# Patient Record
Sex: FEMALE | Race: WHITE | NOT HISPANIC OR LATINO | Employment: OTHER | URBAN - METROPOLITAN AREA
[De-identification: names, ages, dates, MRNs, and addresses within clinical notes are randomized per-mention and may not be internally consistent; named-entity substitution may affect disease eponyms.]

---

## 2018-03-22 DIAGNOSIS — R92.8 ABNORMAL MAMMOGRAM: Primary | ICD-10-CM

## 2018-04-20 DIAGNOSIS — M25.559 HIP PAIN: Primary | ICD-10-CM

## 2018-04-23 ENCOUNTER — HOSPITAL ENCOUNTER (OUTPATIENT)
Dept: RADIOLOGY | Facility: HOSPITAL | Age: 63
Discharge: HOME | End: 2018-04-23
Attending: NURSE PRACTITIONER
Payer: MEDICARE

## 2018-04-23 DIAGNOSIS — M25.559 HIP PAIN: ICD-10-CM

## 2018-04-23 PROCEDURE — 73721 MRI JNT OF LWR EXTRE W/O DYE: CPT | Mod: TC,RT

## 2018-04-23 PROCEDURE — 73721 MRI JNT OF LWR EXTRE W/O DYE: CPT | Mod: 26,RT,, | Performed by: RADIOLOGY

## 2018-07-16 ENCOUNTER — HOSPITAL ENCOUNTER (OUTPATIENT)
Dept: RADIOLOGY | Facility: HOSPITAL | Age: 63
Discharge: HOME | End: 2018-07-16
Attending: OBSTETRICS & GYNECOLOGY
Payer: MEDICARE

## 2018-07-16 VITALS — BODY MASS INDEX: 28.88 KG/M2 | WEIGHT: 169.13 LBS | HEIGHT: 64 IN

## 2018-07-16 DIAGNOSIS — R92.8 ABNORMAL MAMMOGRAM: ICD-10-CM

## 2018-07-16 PROCEDURE — 77066 DX MAMMO INCL CAD BI: CPT | Mod: TC

## 2018-07-16 PROCEDURE — 77066 DX MAMMO INCL CAD BI: CPT | Mod: 26,,, | Performed by: RADIOLOGY

## 2021-01-04 RX ORDER — ASPIRIN/ACETAMINOPHEN/CAFFEINE 250-250-65
1 TABLET ORAL
COMMUNITY

## 2021-01-04 RX ORDER — ASPIRIN 81 MG/1
1 TABLET ORAL DAILY
COMMUNITY

## 2021-01-04 RX ORDER — ALBUTEROL SULFATE 90 UG/1
2 AEROSOL, METERED RESPIRATORY (INHALATION) 4 TIMES DAILY PRN
COMMUNITY
End: 2021-01-05

## 2021-01-04 RX ORDER — NEBIVOLOL 10 MG/1
1 TABLET ORAL DAILY
COMMUNITY
Start: 2017-11-22 | End: 2021-06-11 | Stop reason: SDUPTHER

## 2021-01-04 RX ORDER — CYCLOBENZAPRINE HCL 10 MG
1 TABLET ORAL NIGHTLY
COMMUNITY
End: 2021-06-11 | Stop reason: SDUPTHER

## 2021-01-04 RX ORDER — CELECOXIB 200 MG/1
1 CAPSULE ORAL DAILY
COMMUNITY
End: 2021-06-21 | Stop reason: SDUPTHER

## 2021-01-04 RX ORDER — DOXEPIN HYDROCHLORIDE 25 MG/1
2 CAPSULE ORAL NIGHTLY
COMMUNITY
End: 2021-01-05 | Stop reason: SDUPTHER

## 2021-01-04 RX ORDER — FLUTICASONE PROPIONATE AND SALMETEROL 250; 50 UG/1; UG/1
1 POWDER RESPIRATORY (INHALATION) 2 TIMES DAILY
COMMUNITY
Start: 2017-12-12 | End: 2021-01-05

## 2021-01-04 RX ORDER — MONTELUKAST SODIUM 10 MG/1
1 TABLET ORAL DAILY
COMMUNITY
End: 2021-01-05 | Stop reason: SDUPTHER

## 2021-01-04 RX ORDER — BUPROPION HYDROCHLORIDE 100 MG/1
1 TABLET ORAL 2 TIMES DAILY
COMMUNITY
End: 2021-12-07

## 2021-01-04 RX ORDER — HYDROCODONE BITARTRATE AND ACETAMINOPHEN 10; 325 MG/1; MG/1
1 TABLET ORAL 3 TIMES DAILY PRN
COMMUNITY
End: 2021-01-05 | Stop reason: SDUPTHER

## 2021-01-04 RX ORDER — PANTOPRAZOLE SODIUM 40 MG/1
1 TABLET, DELAYED RELEASE ORAL DAILY
COMMUNITY
End: 2021-06-21 | Stop reason: SDUPTHER

## 2021-01-04 RX ORDER — FLUTICASONE PROPIONATE 50 MCG
1 SPRAY, SUSPENSION (ML) NASAL DAILY
COMMUNITY
End: 2022-07-06 | Stop reason: SDUPTHER

## 2021-01-04 RX ORDER — CETIRIZINE HYDROCHLORIDE 10 MG/1
1 TABLET ORAL DAILY
COMMUNITY
End: 2021-01-05 | Stop reason: SDUPTHER

## 2021-01-05 ENCOUNTER — OFFICE VISIT (OUTPATIENT)
Dept: FAMILY MEDICINE | Facility: CLINIC | Age: 66
End: 2021-01-05
Payer: MEDICARE

## 2021-01-05 VITALS
HEART RATE: 98 BPM | TEMPERATURE: 97 F | SYSTOLIC BLOOD PRESSURE: 140 MMHG | WEIGHT: 178 LBS | OXYGEN SATURATION: 98 % | DIASTOLIC BLOOD PRESSURE: 80 MMHG | HEIGHT: 64 IN | BODY MASS INDEX: 30.39 KG/M2

## 2021-01-05 DIAGNOSIS — J44.9 CHRONIC OBSTRUCTIVE PULMONARY DISEASE, UNSPECIFIED COPD TYPE: Primary | ICD-10-CM

## 2021-01-05 DIAGNOSIS — E78.5 HYPERLIPIDEMIA, UNSPECIFIED HYPERLIPIDEMIA TYPE: ICD-10-CM

## 2021-01-05 DIAGNOSIS — N95.2 VAGINAL ATROPHY: ICD-10-CM

## 2021-01-05 DIAGNOSIS — F33.42 RECURRENT MAJOR DEPRESSIVE DISORDER, IN FULL REMISSION: ICD-10-CM

## 2021-01-05 DIAGNOSIS — I10 HYPERTENSION, UNSPECIFIED TYPE: ICD-10-CM

## 2021-01-05 PROBLEM — F32.A DEPRESSIVE DISORDER: Status: ACTIVE | Noted: 2021-01-05

## 2021-01-05 PROBLEM — K21.9 GASTROESOPHAGEAL REFLUX DISEASE: Status: ACTIVE | Noted: 2017-05-02

## 2021-01-05 PROBLEM — M51.36 DEGENERATION OF LUMBAR INTERVERTEBRAL DISC: Status: ACTIVE | Noted: 2018-10-01

## 2021-01-05 PROBLEM — M51.369 DEGENERATION OF LUMBAR INTERVERTEBRAL DISC: Status: ACTIVE | Noted: 2018-10-01

## 2021-01-05 PROBLEM — J30.9 ALLERGIC RHINITIS: Status: ACTIVE | Noted: 2021-01-05

## 2021-01-05 PROBLEM — Z87.891 HISTORY OF SMOKING: Status: ACTIVE | Noted: 2017-06-02

## 2021-01-05 PROCEDURE — 99214 OFFICE O/P EST MOD 30 MIN: CPT | Mod: S$GLB,,, | Performed by: FAMILY MEDICINE

## 2021-01-05 PROCEDURE — 99214 PR OFFICE/OUTPT VISIT, EST, LEVL IV, 30-39 MIN: ICD-10-PCS | Mod: S$GLB,,, | Performed by: FAMILY MEDICINE

## 2021-01-05 RX ORDER — HYDROCHLOROTHIAZIDE 25 MG/1
25 TABLET ORAL DAILY
Qty: 90 TABLET | Refills: 3 | Status: SHIPPED | OUTPATIENT
Start: 2021-01-05 | End: 2022-03-10 | Stop reason: SDUPTHER

## 2021-01-05 RX ORDER — TRAVOPROST OPHTHALMIC SOLUTION 0.04 MG/ML
SOLUTION OPHTHALMIC
COMMUNITY
Start: 2020-12-02 | End: 2023-02-07

## 2021-01-05 RX ORDER — ALBUTEROL SULFATE 90 UG/1
2 AEROSOL, METERED RESPIRATORY (INHALATION) EVERY 6 HOURS PRN
Qty: 25.5 G | Refills: 3 | Status: SHIPPED | OUTPATIENT
Start: 2021-01-05 | End: 2021-04-07 | Stop reason: SDUPTHER

## 2021-01-05 RX ORDER — HYDROCODONE BITARTRATE AND ACETAMINOPHEN 10; 325 MG/1; MG/1
1 TABLET ORAL EVERY 8 HOURS PRN
Qty: 90 TABLET | Refills: 0 | Status: SHIPPED | OUTPATIENT
Start: 2021-01-05 | End: 2021-03-03 | Stop reason: SDUPTHER

## 2021-01-05 RX ORDER — HYDROCODONE BITARTRATE AND ACETAMINOPHEN 10; 325 MG/1; MG/1
1 TABLET ORAL EVERY 8 HOURS PRN
Qty: 90 TABLET | Refills: 0 | Status: SHIPPED | OUTPATIENT
Start: 2021-02-03 | End: 2021-03-03 | Stop reason: SDUPTHER

## 2021-01-05 RX ORDER — DOXYCYCLINE 100 MG/1
CAPSULE ORAL
COMMUNITY
Start: 2020-11-13 | End: 2021-04-07

## 2021-01-05 RX ORDER — BUDESONIDE 1 MG/2ML
2 INHALANT ORAL
COMMUNITY
End: 2021-06-09 | Stop reason: SDUPTHER

## 2021-01-05 RX ORDER — HYDROCHLOROTHIAZIDE 25 MG/1
TABLET ORAL
COMMUNITY
Start: 2020-12-18 | End: 2021-01-05 | Stop reason: SDUPTHER

## 2021-01-05 RX ORDER — CETIRIZINE HYDROCHLORIDE 10 MG/1
10 TABLET ORAL DAILY
Qty: 90 TABLET | Refills: 5 | Status: SHIPPED | OUTPATIENT
Start: 2021-01-05 | End: 2022-03-10 | Stop reason: SDUPTHER

## 2021-01-05 RX ORDER — FLUCONAZOLE 150 MG/1
150 TABLET ORAL
Qty: 4 TABLET | Refills: 1 | Status: SHIPPED | OUTPATIENT
Start: 2021-01-05 | End: 2021-04-07

## 2021-01-05 RX ORDER — DOXEPIN HYDROCHLORIDE 25 MG/1
50 CAPSULE ORAL NIGHTLY
Qty: 180 CAPSULE | Refills: 3 | Status: SHIPPED | OUTPATIENT
Start: 2021-01-05 | End: 2021-09-08 | Stop reason: SDUPTHER

## 2021-01-05 RX ORDER — FLUCONAZOLE 150 MG/1
TABLET ORAL
COMMUNITY
Start: 2020-11-13 | End: 2021-01-05 | Stop reason: SDUPTHER

## 2021-01-05 RX ORDER — IPRATROPIUM BROMIDE AND ALBUTEROL SULFATE 2.5; .5 MG/3ML; MG/3ML
3 SOLUTION RESPIRATORY (INHALATION)
Qty: 1 BOX | Refills: 5 | Status: SHIPPED | OUTPATIENT
Start: 2021-01-05 | End: 2021-06-09 | Stop reason: SDUPTHER

## 2021-01-05 RX ORDER — MONTELUKAST SODIUM 10 MG/1
10 TABLET ORAL DAILY
Qty: 90 TABLET | Refills: 3 | Status: SHIPPED | OUTPATIENT
Start: 2021-01-05 | End: 2022-03-10 | Stop reason: SDUPTHER

## 2021-01-05 RX ORDER — PREDNISONE 20 MG/1
TABLET ORAL
COMMUNITY
Start: 2020-11-13 | End: 2021-03-03

## 2021-01-05 RX ORDER — IPRATROPIUM BROMIDE AND ALBUTEROL SULFATE 2.5; .5 MG/3ML; MG/3ML
SOLUTION RESPIRATORY (INHALATION)
COMMUNITY
Start: 2020-12-10 | End: 2021-01-05 | Stop reason: SDUPTHER

## 2021-02-12 ENCOUNTER — TELEPHONE (OUTPATIENT)
Dept: FAMILY MEDICINE | Facility: CLINIC | Age: 66
End: 2021-02-12

## 2021-03-02 DIAGNOSIS — J44.9 CHRONIC OBSTRUCTIVE PULMONARY DISEASE, UNSPECIFIED COPD TYPE: ICD-10-CM

## 2021-03-02 RX ORDER — TRAMADOL HYDROCHLORIDE 50 MG/1
50 TABLET ORAL EVERY 6 HOURS PRN
COMMUNITY
Start: 2021-02-16 | End: 2021-04-07

## 2021-03-02 RX ORDER — GABAPENTIN 300 MG/1
CAPSULE ORAL
COMMUNITY
Start: 2021-02-16 | End: 2022-08-23

## 2021-03-03 ENCOUNTER — OFFICE VISIT (OUTPATIENT)
Dept: FAMILY MEDICINE | Facility: CLINIC | Age: 66
End: 2021-03-03
Payer: MEDICARE

## 2021-03-03 VITALS
SYSTOLIC BLOOD PRESSURE: 128 MMHG | WEIGHT: 181.63 LBS | TEMPERATURE: 98 F | HEIGHT: 64 IN | BODY MASS INDEX: 31.01 KG/M2 | HEART RATE: 75 BPM | DIASTOLIC BLOOD PRESSURE: 80 MMHG

## 2021-03-03 DIAGNOSIS — R07.81 PLEURITIC PAIN: ICD-10-CM

## 2021-03-03 DIAGNOSIS — J44.9 CHRONIC OBSTRUCTIVE PULMONARY DISEASE, UNSPECIFIED COPD TYPE: ICD-10-CM

## 2021-03-03 DIAGNOSIS — M51.36 DEGENERATION OF LUMBAR INTERVERTEBRAL DISC: Primary | ICD-10-CM

## 2021-03-03 DIAGNOSIS — Z96.652 STATUS POST TOTAL LEFT KNEE REPLACEMENT: ICD-10-CM

## 2021-03-03 DIAGNOSIS — G89.4 CHRONIC PAIN SYNDROME: ICD-10-CM

## 2021-03-03 PROCEDURE — 99213 PR OFFICE/OUTPT VISIT, EST, LEVL III, 20-29 MIN: ICD-10-PCS | Mod: S$GLB,,, | Performed by: NURSE PRACTITIONER

## 2021-03-03 PROCEDURE — 99213 OFFICE O/P EST LOW 20 MIN: CPT | Mod: S$GLB,,, | Performed by: NURSE PRACTITIONER

## 2021-03-03 RX ORDER — HYDROCODONE BITARTRATE AND ACETAMINOPHEN 10; 325 MG/1; MG/1
1 TABLET ORAL EVERY 8 HOURS PRN
Qty: 90 TABLET | Refills: 0 | Status: SHIPPED | OUTPATIENT
Start: 2021-03-03 | End: 2021-03-03

## 2021-03-03 RX ORDER — HYDROCODONE BITARTRATE AND ACETAMINOPHEN 10; 325 MG/1; MG/1
1 TABLET ORAL EVERY 8 HOURS PRN
Qty: 90 TABLET | Refills: 0 | Status: SHIPPED | OUTPATIENT
Start: 2021-03-03 | End: 2023-03-20

## 2021-03-03 RX ORDER — PREDNISONE 20 MG/1
20 TABLET ORAL DAILY
Qty: 5 TABLET | Refills: 0 | Status: SHIPPED | OUTPATIENT
Start: 2021-03-03 | End: 2021-04-07

## 2021-03-03 RX ORDER — HYDROCODONE BITARTRATE AND ACETAMINOPHEN 10; 325 MG/1; MG/1
1 TABLET ORAL EVERY 8 HOURS PRN
Qty: 90 TABLET | Refills: 0 | Status: SHIPPED | OUTPATIENT
Start: 2021-04-03 | End: 2021-03-03

## 2021-03-03 RX ORDER — HYDROCODONE BITARTRATE AND ACETAMINOPHEN 10; 325 MG/1; MG/1
1 TABLET ORAL EVERY 8 HOURS PRN
Qty: 90 TABLET | Refills: 0 | Status: SHIPPED | OUTPATIENT
Start: 2021-04-03 | End: 2021-06-09 | Stop reason: SDUPTHER

## 2021-03-05 DIAGNOSIS — Z12.11 COLON CANCER SCREENING: ICD-10-CM

## 2021-03-08 RX ORDER — PREDNISONE 20 MG/1
TABLET ORAL
OUTPATIENT
Start: 2021-03-08

## 2021-03-08 RX ORDER — HYDROCODONE BITARTRATE AND ACETAMINOPHEN 10; 325 MG/1; MG/1
1 TABLET ORAL EVERY 8 HOURS PRN
Qty: 90 TABLET | Refills: 0 | OUTPATIENT
Start: 2021-03-08

## 2021-03-16 ENCOUNTER — TELEPHONE (OUTPATIENT)
Dept: FAMILY MEDICINE | Facility: CLINIC | Age: 66
End: 2021-03-16

## 2021-03-30 ENCOUNTER — TELEPHONE (OUTPATIENT)
Dept: FAMILY MEDICINE | Facility: CLINIC | Age: 66
End: 2021-03-30

## 2021-04-07 ENCOUNTER — OFFICE VISIT (OUTPATIENT)
Dept: FAMILY MEDICINE | Facility: CLINIC | Age: 66
End: 2021-04-07
Payer: MEDICAID

## 2021-04-07 VITALS
WEIGHT: 178.63 LBS | DIASTOLIC BLOOD PRESSURE: 80 MMHG | SYSTOLIC BLOOD PRESSURE: 130 MMHG | HEIGHT: 64 IN | OXYGEN SATURATION: 97 % | TEMPERATURE: 99 F | HEART RATE: 79 BPM | BODY MASS INDEX: 30.5 KG/M2

## 2021-04-07 DIAGNOSIS — J44.9 CHRONIC OBSTRUCTIVE PULMONARY DISEASE, UNSPECIFIED COPD TYPE: Primary | ICD-10-CM

## 2021-04-07 DIAGNOSIS — F17.200 NICOTINE DEPENDENCE WITH CURRENT USE: ICD-10-CM

## 2021-04-07 PROCEDURE — 99213 OFFICE O/P EST LOW 20 MIN: CPT | Mod: S$GLB,,, | Performed by: NURSE PRACTITIONER

## 2021-04-07 PROCEDURE — 99213 PR OFFICE/OUTPT VISIT, EST, LEVL III, 20-29 MIN: ICD-10-PCS | Mod: S$GLB,,, | Performed by: NURSE PRACTITIONER

## 2021-04-07 RX ORDER — ALBUTEROL SULFATE 90 UG/1
2 AEROSOL, METERED RESPIRATORY (INHALATION) EVERY 6 HOURS PRN
Qty: 25.5 G | Refills: 3 | Status: SHIPPED | OUTPATIENT
Start: 2021-04-07 | End: 2022-03-10 | Stop reason: SDUPTHER

## 2021-04-07 RX ORDER — IBUPROFEN 200 MG
1 TABLET ORAL DAILY
Qty: 28 PATCH | Refills: 0 | Status: SHIPPED | OUTPATIENT
Start: 2021-04-07 | End: 2021-06-09

## 2021-04-07 RX ORDER — BUDESONIDE AND FORMOTEROL FUMARATE DIHYDRATE 160; 4.5 UG/1; UG/1
2 AEROSOL RESPIRATORY (INHALATION) EVERY 12 HOURS
Qty: 10.2 G | Refills: 5 | Status: SHIPPED | OUTPATIENT
Start: 2021-04-07 | End: 2021-04-09 | Stop reason: ALTCHOICE

## 2021-04-07 RX ORDER — PREDNISONE 20 MG/1
20 TABLET ORAL DAILY
Qty: 5 TABLET | Refills: 0 | Status: SHIPPED | OUTPATIENT
Start: 2021-04-07 | End: 2021-06-09

## 2021-04-08 ENCOUNTER — TELEPHONE (OUTPATIENT)
Dept: FAMILY MEDICINE | Facility: CLINIC | Age: 66
End: 2021-04-08

## 2021-04-08 DIAGNOSIS — J44.9 CHRONIC OBSTRUCTIVE PULMONARY DISEASE, UNSPECIFIED COPD TYPE: Primary | ICD-10-CM

## 2021-04-09 ENCOUNTER — TELEPHONE (OUTPATIENT)
Dept: FAMILY MEDICINE | Facility: CLINIC | Age: 66
End: 2021-04-09

## 2021-04-09 DIAGNOSIS — J44.9 CHRONIC OBSTRUCTIVE PULMONARY DISEASE, UNSPECIFIED COPD TYPE: ICD-10-CM

## 2021-04-12 ENCOUNTER — TELEPHONE (OUTPATIENT)
Dept: FAMILY MEDICINE | Facility: CLINIC | Age: 66
End: 2021-04-12

## 2021-06-09 ENCOUNTER — OFFICE VISIT (OUTPATIENT)
Dept: FAMILY MEDICINE | Facility: CLINIC | Age: 66
End: 2021-06-09
Payer: MEDICAID

## 2021-06-09 ENCOUNTER — TELEPHONE (OUTPATIENT)
Dept: FAMILY MEDICINE | Facility: CLINIC | Age: 66
End: 2021-06-09

## 2021-06-09 VITALS
BODY MASS INDEX: 30.3 KG/M2 | WEIGHT: 177.5 LBS | TEMPERATURE: 98 F | OXYGEN SATURATION: 96 % | DIASTOLIC BLOOD PRESSURE: 80 MMHG | HEART RATE: 88 BPM | HEIGHT: 64 IN | SYSTOLIC BLOOD PRESSURE: 124 MMHG

## 2021-06-09 DIAGNOSIS — F17.210 NICOTINE DEPENDENCE, CIGARETTES, UNCOMPLICATED: ICD-10-CM

## 2021-06-09 DIAGNOSIS — J44.9 CHRONIC OBSTRUCTIVE PULMONARY DISEASE, UNSPECIFIED COPD TYPE: ICD-10-CM

## 2021-06-09 PROCEDURE — 99214 PR OFFICE/OUTPT VISIT, EST, LEVL IV, 30-39 MIN: ICD-10-PCS | Mod: S$GLB,,, | Performed by: NURSE PRACTITIONER

## 2021-06-09 PROCEDURE — 99214 OFFICE O/P EST MOD 30 MIN: CPT | Mod: S$GLB,,, | Performed by: NURSE PRACTITIONER

## 2021-06-09 RX ORDER — BUDESONIDE AND FORMOTEROL FUMARATE DIHYDRATE 160; 4.5 UG/1; UG/1
2 AEROSOL RESPIRATORY (INHALATION) EVERY 12 HOURS
Qty: 10.2 G | Refills: 5 | Status: SHIPPED | OUTPATIENT
Start: 2021-06-09 | End: 2022-03-10 | Stop reason: SDUPTHER

## 2021-06-09 RX ORDER — IPRATROPIUM BROMIDE AND ALBUTEROL SULFATE 2.5; .5 MG/3ML; MG/3ML
3 SOLUTION RESPIRATORY (INHALATION)
Qty: 1 BOX | Refills: 5 | Status: SHIPPED | OUTPATIENT
Start: 2021-06-09 | End: 2022-02-11 | Stop reason: SDUPTHER

## 2021-06-11 ENCOUNTER — TELEPHONE (OUTPATIENT)
Dept: FAMILY MEDICINE | Facility: CLINIC | Age: 66
End: 2021-06-11

## 2021-06-11 DIAGNOSIS — I10 HYPERTENSION, UNSPECIFIED TYPE: ICD-10-CM

## 2021-06-11 DIAGNOSIS — M51.36 DEGENERATION OF LUMBAR INTERVERTEBRAL DISC: Primary | ICD-10-CM

## 2021-06-11 RX ORDER — CYCLOBENZAPRINE HCL 10 MG
10 TABLET ORAL NIGHTLY
Qty: 90 TABLET | Refills: 3 | Status: SHIPPED | OUTPATIENT
Start: 2021-06-11

## 2021-06-11 RX ORDER — NEBIVOLOL 10 MG/1
10 TABLET ORAL DAILY
Qty: 90 TABLET | Refills: 3 | Status: SHIPPED | OUTPATIENT
Start: 2021-06-11 | End: 2022-03-17

## 2021-06-21 DIAGNOSIS — J44.9 CHRONIC OBSTRUCTIVE PULMONARY DISEASE, UNSPECIFIED COPD TYPE: ICD-10-CM

## 2021-06-21 RX ORDER — PANTOPRAZOLE SODIUM 40 MG/1
40 TABLET, DELAYED RELEASE ORAL DAILY
Qty: 90 TABLET | Refills: 3 | Status: SHIPPED | OUTPATIENT
Start: 2021-06-21 | End: 2022-03-10 | Stop reason: SDUPTHER

## 2021-06-21 RX ORDER — CELECOXIB 200 MG/1
200 CAPSULE ORAL DAILY
Qty: 90 CAPSULE | Refills: 1 | Status: SHIPPED | OUTPATIENT
Start: 2021-06-21 | End: 2021-09-08 | Stop reason: SDUPTHER

## 2021-06-22 ENCOUNTER — HOSPITAL ENCOUNTER (OUTPATIENT)
Dept: RADIOLOGY | Facility: HOSPITAL | Age: 66
Discharge: HOME | End: 2021-06-22
Attending: NURSE PRACTITIONER
Payer: MEDICARE

## 2021-06-22 DIAGNOSIS — J44.9 CHRONIC OBSTRUCTIVE PULMONARY DISEASE, UNSPECIFIED COPD TYPE: ICD-10-CM

## 2021-06-22 DIAGNOSIS — F17.210 NICOTINE DEPENDENCE, CIGARETTES, UNCOMPLICATED: ICD-10-CM

## 2021-06-22 PROCEDURE — 71271 CT THORAX LUNG CANCER SCR C-: CPT | Mod: TC,PO

## 2021-07-21 ENCOUNTER — OFFICE VISIT (OUTPATIENT)
Dept: FAMILY MEDICINE | Facility: CLINIC | Age: 66
End: 2021-07-21
Payer: MEDICARE

## 2021-07-21 VITALS
RESPIRATION RATE: 20 BRPM | TEMPERATURE: 99 F | BODY MASS INDEX: 30.09 KG/M2 | SYSTOLIC BLOOD PRESSURE: 142 MMHG | OXYGEN SATURATION: 88 % | DIASTOLIC BLOOD PRESSURE: 79 MMHG | WEIGHT: 176.25 LBS | HEART RATE: 96 BPM | HEIGHT: 64 IN

## 2021-07-21 DIAGNOSIS — J44.9 CHRONIC OBSTRUCTIVE PULMONARY DISEASE, UNSPECIFIED COPD TYPE: ICD-10-CM

## 2021-07-21 DIAGNOSIS — I10 ESSENTIAL HYPERTENSION: ICD-10-CM

## 2021-07-21 DIAGNOSIS — T36.95XA ANTIBIOTIC-INDUCED YEAST INFECTION: ICD-10-CM

## 2021-07-21 DIAGNOSIS — L03.114 CELLULITIS OF LEFT ARM: Primary | ICD-10-CM

## 2021-07-21 DIAGNOSIS — B37.9 ANTIBIOTIC-INDUCED YEAST INFECTION: ICD-10-CM

## 2021-07-21 PROCEDURE — 99213 OFFICE O/P EST LOW 20 MIN: CPT | Mod: S$GLB,,, | Performed by: NURSE PRACTITIONER

## 2021-07-21 PROCEDURE — 99213 PR OFFICE/OUTPT VISIT, EST, LEVL III, 20-29 MIN: ICD-10-PCS | Mod: S$GLB,,, | Performed by: NURSE PRACTITIONER

## 2021-07-21 RX ORDER — FLUCONAZOLE 150 MG/1
150 TABLET ORAL DAILY
Qty: 1 TABLET | Refills: 0 | Status: SHIPPED | OUTPATIENT
Start: 2021-07-21 | End: 2021-07-22

## 2021-07-21 RX ORDER — CEPHALEXIN 500 MG/1
500 CAPSULE ORAL EVERY 12 HOURS
Qty: 20 CAPSULE | Refills: 0 | Status: SHIPPED | OUTPATIENT
Start: 2021-07-21 | End: 2021-09-08

## 2021-09-08 ENCOUNTER — OFFICE VISIT (OUTPATIENT)
Dept: FAMILY MEDICINE | Facility: CLINIC | Age: 66
End: 2021-09-08
Payer: MEDICARE

## 2021-09-08 VITALS
TEMPERATURE: 98 F | BODY MASS INDEX: 30.45 KG/M2 | HEIGHT: 64 IN | OXYGEN SATURATION: 96 % | HEART RATE: 78 BPM | DIASTOLIC BLOOD PRESSURE: 80 MMHG | WEIGHT: 178.38 LBS | SYSTOLIC BLOOD PRESSURE: 124 MMHG

## 2021-09-08 DIAGNOSIS — F33.42 RECURRENT MAJOR DEPRESSIVE DISORDER, IN FULL REMISSION: ICD-10-CM

## 2021-09-08 DIAGNOSIS — F32.A DEPRESSIVE DISORDER: ICD-10-CM

## 2021-09-08 DIAGNOSIS — I10 ESSENTIAL HYPERTENSION: ICD-10-CM

## 2021-09-08 DIAGNOSIS — M51.36 DEGENERATION OF LUMBAR INTERVERTEBRAL DISC: ICD-10-CM

## 2021-09-08 DIAGNOSIS — J44.9 CHRONIC OBSTRUCTIVE PULMONARY DISEASE, UNSPECIFIED COPD TYPE: ICD-10-CM

## 2021-09-08 DIAGNOSIS — F17.200 NICOTINE DEPENDENCE WITH CURRENT USE: Primary | ICD-10-CM

## 2021-09-08 DIAGNOSIS — N95.2 ATROPHIC VAGINITIS: ICD-10-CM

## 2021-09-08 DIAGNOSIS — B37.31 VAGINAL CANDIDIASIS: ICD-10-CM

## 2021-09-08 PROCEDURE — 99214 PR OFFICE/OUTPT VISIT, EST, LEVL IV, 30-39 MIN: ICD-10-PCS | Mod: S$GLB,,, | Performed by: NURSE PRACTITIONER

## 2021-09-08 PROCEDURE — 99214 OFFICE O/P EST MOD 30 MIN: CPT | Mod: S$GLB,,, | Performed by: NURSE PRACTITIONER

## 2021-09-08 RX ORDER — CELECOXIB 200 MG/1
200 CAPSULE ORAL DAILY
Qty: 90 CAPSULE | Refills: 1 | Status: SHIPPED | OUTPATIENT
Start: 2021-09-08 | End: 2022-03-10 | Stop reason: SDUPTHER

## 2021-09-08 RX ORDER — FLUCONAZOLE 150 MG/1
150 TABLET ORAL DAILY
Qty: 2 TABLET | Refills: 0 | Status: SHIPPED | OUTPATIENT
Start: 2021-09-08 | End: 2021-09-09

## 2021-09-08 RX ORDER — DOXEPIN HYDROCHLORIDE 25 MG/1
50 CAPSULE ORAL NIGHTLY
Qty: 180 CAPSULE | Refills: 3 | Status: SHIPPED | OUTPATIENT
Start: 2021-09-08 | End: 2022-09-08 | Stop reason: SDUPTHER

## 2021-10-11 ENCOUNTER — TELEPHONE (OUTPATIENT)
Dept: PEDIATRICS | Facility: CLINIC | Age: 66
End: 2021-10-11

## 2021-10-11 ENCOUNTER — OFFICE VISIT (OUTPATIENT)
Dept: FAMILY MEDICINE | Facility: CLINIC | Age: 66
End: 2021-10-11
Payer: MEDICARE

## 2021-10-11 VITALS
TEMPERATURE: 98 F | BODY MASS INDEX: 30.39 KG/M2 | OXYGEN SATURATION: 95 % | SYSTOLIC BLOOD PRESSURE: 144 MMHG | RESPIRATION RATE: 18 BRPM | HEIGHT: 64 IN | WEIGHT: 178 LBS | HEART RATE: 72 BPM | DIASTOLIC BLOOD PRESSURE: 76 MMHG

## 2021-10-11 DIAGNOSIS — N30.01 ACUTE CYSTITIS WITH HEMATURIA: ICD-10-CM

## 2021-10-11 DIAGNOSIS — G89.4 CHRONIC PAIN SYNDROME: ICD-10-CM

## 2021-10-11 DIAGNOSIS — E66.09 CLASS 1 OBESITY DUE TO EXCESS CALORIES WITHOUT SERIOUS COMORBIDITY WITH BODY MASS INDEX (BMI) OF 30.0 TO 30.9 IN ADULT: ICD-10-CM

## 2021-10-11 DIAGNOSIS — J44.1 COPD WITH ACUTE EXACERBATION: Primary | ICD-10-CM

## 2021-10-11 DIAGNOSIS — I10 PRIMARY HYPERTENSION: ICD-10-CM

## 2021-10-11 DIAGNOSIS — R30.0 BURNING WITH URINATION: ICD-10-CM

## 2021-10-11 DIAGNOSIS — J44.9 CHRONIC OBSTRUCTIVE PULMONARY DISEASE, UNSPECIFIED COPD TYPE: ICD-10-CM

## 2021-10-11 LAB
BILIRUB SERPL-MCNC: NORMAL MG/DL
BLOOD URINE, POC: ABNORMAL
CLARITY, POC UA: ABNORMAL
COLOR, POC UA: ABNORMAL
GLUCOSE UR QL STRIP: NORMAL
KETONES UR QL STRIP: NEGATIVE
LEUKOCYTE ESTERASE URINE, POC: ABNORMAL
NITRITE, POC UA: POSITIVE
PH, POC UA: 5
PROTEIN, POC: ABNORMAL
SPECIFIC GRAVITY, POC UA: 1.01
UROBILINOGEN, POC UA: NORMAL

## 2021-10-11 PROCEDURE — 87086 URINE CULTURE/COLONY COUNT: CPT | Performed by: FAMILY MEDICINE

## 2021-10-11 PROCEDURE — 87077 CULTURE AEROBIC IDENTIFY: CPT | Performed by: FAMILY MEDICINE

## 2021-10-11 PROCEDURE — 87186 SC STD MICRODIL/AGAR DIL: CPT | Performed by: FAMILY MEDICINE

## 2021-10-11 PROCEDURE — 99214 OFFICE O/P EST MOD 30 MIN: CPT | Mod: S$GLB,,, | Performed by: FAMILY MEDICINE

## 2021-10-11 PROCEDURE — 81002 POCT URINE DIPSTICK WITHOUT MICROSCOPE: ICD-10-PCS | Mod: S$GLB,,, | Performed by: FAMILY MEDICINE

## 2021-10-11 PROCEDURE — 81002 URINALYSIS NONAUTO W/O SCOPE: CPT | Mod: S$GLB,,, | Performed by: FAMILY MEDICINE

## 2021-10-11 PROCEDURE — 99214 PR OFFICE/OUTPT VISIT, EST, LEVL IV, 30-39 MIN: ICD-10-PCS | Mod: S$GLB,,, | Performed by: FAMILY MEDICINE

## 2021-10-11 PROCEDURE — 87088 URINE BACTERIA CULTURE: CPT | Performed by: FAMILY MEDICINE

## 2021-10-11 RX ORDER — DOXYCYCLINE HYCLATE 100 MG
100 TABLET ORAL 2 TIMES DAILY
Qty: 14 TABLET | Refills: 0 | Status: SHIPPED | OUTPATIENT
Start: 2021-10-11 | End: 2021-10-18

## 2021-10-11 RX ORDER — PHENAZOPYRIDINE HYDROCHLORIDE 200 MG/1
200 TABLET, FILM COATED ORAL 3 TIMES DAILY PRN
Qty: 20 TABLET | Refills: 0 | Status: SHIPPED | OUTPATIENT
Start: 2021-10-11 | End: 2021-10-13

## 2021-10-11 RX ORDER — METHYLPREDNISOLONE 4 MG/1
TABLET ORAL
Qty: 21 TABLET | Refills: 0 | Status: SHIPPED | OUTPATIENT
Start: 2021-10-11 | End: 2022-03-10 | Stop reason: ALTCHOICE

## 2021-10-14 LAB — BACTERIA UR CULT: ABNORMAL

## 2021-10-19 ENCOUNTER — TELEPHONE (OUTPATIENT)
Dept: PEDIATRICS | Facility: CLINIC | Age: 66
End: 2021-10-19

## 2021-10-20 ENCOUNTER — TELEPHONE (OUTPATIENT)
Dept: FAMILY MEDICINE | Facility: CLINIC | Age: 66
End: 2021-10-20

## 2021-10-27 DIAGNOSIS — Z12.31 OTHER SCREENING MAMMOGRAM: ICD-10-CM

## 2021-11-13 ENCOUNTER — OFFICE VISIT (OUTPATIENT)
Dept: URGENT CARE | Facility: CLINIC | Age: 66
End: 2021-11-13
Payer: MEDICARE

## 2021-11-13 VITALS
DIASTOLIC BLOOD PRESSURE: 73 MMHG | BODY MASS INDEX: 30.39 KG/M2 | RESPIRATION RATE: 18 BRPM | WEIGHT: 178 LBS | OXYGEN SATURATION: 92 % | TEMPERATURE: 99 F | HEIGHT: 64 IN | HEART RATE: 81 BPM | SYSTOLIC BLOOD PRESSURE: 147 MMHG

## 2021-11-13 DIAGNOSIS — M79.602 PAIN IN LEFT ARM: ICD-10-CM

## 2021-11-13 DIAGNOSIS — L02.414 ABSCESS OF LEFT FOREARM: Primary | ICD-10-CM

## 2021-11-13 PROCEDURE — 99213 PR OFFICE/OUTPT VISIT, EST, LEVL III, 20-29 MIN: ICD-10-PCS | Mod: S$GLB,,, | Performed by: NURSE PRACTITIONER

## 2021-11-13 PROCEDURE — 99213 OFFICE O/P EST LOW 20 MIN: CPT | Mod: S$GLB,,, | Performed by: NURSE PRACTITIONER

## 2021-11-13 RX ORDER — SULFAMETHOXAZOLE AND TRIMETHOPRIM 800; 160 MG/1; MG/1
1 TABLET ORAL 2 TIMES DAILY
Qty: 20 TABLET | Refills: 0 | Status: SHIPPED | OUTPATIENT
Start: 2021-11-13 | End: 2022-03-10

## 2021-11-13 RX ORDER — SULFAMETHOXAZOLE AND TRIMETHOPRIM 800; 160 MG/1; MG/1
1 TABLET ORAL 2 TIMES DAILY
Qty: 20 TABLET | Refills: 0 | Status: SHIPPED | OUTPATIENT
Start: 2021-11-13 | End: 2021-11-23

## 2021-11-21 LAB
BACTERIA SPEC CULT: ABNORMAL
MICROORGANISM/AGENT SPEC: ABNORMAL
OTHER ANTIBIOTIC SUSC ISLT: ABNORMAL

## 2021-12-06 DIAGNOSIS — F33.42 RECURRENT MAJOR DEPRESSIVE DISORDER, IN FULL REMISSION: ICD-10-CM

## 2021-12-07 RX ORDER — BUPROPION HYDROCHLORIDE 100 MG/1
100 TABLET ORAL 3 TIMES DAILY
Qty: 270 TABLET | Refills: 1 | Status: SHIPPED | OUTPATIENT
Start: 2021-12-07 | End: 2022-05-05

## 2022-01-05 ENCOUNTER — TELEPHONE (OUTPATIENT)
Dept: FAMILY MEDICINE | Facility: CLINIC | Age: 67
End: 2022-01-05
Payer: MEDICARE

## 2022-01-05 NOTE — TELEPHONE ENCOUNTER
----- Message from Vinita Chavez sent at 1/5/2022 10:31 AM CST -----  Contact: pt  Type:  Sooner Apoointment Request    Caller is requesting a sooner appointment.  Caller declined first available appointment listed below.  Caller will not accept being placed on the waitlist and is requesting a message be sent to doctor.    Name of Caller:  pt  When is the first available appointment?  1/25  Symptoms:  chest congestion  Best Call Back Number:  [phone]    Additional Information:

## 2022-01-05 NOTE — TELEPHONE ENCOUNTER
LM for pt to call back to notify to [facility] for treatment due to not having any available appointments

## 2022-01-10 ENCOUNTER — TELEPHONE (OUTPATIENT)
Dept: FAMILY MEDICINE | Facility: CLINIC | Age: 67
End: 2022-01-10
Payer: MEDICARE

## 2022-01-10 DIAGNOSIS — U07.1 COVID-19: Primary | ICD-10-CM

## 2022-01-10 DIAGNOSIS — J43.9 PULMONARY EMPHYSEMA, UNSPECIFIED EMPHYSEMA TYPE: ICD-10-CM

## 2022-01-10 NOTE — TELEPHONE ENCOUNTER
Called and spoke with patient - patient advises that she is very concerned with her history of respiratory issues that she will not fare well without the infusion being ordered. Patient advises that she was seen and tested positive for COVID Saturday at one of the local [facility] (located by [employer]). Explained to patient that I will consult with provider and will relay further information to her regarding whether or not provider can order without being seen.

## 2022-01-10 NOTE — TELEPHONE ENCOUNTER
Monoclonal antibody infusion order placed.  Scheduling should make contact with her to set this up

## 2022-01-10 NOTE — TELEPHONE ENCOUNTER
----- Message from Ted Brennan sent at 1/10/2022  9:36 AM CST -----  Contact: pt  Type: Needs Medical Advice  Who Called:  pt  Symptoms (please be specific):  tested positive for Covid  How long has patient had these symptoms:  Since Saturday    Best Call Back Number: [phone]    Additional Information: pt called and wants the Covid Antibody infusion.  Can you pleaser contact her and disucss and get the order from the NP?

## 2022-01-11 ENCOUNTER — TELEPHONE (OUTPATIENT)
Dept: FAMILY MEDICINE | Facility: CLINIC | Age: 67
End: 2022-01-11
Payer: MEDICARE

## 2022-01-11 NOTE — TELEPHONE ENCOUNTER
----- Message from Trang Joy sent at 1/11/2022 11:20 AM CST -----  Contact: pt  Type: Needs Medical Advice    Who Called: pt    Best Call Back Number: [phone]    Inquiry/Question: pt is calling in reference to her infusion. States the order was put in yesterday and hasn't heard anything yet.      Thank you~

## 2022-01-11 NOTE — TELEPHONE ENCOUNTER
Sent in basket message to [facility] clinical support staff requesting for them to reach out directly to the patient.

## 2022-01-13 ENCOUNTER — INFUSION (OUTPATIENT)
Dept: INFECTIOUS DISEASES | Facility: HOSPITAL | Age: 67
End: 2022-01-13
Attending: NURSE PRACTITIONER
Payer: MEDICARE

## 2022-01-13 VITALS
SYSTOLIC BLOOD PRESSURE: 140 MMHG | RESPIRATION RATE: 18 BRPM | TEMPERATURE: 98 F | HEART RATE: 75 BPM | OXYGEN SATURATION: 75 % | DIASTOLIC BLOOD PRESSURE: 62 MMHG

## 2022-01-13 DIAGNOSIS — U07.1 COVID-19: Primary | ICD-10-CM

## 2022-01-13 DIAGNOSIS — J43.9 PULMONARY EMPHYSEMA, UNSPECIFIED EMPHYSEMA TYPE: ICD-10-CM

## 2022-01-13 PROCEDURE — 63600175 PHARM REV CODE 636 W HCPCS: Performed by: NURSE PRACTITIONER

## 2022-01-13 PROCEDURE — M0243 CASIRIVI AND IMDEVI INFUSION: HCPCS | Performed by: NURSE PRACTITIONER

## 2022-01-13 PROCEDURE — 25000003 PHARM REV CODE 250: Performed by: NURSE PRACTITIONER

## 2022-01-13 RX ORDER — SODIUM CHLORIDE 0.9 % (FLUSH) 0.9 %
10 SYRINGE (ML) INJECTION
Status: DISCONTINUED | OUTPATIENT
Start: 2022-01-13 | End: 2022-03-10

## 2022-01-13 RX ORDER — ALBUTEROL SULFATE 90 UG/1
2 AEROSOL, METERED RESPIRATORY (INHALATION)
Status: DISCONTINUED | OUTPATIENT
Start: 2022-01-13 | End: 2022-03-10

## 2022-01-13 RX ORDER — ONDANSETRON 4 MG/1
4 TABLET, ORALLY DISINTEGRATING ORAL ONCE AS NEEDED
Status: DISCONTINUED | OUTPATIENT
Start: 2022-01-13 | End: 2022-03-10

## 2022-01-13 RX ORDER — EPINEPHRINE 0.3 MG/.3ML
0.3 INJECTION SUBCUTANEOUS
Status: DISCONTINUED | OUTPATIENT
Start: 2022-01-13 | End: 2022-03-10

## 2022-01-13 RX ORDER — DIPHENHYDRAMINE HYDROCHLORIDE 50 MG/ML
25 INJECTION INTRAMUSCULAR; INTRAVENOUS ONCE AS NEEDED
Status: DISCONTINUED | OUTPATIENT
Start: 2022-01-13 | End: 2022-03-10

## 2022-01-13 RX ORDER — ACETAMINOPHEN 325 MG/1
650 TABLET ORAL ONCE AS NEEDED
Status: DISCONTINUED | OUTPATIENT
Start: 2022-01-13 | End: 2022-03-10

## 2022-01-13 RX ADMIN — CASIRIVIMAB AND IMDEVIMAB 600 MG: 600; 600 INJECTION, SOLUTION, CONCENTRATE INTRAVENOUS at 08:01

## 2022-01-13 RX ADMIN — SODIUM CHLORIDE: 0.9 INJECTION, SOLUTION INTRAVENOUS at 08:01

## 2022-01-15 ENCOUNTER — NURSE TRIAGE (OUTPATIENT)
Dept: ADMINISTRATIVE | Facility: CLINIC | Age: 67
End: 2022-01-15
Payer: MEDICARE

## 2022-01-15 NOTE — TELEPHONE ENCOUNTER
Pt tested positive last Saturday or Sunday. Pt stated she still have yellow drainage when she blow her nose. Middle of her tongue has bumps. Pt received the infusion on Thursday. Sob with movement and exertion. Care advice recommend pt go to [facility]. Pt verbalized understanding.     Reason for Disposition   MILD difficulty breathing (e.g., minimal/no SOB at rest, SOB with walking, pulse <100)    Additional Information   Negative: SEVERE difficulty breathing (e.g., struggling for each breath, speaks in single words)   Negative: Difficult to awaken or acting confused (e.g., disoriented, slurred speech)   Negative: Bluish (or gray) lips or face now   Negative: Shock suspected (e.g., cold/pale/clammy skin, too weak to stand, low BP, rapid pulse)   Negative: Sounds like a life-threatening emergency to the triager   Negative: SEVERE or constant chest pain or pressure (Exception: mild central chest pain, present only when coughing)   Negative: MODERATE difficulty breathing (e.g., speaks in phrases, SOB even at rest, pulse 100-120)   Negative: [1] Headache AND [2] stiff neck (can't touch chin to chest)    Protocols used: CORONAVIRUS (COVID-19) DIAGNOSED OR [id number]

## 2022-02-11 DIAGNOSIS — J44.9 CHRONIC OBSTRUCTIVE PULMONARY DISEASE, UNSPECIFIED COPD TYPE: ICD-10-CM

## 2022-02-11 RX ORDER — IPRATROPIUM BROMIDE AND ALBUTEROL SULFATE 2.5; .5 MG/3ML; MG/3ML
3 SOLUTION RESPIRATORY (INHALATION)
Qty: 1 EACH | Refills: 5 | Status: SHIPPED | OUTPATIENT
Start: 2022-02-11 | End: 2022-03-10 | Stop reason: SDUPTHER

## 2022-02-11 NOTE — TELEPHONE ENCOUNTER
LOV:10/11/21 You    NOV:3/10/22 Fuentes    Preffered Pharmacy:[facility] - [place], [place] - [address]    Last Prescribed:    albuterol-ipratropium (DUO-NEB) 2.5 mg-0.5 mg/3 mL nebulizer solution 1 Box 5       Medication Pended

## 2022-02-11 NOTE — TELEPHONE ENCOUNTER
----- Message from Terri Coronado sent at 2/11/2022 11:44 AM CST -----  Type:  RX Refill Request  Who Called: Patient  Refill or New Rx: refill  RX Name and Strength: albuterol-ipratropium (DUO-NEB) 2.5 mg-0.5 mg/3 mL nebulizer solution 1 Box   How is the patient currently taking it? (ex. 1XDay):    Is this a 30 day or 90 day RX:    Preferred Pharmacy with phone number:  [facility]  [place], [place] - [address]   Phone:  [phone]  Fax:  [phone]  Local or Mail Order:  Local  Ordering Provider:  Elba Dill NP  Best Call Back Number:  [phone]  Additional Information: Pt states she needs prior auth for refills on Rx albuterol-ipratropium (DUO-NEB) 2.5 mg-0.5 mg/3 mL nebulizer solution 1 Box

## 2022-02-14 ENCOUNTER — TELEPHONE (OUTPATIENT)
Dept: FAMILY MEDICINE | Facility: CLINIC | Age: 67
End: 2022-02-14
Payer: MEDICARE

## 2022-02-14 ENCOUNTER — OFFICE VISIT (OUTPATIENT)
Dept: FAMILY MEDICINE | Facility: CLINIC | Age: 67
End: 2022-02-14
Payer: MEDICARE

## 2022-02-14 VITALS
HEIGHT: 64 IN | SYSTOLIC BLOOD PRESSURE: 118 MMHG | RESPIRATION RATE: 16 BRPM | DIASTOLIC BLOOD PRESSURE: 76 MMHG | TEMPERATURE: 98 F | BODY MASS INDEX: 30.05 KG/M2 | WEIGHT: 176 LBS | OXYGEN SATURATION: 96 % | HEART RATE: 82 BPM

## 2022-02-14 DIAGNOSIS — K21.9 GASTROESOPHAGEAL REFLUX DISEASE, UNSPECIFIED WHETHER ESOPHAGITIS PRESENT: ICD-10-CM

## 2022-02-14 DIAGNOSIS — J44.9 CHRONIC OBSTRUCTIVE PULMONARY DISEASE, UNSPECIFIED COPD TYPE: ICD-10-CM

## 2022-02-14 DIAGNOSIS — E66.9 OBESITY, CLASS I, BMI 30-34.9: ICD-10-CM

## 2022-02-14 DIAGNOSIS — B37.31 YEAST VAGINITIS: Primary | ICD-10-CM

## 2022-02-14 DIAGNOSIS — B37.0 THRUSH: ICD-10-CM

## 2022-02-14 PROCEDURE — 99213 PR OFFICE/OUTPT VISIT, EST, LEVL III, 20-29 MIN: ICD-10-PCS | Mod: S$GLB,,, | Performed by: FAMILY MEDICINE

## 2022-02-14 PROCEDURE — 99213 OFFICE O/P EST LOW 20 MIN: CPT | Mod: S$GLB,,, | Performed by: FAMILY MEDICINE

## 2022-02-14 RX ORDER — FLUCONAZOLE 150 MG/1
150 TABLET ORAL
Qty: 2 TABLET | Refills: 1 | Status: SHIPPED | OUTPATIENT
Start: 2022-02-14 | End: 2022-02-20

## 2022-02-14 RX ORDER — IPRATROPIUM BROMIDE AND ALBUTEROL SULFATE 2.5; .5 MG/3ML; MG/3ML
3 SOLUTION RESPIRATORY (INHALATION)
Qty: 1 EACH | Refills: 5 | Status: CANCELLED | OUTPATIENT
Start: 2022-02-14 | End: 2022-03-26

## 2022-02-14 RX ORDER — NYSTATIN 100000 [USP'U]/ML
10 SUSPENSION ORAL 4 TIMES DAILY
Qty: 400 ML | Refills: 0 | Status: SHIPPED | OUTPATIENT
Start: 2022-02-14 | End: 2022-02-24

## 2022-02-14 NOTE — PATIENT INSTRUCTIONS
Patient Education       Thrush Discharge Instructions   About this topic   Thrush is a type of yeast infection that you can get in your mouth. Germs, called fungi, cause yeast infections. These germs live almost everywhere on your body. Most often, your immune system can control the amount of yeast and you stay healthy. If you are sick, the yeast can multiply and cause an infection.  Thrush causes white patches inside your mouth. You may also have redness, bleeding, or soreness. In other parts of your body, yeast infections can cause itching, burning or cracking of the skin. People who are breastfeeding may develop thrush on their nipples. Babies who are  can also develop oral thrush.     What care is needed at home?   · Ask your doctor what you need to do when you go home. Make sure you ask questions if you do not understand what the doctor says. This way you will know what you need to do.  · For adults and children:  ? Brush your teeth and tongue at least two times each day with a soft toothbrush. Floss every night. Change your toothbrush as ordered.  ? If you have dentures or retainers, clean them well every night.  ? Do not use over-the-counter (OTC) mouthwashes. They can kill healthy bacteria, which you may need to help recover.  ? Rinse your mouth with warm salt water a few times a day. Mix 1/2 teaspoon (2.5 grams) salt with a cup (240 mL) of warm water.  · For infants and toddlers:  ? Wash your baby's bottle, bottle nipples, and pacifiers each day in hot water.  ? Wash sippy cups and toys that children put in the mouth each day in hot water.  · For breastfeeding moms:  ? Wash bras, bra pads, nightgowns, or any items that come into contact with your nipples in hot water and dry in a hot dryer.  ? Rinse nipples after each breastfeeding.  ? Clean all parts of the breast pump which come into contact with the milk.  What follow-up care is needed?   Your doctor may ask you to make visits to the office to  check on your progress. Be sure to keep these visits.  What drugs may be needed?   The doctor may order drugs to fight an infection. The drug may be a pill, mouthwash, or lozenge that you suck on.  Will physical activity be limited?   Physical activity will not be limited.  What changes to diet are needed?   · Ask your doctor if eating yogurt may help.  · If your mouth is sore, eat soft foods like soup and pureed fruits and vegetables.  What problems could happen?   · Thrush may spread to other parts of the body  · Treatment does not work  · Thrush comes back  What can be done to prevent this health problem?   · See your dentist regularly. You may need to go more often if you have diabetes or dentures.  · If you have diabetes, keep your blood sugar under control.  · If you use an inhaler, rinse your mouth after each use.  · If you smoke, try to quit.  · If you get thrush often, you may need to take drugs every day to keep from getting it again.  When do I need to call the doctor?   · Fever of 100.4°F (38°C) or higher  · Signs of not getting enough water. These include dry mouth, very thirsty, or little or no urine.  · Trouble swallowing or stiff neck or jaw  · You are not feeling better in 2 to 3 days or you are feeling worse  Teach Back: Helping You Understand   The Teach Back Method helps you understand the information we are giving you. After you talk with the staff, tell them in your own words what you learned. This helps to make sure the staff has described each thing clearly. It also helps to explain things that may have been confusing. Before going home, make sure you can do these:  · I can tell you about my condition.  · I can tell you how to care for my babys bottles, nipples, and pacifiers if my baby has thrush.  · I can tell you what signs will make me think I am not getting enough water.  Where can I learn more?   American Academy of Family Physicians  https://familydoctor.org/condition/thrush/  "  KidsHealth  http://kidshealth.org/parent/infections/skin/thrush.html   Last Reviewed Date   2021-08-30  Consumer Information Use and Disclaimer   This information is not specific medical advice and does not replace information you receive from your health care provider. This is only a brief summary of general information. It does NOT include all information about conditions, illnesses, injuries, tests, procedures, treatments, therapies, discharge instructions or life-style choices that may apply to you. You must talk with your health care provider for complete information about your health and treatment options. This information should not be used to decide whether or not to accept your health care providers advice, instructions or recommendations. Only your health care provider has the knowledge and training to provide advice that is right for you.  Copyright   Copyright © 2021 UpToDate, Inc. and its affiliates and/or licensors. All rights reserved.  Patient Education       Vulvovaginal Yeast Infection   The Basics   Written by the doctors and editors at [employer] [facility]   What is a vulvovaginal yeast infection? -- A vulvovaginal yeast infection is an infection that causes itching and irritation of the vulva, the outer lips of the vagina (figure 1). This type of infection is usually caused by a fungus called "candida." (Yeast are a type of fungus.)  What are the symptoms of a yeast infection? -- Symptoms include:  · Itching of the vulva (this is the most common symptom)  · Pain, redness, or irritation of the vulva and vagina  · Pain when you urinate  · Pain during sex  · Abnormal vaginal discharge, which might be thick and white or thin and watery  How do I know if my symptoms are caused by a yeast infection? -- Most people cannot tell whether they have a yeast infection or something else. The symptoms of a yeast infection are a lot like the symptoms of many other conditions, so it is hard to tell.  The best way find " out if you have a yeast infection is to see your doctor or nurse. They can run a swab (Q-tip) inside your vagina to collect vaginal fluids. Then, they can look at the vaginal fluids from the swab under a microscope and look for the fungus that causes yeast infections. Sometimes a test is done to find out which type of yeast you have.  Depending on your situation, your doctor or nurse might do other tests on your vaginal fluid, too. One common test checks for yeast infections as well as bacterial vaginosis and trichomoniasis. These are other infections that can also cause itching and irritation.   How did I get a yeast infection? -- The fungus that causes yeast infections normally lives in the vagina and the gut. Even though the yeast are there, they do not usually cause symptoms. Certain medicines (especially antibiotics), stress, and other factors can cause the fungus to grow more than it should. When that happens, a yeast infection can start.  How are yeast infections treated? -- Yeast infections can be treated with a pill that you swallow or with medicines that you put in the vagina and on the vulva. The medicines that you put in the vagina come in creams and tablets. All medicines for yeast infections work by killing the fungus that causes the infections.  When will I feel better? -- You will probably feel better within a few days of starting treatment. If you do not get better after you finish treatment, you should see your doctor or nurse again. You might need to take more medicine or a different medicine.  What if I get yeast infections often? -- Be sure to see or doctor or nurse about it. That way you can find out for sure whether your symptoms are caused by a yeast infection and, if so, which type of yeast. There are a few different types of yeast, and they respond to different treatments. Plus, the same symptoms that you get with a yeast infection can sometimes be caused by other types of infections, an  allergy, or other problems. If you get frequent infections, you might need a different treatment than you have tried in the past.  All topics are updated as new evidence becomes available and our peer review process is complete.  This topic retrieved from [employer] on: Sep 21, 2021.  Topic 18136 Version 9.0  Release: 29.4.2 - C29.263  © 2021 UpToDate, Inc. and/or its affiliates. All rights reserved.  figure 1: Adult female external genitalia     This drawing shows the different parts of the genitals.  Graphic 62288 Version 9.0    Consumer Information Use and Disclaimer   This information is not specific medical advice and does not replace information you receive from your health care provider. This is only a brief summary of general information. It does NOT include all information about conditions, illnesses, injuries, tests, procedures, treatments, therapies, discharge instructions or life-style choices that may apply to you. You must talk with your health care provider for complete information about your health and treatment options. This information should not be used to decide whether or not to accept your health care provider's advice, instructions or recommendations. Only your health care provider has the knowledge and training to provide advice that is right for you. The use of this information is governed by the [employer] End User License Agreement, available at https://www.[employer]/en/solutions/[employer]/about/dora.The use of [employer] content is governed by the [employer] Terms of Use. ©2021 UpToDate, Inc. All rights reserved.  Copyright   © 2021 UpToDate, Inc. and/or its affiliates. All rights reserved.

## 2022-02-14 NOTE — TELEPHONE ENCOUNTER
Albuterol for nebulizer was submitted to her pharmacy 3 days ago. Please have patient contact pharmacy to fill

## 2022-02-14 NOTE — PROGRESS NOTES
"Subjective:       Patient ID: Laxmi Chand is a 66 y.o. female.    Chief Complaint: Vaginitis (Pt has been having itching with no odor for a few days now. /) and Thrush    Laxmi Chand presents to the clinic today with complaints of yeast infection. Patient states she had this mid January and feels it did not fully resolve. Patient states she used the mouth wash and it helped. Patient states she has had these symptoms again for a few days now.   Patient also reports she has a chronic cough from COPD and post nasal drip. Patient states the mucus is not green or yellow.   Patient educated on plan of care, verbalized understanding.     Review of Systems   Constitutional: Negative for activity change, appetite change, chills, diaphoresis and fever.   HENT: Negative for congestion, ear pain, postnasal drip, sinus pressure, sneezing and sore throat.         Mouth pain and "thrush"   Eyes: Negative for pain, discharge, redness and itching.   Respiratory: Positive for cough. Negative for apnea, chest tightness, shortness of breath and wheezing.    Cardiovascular: Negative for chest pain and leg swelling.   Gastrointestinal: Negative for abdominal distention, abdominal pain, constipation, diarrhea, nausea and vomiting.   Genitourinary: Positive for vaginal discharge. Negative for difficulty urinating, dysuria, flank pain and frequency.        Vaginal itching.   Skin: Negative for color change, rash and wound.   Neurological: Negative for dizziness.       Patient Active Problem List   Diagnosis    Allergic rhinitis    Chronic obstructive lung disease    Degeneration of lumbar intervertebral disc    Depressive disorder    Gastroesophageal reflux disease    History of smoking    Hyperlipidemia    Hypertensive disorder    Status post total left knee replacement    Chronic pain syndrome    Pleuritic pain       Objective:      Physical Exam  Vitals reviewed.   Constitutional:       General: She is not in acute " "distress.     Appearance: Normal appearance. She is well-developed.   HENT:      Head: Normocephalic.      Nose: Nose normal.      Mouth/Throat:      Lips: Pink.      Mouth: Mucous membranes are moist.      Comments: Some white discoloration on tongue, very mild  Eyes:      Conjunctiva/sclera: Conjunctivae normal.      Pupils: Pupils are equal, round, and reactive to light.   Cardiovascular:      Rate and Rhythm: Normal rate and regular rhythm.      Heart sounds: Normal heart sounds.   Pulmonary:      Effort: Pulmonary effort is normal. No respiratory distress.      Breath sounds: Normal breath sounds.   Abdominal:      General: There is no distension.      Palpations: Abdomen is soft.      Tenderness: There is no abdominal tenderness.   Musculoskeletal:      Cervical back: Normal range of motion and neck supple.   Skin:     General: Skin is warm and dry.      Findings: No rash.   Neurological:      Mental Status: She is alert and oriented to person, place, and time.   Psychiatric:         Mood and Affect: Mood normal.         Behavior: Behavior normal.         No results found for: WBC, HGB, HCT, PLT, CHOL, TRIG, HDL, LDLDIRECT, ALT, AST, NA, K, CL, CREATININE, BUN, CO2, TSH, PSA, INR, GLUF, HGBA1C, MICROALBUR  The ASCVD Risk score (Jen DC Jr., et al., 2013) failed to calculate for the following reasons:    Cannot find a previous HDL lab    Cannot find a previous total cholesterol lab  Visit Vitals  /76 (BP Location: Left arm, Patient Position: Sitting, BP Method: Medium (Manual))   Pulse 82   Temp 98.1 °F (36.7 °C) (Oral)   Resp 16   Ht 5' 4" (1.626 m)   Wt 79.8 kg (176 lb)   SpO2 96%   BMI 30.21 kg/m²      Assessment:       1. Yeast vaginitis    2. Thrush    3. Obesity, Class I, BMI 30-34.9    4. Chronic obstructive pulmonary disease, unspecified COPD type    5. Gastroesophageal reflux disease, unspecified whether esophagitis present        Plan:       Laxmi was seen today for vaginitis and " thrush.    Diagnoses and all orders for this visit:    Yeast vaginitis / Thrush  -     fluconazole (DIFLUCAN) 150 MG Tab; Take 1 tablet (150 mg total) by mouth Every 3 (three) days. for 6 days  -     nystatin (MYCOSTATIN) 100,000 unit/mL suspension; Take 10 mLs (1,000,000 Units total) by mouth 4 (four) times daily. for 10 days    Obesity, Class I, BMI 30-34.9  Body mass index is 30.21 kg/m².  Continue healthy diet and regular exercise as tolerated.  Continue medications as prescribed.  Follow up with PCP     Chronic obstructive pulmonary disease, unspecified COPD type  Stable, discussed if things change she may need antibiotics but to continue breathing treatments, and allergy medication  Continue medications as prescribed.  Follow up with PCP    Gastroesophageal reflux disease, unspecified whether esophagitis present  stable    Follow up if symptoms worsen or fail to improve, for keep scheduled appt.      Future Appointments     Date Provider Specialty Appt Notes    3/10/2022 Elba Dill NP Family Medicine 6 month ck copd htn

## 2022-02-14 NOTE — TELEPHONE ENCOUNTER
----- Message from Allan Marcum MA sent at 2/14/2022 10:40 AM CST -----  Contact: LISA VELEZ [[id number]]  Type: Needs Medical Advice    Who Called: LISA VELEZ [[id number]]  Best Call Back Number: [phone]  Inquiry/Question: Would you kindly call LISA VELEZ [[id number]] regarding pre authorization for medication also pt states she needs a sooner appt due to rash in mouth been trying to get appt for a few weeks now .Thank you~

## 2022-02-14 NOTE — TELEPHONE ENCOUNTER
"Called and spoke with patient - advised her these refills were submitted around noon on Friday. She states she didn't know that they had been refilled after being requested, but that she will contact the pharmacy. She also states she is needing to be seen either today or soon for continued thrush that has returned since completing nystatin prescription - she states her tongue is "too sore to eat", etc. She is now scheduled to see Zoraida Orta for 2:20pm at [facility] location today.   "

## 2022-03-09 DIAGNOSIS — Z12.11 COLON CANCER SCREENING: ICD-10-CM

## 2022-03-10 ENCOUNTER — TELEPHONE (OUTPATIENT)
Dept: FAMILY MEDICINE | Facility: CLINIC | Age: 67
End: 2022-03-10
Payer: MEDICARE

## 2022-03-10 ENCOUNTER — OFFICE VISIT (OUTPATIENT)
Dept: FAMILY MEDICINE | Facility: CLINIC | Age: 67
End: 2022-03-10
Payer: MEDICARE

## 2022-03-10 ENCOUNTER — TELEPHONE (OUTPATIENT)
Dept: FAMILY MEDICINE | Facility: CLINIC | Age: 67
End: 2022-03-10

## 2022-03-10 VITALS
HEART RATE: 77 BPM | SYSTOLIC BLOOD PRESSURE: 132 MMHG | HEIGHT: 64 IN | BODY MASS INDEX: 29.96 KG/M2 | WEIGHT: 175.5 LBS | DIASTOLIC BLOOD PRESSURE: 76 MMHG | RESPIRATION RATE: 18 BRPM | OXYGEN SATURATION: 97 % | TEMPERATURE: 98 F

## 2022-03-10 DIAGNOSIS — J43.9 PULMONARY EMPHYSEMA, UNSPECIFIED EMPHYSEMA TYPE: ICD-10-CM

## 2022-03-10 DIAGNOSIS — F17.200 NICOTINE DEPENDENCE WITH CURRENT USE: ICD-10-CM

## 2022-03-10 DIAGNOSIS — R11.0 NAUSEA: ICD-10-CM

## 2022-03-10 DIAGNOSIS — J44.9 CHRONIC OBSTRUCTIVE PULMONARY DISEASE, UNSPECIFIED COPD TYPE: ICD-10-CM

## 2022-03-10 DIAGNOSIS — E78.2 MIXED HYPERLIPIDEMIA: ICD-10-CM

## 2022-03-10 DIAGNOSIS — N76.0 RECURRENT VAGINITIS: ICD-10-CM

## 2022-03-10 DIAGNOSIS — Z78.0 ENCOUNTER FOR OSTEOPOROSIS SCREENING IN ASYMPTOMATIC POSTMENOPAUSAL PATIENT: ICD-10-CM

## 2022-03-10 DIAGNOSIS — Z12.31 ENCOUNTER FOR SCREENING MAMMOGRAM FOR MALIGNANT NEOPLASM OF BREAST: ICD-10-CM

## 2022-03-10 DIAGNOSIS — Z13.820 ENCOUNTER FOR OSTEOPOROSIS SCREENING IN ASYMPTOMATIC POSTMENOPAUSAL PATIENT: ICD-10-CM

## 2022-03-10 DIAGNOSIS — Z11.59 ENCOUNTER FOR HEPATITIS C SCREENING TEST FOR LOW RISK PATIENT: ICD-10-CM

## 2022-03-10 DIAGNOSIS — N89.8 VAGINAL ODOR: ICD-10-CM

## 2022-03-10 DIAGNOSIS — I10 HYPERTENSION, UNSPECIFIED TYPE: Primary | ICD-10-CM

## 2022-03-10 DIAGNOSIS — J30.9 CHRONIC ALLERGIC RHINITIS: ICD-10-CM

## 2022-03-10 DIAGNOSIS — K21.9 GASTROESOPHAGEAL REFLUX DISEASE, UNSPECIFIED WHETHER ESOPHAGITIS PRESENT: ICD-10-CM

## 2022-03-10 PROCEDURE — 99999 PR PBB SHADOW E&M-EST. PATIENT-LVL V: CPT | Mod: PBBFAC,,, | Performed by: NURSE PRACTITIONER

## 2022-03-10 PROCEDURE — 99999 PR PBB SHADOW E&M-EST. PATIENT-LVL V: ICD-10-PCS | Mod: PBBFAC,,, | Performed by: NURSE PRACTITIONER

## 2022-03-10 PROCEDURE — 99215 OFFICE O/P EST HI 40 MIN: CPT | Mod: PBBFAC,PN | Performed by: NURSE PRACTITIONER

## 2022-03-10 PROCEDURE — 99214 OFFICE O/P EST MOD 30 MIN: CPT | Mod: S$PBB,,, | Performed by: NURSE PRACTITIONER

## 2022-03-10 PROCEDURE — 99214 PR OFFICE/OUTPT VISIT, EST, LEVL IV, 30-39 MIN: ICD-10-PCS | Mod: S$PBB,,, | Performed by: NURSE PRACTITIONER

## 2022-03-10 RX ORDER — HYDROCHLOROTHIAZIDE 25 MG/1
25 TABLET ORAL DAILY
Qty: 90 TABLET | Refills: 3 | Status: SHIPPED | OUTPATIENT
Start: 2022-03-10 | End: 2023-03-13 | Stop reason: SDUPTHER

## 2022-03-10 RX ORDER — MONTELUKAST SODIUM 10 MG/1
10 TABLET ORAL DAILY
Qty: 90 TABLET | Refills: 3 | Status: SHIPPED | OUTPATIENT
Start: 2022-03-10 | End: 2023-03-13 | Stop reason: SDUPTHER

## 2022-03-10 RX ORDER — ONDANSETRON 4 MG/1
4 TABLET, FILM COATED ORAL EVERY 8 HOURS PRN
Qty: 15 TABLET | Refills: 1 | Status: SHIPPED | OUTPATIENT
Start: 2022-03-10 | End: 2022-09-08 | Stop reason: SDUPTHER

## 2022-03-10 RX ORDER — PANTOPRAZOLE SODIUM 40 MG/1
40 TABLET, DELAYED RELEASE ORAL DAILY
Qty: 90 TABLET | Refills: 3 | Status: SHIPPED | OUTPATIENT
Start: 2022-03-10 | End: 2022-09-08 | Stop reason: SDUPTHER

## 2022-03-10 RX ORDER — METRONIDAZOLE 500 MG/1
500 TABLET ORAL EVERY 12 HOURS
Qty: 14 TABLET | Refills: 0 | Status: SHIPPED | OUTPATIENT
Start: 2022-03-10 | End: 2022-08-23

## 2022-03-10 RX ORDER — ALBUTEROL SULFATE 90 UG/1
2 AEROSOL, METERED RESPIRATORY (INHALATION) EVERY 6 HOURS PRN
Qty: 25.5 G | Refills: 3 | Status: SHIPPED | OUTPATIENT
Start: 2022-03-10 | End: 2022-05-10

## 2022-03-10 RX ORDER — CELECOXIB 200 MG/1
200 CAPSULE ORAL DAILY
Qty: 90 CAPSULE | Refills: 1 | Status: SHIPPED | OUTPATIENT
Start: 2022-03-10 | End: 2022-09-08 | Stop reason: SDUPTHER

## 2022-03-10 RX ORDER — CETIRIZINE HYDROCHLORIDE 10 MG/1
10 TABLET ORAL DAILY
Qty: 90 TABLET | Refills: 5 | Status: SHIPPED | OUTPATIENT
Start: 2022-03-10 | End: 2023-03-13 | Stop reason: SDUPTHER

## 2022-03-10 RX ORDER — BUDESONIDE AND FORMOTEROL FUMARATE DIHYDRATE 160; 4.5 UG/1; UG/1
2 AEROSOL RESPIRATORY (INHALATION) EVERY 12 HOURS
Qty: 10.2 G | Refills: 5 | Status: SHIPPED | OUTPATIENT
Start: 2022-03-10 | End: 2023-02-15 | Stop reason: SDUPTHER

## 2022-03-10 RX ORDER — IPRATROPIUM BROMIDE AND ALBUTEROL SULFATE 2.5; .5 MG/3ML; MG/3ML
3 SOLUTION RESPIRATORY (INHALATION)
Qty: 180 ML | Refills: 5 | Status: SHIPPED | OUTPATIENT
Start: 2022-03-10 | End: 2022-08-17

## 2022-03-10 NOTE — PROGRESS NOTES
Subjective:       Patient ID: Laxmi Chand is a 66 y.o. female.    Chief Complaint: Hypertension (6 month f/u ), Medication Refill, Vaginitis (Pt states she still has yeast in her vagina. ), and Nausea (Pt states she would like zofran)    Laxmi Chand presents to the clinic today to follow up on hypertension, COPD and medication refills.   She is under the care of Dr. Ángel White/Hyacinth SOTELO for pain management, Dr. Edwards, and orthopedics Dr. Ruffin, Optometrist Dr. Wilkinson   She has a reported past medical history of COPD, essential hypertension, mixed hyperlipidemia, depression, GERD, Endometrial cancer, chronic pain related to DJD of the lumbar region with history of lumbar fusion by Dr. Farley in [place], open angle glaucoma and osteoarthritis.   She has chronic reports of dyspnea on exertion, wheezing, shortness of breath despite the use of her nebulizer's and rescue inhaler. She had a PFT within the past 2-3 years. She denies ever seeing pulmonology in the past. She continues to smoke daily she was smoking 0.5- 1 pack daily, but states she has decreased to 1 pack every 3 days.    She was treated for vaginitis on 2/14/2022- she reports that she does not feel that this issue has completely resolved. She reports complaints of vaginal itching, burning, foul vaginal odor with scant discharge. She is still sexually active. She wears incontinence pads/panty liners. She is due for her pelvic exam with her OBGYN.     Review of Systems   Constitutional: Negative for activity change, appetite change, chills, diaphoresis and fever.   HENT: Negative for congestion, ear pain, postnasal drip, sinus pressure, sneezing and sore throat.    Eyes: Negative for pain, discharge, redness and itching.   Respiratory: Positive for cough, shortness of breath and wheezing. Negative for apnea and chest tightness.    Cardiovascular: Negative for chest pain, palpitations and leg swelling.   Gastrointestinal: Negative for  abdominal distention, abdominal pain, constipation, diarrhea, nausea and vomiting.   Genitourinary: Positive for vaginal discharge. Negative for difficulty urinating, dysuria, flank pain and frequency.        Vaginal itching, vaginal odor   Skin: Negative for color change, rash and wound.   Neurological: Negative for dizziness, speech difficulty, weakness, light-headedness and headaches.   Hematological: Negative.    Psychiatric/Behavioral: Negative.        Patient Active Problem List   Diagnosis    Allergic rhinitis    Chronic obstructive lung disease    Degeneration of lumbar intervertebral disc    Depressive disorder    Gastroesophageal reflux disease    History of smoking    Hyperlipidemia    Hypertensive disorder    Status post total left knee replacement    Chronic pain syndrome    Pleuritic pain       Objective:      Physical Exam  Vitals reviewed.   Constitutional:       General: She is not in acute distress.     Appearance: Normal appearance. She is well-developed.   Cardiovascular:      Rate and Rhythm: Normal rate and regular rhythm.      Heart sounds: Normal heart sounds.   Pulmonary:      Effort: Pulmonary effort is normal. No respiratory distress.      Breath sounds: Examination of the right-upper field reveals wheezing. Examination of the left-upper field reveals wheezing. Examination of the right-middle field reveals wheezing. Examination of the left-middle field reveals wheezing. Wheezing present.   Abdominal:      General: There is no distension.      Palpations: Abdomen is soft.      Tenderness: There is no abdominal tenderness.   Skin:     General: Skin is warm and dry.      Findings: No rash.   Neurological:      Mental Status: She is alert and oriented to person, place, and time.   Psychiatric:         Mood and Affect: Mood normal.         Behavior: Behavior normal.         No results found for: WBC, HGB, HCT, PLT, CHOL, TRIG, HDL, LDLDIRECT, ALT, AST, NA, K, CL, CREATININE, BUN,  "CO2, TSH, PSA, INR, GLUF, HGBA1C, MICROALBUR  The ASCVD Risk score ([place] SWATI Jr., et al., 2013) failed to calculate for the following reasons:    Cannot find a previous HDL lab    Cannot find a previous total cholesterol lab  Visit Vitals  /76 (BP Location: Left arm, Patient Position: Sitting, BP Method: Large (Manual))   Pulse 77   Temp 98.2 °F (36.8 °C) (Oral)   Resp 18   Ht 5' 4" (1.626 m)   Wt 79.6 kg (175 lb 7.8 oz)   SpO2 97%   BMI 30.12 kg/m²      Assessment:       1. Hypertension, unspecified type    2. Chronic obstructive pulmonary disease, unspecified COPD type    3. Gastroesophageal reflux disease, unspecified whether esophagitis present    4. Chronic allergic rhinitis    5. Nicotine dependence with current use    6. Pulmonary emphysema, unspecified emphysema type    7. Recurrent vaginitis    8. Vaginal odor    9. Nausea    10. Mixed hyperlipidemia    11. Encounter for screening mammogram for malignant neoplasm of breast    12. Encounter for osteoporosis screening in asymptomatic postmenopausal patient    13. Encounter for hepatitis C screening test for low risk patient        Plan:       Laxmi was seen today for hypertension, medication refill, vaginitis and nausea.    Diagnoses and all orders for this visit:    Hypertension, unspecified type  -     hydroCHLOROthiazide (HYDRODIURIL) 25 MG tablet; Take 1 tablet (25 mg total) by mouth once daily.  -     Comprehensive Metabolic Panel; Future  -     TSH; Future  The patient was counseled on HTN education, management and recommendations. The need for weight reduction was reinforced and a BMI goal of 19 to 25 was set.  Patient was encouraged to adhere to a low sodium diet and a DASH diet was recommended. Patient was also encouraged to engage in routine exercise such as walking most days of the week greater than 30 minutes. Patient education materials were provided to the patient for home review and further reinforcement of topics discussed.     Chronic " obstructive pulmonary disease, unspecified COPD type  -     celecoxib (CELEBREX) 200 MG capsule; Take 1 capsule (200 mg total) by mouth once daily.  -     montelukast (SINGULAIR) 10 mg tablet; Take 1 tablet (10 mg total) by mouth once daily.  -     budesonide-formoterol 160-4.5 mcg (SYMBICORT) 160-4.5 mcg/actuation HFAA; Inhale 2 puffs into the lungs every 12 (twelve) hours. Controller  -     albuterol (PROVENTIL/VENTOLIN HFA) 90 mcg/actuation inhaler; Inhale 2 puffs into the lungs every 6 (six) hours as needed for Wheezing.  -     albuterol-ipratropium (DUO-NEB) 2.5 mg-0.5 mg/3 mL nebulizer solution; Take 3 mLs by nebulization every 6 (six) hours while awake.    Gastroesophageal reflux disease, unspecified whether esophagitis present  -     pantoprazole (PROTONIX) 40 MG tablet; Take 1 tablet (40 mg total) by mouth once daily.    Chronic allergic rhinitis  -     cetirizine (ZYRTEC) 10 MG tablet; Take 1 tablet (10 mg total) by mouth once daily.    Nicotine dependence with current use  Pt encouraged to quit smoking to benefit overall health and well being.  Patient continues to use tobacco and at this time declines any tobacco cessation intervention.  Risks associated with tobacco use were reinforced to the patient.  Understanding was voiced. I discussed options such as nicotine replacement products including gum and patches as well as wellbutrin, and chantix.  Side effects, benefits and risks were discussed regarding each. I offered a referral to [facility] smoking cessation program.     Pulmonary emphysema, unspecified emphysema type    Recurrent vaginitis  -     metroNIDAZOLE (FLAGYL) 500 MG tablet; Take 1 tablet (500 mg total) by mouth every 12 (twelve) hours.  -     CBC Auto Differential; Future  Void after sex  Change incontinence pads frequently  Take above medication with food until all gone  Daily probiotic  Schedule well exam with GYN  Vaginal odor  -     metroNIDAZOLE (FLAGYL) 500 MG tablet; Take 1 tablet (500  mg total) by mouth every 12 (twelve) hours.    Nausea  -     ondansetron (ZOFRAN) 4 MG tablet; Take 1 tablet (4 mg total) by mouth every 8 (eight) hours as needed for Nausea.    Mixed hyperlipidemia  -     Lipid Panel; Future    Encounter for screening mammogram for malignant neoplasm of breast  -     Mammo Digital Screening Bilat w/ [facility]; Future  -     Mammo Digital Screening Bilat w/ [facility]  Monthly self breast exams   Encounter for osteoporosis screening in asymptomatic postmenopausal patient  -     DXA Bone Density Spine And Hip; Future  -     DXA Bone Density Spine And Hip    Encounter for hepatitis C screening test for low risk patient  -     Hepatitis C Antibody; Future    Obtain fasting labs for review.   Follow up in about 6 months (around 9/10/2022).      Future Appointments     Date Provider Specialty Appt Notes    9/8/2022 Elba Dill NP Family Medicine 6 month follow up COPD HTN

## 2022-03-10 NOTE — TELEPHONE ENCOUNTER
Received faxed document from [email or web address] requesting PA completion for ipratropium albuterol prescription. Submitted PA via [email or web address]. Pending review by health plan currently.

## 2022-05-10 ENCOUNTER — LAB VISIT (OUTPATIENT)
Dept: LAB | Facility: CLINIC | Age: 67
End: 2022-05-10
Payer: MEDICARE

## 2022-05-10 DIAGNOSIS — Z11.59 ENCOUNTER FOR HEPATITIS C SCREENING TEST FOR LOW RISK PATIENT: ICD-10-CM

## 2022-05-10 DIAGNOSIS — I10 HYPERTENSION, UNSPECIFIED TYPE: ICD-10-CM

## 2022-05-10 DIAGNOSIS — E78.2 MIXED HYPERLIPIDEMIA: ICD-10-CM

## 2022-05-10 DIAGNOSIS — N76.0 RECURRENT VAGINITIS: ICD-10-CM

## 2022-05-10 LAB
ALBUMIN SERPL BCP-MCNC: 3.6 G/DL (ref 3.5–5.2)
ALP SERPL-CCNC: 84 U/L (ref 55–135)
ALT SERPL W/O P-5'-P-CCNC: 16 U/L (ref 10–44)
ANION GAP SERPL CALC-SCNC: 11 MMOL/L (ref 8–16)
AST SERPL-CCNC: 18 U/L (ref 10–40)
BASOPHILS # BLD AUTO: 0.02 K/UL (ref 0–0.2)
BASOPHILS NFR BLD: 0.3 % (ref 0–1.9)
BILIRUB SERPL-MCNC: 0.5 MG/DL (ref 0.1–1)
BUN SERPL-MCNC: 13 MG/DL (ref 8–23)
CALCIUM SERPL-MCNC: 9.7 MG/DL (ref 8.7–10.5)
CHLORIDE SERPL-SCNC: 100 MMOL/L (ref 95–110)
CHOLEST SERPL-MCNC: 203 MG/DL (ref 120–199)
CHOLEST/HDLC SERPL: 4.1 {RATIO} (ref 2–5)
CO2 SERPL-SCNC: 27 MMOL/L (ref 23–29)
CREAT SERPL-MCNC: 0.8 MG/DL (ref 0.5–1.4)
DIFFERENTIAL METHOD: NORMAL
EOSINOPHIL # BLD AUTO: 0.2 K/UL (ref 0–0.5)
EOSINOPHIL NFR BLD: 3.1 % (ref 0–8)
ERYTHROCYTE [DISTWIDTH] IN BLOOD BY AUTOMATED COUNT: 12.5 % (ref 11.5–14.5)
EST. GFR  (AFRICAN AMERICAN): >60 ML/MIN/1.73 M^2
EST. GFR  (NON AFRICAN AMERICAN): >60 ML/MIN/1.73 M^2
GLUCOSE SERPL-MCNC: 96 MG/DL (ref 70–110)
HCT VFR BLD AUTO: 41.1 % (ref 37–48.5)
HDLC SERPL-MCNC: 50 MG/DL (ref 40–75)
HDLC SERPL: 24.6 % (ref 20–50)
HGB BLD-MCNC: 13.4 G/DL (ref 12–16)
IMM GRANULOCYTES # BLD AUTO: 0.02 K/UL (ref 0–0.04)
IMM GRANULOCYTES NFR BLD AUTO: 0.3 % (ref 0–0.5)
LDLC SERPL CALC-MCNC: 132.8 MG/DL (ref 63–159)
LYMPHOCYTES # BLD AUTO: 2.1 K/UL (ref 1–4.8)
LYMPHOCYTES NFR BLD: 32.2 % (ref 18–48)
MCH RBC QN AUTO: 29.9 PG (ref 27–31)
MCHC RBC AUTO-ENTMCNC: 32.6 G/DL (ref 32–36)
MCV RBC AUTO: 92 FL (ref 82–98)
MONOCYTES # BLD AUTO: 0.5 K/UL (ref 0.3–1)
MONOCYTES NFR BLD: 7.7 % (ref 4–15)
NEUTROPHILS # BLD AUTO: 3.6 K/UL (ref 1.8–7.7)
NEUTROPHILS NFR BLD: 56.4 % (ref 38–73)
NONHDLC SERPL-MCNC: 153 MG/DL
NRBC BLD-RTO: 0 /100 WBC
PLATELET # BLD AUTO: 422 K/UL (ref 150–450)
PMV BLD AUTO: 9.4 FL (ref 9.2–12.9)
POTASSIUM SERPL-SCNC: 4.2 MMOL/L (ref 3.5–5.1)
PROT SERPL-MCNC: 6.8 G/DL (ref 6–8.4)
RBC # BLD AUTO: 4.48 M/UL (ref 4–5.4)
SODIUM SERPL-SCNC: 138 MMOL/L (ref 136–145)
TRIGL SERPL-MCNC: 101 MG/DL (ref 30–150)
TSH SERPL DL<=0.005 MIU/L-ACNC: 1.04 UIU/ML (ref 0.4–4)
WBC # BLD AUTO: 6.37 K/UL (ref 3.9–12.7)

## 2022-05-10 PROCEDURE — 80061 LIPID PANEL: CPT | Performed by: NURSE PRACTITIONER

## 2022-05-10 PROCEDURE — 84443 ASSAY THYROID STIM HORMONE: CPT | Performed by: NURSE PRACTITIONER

## 2022-05-10 PROCEDURE — 85025 COMPLETE CBC W/AUTO DIFF WBC: CPT | Performed by: NURSE PRACTITIONER

## 2022-05-10 PROCEDURE — 80053 COMPREHEN METABOLIC PANEL: CPT | Performed by: NURSE PRACTITIONER

## 2022-05-10 PROCEDURE — 86803 HEPATITIS C AB TEST: CPT | Performed by: NURSE PRACTITIONER

## 2022-05-12 ENCOUNTER — TELEPHONE (OUTPATIENT)
Dept: FAMILY MEDICINE | Facility: CLINIC | Age: 67
End: 2022-05-12
Payer: MEDICARE

## 2022-05-12 NOTE — TELEPHONE ENCOUNTER
Was on the phone with patient regarding lab results when she asked if she could be scheduled with soonest available for numbness and tingling in her leg. She opted to schedule with HERBER Orta for Monday at [facility].

## 2022-05-16 ENCOUNTER — OFFICE VISIT (OUTPATIENT)
Dept: FAMILY MEDICINE | Facility: CLINIC | Age: 67
End: 2022-05-16
Payer: MEDICARE

## 2022-05-16 VITALS
HEIGHT: 64 IN | DIASTOLIC BLOOD PRESSURE: 62 MMHG | BODY MASS INDEX: 29.53 KG/M2 | SYSTOLIC BLOOD PRESSURE: 122 MMHG | OXYGEN SATURATION: 96 % | WEIGHT: 173 LBS | RESPIRATION RATE: 16 BRPM | HEART RATE: 84 BPM | TEMPERATURE: 98 F

## 2022-05-16 DIAGNOSIS — R20.0 NUMBNESS AND TINGLING IN BOTH HANDS: ICD-10-CM

## 2022-05-16 DIAGNOSIS — R20.2 NUMBNESS AND TINGLING IN BOTH HANDS: ICD-10-CM

## 2022-05-16 DIAGNOSIS — E66.3 OVERWEIGHT (BMI 25.0-29.9): Primary | ICD-10-CM

## 2022-05-16 DIAGNOSIS — Z98.890 HISTORY OF CARPAL TUNNEL SURGERY: ICD-10-CM

## 2022-05-16 DIAGNOSIS — J44.9 CHRONIC OBSTRUCTIVE PULMONARY DISEASE, UNSPECIFIED COPD TYPE: ICD-10-CM

## 2022-05-16 DIAGNOSIS — I10 PRIMARY HYPERTENSION: ICD-10-CM

## 2022-05-16 DIAGNOSIS — M51.36 DEGENERATION OF LUMBAR INTERVERTEBRAL DISC: ICD-10-CM

## 2022-05-16 LAB — HCV AB SERPL QL IA: NEGATIVE

## 2022-05-16 PROCEDURE — 99214 OFFICE O/P EST MOD 30 MIN: CPT | Mod: S$GLB,,, | Performed by: FAMILY MEDICINE

## 2022-05-16 PROCEDURE — 99214 PR OFFICE/OUTPT VISIT, EST, LEVL IV, 30-39 MIN: ICD-10-PCS | Mod: S$GLB,,, | Performed by: FAMILY MEDICINE

## 2022-05-16 NOTE — PROGRESS NOTES
Subjective:       Patient ID: Laxmi Chand is a 66 y.o. female.    Chief Complaint: Numbness (Pt reports having numbness and tingling in her legs and both arms for about 3-4 weeks)    Laxmi Chand presents to the clinic today with complaints of numbness and tingling to hands and feet. Patient states this has been coming and going for a few weeks. Patient states she has had this in her hands when she has been holding out her phone or doing things. Patient also has numbness in her feet but feels this is due to sitting. Patient states she thought her blood work would show something to explain it but due to her blood work looking good she wanted to be seen. Patient states she has had carpal tunnel surgery on one arm twice and the other arm once in the past.   Patient educated on plan of care, verbalized understanding.     Review of Systems   Constitutional: Negative for activity change, appetite change, chills, diaphoresis and fever.   HENT: Negative for congestion, ear pain, postnasal drip, sinus pressure, sneezing and sore throat.    Eyes: Negative for pain, discharge, redness and itching.   Respiratory: Negative for apnea, cough, chest tightness, shortness of breath and wheezing.    Cardiovascular: Negative for chest pain and leg swelling.   Gastrointestinal: Negative for abdominal distention, abdominal pain, constipation, diarrhea, nausea and vomiting.   Genitourinary: Negative for difficulty urinating, dysuria, flank pain and frequency.   Skin: Negative for color change, rash and wound.   Neurological: Positive for numbness. Negative for dizziness.       Patient Active Problem List   Diagnosis    Allergic rhinitis    Chronic obstructive lung disease    Degeneration of lumbar intervertebral disc    Depressive disorder    Gastroesophageal reflux disease    History of smoking    Hyperlipidemia    Hypertensive disorder    Status post total left knee replacement    Chronic pain syndrome    Pleuritic pain        Objective:      Physical Exam  Vitals reviewed.   Constitutional:       General: She is not in acute distress.     Appearance: Normal appearance. She is well-developed.   HENT:      Head: Normocephalic.      Nose: Nose normal.   Eyes:      Conjunctiva/sclera: Conjunctivae normal.      Pupils: Pupils are equal, round, and reactive to light.   Cardiovascular:      Rate and Rhythm: Normal rate and regular rhythm.      Heart sounds: Normal heart sounds.   Pulmonary:      Effort: Pulmonary effort is normal. No respiratory distress.      Breath sounds: Normal breath sounds.   Abdominal:      General: There is no distension.      Palpations: Abdomen is soft.      Tenderness: There is no abdominal tenderness.   Musculoskeletal:      Cervical back: Normal range of motion and neck supple.   Skin:     General: Skin is warm and dry.      Findings: No rash.   Neurological:      Mental Status: She is alert and oriented to person, place, and time.   Psychiatric:         Mood and Affect: Mood normal.         Behavior: Behavior normal.         Lab Results   Component Value Date    WBC 6.37 05/10/2022    HGB 13.4 05/10/2022    HCT 41.1 05/10/2022     05/10/2022    CHOL 203 (H) 05/10/2022    TRIG 101 05/10/2022    HDL 50 05/10/2022    ALT 16 05/10/2022    AST 18 05/10/2022     05/10/2022    K 4.2 05/10/2022     05/10/2022    CREATININE 0.8 05/10/2022    BUN 13 05/10/2022    CO2 27 05/10/2022    TSH 1.040 05/10/2022     The 10-year ASCVD risk score (Jen SWATI Terrell., et al., 2013) is: 13.9%    Values used to calculate the score:      Age: 66 years      Sex: Female      Is Non- : No      Diabetic: No      Tobacco smoker: Yes      Systolic Blood Pressure: 122 mmHg      Is BP treated: Yes      HDL Cholesterol: 50 mg/dL      Total Cholesterol: 203 mg/dL  Visit Vitals  /62 (BP Location: Left arm, Patient Position: Sitting, BP Method: Medium (Manual))   Pulse 84   Temp 98.1 °F (36.7 °C)  "(Oral)   Resp 16   Ht 5' 4" (1.626 m)   Wt 78.5 kg (173 lb)   SpO2 96%   BMI 29.70 kg/m²      Assessment:       1. Overweight (BMI 25.0-29.9)    2. Numbness and tingling in both hands    3. Degeneration of lumbar intervertebral disc    4. Chronic obstructive pulmonary disease, unspecified COPD type    5. Primary hypertension    6. History of carpal tunnel surgery        Plan:       Laxmi was seen today for numbness.    Diagnoses and all orders for this visit:    Overweight (BMI 25.0-29.9)  Body mass index is 29.7 kg/m².  Continue healthy diet and regular exercise as tolerated.  Continue medications as prescribed.  Follow up with PCP     Numbness and tingling in both hands / History of carpal tunnel surgery  -     Ambulatory referral/consult to Neurology; Future    Degeneration of lumbar intervertebral disc  Stable  Continue medications as prescribed.  Follow up with PCP     Chronic obstructive pulmonary disease, unspecified COPD type  Stable  Continue medications as prescribed.  Follow up with PCP     Primary hypertension  Stable  Continue medications as prescribed.  Follow up with PCP     Follow up if symptoms worsen or fail to improve, for scheduled appt.      Future Appointments     Date Provider Specialty Appt Notes    5/16/2022 Zoraida Orta NP Family Medicine numbness/tingling in leg    5/18/2022  Radiology     5/18/2022  Radiology     9/8/2022 Elba Dill NP Family Medicine 6 month follow up COPD HTN           "

## 2022-05-24 ENCOUNTER — HOSPITAL ENCOUNTER (OUTPATIENT)
Dept: RADIOLOGY | Facility: HOSPITAL | Age: 67
Discharge: HOME | End: 2022-05-24
Attending: NURSE PRACTITIONER
Payer: MEDICARE

## 2022-05-24 VITALS — HEIGHT: 64 IN | WEIGHT: 180.75 LBS | BODY MASS INDEX: 30.86 KG/M2

## 2022-05-24 PROCEDURE — 77080 DEXA BONE DENSITY SPINE HIP: ICD-10-PCS | Mod: 26,,, | Performed by: RADIOLOGY

## 2022-05-24 PROCEDURE — 77067 SCR MAMMO BI INCL CAD: CPT | Mod: 26,,, | Performed by: RADIOLOGY

## 2022-05-24 PROCEDURE — 77080 DXA BONE DENSITY AXIAL: CPT | Mod: TC

## 2022-05-24 PROCEDURE — 77067 MAMMO DIGITAL SCREENING BILAT WITH TOMO: ICD-10-PCS | Mod: 26,,, | Performed by: RADIOLOGY

## 2022-05-24 PROCEDURE — 77067 SCR MAMMO BI INCL CAD: CPT | Mod: TC

## 2022-05-24 PROCEDURE — 77063 MAMMO DIGITAL SCREENING BILAT WITH TOMO: ICD-10-PCS | Mod: 26,,, | Performed by: RADIOLOGY

## 2022-05-24 PROCEDURE — 77063 BREAST TOMOSYNTHESIS BI: CPT | Mod: 26,,, | Performed by: RADIOLOGY

## 2022-05-24 PROCEDURE — 77080 DXA BONE DENSITY AXIAL: CPT | Mod: 26,,, | Performed by: RADIOLOGY

## 2022-05-25 ENCOUNTER — TELEPHONE (OUTPATIENT)
Dept: PEDIATRICS | Facility: CLINIC | Age: 67
End: 2022-05-25
Payer: MEDICARE

## 2022-05-25 DIAGNOSIS — M85.852 OSTEOPENIA OF BOTH HIPS: Primary | ICD-10-CM

## 2022-05-25 DIAGNOSIS — M85.851 OSTEOPENIA OF BOTH HIPS: Primary | ICD-10-CM

## 2022-05-25 NOTE — TELEPHONE ENCOUNTER
----- Message from Elba Dill NP sent at 5/24/2022 12:36 PM CDT -----  Bone Density scan shows osteopenia of both hips  Treatment is recommended based on her scorings- This is includes the use of a bisphosphonate such as Fosamax or Boniva   Along with Calcium/Vit main D intake of at least 1,200 mg of calcium daily and 600-800 IU of Vitamin D daily.   Smoking cessation is highly encouraged  Low weight bearing activity at least 2-3 times weekly is encouraged.   Decrease risk of falls: avoid area/throw rugs, uneven ground, good supportive shoes at all times.   Avoid overuse of steroids/long term use of steroids   Does she have a preference of which medication is sent to her pharmacy?

## 2022-05-26 DIAGNOSIS — R92.8 ABNORMALITY OF RIGHT BREAST ON SCREENING MAMMOGRAM: Primary | ICD-10-CM

## 2022-05-26 RX ORDER — IBANDRONATE SODIUM 150 MG/1
150 TABLET, FILM COATED ORAL
Qty: 1 TABLET | Refills: 11 | Status: SHIPPED | OUTPATIENT
Start: 2022-05-26 | End: 2022-11-15

## 2022-07-06 ENCOUNTER — OFFICE VISIT (OUTPATIENT)
Dept: FAMILY MEDICINE | Facility: CLINIC | Age: 67
End: 2022-07-06
Payer: MEDICARE

## 2022-07-06 VITALS
WEIGHT: 176.69 LBS | HEIGHT: 64 IN | TEMPERATURE: 97 F | HEART RATE: 73 BPM | BODY MASS INDEX: 30.17 KG/M2 | SYSTOLIC BLOOD PRESSURE: 122 MMHG | DIASTOLIC BLOOD PRESSURE: 82 MMHG | OXYGEN SATURATION: 95 %

## 2022-07-06 DIAGNOSIS — J44.1 CHRONIC OBSTRUCTIVE PULMONARY DISEASE WITH ACUTE EXACERBATION: Primary | ICD-10-CM

## 2022-07-06 DIAGNOSIS — R35.0 URINARY FREQUENCY: ICD-10-CM

## 2022-07-06 LAB
BILIRUB SERPL-MCNC: NEGATIVE MG/DL
BLOOD URINE, POC: ABNORMAL
CLARITY, POC UA: CLEAR
COLOR, POC UA: YELLOW
CTP QC/QA: YES
GLUCOSE UR QL STRIP: NEGATIVE
KETONES UR QL STRIP: NEGATIVE
LEUKOCYTE ESTERASE URINE, POC: NEGATIVE
NITRITE, POC UA: NEGATIVE
PH, POC UA: 7
PROTEIN, POC: NEGATIVE
SARS-COV-2 RDRP RESP QL NAA+PROBE: NEGATIVE
SPECIFIC GRAVITY, POC UA: 1.02
UROBILINOGEN, POC UA: 0.2

## 2022-07-06 PROCEDURE — 99999 PR PBB SHADOW E&M-EST. PATIENT-LVL V: CPT | Mod: PBBFAC,,,

## 2022-07-06 PROCEDURE — 99215 OFFICE O/P EST HI 40 MIN: CPT | Mod: PBBFAC,PN

## 2022-07-06 PROCEDURE — 99214 OFFICE O/P EST MOD 30 MIN: CPT | Mod: S$PBB,,,

## 2022-07-06 PROCEDURE — 87086 URINE CULTURE/COLONY COUNT: CPT

## 2022-07-06 PROCEDURE — U0002 COVID-19 LAB TEST NON-CDC: HCPCS | Mod: PBBFAC,PN

## 2022-07-06 PROCEDURE — 99214 PR OFFICE/OUTPT VISIT, EST, LEVL IV, 30-39 MIN: ICD-10-PCS | Mod: S$PBB,,,

## 2022-07-06 PROCEDURE — 99999 PR PBB SHADOW E&M-EST. PATIENT-LVL V: ICD-10-PCS | Mod: PBBFAC,,,

## 2022-07-06 PROCEDURE — 81002 URINALYSIS NONAUTO W/O SCOPE: CPT | Mod: PBBFAC,PN

## 2022-07-06 RX ORDER — FLUTICASONE PROPIONATE 50 MCG
1 SPRAY, SUSPENSION (ML) NASAL DAILY
Qty: 18.2 ML | Refills: 2 | Status: SHIPPED | OUTPATIENT
Start: 2022-07-06 | End: 2022-12-02 | Stop reason: SDUPTHER

## 2022-07-06 RX ORDER — PSEUDOEPHEDRINE HCL 120 MG/1
120 TABLET, FILM COATED, EXTENDED RELEASE ORAL
COMMUNITY

## 2022-07-06 RX ORDER — DOXYCYCLINE 100 MG/1
100 CAPSULE ORAL 2 TIMES DAILY
Qty: 20 CAPSULE | Refills: 0 | Status: SHIPPED | OUTPATIENT
Start: 2022-07-06 | End: 2022-07-16

## 2022-07-06 RX ORDER — PREDNISONE 20 MG/1
40 TABLET ORAL DAILY
Qty: 10 TABLET | Refills: 0 | Status: SHIPPED | OUTPATIENT
Start: 2022-07-06 | End: 2022-07-11

## 2022-07-06 NOTE — PATIENT INSTRUCTIONS

## 2022-07-06 NOTE — PROGRESS NOTES
Subjective:       Patient ID: Laxmi Chand is a 66 y.o. female.    Chief Complaint: COPD (States was out in heat Saturday picking tomatoes. Started Sunday with increased cough increased sob increased wheezing. Denies any fever. Took old rx of antibiotics but no relief.)    Patient presents to the clinic with complaints of productive cough, shortness of breath, and wheezing starting Sunday. Denies fever. History of COPD. Also has complaint of urinary frequency.     COPD  This is a recurrent problem. The current episode started in the past 7 days. The problem has been gradually worsening. Associated symptoms include congestion and coughing. Pertinent negatives include no abdominal pain, chest pain, chills, diaphoresis, fever, nausea, rash, sore throat or vomiting. The symptoms are aggravated by exertion. Treatments tried: breathing treatments. The treatment provided no relief.     Review of Systems   Constitutional: Negative for activity change, appetite change, chills, diaphoresis and fever.   HENT: Positive for congestion, postnasal drip and sinus pressure. Negative for ear pain, sneezing and sore throat.    Eyes: Negative for pain, discharge, redness and itching.   Respiratory: Positive for cough, shortness of breath and wheezing. Negative for apnea and chest tightness.         Reports coughing up green sputum   Cardiovascular: Negative for chest pain and leg swelling.   Gastrointestinal: Negative for abdominal distention, abdominal pain, constipation, diarrhea, nausea and vomiting.   Genitourinary: Positive for frequency. Negative for difficulty urinating, dysuria and flank pain.   Skin: Negative for color change, rash and wound.   Neurological: Negative for dizziness.   All other systems reviewed and are negative.      Patient Active Problem List   Diagnosis    Allergic rhinitis    Chronic obstructive lung disease    Degeneration of lumbar intervertebral disc    Depressive disorder    Gastroesophageal  reflux disease    History of smoking    Hyperlipidemia    Hypertensive disorder    Status post total left knee replacement    Chronic pain syndrome    Pleuritic pain       Objective:      Physical Exam  Vitals and nursing note reviewed.   Constitutional:       General: She is not in acute distress.     Appearance: Normal appearance. She is well-developed.   HENT:      Head: Normocephalic.      Nose: Nose normal.   Eyes:      Conjunctiva/sclera: Conjunctivae normal.      Pupils: Pupils are equal, round, and reactive to light.   Cardiovascular:      Rate and Rhythm: Normal rate and regular rhythm.      Heart sounds: Normal heart sounds.   Pulmonary:      Effort: Pulmonary effort is normal. No respiratory distress.      Breath sounds: No stridor. Wheezing present. No rhonchi.   Abdominal:      General: Bowel sounds are normal. There is no distension.      Palpations: Abdomen is soft.      Tenderness: There is no abdominal tenderness.   Musculoskeletal:      Cervical back: Normal range of motion and neck supple.   Skin:     General: Skin is warm and dry.      Findings: No rash.   Neurological:      Mental Status: She is alert and oriented to person, place, and time.   Psychiatric:         Behavior: Behavior normal.         Lab Results   Component Value Date    WBC 6.37 05/10/2022    HGB 13.4 05/10/2022    HCT 41.1 05/10/2022     05/10/2022    CHOL 203 (H) 05/10/2022    TRIG 101 05/10/2022    HDL 50 05/10/2022    ALT 16 05/10/2022    AST 18 05/10/2022     05/10/2022    K 4.2 05/10/2022     05/10/2022    CREATININE 0.8 05/10/2022    BUN 13 05/10/2022    CO2 27 05/10/2022    TSH 1.040 05/10/2022     The 10-year ASCVD risk score ([place] LONGORIA Jr., et al., 2013) is: 13.9%    Values used to calculate the score:      Age: 66 years      Sex: Female      Is Non- : No      Diabetic: No      Tobacco smoker: Yes      Systolic Blood Pressure: 122 mmHg      Is BP treated: Yes      HDL  "Cholesterol: 50 mg/dL      Total Cholesterol: 203 mg/dL  Visit Vitals  /82 (BP Location: Right arm, Patient Position: Sitting, BP Method: Medium (Manual))   Pulse 73   Temp 97.2 °F (36.2 °C) (Temporal)   Ht 5' 4" (1.626 m)   Wt 80.2 kg (176 lb 11.2 oz)   SpO2 95%   BMI 30.33 kg/m²      Assessment:       1. Chronic obstructive pulmonary disease with acute exacerbation    2. Urinary frequency        Plan:       1. Chronic obstructive pulmonary disease with acute exacerbation  -     fluticasone propionate (FLONASE) 50 mcg/actuation nasal spray  -     POCT COVID-19 Rapid Screening  -     doxycycline (VIBRAMYCIN) 100 MG Cap  -     predniSONE (DELTASONE) 20 MG tablet  - Continue Duoneb breathing treatments PRN    2. Urinary frequency  -     Urine culture  -     POCT urine dipstick without microscope       Follow up if symptoms worsen or fail to improve.      Future Appointments     Date Provider Specialty Appt Notes    7/12/2022  Radiology Mammo Digital Diagnostic Right with [facility]    7/12/2022  Radiology US Breast Right Limited    9/8/2022 Elba Dill NP Family Medicine 6 month follow up COPD HTN           "

## 2022-07-07 LAB — BACTERIA UR CULT: NO GROWTH

## 2022-07-08 NOTE — PROGRESS NOTES
Please contact the patient and let them know that her urine culture results were negative.   Thanks,   Fouzia Davidson NP

## 2022-07-12 ENCOUNTER — TELEPHONE (OUTPATIENT)
Dept: FAMILY MEDICINE | Facility: CLINIC | Age: 67
End: 2022-07-12
Payer: MEDICARE

## 2022-07-12 ENCOUNTER — HOSPITAL ENCOUNTER (OUTPATIENT)
Dept: RADIOLOGY | Facility: HOSPITAL | Age: 67
Discharge: HOME | End: 2022-07-12
Attending: NURSE PRACTITIONER
Payer: MEDICARE

## 2022-07-12 DIAGNOSIS — R92.8 ABNORMALITY OF RIGHT BREAST ON SCREENING MAMMOGRAM: ICD-10-CM

## 2022-07-12 PROCEDURE — 77065 MAMMO DIGITAL DIAGNOSTIC RIGHT WITH TOMO: ICD-10-PCS | Mod: 26,RT,, | Performed by: RADIOLOGY

## 2022-07-12 PROCEDURE — 77061 BREAST TOMOSYNTHESIS UNI: CPT | Mod: 26,RT,, | Performed by: RADIOLOGY

## 2022-07-12 PROCEDURE — 77065 DX MAMMO INCL CAD UNI: CPT | Mod: TC,RT

## 2022-07-12 PROCEDURE — 77065 DX MAMMO INCL CAD UNI: CPT | Mod: 26,RT,, | Performed by: RADIOLOGY

## 2022-07-12 PROCEDURE — 77061 MAMMO DIGITAL DIAGNOSTIC RIGHT WITH TOMO: ICD-10-PCS | Mod: 26,RT,, | Performed by: RADIOLOGY

## 2022-07-12 NOTE — TELEPHONE ENCOUNTER
Call placed to pt,breast biopsy is already scheduled on 7-26 @11:00,pt states the hospital will be mailing her instructions prior to the biopsy.Informed pt. Is it very important to keep this appt.      ----- Message from Elba Dill NP sent at 7/12/2022  2:15 PM CDT -----  Results from mammogram were reviewed.   There is a new area to the right outer breast that is a suspicious finding. It is recommended that the patient have a biopsy of the area.  An order for this has been submitted and scheduling should make contact with patient to set this up.   Please encourage patient to notify us if she does not hear from them within the next 3-4 days so that we can reach out to check on this for her as it is very important that she follow up on this.

## 2022-07-14 ENCOUNTER — TELEPHONE (OUTPATIENT)
Dept: FAMILY MEDICINE | Facility: CLINIC | Age: 67
End: 2022-07-14
Payer: MEDICARE

## 2022-07-14 NOTE — TELEPHONE ENCOUNTER
----- Message from Mikal Washington sent at 7/14/2022 10:54 AM CDT -----  Name Of Caller: Laxmi        Provider Name: Elba Dill NP        Does patient feel the need to be seen today? no        Relationship to the Pt?: patient        Contact Preference?:[phone]        What is the nature of the call?: Patient has an appointment for a biopsy scheduled for Tuesday 7- at 11am states that she needs to cancel that appointment and get it  rescheduled

## 2022-08-05 ENCOUNTER — HOSPITAL ENCOUNTER (OUTPATIENT)
Dept: RADIOLOGY | Facility: HOSPITAL | Age: 67
Discharge: HOME | End: 2022-08-05
Attending: NURSE PRACTITIONER
Payer: MEDICARE

## 2022-08-05 DIAGNOSIS — R92.0 MICROCALCIFICATION OF RIGHT BREAST ON MAMMOGRAM: ICD-10-CM

## 2022-08-05 DIAGNOSIS — R92.8 ABNORMAL MAMMOGRAM OF RIGHT BREAST: ICD-10-CM

## 2022-08-05 PROCEDURE — 25000003 PHARM REV CODE 250: Performed by: RADIOLOGY

## 2022-08-05 PROCEDURE — 19081 BX BREAST 1ST LESION STRTCTC: CPT | Mod: RT,,, | Performed by: RADIOLOGY

## 2022-08-05 PROCEDURE — 88342 IMHCHEM/IMCYTCHM 1ST ANTB: CPT | Mod: 59 | Performed by: PATHOLOGY

## 2022-08-05 PROCEDURE — 88341 IMHCHEM/IMCYTCHM EA ADD ANTB: CPT | Performed by: PATHOLOGY

## 2022-08-05 PROCEDURE — 19081 MAMMO BREAST STEREOTACTIC BREAST BIOPSY RIGHT: ICD-10-PCS | Mod: RT,,, | Performed by: RADIOLOGY

## 2022-08-05 PROCEDURE — 88342 CHG IMMUNOCYTOCHEMISTRY: ICD-10-PCS | Mod: 26,XU,, | Performed by: PATHOLOGY

## 2022-08-05 PROCEDURE — 27202473 MAMMO BREAST STEREOTACTIC BREAST BIOPSY RIGHT

## 2022-08-05 PROCEDURE — 88305 TISSUE EXAM BY PATHOLOGIST: CPT | Mod: 26,,, | Performed by: PATHOLOGY

## 2022-08-05 PROCEDURE — 88360 TUMOR IMMUNOHISTOCHEM/MANUAL: CPT | Mod: 59 | Performed by: PATHOLOGY

## 2022-08-05 PROCEDURE — 88305 TISSUE EXAM BY PATHOLOGIST: ICD-10-PCS | Mod: 26,,, | Performed by: PATHOLOGY

## 2022-08-05 PROCEDURE — 88305 TISSUE EXAM BY PATHOLOGIST: CPT | Performed by: PATHOLOGY

## 2022-08-05 PROCEDURE — 88341 IMHCHEM/IMCYTCHM EA ADD ANTB: CPT | Mod: 26,XU,, | Performed by: PATHOLOGY

## 2022-08-05 PROCEDURE — 88342 IMHCHEM/IMCYTCHM 1ST ANTB: CPT | Mod: 26,XU,, | Performed by: PATHOLOGY

## 2022-08-05 PROCEDURE — 88341 PR IHC OR ICC EACH ADD'L SINGLE ANTIBODY  STAINPR: ICD-10-PCS | Mod: 26,XU,, | Performed by: PATHOLOGY

## 2022-08-05 PROCEDURE — 88360 TUMOR IMMUNOHISTOCHEM/MANUAL: CPT | Mod: 26,,, | Performed by: PATHOLOGY

## 2022-08-05 PROCEDURE — 88360 PR  TUMOR IMMUNOHISTOCHEM/MANUAL: ICD-10-PCS | Mod: 26,,, | Performed by: PATHOLOGY

## 2022-08-05 RX ORDER — LIDOCAINE HYDROCHLORIDE 10 MG/ML
10 INJECTION INFILTRATION; PERINEURAL ONCE
Status: COMPLETED | OUTPATIENT
Start: 2022-08-05 | End: 2022-08-05

## 2022-08-05 RX ORDER — LIDOCAINE HYDROCHLORIDE AND EPINEPHRINE 20; 10 MG/ML; UG/ML
10 INJECTION, SOLUTION INFILTRATION; PERINEURAL ONCE
Status: COMPLETED | OUTPATIENT
Start: 2022-08-05 | End: 2022-08-05

## 2022-08-05 RX ADMIN — LIDOCAINE HYDROCHLORIDE,EPINEPHRINE BITARTRATE 10 ML: 20; .01 INJECTION, SOLUTION INFILTRATION; PERINEURAL at 11:08

## 2022-08-05 RX ADMIN — LIDOCAINE HYDROCHLORIDE 10 ML: 10 INJECTION, SOLUTION INFILTRATION; PERINEURAL at 11:08

## 2022-08-05 NOTE — PROGRESS NOTES
2 patient identifier name and date of birth confirmed as stated by patient and matched with armband. Informed consent obtained by Dr. Naranjo

## 2022-08-10 ENCOUNTER — TELEPHONE (OUTPATIENT)
Dept: FAMILY MEDICINE | Facility: CLINIC | Age: 67
End: 2022-08-10
Payer: MEDICARE

## 2022-08-10 NOTE — TELEPHONE ENCOUNTER
Pt had breast biopsy on 8-5,having a lot of pain scheduled appt/ for tomorrow.      ----- Message from Devyn Medina sent at 8/10/2022 12:51 PM CDT -----  Contact: Self  Type:  Same Day Appointment Request    Caller is requesting a same day appointment.  Caller declined first available appointment listed below.      Name of Caller:  Patient  When is the first available appointment?  8/11  Symptoms:  Issues after breast exam  Best Call Back Number:  [phone]   Additional Information:

## 2022-08-11 ENCOUNTER — TELEPHONE (OUTPATIENT)
Dept: FAMILY MEDICINE | Facility: CLINIC | Age: 67
End: 2022-08-11

## 2022-08-11 ENCOUNTER — OFFICE VISIT (OUTPATIENT)
Dept: FAMILY MEDICINE | Facility: CLINIC | Age: 67
End: 2022-08-11
Payer: MEDICARE

## 2022-08-11 VITALS
DIASTOLIC BLOOD PRESSURE: 82 MMHG | SYSTOLIC BLOOD PRESSURE: 128 MMHG | HEART RATE: 87 BPM | HEIGHT: 64 IN | WEIGHT: 191.56 LBS | TEMPERATURE: 98 F | OXYGEN SATURATION: 97 % | BODY MASS INDEX: 32.7 KG/M2

## 2022-08-11 DIAGNOSIS — D05.11 DUCTAL CARCINOMA IN SITU (DCIS) OF RIGHT BREAST: Primary | ICD-10-CM

## 2022-08-11 DIAGNOSIS — I10 PRIMARY HYPERTENSION: ICD-10-CM

## 2022-08-11 DIAGNOSIS — J44.9 CHRONIC OBSTRUCTIVE PULMONARY DISEASE, UNSPECIFIED COPD TYPE: ICD-10-CM

## 2022-08-11 DIAGNOSIS — N64.4 BREAST PAIN, RIGHT: Primary | ICD-10-CM

## 2022-08-11 LAB
FINAL PATHOLOGIC DIAGNOSIS: NORMAL
GROSS: NORMAL
Lab: NORMAL
MICROSCOPIC EXAM: NORMAL

## 2022-08-11 PROCEDURE — 99999 PR PBB SHADOW E&M-EST. PATIENT-LVL V: ICD-10-PCS | Mod: PBBFAC,,,

## 2022-08-11 PROCEDURE — 99214 OFFICE O/P EST MOD 30 MIN: CPT | Mod: S$PBB,,,

## 2022-08-11 PROCEDURE — 99214 PR OFFICE/OUTPT VISIT, EST, LEVL IV, 30-39 MIN: ICD-10-PCS | Mod: S$PBB,,,

## 2022-08-11 PROCEDURE — 99215 OFFICE O/P EST HI 40 MIN: CPT | Mod: PBBFAC,PN

## 2022-08-11 PROCEDURE — 99999 PR PBB SHADOW E&M-EST. PATIENT-LVL V: CPT | Mod: PBBFAC,,,

## 2022-08-11 NOTE — PATIENT INSTRUCTIONS

## 2022-08-11 NOTE — TELEPHONE ENCOUNTER
Attempted to call patient with pathology results from recent breast biopsy.   1st attempt: continuous ringing- no option to leave voice mail  2nd attempt: rang x3 and then disconnected.    Please attempt to reach patient to inform her that her breast biopsy results have been obtained and are positive for cancerous cells. She will need to see general surgery as well as oncology.  These referrals have been placed for the patient to prevent any delays in her treatment.  General Surgery: Jatin Gutierrez   Hematology/Oncology: Naveen Marrero  Please inform patient that if she has any questions or concerns to please let us know.

## 2022-08-11 NOTE — PROGRESS NOTES
Subjective:       Patient ID: Laxmi Chand is a 66 y.o. female.    Chief Complaint: Breast Pain (S/p breast biopsy right breast,done august 05th. C/o pain with lots of bruising.)    Patient presents to the clinic with complaints of right breast pain post biopsy on 8/5/22.     States it has been hurting since the biopsy was performed. Pain 4/10 with no movement, worsens with touch or movement. Has been taken regular pain meds.     Patient educated on plan of care, verbalized understanding.        Review of Systems   Constitutional: Negative for activity change, appetite change, chills, diaphoresis and fever.   HENT: Positive for postnasal drip. Negative for congestion, ear pain, sinus pressure, sneezing and sore throat.    Eyes: Negative for pain, discharge, redness and itching.   Respiratory: Positive for cough. Negative for apnea, chest tightness, shortness of breath and wheezing.    Cardiovascular: Negative for chest pain and leg swelling.   Gastrointestinal: Negative for abdominal distention, abdominal pain, constipation, diarrhea, nausea and vomiting.   Genitourinary: Negative for difficulty urinating, dysuria, flank pain and frequency.   Musculoskeletal: Positive for arthralgias and myalgias.        Right foot surgery, has boot and ace wrap on   Skin: Negative for color change, rash and wound.        Bruising and pain to right breast   Neurological: Negative for dizziness.   All other systems reviewed and are negative.      Patient Active Problem List   Diagnosis    Allergic rhinitis    Chronic obstructive lung disease    Degeneration of lumbar intervertebral disc    Depressive disorder    Gastroesophageal reflux disease    History of smoking    Hyperlipidemia    Hypertensive disorder    Status post total left knee replacement    Chronic pain syndrome    Pleuritic pain       Objective:      Physical Exam  Vitals and nursing note reviewed.   Constitutional:       General: She is not in acute  distress.     Appearance: Normal appearance. She is well-developed.   HENT:      Head: Normocephalic.      Nose: Nose normal.   Eyes:      Conjunctiva/sclera: Conjunctivae normal.      Pupils: Pupils are equal, round, and reactive to light.   Cardiovascular:      Rate and Rhythm: Normal rate and regular rhythm.      Heart sounds: Normal heart sounds.   Pulmonary:      Effort: Pulmonary effort is normal. No respiratory distress.      Breath sounds: Normal breath sounds.   Chest:   Breasts:      Right: Tenderness present.            Comments: See picture. No hematoma palpated. Breast soft, tender to touch. No erythema/warmth noted.   Abdominal:      General: Bowel sounds are normal. There is no distension.      Palpations: Abdomen is soft.      Tenderness: There is no abdominal tenderness.   Musculoskeletal:      Cervical back: Normal range of motion and neck supple.   Skin:     General: Skin is warm and dry.      Findings: No rash.   Neurological:      Mental Status: She is alert and oriented to person, place, and time.   Psychiatric:         Behavior: Behavior normal.             Lab Results   Component Value Date    WBC 6.37 05/10/2022    HGB 13.4 05/10/2022    HCT 41.1 05/10/2022     05/10/2022    CHOL 203 (H) 05/10/2022    TRIG 101 05/10/2022    HDL 50 05/10/2022    ALT 16 05/10/2022    AST 18 05/10/2022     05/10/2022    K 4.2 05/10/2022     05/10/2022    CREATININE 0.8 05/10/2022    BUN 13 05/10/2022    CO2 27 05/10/2022    TSH 1.040 05/10/2022     The 10-year ASCVD risk score (Jen SWATI Jr., et al., 2013) is: 15.2%    Values used to calculate the score:      Age: 66 years      Sex: Female      Is Non- : No      Diabetic: No      Tobacco smoker: Yes      Systolic Blood Pressure: 128 mmHg      Is BP treated: Yes      HDL Cholesterol: 50 mg/dL      Total Cholesterol: 203 mg/dL  Visit Vitals  /82 (BP Location: Left arm, Patient Position: Sitting, BP Method: Medium  "(Manual))   Pulse 87   Temp 97.9 °F (36.6 °C)   Ht 5' 4" (1.626 m)   Wt 86.9 kg (191 lb 9.3 oz)   SpO2 97%   BMI 32.88 kg/m²      Assessment:       1. Breast pain, right    2. Chronic obstructive pulmonary disease, unspecified COPD type    3. Primary hypertension        Plan:       1. Breast pain, right   - Continue ice packs   - Wear bra for support   - Take pain meds as prescribed    2. Chronic obstructive pulmonary disease, unspecified COPD type   - Stable   - Continue current plan of care   - Follow up with PCP   - Patient does report she has stopped smoking    3. Primary hypertension   - Stable   - Continue current plan of care   - Follow up with PCP       Follow up if symptoms worsen or fail to improve.      Future Appointments     Date Provider Specialty Appt Notes    9/8/2022 Elba Dill NP Family Medicine 6 month follow up COPD HTN         "

## 2022-08-12 ENCOUNTER — TELEPHONE (OUTPATIENT)
Dept: HEMATOLOGY/ONCOLOGY | Facility: CLINIC | Age: 67
End: 2022-08-12
Payer: MEDICARE

## 2022-08-12 NOTE — TELEPHONE ENCOUNTER
Spoke with patient related to recent breast bx results.  All questions/concerns answered at this time for the patient  Referrals have been placed to Oncology/ General Surgery. She voiced understanding about importance of keeping these appointments to delay treatment.

## 2022-08-12 NOTE — TELEPHONE ENCOUNTER
----- Message from Sarah Mondragon sent at 8/12/2022  4:10 PM CDT -----  Type:Needs Medical Advice    Who Called:PT  Best call back number:#[phone]  Additional Info: Requesting a call back regarding#PT has a referral and is ready to schedule.  Please Advise- Thank you

## 2022-08-12 NOTE — NURSING
Received referral for pt to see Dr. Marrero.  Spoke to pt.  She has just been dx with Breast Ca and lives in [place].  She would prefer to see someone in [place].  Mammogram, bx and path are in Epic.  She has not had any imaging.  Scheduled her at [place] for Aug 23 (first available).  Message sent to [place] NavigatorKristina.  Contact information given.  Encouraged her to call with any questions or concerns.  Pt verbalized understanding.

## 2022-08-15 ENCOUNTER — TELEPHONE (OUTPATIENT)
Dept: HEMATOLOGY/ONCOLOGY | Facility: CLINIC | Age: 67
End: 2022-08-15
Payer: MEDICARE

## 2022-08-15 NOTE — TELEPHONE ENCOUNTER
----- Message from Neeta Spaulding, Patient Care Assistant sent at 8/15/2022  9:17 AM CDT -----  Regarding: grace  Type: Needs Medical Advice  Who Called:  ar Elena Call Back Number: [phone]      Additional Information: patient is wanting to speak with grace , please call to further discuss, thank you

## 2022-08-15 NOTE — TELEPHONE ENCOUNTER
Spoke to Mrs Chand she was advised per Dr. Marrero additional testing is not needed prior to appt and it was ok to leave her appt on 8/23 patient was given my direct contact information and she verbalized understanding to all

## 2022-08-15 NOTE — TELEPHONE ENCOUNTER
Spoke to Mrs Chand regarding her appt on Tuesday, Aug 23 she would like to be seen sooner message sent provider and staff she was advised that I will call her back she verbalized understanding

## 2022-08-15 NOTE — TELEPHONE ENCOUNTER
----- Message from Naveen Marrero MD sent at 8/15/2022  2:42 PM CDT -----  Regarding: RE: NEW REFERRAL  Should be fine as is  ----- Message -----  From: Kristina Cole RN  Sent: 8/15/2022   1:34 PM CDT  To: Naveen Marrero MD, Iram Buck RN  Subject: FW: NEW REFERRAL                                 Hi Dr. Marrero please see below from the Navigation Team in [place].  Will you need additional testing prior to NP appt. I did speak with the patient she asking to be seen this week.  ----- Message -----  From: Roma Prince RN  Sent: 8/12/2022   5:44 PM CDT  To: Kristina Cole RN  Subject: NEW REFERRAL                                     Luís Acevedo,    I received this referral and after talking to the pt, she would like to see Dr. Marrero in [place].  As usual I had trouble scheduling her, but Faith was able to help get her on his schedule.  The appt is on the 23rd.  She is a newly dx breast ca.  Mammogram and bx are in Epic.  She has not had any imaging.  I told her I was sending a message to his Navigator and that if he wanted any imaging or testing prior to her appt, you would reach out to her.      Thank you!     Roma

## 2022-08-15 NOTE — TELEPHONE ENCOUNTER
----- Message from Stacie Pantoja sent at 8/15/2022 10:25 AM CDT -----  Type: Needs Medical Advice  Who Called:  Patient   Symptoms (please be specific):    How long has patient had these symptoms:    Pharmacy name and phone #:    Best Call Back Number: [phone]  Additional Information: Patient is requesting a call back to speak with Kristina regarding getting a sooner appt.

## 2022-08-15 NOTE — NURSING
Spoke to pt.  She was trying to reach Kristina about getting a sooner appt.  When I spoke to her, she had just spoken to Kristina. Kristina is trying to get her a sooner appointment.  I did look at Dr. Marrero's schedule here in [place] and told her I would check it frequently to see of something opens up sooner.  She said her daughter would drive her anywhere to get her seen soon.  Encouraged pt to call with any questions or concerns. Pt verbalized understanding.

## 2022-08-16 DIAGNOSIS — J44.9 CHRONIC OBSTRUCTIVE PULMONARY DISEASE, UNSPECIFIED COPD TYPE: ICD-10-CM

## 2022-08-17 ENCOUNTER — TELEPHONE (OUTPATIENT)
Dept: FAMILY MEDICINE | Facility: CLINIC | Age: 67
End: 2022-08-17

## 2022-08-17 ENCOUNTER — TELEPHONE (OUTPATIENT)
Dept: SURGERY | Facility: CLINIC | Age: 67
End: 2022-08-17
Payer: MEDICARE

## 2022-08-17 RX ORDER — IPRATROPIUM BROMIDE AND ALBUTEROL SULFATE 2.5; .5 MG/3ML; MG/3ML
SOLUTION RESPIRATORY (INHALATION)
Qty: 360 ML | Refills: 5 | Status: SHIPPED | OUTPATIENT
Start: 2022-08-17 | End: 2023-01-27

## 2022-08-17 NOTE — TELEPHONE ENCOUNTER
----- Message from Iwona Ignacio sent at 8/17/2022 11:58 AM CDT -----  Patient Said  she had the pharmacy [facility] send refill for meds for nebulizer . She is out and been waiting on the refill . She needs asap please phone [phone]

## 2022-08-17 NOTE — TELEPHONE ENCOUNTER
----- Message from Iwona Ignacio sent at 8/17/2022 11:58 AM CDT -----  Patient Said  she had the pharmacy [facility] send refill for meds for nebulizer . She is out and been waiting on the refill . She needs asap please phone [phone]

## 2022-08-17 NOTE — TELEPHONE ENCOUNTER
GenSurg referral scheduled with patient: 8/26/2022 1015 Dr.Spencer Gutierrez, [facility].

## 2022-08-23 ENCOUNTER — OFFICE VISIT (OUTPATIENT)
Dept: HEMATOLOGY/ONCOLOGY | Facility: CLINIC | Age: 67
End: 2022-08-23
Payer: MEDICARE

## 2022-08-23 ENCOUNTER — OFFICE VISIT (OUTPATIENT)
Dept: SURGERY | Facility: CLINIC | Age: 67
End: 2022-08-23
Payer: MEDICARE

## 2022-08-23 ENCOUNTER — TELEPHONE (OUTPATIENT)
Dept: HEMATOLOGY/ONCOLOGY | Facility: CLINIC | Age: 67
End: 2022-08-23

## 2022-08-23 ENCOUNTER — TELEPHONE (OUTPATIENT)
Dept: HEMATOLOGY/ONCOLOGY | Facility: CLINIC | Age: 67
End: 2022-08-23
Payer: MEDICARE

## 2022-08-23 ENCOUNTER — TELEPHONE (OUTPATIENT)
Dept: SURGERY | Facility: CLINIC | Age: 67
End: 2022-08-23
Payer: MEDICARE

## 2022-08-23 VITALS
DIASTOLIC BLOOD PRESSURE: 65 MMHG | RESPIRATION RATE: 16 BRPM | OXYGEN SATURATION: 95 % | WEIGHT: 179.19 LBS | TEMPERATURE: 98 F | SYSTOLIC BLOOD PRESSURE: 143 MMHG | HEART RATE: 85 BPM | BODY MASS INDEX: 30.76 KG/M2

## 2022-08-23 VITALS
HEART RATE: 84 BPM | WEIGHT: 176.69 LBS | BODY MASS INDEX: 30.17 KG/M2 | HEIGHT: 64 IN | DIASTOLIC BLOOD PRESSURE: 76 MMHG | SYSTOLIC BLOOD PRESSURE: 132 MMHG | OXYGEN SATURATION: 97 %

## 2022-08-23 DIAGNOSIS — D05.11 DUCTAL CARCINOMA IN SITU (DCIS) OF RIGHT BREAST: ICD-10-CM

## 2022-08-23 DIAGNOSIS — Z01.818 PRE-OP TESTING: Primary | ICD-10-CM

## 2022-08-23 DIAGNOSIS — Z85.42 HISTORY OF ENDOMETRIAL CANCER: ICD-10-CM

## 2022-08-23 DIAGNOSIS — D05.11 DUCTAL CARCINOMA IN SITU (DCIS) OF RIGHT BREAST: Primary | ICD-10-CM

## 2022-08-23 DIAGNOSIS — M85.80 SENILE OSTEOPENIA: ICD-10-CM

## 2022-08-23 PROCEDURE — 99999 PR PBB SHADOW E&M-EST. PATIENT-LVL V: CPT | Mod: PBBFAC,,, | Performed by: SURGERY

## 2022-08-23 PROCEDURE — 99999 PR PBB SHADOW E&M-EST. PATIENT-LVL V: ICD-10-PCS | Mod: PBBFAC,,, | Performed by: SURGERY

## 2022-08-23 PROCEDURE — 99215 OFFICE O/P EST HI 40 MIN: CPT | Mod: PBBFAC | Performed by: INTERNAL MEDICINE

## 2022-08-23 PROCEDURE — 99215 OFFICE O/P EST HI 40 MIN: CPT | Mod: PBBFAC,27 | Performed by: SURGERY

## 2022-08-23 PROCEDURE — 99205 PR OFFICE/OUTPT VISIT, NEW, LEVL V, 60-74 MIN: ICD-10-PCS | Mod: S$PBB,,, | Performed by: INTERNAL MEDICINE

## 2022-08-23 PROCEDURE — 99205 OFFICE O/P NEW HI 60 MIN: CPT | Mod: S$PBB,,, | Performed by: INTERNAL MEDICINE

## 2022-08-23 PROCEDURE — 99205 OFFICE O/P NEW HI 60 MIN: CPT | Mod: S$PBB,,, | Performed by: SURGERY

## 2022-08-23 PROCEDURE — 99999 PR PBB SHADOW E&M-EST. PATIENT-LVL V: ICD-10-PCS | Mod: PBBFAC,,, | Performed by: INTERNAL MEDICINE

## 2022-08-23 PROCEDURE — 99999 PR PBB SHADOW E&M-EST. PATIENT-LVL V: CPT | Mod: PBBFAC,,, | Performed by: INTERNAL MEDICINE

## 2022-08-23 PROCEDURE — 99205 PR OFFICE/OUTPT VISIT, NEW, LEVL V, 60-74 MIN: ICD-10-PCS | Mod: S$PBB,,, | Performed by: SURGERY

## 2022-08-23 RX ORDER — SODIUM CHLORIDE 9 MG/ML
INJECTION, SOLUTION INTRAVENOUS CONTINUOUS
Status: CANCELLED | OUTPATIENT
Start: 2022-08-23

## 2022-08-23 NOTE — PROGRESS NOTES
Chief complaint:  Newly diagnosed right breast carcinoma.    History of present illness:  The patient is a 66-year-old white female who had been in her usual state of health, had screening mammography, and was found to have microcalcifications within the right breast.  These have been biopsied in reveal high-grade DCIS which is 2 mm in greatest dimension and associated with typical ductal hyperplasia, papilloma, and non-necrotizing granulomata.  Patient's tumor is strongly estrogen positive/progesterone negative.  Patient has a good deal of bruising and tenderness following biopsy.  Patient reports diagnosis of endometrial carcinoma in her 20s which was managed hysterectomy.    Past medical history:   1. Allergic rhinitis  2.  Chronic obstructive pulmonary disease   3. Degenerative disc disease of the lumbar spine  4.  Depression  5.  Gastroesophageal reflux disease  6. Tobacco abuse  7.  Hyperlipidemia  8.  Hypertension  9.  Status post total left knee arthroplasty  10.  Chronic pain syndrome   11.  Migraine headache  12.  Glaucoma   14. Osteoporosis  15. History of endometrial carcinoma-status post hysterectomy  16.  Status post lumbar spine surgery  17.  Status post cholecystectomy    Allergies: None known     Medications:    Current Outpatient Medications:     albuterol (PROVENTIL/VENTOLIN HFA) 90 mcg/actuation inhaler, INHALE 2 PUFFS INTO THE LUNGS EVERY 6 (SIX) HOURS AS NEEDED FOR WHEEZING., Disp: 25.5 g, Rfl: 3    albuterol-ipratropium (DUO-NEB) 2.5 mg-0.5 mg/3 mL nebulizer solution, INHALE CONTENTS OF 1 VIAL BY MOUTH WITH NEBULIZER EVERY 6 HOURS WHILE AWAKE, Disp: 360 mL, Rfl: 5    aspirin (ECOTRIN) 81 MG EC tablet, Take 1 tablet by mouth once daily. Pt back on asa, Disp: , Rfl:     aspirin-acetaminophen-caffeine 250-250-65 mg (HEADACHE RELIEF, ASA-ACET-CAF,) 250-250-65 mg per tablet, Take 1 tablet by mouth every 6 to 8 hours as needed., Disp: , Rfl:     budesonide-formoterol 160-4.5 mcg (SYMBICORT)  160-4.5 mcg/actuation HFAA, Inhale 2 puffs into the lungs every 12 (twelve) hours. Controller, Disp: 10.2 g, Rfl: 5    buPROPion (WELLBUTRIN) 100 MG tablet, TAKE 1 TABLET (100 MG TOTAL) BY MOUTH 3 (THREE) TIMES DAILY., Disp: 270 tablet, Rfl: 1    calcium carbonate-vitamin D3 (CALCIUM 600 + D,3,) 600-125 mg-unit Tab, Take 2 tablets by mouth once daily., Disp: 180 tablet, Rfl: 3    celecoxib (CELEBREX) 200 MG capsule, Take 1 capsule (200 mg total) by mouth once daily., Disp: 90 capsule, Rfl: 1    cetirizine (ZYRTEC) 10 MG tablet, Take 1 tablet (10 mg total) by mouth once daily., Disp: 90 tablet, Rfl: 5    conjugated estrogens (PREMARIN) vaginal cream, Place 0.5 g vaginally twice a week., Disp: 30 g, Rfl: 5    cyclobenzaprine (FLEXERIL) 10 MG tablet, Take 1 tablet (10 mg total) by mouth nightly., Disp: 90 tablet, Rfl: 3    doxepin (SINEQUAN) 25 MG capsule, Take 2 capsules (50 mg total) by mouth nightly., Disp: 180 capsule, Rfl: 3    fluticasone propionate (FLONASE) 50 mcg/actuation nasal spray, 1 spray (50 mcg total) by Each Nostril route once daily., Disp: 18.2 mL, Rfl: 2    gabapentin (NEURONTIN) 300 MG capsule, , Disp: , Rfl:     hydroCHLOROthiazide (HYDRODIURIL) 25 MG tablet, Take 1 tablet (25 mg total) by mouth once daily., Disp: 90 tablet, Rfl: 3    HYDROcodone-acetaminophen (NORCO)  mg per tablet, Take 1 tablet by mouth every 8 (eight) hours as needed for Pain., Disp: 90 tablet, Rfl: 0    ibandronate (BONIVA) 150 mg tablet, Take 1 tablet (150 mg total) by mouth every 30 days., Disp: 1 tablet, Rfl: 11    metroNIDAZOLE (FLAGYL) 500 MG tablet, Take 1 tablet (500 mg total) by mouth every 12 (twelve) hours., Disp: 14 tablet, Rfl: 0    montelukast (SINGULAIR) 10 mg tablet, Take 1 tablet (10 mg total) by mouth once daily., Disp: 90 tablet, Rfl: 3    nebivoloL (BYSTOLIC) 10 MG Tab, TAKE 1 TABLET BY MOUTH ONCE DAILY, Disp: 90 tablet, Rfl: 3    ondansetron (ZOFRAN) 4 MG tablet, Take 1 tablet (4 mg  total) by mouth every 8 (eight) hours as needed for Nausea., Disp: 15 tablet, Rfl: 1    pantoprazole (PROTONIX) 40 MG tablet, Take 1 tablet (40 mg total) by mouth once daily., Disp: 90 tablet, Rfl: 3    pseudoephedrine (SUDAFED) 120 mg 12 hr tablet, Take 120 mg by mouth every 12 (twelve) hours., Disp: , Rfl:     travoprost (TRAVATAN Z) 0.004 % ophthalmic solution, INT 1 GTT IN OU D HS, Disp: , Rfl:      Family/social history:  Patient smokes 1/2 pack cigarettes daily and has done so for 25 years.    Social alcohol use.    No family history of breast carcinoma.  Mother suffered from hypertension and diabetes mellitus.    Father suffered from hypertension.      Physical examination:   Well-developed, well-nourished, white female, no acute distress, who has a weight of 179 lb.  VITAL SIGNS: Documented  and reviewed this visit.  HEENT: Normocephalic, atraumatic. Oral mucosa pink and moist. Lips without lesions. Tongue midline. Oropharynx clear. Nonicteric sclerae.   NECK: Supple, no adenopathy. No carotid bruits, thyromegaly or thyroid nodule.   HEART: Regular rate and rhythm without murmur, gallop or rub.   LUNGS: Clear to auscultation bilaterally. Normal respiratory effort.   ABDOMEN: Soft, nontender, nondistended with positive normoactive bowel sounds, no hepatosplenomegaly.   EXTREMITIES: No cyanosis, clubbing or edema. Distal pulses are intact.   AXILLAE AND GROIN: No palpable pathologic lymphadenopathy is appreciated.   SKIN: Intact/turgor normal   NEUROLOGIC: Cranial nerves II-XII grossly intact. Motor: Good muscle bulk and tone. Strength/sensory 5/5 throughout. Gait stable.     Laboratory:  Most recent studies were obtained on 05/10/2022.  White count 6.4, hemoglobin 13.4, hematocrit 41.1, platelets 422, absolute neutrophil count 3600.  Sodium 138, potassium 4.2, chloride 100, CO2 27, BUN 13, creatinine 0.8, glucose 96, calcium 9.7, liver function test within normal limits, GFR is greater than  60.    Mammography:  Study performed 05/24/2022.  Impression:  Right  Calcifications: Right breast calcifications at the upper outer posterior position. Assessment: 0 - Incomplete. Special Views: Magnification View is recommended.    Left  There is no mammographic evidence of malignancy in the left breast.  BI-RADS Category:   Overall: 0 - Incomplete: Needs Additional Imaging Evaluation    Bone mineral density:   Study performed 05/24/2022.    Osteopenia involving both hips.    There is a 18.5% risk of a major osteoporotic fracture and a 3.2% risk of hip fracture in the next 10 years (FRAX).     Impression:   1. High-grade DCIS of the right breast.  2. Senile osteopenia.  3. Personal history of endometrial carcinoma.    Plan:   1. Will refer to Dr. Gutierrez in General surgery for lumpectomy and sentinel lymph node sampling.  2. Return to clinic to review results of operative pathology as well as interval lab to include CBC, CMP, LDH BRCA 1 & 2 analysis, and magnesium.      This note was created using voice recognition software and may contain grammatical errors.

## 2022-08-23 NOTE — TELEPHONE ENCOUNTER
Pt signed consent for [facility] testing. Blood sample drawn per phlebotomy. TRF, blood sample, and req'd paperwork placed in fed ex kit. Tracking # [id number].

## 2022-08-23 NOTE — TELEPHONE ENCOUNTER
Referral msg sent to Dr Gutierrez's staff.      ----- Message from Naveen Marrero MD sent at 8/23/2022  9:22 AM CDT -----  Refer to Dr. Gutierrez for lumpectomy and sentinel lymph node sampling.    Return to clinic to review postoperative pathology, CBC, CMP, LDH and magnesium.

## 2022-08-23 NOTE — LETTER
August 23, 2022        Elba Dill, HERBER  [address] [facility] [place] [address]             [facility]  [address] [place] [address]  Phone: [phone]   Patient: Laxmi Chand   MR Number: [id number]   YOB: 1955   Date of Visit: 8/23/2022       Dear Dr. Dill:    Thank you for referring Laxmi Chand to me for evaluation of DCIS of the right breast. Below are the relevant portions of my assessment and plan of care.  If you have questions, please do not hesitate to call me. I look forward to following Laxmi along with you.    Sincerely,      MD AQUILES Felix DO Stacey M. Kreher, WHNP Neil L Duplantier, MD David L. McKellar, MD

## 2022-08-23 NOTE — Clinical Note
Refer to Dr. Gutirerez for lumpectomy and sentinel lymph node sampling.   Return to clinic to review postoperative pathology, CBC, CMP, LDH and magnesium.

## 2022-08-23 NOTE — H&P
[facility] H&P Note    Subjective:       Patient ID: Laxmi Chand is a 66 y.o. female.    Chief Complaint:  Abnormal right breast biopsy.  HPI:  Laxmi Chand is a 66 y.o. female history of COPD gastroesophageal reflux disease hypertension presents today as a new patient referral for evaluation of abnormal breast biopsy right side.  Patient underwent routine screening mammograms.  Found to have abnormal calcifications in the right breast.  Underwent breast biopsy given BI-RADS 4 lesion.  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  Patient subsequently referred surgery Clinic today for evaluation.  Of note no family history known of previous breast cancers.  No personal history of breast cancer.  One of the patient's best friends did diet inflammatory breast cancer.    Past Medical History:   Diagnosis Date    COPD (chronic obstructive pulmonary disease)     Degeneration of lumbar intervertebral disc     Endometrial cancer     GERD (gastroesophageal reflux disease)     HTN (hypertension)     Hyperlipemia, mixed      Past Surgical History:   Procedure Laterality Date    BREAST BIOPSY Right 08/05/2022    CARPAL TUNNEL RELEASE  1985    CHOLECYSTECTOMY  1978    HYSTERECTOMY      KNEE SURGERY Left 06/01/2020    LUMBAR DISC SURGERY  01/01/1990    plate and roland    LUMBAR FUSION  2011    TOTAL KNEE REPLACEMENT USING COMPUTER NAVIGATION Left 02/15/2021     Family History   Problem Relation Age of Onset    Hypertension Mother     Diabetes Mother     Hypertension Father     Breast cancer Neg Hx      Social History     Socioeconomic History    Marital status: Significant Other   Occupational History    Occupation: disabled   Tobacco Use    Smoking status: Current Every Day Smoker     Packs/day: 0.50     Years: 50.00     Pack years: 25.00     Types: Cigarettes     Start date: 1969    Smokeless tobacco: Never Used   Substance and Sexual Activity    Alcohol  use: Yes    Drug use: Not Currently    Sexual activity: Yes     Partners: Male   Social History Narrative    Pl[facility] from [facility]         [facility]     Annual    urinary tract infection    yeast infection        Visit Summary    No pap per guidelines    U/A for culture    Wet prep: yeast only    Pt has a PCP    Colonoscopy up to date    [place] [facility] [place]criptions:    SIG: Cipro 500 mg oral tablet, 3 days, Dispense #6 Tablet, 0 Refills    Directions: Take 1 oral tablet 2 times a day    SIG: Diflucan 100 mg oral tablet, 3 days, Dispense #3 Tablet, 0 Refills    Directions: Take 1 oral tablet once a day        Return for follow up appointment in one year or prn.     [id number]     Vulvar C[facility]: Resolved        Visit Summary    Normal external gyn exam. Cyst resolved        Return for follow up appointment annual/prn.     [id number]     Chronic Vaginitis    Paratubal cysts: stable        Visit Summary    Wet prep: mostly yeast, few clue cells    Pt soaks in bath BID, stop, shower only, no douching.    u/s performed today. unchanged from last scan.        Prescriptions: Clindesse sample given    SIG: Diflucan 100 mg oral tablet, 3 days, Dispense #3 Tablet, 0 Refills    Directions: Take 1 oral tablet once a day    Recommend probiotics        Return for follow up appointment for annual or prn. MDL swab at next appt if needed.    Mammo up to date.     [id number]     Recurrent bacterial vaginosis.  Paratubal cysts.  Dysuria.    Repeat transvaginal ultrasound 6 weeks.        Visit Summary    Ordered:    Urine Culture, Routine     GYN Visit & [id number] [facility]     path compound melanocytic nevus...ch us...of pelvis....rtc 1y pap     GYN Visit & Pelmpmz05 [facility]     3 moles removed from back 5 mm each...same contwainer monsels applied...    one mole removed from under each breasxt 5 mm...mnonsels  applied...each one sent to path sepvincentt..        vag dc bv...sp metrogel...no family h/o breast ca...        pelvic us...complex cyst 2.23 cm on left w possible excrescence and septation        pelvic us for complex cyst at [facility]......rtc 1wk     [id number]     Chronic Bacterial Vaginitis    Chronic Back Pain    Left Lower Quadrant resolved, possible ruptured ovarian cyst        Visit Summary    MDL swab done        Return for follow up appointment in 2 months for follow up pelvic u/s.     GYN Exam/[id number] 20164/6/2017     Annual    Vaginal odor, Bacterial Vaginosis    Ovarian Cyst        Visit Summary    Pap done, reports hx of cervical pre-cancer        Prescriptions:    SIG: metronidazole 500 mg tablet, 7 days, Dispense #14 Tablet, 0 Refills    Directions: Take 1 oral tablet 2 times a day. No alcohol discussed.        U/S today, reports had ovarian cyst on CT scan.    FSH today        Return for follow up appointment  pending results.       Current Outpatient Medications   Medication Sig Dispense Refill    albuterol-ipratropium (DUO-NEB) 2.5 mg-0.5 mg/3 mL nebulizer solution INHALE CONTENTS OF 1 VIAL BY MOUTH WITH NEBULIZER EVERY 6 HOURS WHILE AWAKE 360 mL 5    aspirin (ECOTRIN) 81 MG EC tablet Take 1 tablet by mouth once daily. Pt back on asa      aspirin-acetaminophen-caffeine 250-250-65 mg (HEADACHE RELIEF, ASA-ACET-CAF,) 250-250-65 mg per tablet Take 1 tablet by mouth every 6 to 8 hours as needed.      budesonide-formoterol 160-4.5 mcg (SYMBICORT) 160-4.5 mcg/actuation HFAA Inhale 2 puffs into the lungs every 12 (twelve) hours. Controller 10.2 g 5    buPROPion (WELLBUTRIN) 100 MG tablet TAKE 1 TABLET (100 MG TOTAL) BY MOUTH 3 (THREE) TIMES DAILY. 270 tablet 1    calcium carbonate-vitamin D3 (CALCIUM 600 + D,3,) 600-125 mg-unit Tab Take 2 tablets by mouth once daily. 180 tablet 3    celecoxib (CELEBREX) 200 MG capsule Take 1 capsule (200 mg total) by mouth once daily.  90 capsule 1    cetirizine (ZYRTEC) 10 MG tablet Take 1 tablet (10 mg total) by mouth once daily. 90 tablet 5    conjugated estrogens (PREMARIN) vaginal cream Place 0.5 g vaginally twice a week. 30 g 5    cyclobenzaprine (FLEXERIL) 10 MG tablet Take 1 tablet (10 mg total) by mouth nightly. 90 tablet 3    doxepin (SINEQUAN) 25 MG capsule Take 2 capsules (50 mg total) by mouth nightly. 180 capsule 3    fluticasone propionate (FLONASE) 50 mcg/actuation nasal spray 1 spray (50 mcg total) by Each Nostril route once daily. 18.2 mL 2    hydroCHLOROthiazide (HYDRODIURIL) 25 MG tablet Take 1 tablet (25 mg total) by mouth once daily. 90 tablet 3    HYDROcodone-acetaminophen (NORCO)  mg per tablet Take 1 tablet by mouth every 8 (eight) hours as needed for Pain. 90 tablet 0    ibandronate (BONIVA) 150 mg tablet Take 1 tablet (150 mg total) by mouth every 30 days. 1 tablet 11    montelukast (SINGULAIR) 10 mg tablet Take 1 tablet (10 mg total) by mouth once daily. 90 tablet 3    nebivoloL (BYSTOLIC) 10 MG Tab TAKE 1 TABLET BY MOUTH ONCE DAILY 90 tablet 3    ondansetron (ZOFRAN) 4 MG tablet Take 1 tablet (4 mg total) by mouth every 8 (eight) hours as needed for Nausea. 15 tablet 1    pantoprazole (PROTONIX) 40 MG tablet Take 1 tablet (40 mg total) by mouth once daily. 90 tablet 3    pseudoephedrine (SUDAFED) 120 mg 12 hr tablet Take 120 mg by mouth every 12 (twelve) hours.      travoprost (TRAVATAN Z) 0.004 % ophthalmic solution INT 1 GTT IN OU D HS      albuterol (PROVENTIL/VENTOLIN HFA) 90 mcg/actuation inhaler INHALE 2 PUFFS INTO THE LUNGS EVERY 6 (SIX) HOURS AS NEEDED FOR WHEEZING. 25.5 g 3     No current facility-administered medications for this visit.     Review of patient's allergies indicates:  No Known Allergies    Review of Systems   Constitutional: Negative for activity change, appetite change, chills and fever.   HENT: Negative for congestion, dental problem and ear discharge.    Eyes: Negative  "for discharge and itching.   Respiratory: Negative for apnea, choking and chest tightness.    Cardiovascular: Negative for chest pain and leg swelling.   Gastrointestinal: Negative for abdominal distention, abdominal pain, anal bleeding, constipation, diarrhea and nausea.   Endocrine: Negative for cold intolerance and heat intolerance.   Genitourinary: Negative for difficulty urinating and dyspareunia.   Musculoskeletal: Negative for arthralgias and back pain.   Skin: Negative for color change and pallor.   Neurological: Negative for dizziness and facial asymmetry.   Hematological: Negative for adenopathy. Does not bruise/bleed easily.   Psychiatric/Behavioral: Negative for agitation and behavioral problems.       Objective:      Vitals:    08/23/22 1318   BP: 132/76   BP Location: Left arm   Patient Position: Sitting   BP Method: Medium (Automatic)   Pulse: 84   SpO2: 97%   Weight: 80.2 kg (176 lb 11.2 oz)   Height: 5' 4" (1.626 m)     Physical Exam  Constitutional:       General: She is not in acute distress.     Appearance: She is well-developed.   HENT:      Head: Normocephalic and atraumatic.   Eyes:      Pupils: Pupils are equal, round, and reactive to light.   Neck:      Thyroid: No thyromegaly.   Cardiovascular:      Rate and Rhythm: Normal rate and regular rhythm.   Pulmonary:      Effort: Pulmonary effort is normal.      Breath sounds: Normal breath sounds.   Abdominal:      General: Bowel sounds are normal. There is no distension.      Palpations: Abdomen is soft.      Tenderness: There is no abdominal tenderness.   Musculoskeletal:         General: No deformity. Normal range of motion.      Cervical back: Normal range of motion and neck supple.   Skin:     General: Skin is warm.      Capillary Refill: Capillary refill takes less than 2 seconds.      Findings: No erythema.   Neurological:      Mental Status: She is alert and oriented to person, place, and time.      Cranial Nerves: No cranial nerve " deficit.   Psychiatric:         Behavior: Behavior normal.       Mammogram ultrasound pathology results reviewed.  DCIS right breast.     Assessment:       1. Pre-op testing    2. Ductal carcinoma in situ (DCIS) of right breast        Plan:   Pre-op testing  -     CBC Auto Differential; Future; Expected date: 11/23/2022  -     Comprehensive Metabolic Panel; Future; Expected date: 11/23/2022  -     EKG 12-lead; Future; Expected date: 11/23/2022    Ductal carcinoma in situ (DCIS) of right breast  -     Ambulatory referral/consult to General Surgery  -     Case Request Operating Room: EXCISION, MASS, BREAST, BIOPSY, LYMPH NODE, AXILLARY  -     Full code; Standing  -     Place in Outpatient; Standing  -     Vital signs; Standing  -     Insert peripheral IV; Standing  -     Verify beta-blocker dose taken within 24 hours if patient is prescribed beta-blocker; Standing  -     Height and weight; Standing  -     Intake and output; Standing  -     Verify discontinuation of antithrombotics; Standing  -     POCT glucose; Standing  -     Verify blood consent; Standing  -     Verify consent; Standing  -     Diet NPO; Standing  -     Place DARÍO hose; Standing  -     Place sequential compression device; Standing    Other orders  -     0.9%  NaCl infusion  -     IP VTE LOW RISK PATIENT; Standing  -     ceFAZolin (ANCEF) 2 g in dextrose 5 % 50 mL IVPB        Medical Decision Making/Counseling:  Right breast ductal carcinoma in Situ.  Recommendation per Oncology is upfront surgery.  ER positive.  Options to include mastectomy, bilateral mastectomy, partial mastectomy with lumpectomy wire localized sentinel lymph node sampling have been discussed in detail with the patient and family in clinic.  Necessity with lumpectomy for radiation postoperative has been discussed.  Possible needs, 25% of returning to the OR for adequate margins and re-excision has been discussed.  After informed discussion with the patient and family in clinic, they  voiced understanding of the options of surgical intervention desires to proceed with wire localized lumpectomy right breast with axillary lymph node sampling versus axillary dissection.  All questions were answered to their satisfaction.      Preoperative, patient will go to Radiology undergo wire localized placement via ultrasound.  Patient subsequently go to nuclear medicine for technetium sulfur colloid injection for sentinel lymph node mapping.  Patient will undergo Lymphazurin blue dye injection OR and then undergo surgical treatment.    Patient instructed that best way to communicate with my office staff is for patient to get on the [facility] epic patient portal to expedite communication and communication issues that may occur.  Patient was given instructions on how to get on the portal.  I encouraged patient to obtain portal access as well.  Ultimately it is up to the patient to obtain access.  Patient voiced understanding.

## 2022-08-26 ENCOUNTER — HOSPITAL ENCOUNTER (OUTPATIENT)
Dept: CARDIOLOGY | Facility: HOSPITAL | Age: 67
Discharge: HOME | End: 2022-08-26
Attending: SURGERY
Payer: MEDICARE

## 2022-08-26 DIAGNOSIS — Z01.818 PRE-OP TESTING: ICD-10-CM

## 2022-08-26 PROCEDURE — 93010 EKG 12-LEAD: ICD-10-PCS | Mod: ,,, | Performed by: INTERNAL MEDICINE

## 2022-08-26 PROCEDURE — 93010 ELECTROCARDIOGRAM REPORT: CPT | Mod: ,,, | Performed by: INTERNAL MEDICINE

## 2022-08-26 PROCEDURE — 93005 ELECTROCARDIOGRAM TRACING: CPT

## 2022-08-29 ENCOUNTER — TELEPHONE (OUTPATIENT)
Dept: SURGERY | Facility: CLINIC | Age: 67
End: 2022-08-29
Payer: MEDICARE

## 2022-08-30 ENCOUNTER — TELEPHONE (OUTPATIENT)
Dept: HEMATOLOGY/ONCOLOGY | Facility: CLINIC | Age: 67
End: 2022-08-30
Payer: MEDICARE

## 2022-08-31 DIAGNOSIS — J44.9 CHRONIC OBSTRUCTIVE PULMONARY DISEASE, UNSPECIFIED COPD TYPE: ICD-10-CM

## 2022-08-31 RX ORDER — ALBUTEROL SULFATE 90 UG/1
2 AEROSOL, METERED RESPIRATORY (INHALATION) EVERY 6 HOURS PRN
Qty: 25.5 G | Refills: 3 | Status: SHIPPED | OUTPATIENT
Start: 2022-08-31 | End: 2022-09-08 | Stop reason: SDUPTHER

## 2022-09-01 ENCOUNTER — PATIENT MESSAGE (OUTPATIENT)
Dept: HEMATOLOGY/ONCOLOGY | Facility: CLINIC | Age: 67
End: 2022-09-01
Payer: MEDICARE

## 2022-09-06 ENCOUNTER — HOSPITAL ENCOUNTER (OUTPATIENT)
Dept: PREADMISSION TESTING | Facility: HOSPITAL | Age: 67
Discharge: HOME | End: 2022-09-06
Attending: SURGERY
Payer: MEDICARE

## 2022-09-06 ENCOUNTER — ANESTHESIA EVENT (OUTPATIENT)
Dept: SURGERY | Facility: HOSPITAL | Age: 67
End: 2022-09-06
Payer: MEDICARE

## 2022-09-06 NOTE — ANESTHESIA PREPROCEDURE EVALUATION
09/06/2022  Laxmi Chand is a 67 y.o., female.      Pre-op Assessment    I have reviewed the Patient Summary Reports.     I have reviewed the Nursing Notes. I have reviewed the NPO Status.   I have reviewed the Medications.     Review of Systems  Social:  Smoker    Cardiovascular:   Hypertension hyperlipidemia    Pulmonary:   COPD    Hepatic/GI:   GERD    Musculoskeletal:   Arthritis   Spine Disorders: lumbar    Neurological:   Chronic Pain Syndrome   Endocrine:  Endocrine Normal    Psych:   Psychiatric History          Physical Exam    Airway:  Mallampati: II   Mouth Opening: Normal  TM Distance: Normal  Tongue: Normal    Chest/Lungs:  expiratory wheezes    Heart:  Rate: Normal  Rhythm: Regular Rhythm        Anesthesia Plan  Type of Anesthesia, risks & benefits discussed:    Anesthesia Type: Gen ETT  Intra-op Monitoring Plan: Standard ASA Monitors  Post Op Pain Control Plan: multimodal analgesia  Induction:  IV  Airway Plan: Direct  ASA Score: 3  Day of Surgery Review of History & Physical: H&P Update referred to the surgeon/provider.    Ready For Surgery From Anesthesia Perspective.     .

## 2022-09-07 ENCOUNTER — HOSPITAL ENCOUNTER (OUTPATIENT)
Facility: HOSPITAL | Age: 67
Discharge: HOME | End: 2022-09-07
Attending: SURGERY | Admitting: SURGERY
Payer: MEDICARE

## 2022-09-07 ENCOUNTER — HOSPITAL ENCOUNTER (OUTPATIENT)
Dept: RADIOLOGY | Facility: HOSPITAL | Age: 67
Discharge: HOME | End: 2022-09-07
Attending: SURGERY
Payer: MEDICARE

## 2022-09-07 ENCOUNTER — ANESTHESIA (OUTPATIENT)
Dept: SURGERY | Facility: HOSPITAL | Age: 67
End: 2022-09-07
Payer: MEDICARE

## 2022-09-07 ENCOUNTER — HOSPITAL ENCOUNTER (OUTPATIENT)
Dept: RADIOLOGY | Facility: HOSPITAL | Age: 67
Discharge: HOME | End: 2022-09-07
Attending: SURGERY | Admitting: SURGERY
Payer: MEDICARE

## 2022-09-07 DIAGNOSIS — Z01.818 PRE-OP EXAM: ICD-10-CM

## 2022-09-07 DIAGNOSIS — D05.10 DCIS (DUCTAL CARCINOMA IN SITU) OF BREAST: ICD-10-CM

## 2022-09-07 DIAGNOSIS — D05.11 DUCTAL CARCINOMA IN SITU (DCIS) OF RIGHT BREAST: ICD-10-CM

## 2022-09-07 PROCEDURE — 25000242 PHARM REV CODE 250 ALT 637 W/ HCPCS: Performed by: NURSE ANESTHETIST, CERTIFIED REGISTERED

## 2022-09-07 PROCEDURE — 88307 TISSUE EXAM BY PATHOLOGIST: CPT | Performed by: PATHOLOGY

## 2022-09-07 PROCEDURE — 88341 PR IHC OR ICC EACH ADD'L SINGLE ANTIBODY  STAINPR: ICD-10-PCS | Mod: 26,59,, | Performed by: PATHOLOGY

## 2022-09-07 PROCEDURE — 25000003 PHARM REV CODE 250: Performed by: NURSE ANESTHETIST, CERTIFIED REGISTERED

## 2022-09-07 PROCEDURE — 63600175 PHARM REV CODE 636 W HCPCS: Performed by: NURSE ANESTHETIST, CERTIFIED REGISTERED

## 2022-09-07 PROCEDURE — 88360 TUMOR IMMUNOHISTOCHEM/MANUAL: CPT | Mod: 26,,, | Performed by: PATHOLOGY

## 2022-09-07 PROCEDURE — 19281 PERQ DEVICE BREAST 1ST IMAG: CPT

## 2022-09-07 PROCEDURE — 36000706: Performed by: SURGERY

## 2022-09-07 PROCEDURE — 36000707: Performed by: SURGERY

## 2022-09-07 PROCEDURE — 78195 NM LYMPHATICS AND LYMPH NODE IMAGING: ICD-10-PCS | Mod: 26,,, | Performed by: RADIOLOGY

## 2022-09-07 PROCEDURE — 94640 AIRWAY INHALATION TREATMENT: CPT

## 2022-09-07 PROCEDURE — 78195 LYMPH SYSTEM IMAGING: CPT | Mod: 26,,, | Performed by: RADIOLOGY

## 2022-09-07 PROCEDURE — 63600175 PHARM REV CODE 636 W HCPCS: Mod: JG | Performed by: SURGERY

## 2022-09-07 PROCEDURE — 88307 PR  SURG PATH,LEVEL V: ICD-10-PCS | Mod: 26,,, | Performed by: PATHOLOGY

## 2022-09-07 PROCEDURE — D9220A PRA ANESTHESIA: ICD-10-PCS | Mod: ANES,,, | Performed by: ANESTHESIOLOGY

## 2022-09-07 PROCEDURE — 27201423 OPTIME MED/SURG SUP & DEVICES STERILE SUPPLY: Performed by: SURGERY

## 2022-09-07 PROCEDURE — 19281 PERQ DEVICE BREAST 1ST IMAG: CPT | Mod: RT,,, | Performed by: RADIOLOGY

## 2022-09-07 PROCEDURE — 63600175 PHARM REV CODE 636 W HCPCS: Performed by: SURGERY

## 2022-09-07 PROCEDURE — 88360 PR  TUMOR IMMUNOHISTOCHEM/MANUAL: ICD-10-PCS | Mod: 26,,, | Performed by: PATHOLOGY

## 2022-09-07 PROCEDURE — D9220A PRA ANESTHESIA: Mod: CRNA,,, | Performed by: NURSE ANESTHETIST, CERTIFIED REGISTERED

## 2022-09-07 PROCEDURE — 88360 TUMOR IMMUNOHISTOCHEM/MANUAL: CPT | Performed by: PATHOLOGY

## 2022-09-07 PROCEDURE — 88342 IMHCHEM/IMCYTCHM 1ST ANTB: CPT | Mod: 59 | Performed by: PATHOLOGY

## 2022-09-07 PROCEDURE — 19302 PR MASTECTOMY,PARTIAL,  WITH AXILLARY LYMPHADENECTOMY: ICD-10-PCS | Mod: LT,,, | Performed by: SURGERY

## 2022-09-07 PROCEDURE — 37000009 HC ANESTHESIA EA ADD 15 MINS: Performed by: SURGERY

## 2022-09-07 PROCEDURE — 88307 TISSUE EXAM BY PATHOLOGIST: CPT | Mod: 26,,, | Performed by: PATHOLOGY

## 2022-09-07 PROCEDURE — 19302 P-MASTECTOMY W/LN REMOVAL: CPT | Mod: LT,,, | Performed by: SURGERY

## 2022-09-07 PROCEDURE — 71000015 HC POSTOP RECOV 1ST HR: Performed by: SURGERY

## 2022-09-07 PROCEDURE — 25000242 PHARM REV CODE 250 ALT 637 W/ HCPCS

## 2022-09-07 PROCEDURE — 88341 IMHCHEM/IMCYTCHM EA ADD ANTB: CPT | Performed by: PATHOLOGY

## 2022-09-07 PROCEDURE — D9220A PRA ANESTHESIA: Mod: ANES,,, | Performed by: ANESTHESIOLOGY

## 2022-09-07 PROCEDURE — 38900 IO MAP OF SENT LYMPH NODE: CPT | Mod: LT,,, | Performed by: SURGERY

## 2022-09-07 PROCEDURE — 88342 IMHCHEM/IMCYTCHM 1ST ANTB: CPT | Mod: 26,XU,, | Performed by: PATHOLOGY

## 2022-09-07 PROCEDURE — 88341 IMHCHEM/IMCYTCHM EA ADD ANTB: CPT | Mod: 26,59,, | Performed by: PATHOLOGY

## 2022-09-07 PROCEDURE — 19281 MAMMO NEEDLE LOC WITH MAMMO GUIDANCE 1ST SITE: ICD-10-PCS | Mod: RT,,, | Performed by: RADIOLOGY

## 2022-09-07 PROCEDURE — C1729 CATH, DRAINAGE: HCPCS | Performed by: SURGERY

## 2022-09-07 PROCEDURE — 71000033 HC RECOVERY, INTIAL HOUR: Performed by: SURGERY

## 2022-09-07 PROCEDURE — D9220A PRA ANESTHESIA: ICD-10-PCS | Mod: CRNA,,, | Performed by: NURSE ANESTHETIST, CERTIFIED REGISTERED

## 2022-09-07 PROCEDURE — 25000003 PHARM REV CODE 250: Performed by: ANESTHESIOLOGY

## 2022-09-07 PROCEDURE — 88342 CHG IMMUNOCYTOCHEMISTRY: ICD-10-PCS | Mod: 26,XU,, | Performed by: PATHOLOGY

## 2022-09-07 PROCEDURE — 37000008 HC ANESTHESIA 1ST 15 MINUTES: Performed by: SURGERY

## 2022-09-07 PROCEDURE — A9541 TC99M SULFUR COLLOID: HCPCS

## 2022-09-07 PROCEDURE — 63600175 PHARM REV CODE 636 W HCPCS: Performed by: ANESTHESIOLOGY

## 2022-09-07 PROCEDURE — 38900 PR INTRAOPERATIVE SENTINEL LYMPH NODE ID W DYE INJECTION: ICD-10-PCS | Mod: LT,,, | Performed by: SURGERY

## 2022-09-07 RX ORDER — LIDOCAINE HYDROCHLORIDE 10 MG/ML
INJECTION, SOLUTION EPIDURAL; INFILTRATION; INTRACAUDAL; PERINEURAL
Status: DISCONTINUED
Start: 2022-09-07 | End: 2022-09-07 | Stop reason: HOSPADM

## 2022-09-07 RX ORDER — IPRATROPIUM BROMIDE AND ALBUTEROL SULFATE 2.5; .5 MG/3ML; MG/3ML
SOLUTION RESPIRATORY (INHALATION)
Status: COMPLETED
Start: 2022-09-07 | End: 2022-09-07

## 2022-09-07 RX ORDER — LIDOCAINE HYDROCHLORIDE 20 MG/ML
INJECTION, SOLUTION EPIDURAL; INFILTRATION; INTRACAUDAL; PERINEURAL
Status: DISCONTINUED | OUTPATIENT
Start: 2022-09-07 | End: 2022-09-07

## 2022-09-07 RX ORDER — FAMOTIDINE 10 MG/ML
20 INJECTION INTRAVENOUS ONCE
Status: COMPLETED | OUTPATIENT
Start: 2022-09-07 | End: 2022-09-07

## 2022-09-07 RX ORDER — ALBUTEROL SULFATE 90 UG/1
AEROSOL, METERED RESPIRATORY (INHALATION)
Status: DISCONTINUED | OUTPATIENT
Start: 2022-09-07 | End: 2022-09-07

## 2022-09-07 RX ORDER — SODIUM CHLORIDE, SODIUM LACTATE, POTASSIUM CHLORIDE, CALCIUM CHLORIDE 600; 310; 30; 20 MG/100ML; MG/100ML; MG/100ML; MG/100ML
125 INJECTION, SOLUTION INTRAVENOUS CONTINUOUS
Status: DISCONTINUED | OUTPATIENT
Start: 2022-09-07 | End: 2022-09-07 | Stop reason: HOSPADM

## 2022-09-07 RX ORDER — IPRATROPIUM BROMIDE AND ALBUTEROL SULFATE 2.5; .5 MG/3ML; MG/3ML
3 SOLUTION RESPIRATORY (INHALATION)
Status: DISCONTINUED | OUTPATIENT
Start: 2022-09-07 | End: 2022-09-07

## 2022-09-07 RX ORDER — ONDANSETRON 4 MG/1
4 TABLET, FILM COATED ORAL EVERY 6 HOURS PRN
Qty: 30 TABLET | Refills: 0 | Status: SHIPPED | OUTPATIENT
Start: 2022-09-07 | End: 2022-09-17

## 2022-09-07 RX ORDER — ONDANSETRON 2 MG/ML
4 INJECTION INTRAMUSCULAR; INTRAVENOUS DAILY PRN
Status: DISCONTINUED | OUTPATIENT
Start: 2022-09-07 | End: 2022-09-07 | Stop reason: HOSPADM

## 2022-09-07 RX ORDER — DIPHENHYDRAMINE HYDROCHLORIDE 50 MG/ML
12.5 INJECTION INTRAMUSCULAR; INTRAVENOUS
Status: DISCONTINUED | OUTPATIENT
Start: 2022-09-07 | End: 2022-09-07 | Stop reason: HOSPADM

## 2022-09-07 RX ORDER — SODIUM CHLORIDE 9 MG/ML
INJECTION, SOLUTION INTRAVENOUS CONTINUOUS
Status: DISCONTINUED | OUTPATIENT
Start: 2022-09-07 | End: 2022-09-07 | Stop reason: HOSPADM

## 2022-09-07 RX ORDER — CEFAZOLIN SODIUM 2 G/50ML
SOLUTION INTRAVENOUS
Status: COMPLETED
Start: 2022-09-07 | End: 2022-09-07

## 2022-09-07 RX ORDER — ROCURONIUM BROMIDE 10 MG/ML
INJECTION, SOLUTION INTRAVENOUS
Status: DISCONTINUED | OUTPATIENT
Start: 2022-09-07 | End: 2022-09-07

## 2022-09-07 RX ORDER — HYDROCODONE BITARTRATE AND ACETAMINOPHEN 10; 325 MG/1; MG/1
1 TABLET ORAL EVERY 6 HOURS PRN
Qty: 30 TABLET | Refills: 0 | Status: SHIPPED | OUTPATIENT
Start: 2022-09-07 | End: 2022-09-17

## 2022-09-07 RX ORDER — PROPOFOL 10 MG/ML
VIAL (ML) INTRAVENOUS
Status: DISCONTINUED | OUTPATIENT
Start: 2022-09-07 | End: 2022-09-07

## 2022-09-07 RX ORDER — DEXAMETHASONE SODIUM PHOSPHATE 4 MG/ML
INJECTION, SOLUTION INTRA-ARTICULAR; INTRALESIONAL; INTRAMUSCULAR; INTRAVENOUS; SOFT TISSUE
Status: DISCONTINUED | OUTPATIENT
Start: 2022-09-07 | End: 2022-09-07

## 2022-09-07 RX ORDER — CEFAZOLIN SODIUM 2 G/50ML
2 SOLUTION INTRAVENOUS
Status: COMPLETED | OUTPATIENT
Start: 2022-09-07 | End: 2022-09-07

## 2022-09-07 RX ORDER — LIDOCAINE HYDROCHLORIDE 10 MG/ML
1 INJECTION, SOLUTION EPIDURAL; INFILTRATION; INTRACAUDAL; PERINEURAL ONCE
Status: DISCONTINUED | OUTPATIENT
Start: 2022-09-07 | End: 2022-09-07 | Stop reason: HOSPADM

## 2022-09-07 RX ORDER — ONDANSETRON 2 MG/ML
INJECTION INTRAMUSCULAR; INTRAVENOUS
Status: DISCONTINUED | OUTPATIENT
Start: 2022-09-07 | End: 2022-09-07

## 2022-09-07 RX ORDER — ISOSULFAN BLUE 50 MG/5ML
INJECTION, SOLUTION SUBCUTANEOUS
Status: DISCONTINUED | OUTPATIENT
Start: 2022-09-07 | End: 2022-09-07 | Stop reason: HOSPADM

## 2022-09-07 RX ORDER — FENTANYL CITRATE 50 UG/ML
INJECTION, SOLUTION INTRAMUSCULAR; INTRAVENOUS
Status: DISCONTINUED | OUTPATIENT
Start: 2022-09-07 | End: 2022-09-07

## 2022-09-07 RX ORDER — NEOSTIGMINE METHYLSULFATE 1 MG/ML
INJECTION, SOLUTION INTRAVENOUS
Status: DISCONTINUED | OUTPATIENT
Start: 2022-09-07 | End: 2022-09-07

## 2022-09-07 RX ORDER — LIDOCAINE HYDROCHLORIDE 10 MG/ML
10 INJECTION, SOLUTION EPIDURAL; INFILTRATION; INTRACAUDAL; PERINEURAL ONCE
Status: DISCONTINUED | OUTPATIENT
Start: 2022-09-07 | End: 2022-09-08 | Stop reason: HOSPADM

## 2022-09-07 RX ORDER — MORPHINE SULFATE 4 MG/ML
2 INJECTION, SOLUTION INTRAMUSCULAR; INTRAVENOUS EVERY 5 MIN PRN
Status: DISCONTINUED | OUTPATIENT
Start: 2022-09-07 | End: 2022-09-07 | Stop reason: HOSPADM

## 2022-09-07 RX ORDER — SODIUM CHLORIDE, SODIUM LACTATE, POTASSIUM CHLORIDE, CALCIUM CHLORIDE 600; 310; 30; 20 MG/100ML; MG/100ML; MG/100ML; MG/100ML
INJECTION, SOLUTION INTRAVENOUS CONTINUOUS
Status: DISCONTINUED | OUTPATIENT
Start: 2022-09-07 | End: 2022-09-07 | Stop reason: HOSPADM

## 2022-09-07 RX ORDER — MIDAZOLAM HYDROCHLORIDE 1 MG/ML
INJECTION INTRAMUSCULAR; INTRAVENOUS
Status: DISCONTINUED | OUTPATIENT
Start: 2022-09-07 | End: 2022-09-07

## 2022-09-07 RX ADMIN — SODIUM CHLORIDE, SODIUM LACTATE, POTASSIUM CHLORIDE, AND CALCIUM CHLORIDE: .6; .31; .03; .02 INJECTION, SOLUTION INTRAVENOUS at 09:09

## 2022-09-07 RX ADMIN — PROPOFOL 200 MG: 10 INJECTION, EMULSION INTRAVENOUS at 12:09

## 2022-09-07 RX ADMIN — GLYCOPYRROLATE 0.4 MG: 0.2 INJECTION INTRAMUSCULAR; INTRAVENOUS at 12:09

## 2022-09-07 RX ADMIN — DEXAMETHASONE SODIUM PHOSPHATE 4 MG: 4 INJECTION, SOLUTION INTRAMUSCULAR; INTRAVENOUS at 12:09

## 2022-09-07 RX ADMIN — MIDAZOLAM HYDROCHLORIDE 2 MG: 1 INJECTION, SOLUTION INTRAMUSCULAR; INTRAVENOUS at 11:09

## 2022-09-07 RX ADMIN — FENTANYL CITRATE 100 MCG: 50 INJECTION, SOLUTION INTRAMUSCULAR; INTRAVENOUS at 11:09

## 2022-09-07 RX ADMIN — IPRATROPIUM BROMIDE AND ALBUTEROL SULFATE: 2.5; .5 SOLUTION RESPIRATORY (INHALATION) at 10:09

## 2022-09-07 RX ADMIN — NEOSTIGMINE METHYLSULFATE 2 MG: 1 INJECTION INTRAVENOUS at 12:09

## 2022-09-07 RX ADMIN — ROCURONIUM BROMIDE 30 MG: 10 INJECTION, SOLUTION INTRAVENOUS at 12:09

## 2022-09-07 RX ADMIN — MORPHINE SULFATE 2 MG: 4 INJECTION INTRAVENOUS at 02:09

## 2022-09-07 RX ADMIN — LIDOCAINE HYDROCHLORIDE 100 MG: 20 INJECTION, SOLUTION EPIDURAL; INFILTRATION; INTRACAUDAL; PERINEURAL at 01:09

## 2022-09-07 RX ADMIN — CEFAZOLIN SODIUM 2 G: 2 SOLUTION INTRAVENOUS at 11:09

## 2022-09-07 RX ADMIN — ONDANSETRON HYDROCHLORIDE 4 MG: 2 SOLUTION INTRAMUSCULAR; INTRAVENOUS at 02:09

## 2022-09-07 RX ADMIN — FAMOTIDINE 20 MG: 10 INJECTION INTRAVENOUS at 09:09

## 2022-09-07 RX ADMIN — ONDANSETRON 4 MG: 2 INJECTION INTRAMUSCULAR; INTRAVENOUS at 12:09

## 2022-09-07 RX ADMIN — ALBUTEROL SULFATE 2 PUFF: 90 AEROSOL, METERED RESPIRATORY (INHALATION) at 01:09

## 2022-09-07 RX ADMIN — LIDOCAINE HYDROCHLORIDE 100 MG: 20 INJECTION, SOLUTION EPIDURAL; INFILTRATION; INTRACAUDAL; PERINEURAL at 12:09

## 2022-09-07 NOTE — OP NOTE
[facility] Surgery  Operative Note     SUMMARY     Surgery Date: 9/7/2022     Pre-op Diagnosis:  Ductal carcinoma in situ (DCIS) of right breast [D05.11]    Post-op Diagnosis:  Post-Op Diagnosis Codes:     * Ductal carcinoma in situ (DCIS) of right breast [D05.11]    Operation:  1) wire localized partial mastectomy right breast with margins 48098  2) right axillary dissection.  3) injection of Lymphazurin blue dye.    Surgeon(s) and Role:     * Jatin Gutierrez MD - Primary    Assistant:  None.    Antibiotics:  Ancef 2 g IV.     Estimated Blood Loss: 15 mL    Anesthesia:  General    Description of the findings of the procedure:  Right breast lumpectomy with adequate margins for DCIS, 2 wires placed and utilized for resection.  Post mammographic imaging shows evidence of clip, calcifications, and wires within mammogram image as well as adequate margins.  Right sentinel lymph node not amenable to resection as Lymphazurin blue, nor gamma probe with reading or visualization in the right axilla.  Right axillary dissection performed.  Thoracodorsal vasculature and nerve as well as long thoracic nerve were identified and preserved.  Axillary vein was identified and preserved.    Specimens:  Right lumpectomy.  Right axillary contents.    Complications:  None apparent in the operative room.    Implants:  19 German Reji drain right axilla.         Indications for Procedure:     Ms Chand is a 66yo F presented clinic as referral for evaluation of breast cancer.  Patient offered lumpectomy versus mastectomy and axillary lymph node sampling.  Risk benefits were discussed.  Patient voiced understanding risk benefits wished to proceed today with right lumpectomy with radiation in the future as well as axillary lymph node sampling.    Procedure:     Patient is brought back in the operative room placed on table supine position.  Anesthesia was given per the anesthesia team, please see separate dictated anesthesia note for more  details.  A time-out was conducted with all the operative room in agreement correct patient correct procedure correct allergies correct antibiotics.  Patient was given 2 g Ancef prior surgical incision.  Patient's right breast was prepped and draped in standard sterile surgical fashion.  I then instilled 2 cc Lymphazurin blue in the patient's right nipple-areolar complex.    Skin incision was made with a 15 blade in a curvilinear fashion around the patient's 2 wires in the right lateral breast.  Skin incision is carried down to the wires Bovie electric cautery.  Wires were then utilized to trace the breast tissue down to the area of calcifications etcetera.  This area was cored out with adequate margins for partial mastectomy for cancer.  Specimen was then removed.  It was marked per the [facility] guidelines with paint.  It was handed off to mammography.  Mammography, radiologist confirmed that wires, calcifications, margins, clips were all within the resected specimen.      At this point lymph node sampling was conducted.  Right axilla was examined.  The gamma probe was utilized.  Gamma probe however had no signal in the axilla.  No signal at the nipple either.  The axillary fat pad was then encountered through the lumpectomy site superiorly.  There was no significant evidence of blue within the lymph nodes. , this was non mapping lymph nodes.  Decision was made to perform axillary dissection right side.  Axillary vein was identified.  The axillary contents from the long thoracic nerve to the thoracodorsal nerve were resected inferior to the axillary vein.  Thoracodorsal nerve as well as long thoracic were preserved.  Axillary contents was handed off as specimen.  Nineteen Sudanese Reji drain was placed in the patient's right axilla.  Washout was conducted in the right axilla.  The Reji drain was fixed into place with a 2-0 silk suture.      3-0 Vicryl sutures were used in a simple subdermal fashion close the sub  dermis.  4-0 Monocryl suture was used in a running subcuticular fashion close the epidermis.  Dermabond placed over top as dressing.      Patient tolerated procedure well she was reversed from anesthetic agent transfer back to postop care in stable condition.  All counts were correct at the end the procedure including lap pads instruments well as needles.  Plan be discharged home today with adequate pain nausea medication follow-up surgery clinic 2 weeks.

## 2022-09-07 NOTE — ANESTHESIA PROCEDURE NOTES
Intubation    Date/Time: 9/7/2022 12:02 PM  Performed by: Elba Burgess CRNA  Authorized by: Cliff Johnson MD     Intubation:     Induction:  Intravenous    Intubated:  Postinduction    Mask Ventilation:  Easy mask    Attempts:  1    Attempted By:  CRNA    Method of Intubation:  Direct    Blade:  Woo 2    Laryngeal View Grade: Grade I - full view of cords      Difficult Airway Encountered?: No      Complications:  None    Airway Device:  Oral endotracheal tube    Airway Device Size:  7.0    Style/Cuff Inflation:  Cuffed (inflated to minimal occlusive pressure)    Tube secured:  21    Secured at:  The lips    Placement Verified By:  Capnometry    Complicating Factors:  None    Findings Post-Intubation:  BS equal bilateral and atraumatic/condition of teeth unchanged

## 2022-09-07 NOTE — H&P
[facility] H&P Note    Subjective:       Patient ID: Laxmi Chand is a 67 y.o. female.    Chief Complaint:  Abnormal right breast biopsy.  HPI:  Laxmi Chand is a 67 y.o. female history of COPD gastroesophageal reflux disease hypertension presents today as a new patient referral for evaluation of abnormal breast biopsy right side.  Patient underwent routine screening mammograms.  Found to have abnormal calcifications in the right breast.  Underwent breast biopsy given BI-RADS 4 lesion.  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  Patient subsequently referred surgery Clinic today for evaluation.  Of note no family history known of previous breast cancers.  No personal history of breast cancer.  One of the patient's best friends did diet inflammatory breast cancer.    Past Medical History:   Diagnosis Date    COPD (chronic obstructive pulmonary disease)     Degeneration of lumbar intervertebral disc     Endometrial cancer     GERD (gastroesophageal reflux disease)     HTN (hypertension)     Hyperlipemia, mixed      Past Surgical History:   Procedure Laterality Date    BREAST BIOPSY Right 08/05/2022    CARPAL TUNNEL RELEASE  1985    CHOLECYSTECTOMY  1978    HYSTERECTOMY      KNEE SURGERY Left 06/01/2020    LUMBAR DISC SURGERY  01/01/1990    plate and roland    LUMBAR FUSION  2011    TOTAL KNEE REPLACEMENT USING COMPUTER NAVIGATION Left 02/15/2021     Family History   Problem Relation Age of Onset    Hypertension Mother     Diabetes Mother     Hypertension Father     Breast cancer Neg Hx      Social History     Socioeconomic History    Marital status: Significant Other   Occupational History    Occupation: disabled   Tobacco Use    Smoking status: Every Day     Packs/day: 0.50     Years: 50.00     Pack years: 25.00     Types: Cigarettes     Start date: 1969    Smokeless tobacco: Never   Substance and Sexual Activity    Alcohol use: Yes    Drug use: Not Currently     Sexual activity: Yes     Partners: Male   Social History Narrative    Pl[facility] from [facility]         [facility]     Annual    urinary tract infection    yeast infection        Visit Summary    No pap per guidelines    U/A for culture    Wet prep: yeast only    Pt has a PCP    Colonoscopy up to date    Mammo @ [facility] [place]        Prescriptions:    SIG: Cipro 500 mg oral tablet, 3 days, Dispense #6 Tablet, 0 Refills    Directions: Take 1 oral tablet 2 times a day    SIG: Diflucan 100 mg oral tablet, 3 days, Dispense #3 Tablet, 0 Refills    Directions: Take 1 oral tablet once a day        Return for follow up appointment in one year or prn.     [id number]     Vulvar Cyst: Resolved        Visit Summary    Normal external gyn exam. Cyst resolved        Return for follow up appointment annual/prn.     [id number]     Chronic Vaginitis    Paratubal cysts: stable        Visit Summary    Wet prep: mostly yeast, few clue cells    Pt soaks in bath BID, stop, shower only, no douching.    u/s performed today. unchanged from last scan.        Prescriptions: Clindesse sample given    SIG: Diflucan 100 mg oral tablet, 3 days, Dispense #3 Tablet, 0 Refills    Directions: Take 1 oral tablet once a day    Recommend probiotics        Return for follow up appointment for annual or prn. MDL swab at next appt if needed.    Mammo up to date.     [id number]     Recurrent bacterial vaginosis.  Paratubal cysts.  Dysuria.    Repeat transvaginal ultrasound 6 weeks.        Visit Summary    Ordered:    Urine Culture, Routine     [id number]     path compound melanocytic nevus... us...of pelvis....rtc 1y pap     [id number]     3 moles removed from back 5 mm each...same contwainer monsels applied...    one mole removed from under each breasxt 5 mm...mnonsels applied...each one sent to path  sepreatelyt..        vag dc bv...sp metrogel...no family h/o breast ca...        pelvic us...complex cyst 2.23 cm on left w possible excrescence and septation        [place] [facility] [place]     [id number]     Chronic Bacterial Vaginitis    Chronic Back Pain    Left Lower Quadrant resolved, possible ruptured ovarian cyst        Visit Summary    MDL swab done        Return for follow up appointment in 2 months for follow up pelvic u/s.     GYN Exam/[id number] 20164/6/2017     Annual    Vaginal odor, Bacterial Vaginosis    Ovarian Cyst        Visit Summary    Pap done, reports hx of cervical pre-cancer        Prescriptions:    SIG: metronidazole 500 mg tablet, 7 days, Dispense #14 Tablet, 0 Refills    Directions: Take 1 oral tablet 2 times a day. No alcohol discussed.        U/S today, reports had ovarian cyst on CT scan.    FSH today        Return for follow up appointment  pending results.       No current facility-administered medications for this encounter.     Facility-Administered Medications Ordered in Other Encounters   Medication Dose Route Frequency Provider Last Rate Last Admin    LIDOcaine (PF) 10 mg/ml (1%) 10 mg/mL (1 %) injection             LIDOcaine (PF) 10 mg/ml (1%) injection 100 mg  10 mL Other Once Jatin Gutierrez MD         Review of patient's allergies indicates:  No Known Allergies    Review of Systems   Constitutional:  Negative for activity change, appetite change, chills and fever.   HENT:  Negative for congestion, dental problem and ear discharge.    Eyes:  Negative for discharge and itching.   Respiratory:  Negative for apnea, choking and chest tightness.    Cardiovascular:  Negative for chest pain and leg swelling.   Gastrointestinal:  Negative for abdominal distention, abdominal pain, anal bleeding, constipation, diarrhea and nausea.   Endocrine: Negative for cold intolerance and heat intolerance.   Genitourinary:  Negative for  difficulty urinating and dyspareunia.   Musculoskeletal:  Negative for arthralgias and back pain.   Skin:  Negative for color change and pallor.   Neurological:  Negative for dizziness and facial asymmetry.   Hematological:  Negative for adenopathy. Does not bruise/bleed easily.   Psychiatric/Behavioral:  Negative for agitation and behavioral problems.      Objective:      There were no vitals filed for this visit.    Physical Exam  Constitutional:       General: She is not in acute distress.     Appearance: She is well-developed.   HENT:      Head: Normocephalic and atraumatic.   Eyes:      Pupils: Pupils are equal, round, and reactive to light.   Neck:      Thyroid: No thyromegaly.   Cardiovascular:      Rate and Rhythm: Normal rate and regular rhythm.   Pulmonary:      Effort: Pulmonary effort is normal.      Breath sounds: Normal breath sounds.   Abdominal:      General: Bowel sounds are normal. There is no distension.      Palpations: Abdomen is soft.      Tenderness: There is no abdominal tenderness.   Musculoskeletal:         General: No deformity. Normal range of motion.      Cervical back: Normal range of motion and neck supple.   Skin:     General: Skin is warm.      Capillary Refill: Capillary refill takes less than 2 seconds.      Findings: No erythema.   Neurological:      Mental Status: She is alert and oriented to person, place, and time.      Cranial Nerves: No cranial nerve deficit.   Psychiatric:         Behavior: Behavior normal.     Mammogram ultrasound pathology results reviewed.  DCIS right breast.         Medical Decision Making/Counseling:  Right breast ductal carcinoma in Situ.  Recommendation per Oncology is upfront surgery.  ER positive.  Options to include mastectomy, bilateral mastectomy, partial mastectomy with lumpectomy wire localized sentinel lymph node sampling have been discussed in detail with the patient and family in clinic.  Necessity with lumpectomy for radiation postoperative  has been discussed.  Possible needs, 25% of returning to the OR for adequate margins and re-excision has been discussed.  After informed discussion with the patient and family in clinic, they voiced understanding of the options of surgical intervention desires to proceed with wire localized lumpectomy right breast with axillary lymph node sampling versus axillary dissection.  All questions were answered to their satisfaction.      Preoperative, patient will go to Radiology undergo wire localized placement via ultrasound.  Patient subsequently go to nuclear medicine for technetium sulfur colloid injection for sentinel lymph node mapping.  Patient will undergo Lymphazurin blue dye injection OR and then undergo surgical treatment.    Patient instructed that best way to communicate with my office staff is for patient to get on the [facility] epic patient portal to expedite communication and communication issues that may occur.  Patient was given instructions on how to get on the portal.  I encouraged patient to obtain portal access as well.  Ultimately it is up to the patient to obtain access.  Patient voiced understanding.

## 2022-09-07 NOTE — TRANSFER OF CARE
"Anesthesia Transfer of Care Note    Patient: Laxmi Chand    Procedure(s) Performed: Procedure(s) (LRB):  EXCISION, MASS, BREAST (Right)  BIOPSY, LYMPH NODE, AXILLARY (Right)    Patient location: PACU    Anesthesia Type: general    Transport from OR: Transported from OR on room air with adequate spontaneous ventilation    Post pain: adequate analgesia    Post assessment: no apparent anesthetic complications and tolerated procedure well    Post vital signs: stable    Level of consciousness: awake, alert and oriented    Nausea/Vomiting: no nausea/vomiting    Complications: none    Transfer of care protocol was followed      Last vitals:   Visit Vitals  /60   Pulse 78   Temp 37 °C (98.6 °F) (Oral)   Resp 20   Ht 5' 4" (1.626 m)   Wt 79.8 kg (176 lb)   SpO2 100%   Breastfeeding No   BMI 30.21 kg/m²     "

## 2022-09-07 NOTE — DISCHARGE INSTRUCTIONS
[facility]   [phone]  [facility]      [phone]    Managing nausea    Some people have an upset stomach after surgery. This is often because of anesthesia, pain, or pain medicine, or the stress of surgery. These tips will help you handle nausea and eat healthy foods as you get better. If you were on a special food plan before surgery, ask your healthcare provider if you should follow it while you get better. These tips may help:  Do not push yourself to eat. Your body will tell you when to eat and how much.  Start off with clear liquids and soup. They are easier to digest.  Next try semi-solid foods, such as mashed potatoes, applesauce, and gelatin, as you feel ready.  Slowly move to solid foods. Dont eat fatty, rich, or spicy foods at first.  Do not force yourself to have 3 large meals a day. Instead eat smaller amounts more often.  Take pain medicines with a small amount of solid food, such as crackers or toast, to avoid nausea.     DERMABOND ADVANCED   Adhesive is a sterile, liquid skin adhesive that holds wound edges together. The film will usually remain in place for 5 to 10 days, then naturally fall of your skin. The following will answer some of your questions and provide instructions for proper care for your wound while it is healing.    Check Wound Appearance   Some swelling, redness, and pain are common with all wounds and normally will go away as the wound heals. If swelling, redness, or pain increases, or if the wound feels warm to the touch, contact a doctor. Also contact a doctor if the wound edges reopen or separate.    Caring for Bandages   Do not place tape directly over the DERMABOND adhesive, because removing the tape later may also remove the film.    Avoid Topical Medications   Do not apply liquid or ointment medications or any other product to your wound while the DERMABOND Adhesive is in place. These may loosen the film before your wound is  healed.    Keep Wound Dry and Protected   Apply a clean, dry bandage over the wound if necessary to protect it.   You may occasionally and briely wet your wound in the shower or bath. Do not soak or scrub your wound, do not swim, and avoid periods of heavy perspiration until the DERMABOND Adhesive has naturally fallen off.   After showering or bathing, gently blot your wound dry with a soft towel. If a protective dressing is being used, apply a fresh, dry bandage, keeping the tape off the DERMABOND Adhesive.   Protect your wound from injury until the skin has had sufficient time to heal.   Do not scratch, rub, or pick at the DERMABOND Adhesive. This may loosen the film before your wound is healed.   Protect the wound from prolonged exposure to sunlight or tanning lamps while the film is in place.

## 2022-09-07 NOTE — ANESTHESIA POSTPROCEDURE EVALUATION
Anesthesia Post Evaluation    Patient: Laxmi Chand    Procedure(s) Performed: Procedure(s) (LRB):  EXCISION, MASS, BREAST (Right)  BIOPSY, LYMPH NODE, AXILLARY (Right)    Final Anesthesia Type: general      Patient location during evaluation: PACU  Patient participation: Yes- Able to Participate  Level of consciousness: awake and alert  Post-procedure vital signs: reviewed and stable  Pain management: adequate  Airway patency: patent    PONV status at discharge: No PONV  Anesthetic complications: no      Cardiovascular status: blood pressure returned to baseline  Respiratory status: unassisted  Hydration status: euvolemic  Follow-up not needed.          Vitals Value Taken Time   /94 09/07/22 1438   Temp 36.7 °C (98 °F) 09/07/22 1341   Pulse 90 09/07/22 1437   Resp 11 09/07/22 1437   SpO2 93 % 09/07/22 1437   Vitals shown include unvalidated device data.      No case tracking events are documented in the log.      Pain/Estefany Score: Pain Rating Prior to Med Admin: 6 (9/7/2022  2:26 PM)  Estefany Score: 10 (9/7/2022  2:16 PM)

## 2022-09-07 NOTE — PLAN OF CARE
Pt and daughter given discharge instructions. Pt and daughter verbalizes understanding of teaching. Pt and daughter given instructions how to care for the CHRISSIE drain along with written papers. Pt discharged to private vehicle with personal belongings via w/c. Pt in stable condition at time of discharge.

## 2022-09-07 NOTE — DISCHARGE SUMMARY
Discharge Note        SUMMARY     Admit Date: 9/7/2022    Attending Physician: Jatin Gutierrez MD     Discharge Physician: Jatin Gutierrez MD    Discharge Date: 9/7/2022 1:22 PM      Hospital Course: Patient tolerated procedure well.     Disposition: Home or Self Care    Patient Instructions:   Current Discharge Medication List        START taking these medications    Details   !! HYDROcodone-acetaminophen (NORCO)  mg per tablet Take 1 tablet by mouth every 6 (six) hours as needed for Pain.  Qty: 30 tablet, Refills: 0    Comments: n/a       !! ondansetron (ZOFRAN) 4 MG tablet Take 1 tablet (4 mg total) by mouth every 6 (six) hours as needed for Nausea.  Qty: 30 tablet, Refills: 0       !! - Potential duplicate medications found. Please discuss with provider.        CONTINUE these medications which have NOT CHANGED    Details   albuterol (PROVENTIL/VENTOLIN HFA) 90 mcg/actuation inhaler Inhale 2 puffs into the lungs every 6 (six) hours as needed for Wheezing.  Qty: 25.5 g, Refills: 3    Associated Diagnoses: Chronic obstructive pulmonary disease, unspecified COPD type      albuterol-ipratropium (DUO-NEB) 2.5 mg-0.5 mg/3 mL nebulizer solution INHALE CONTENTS OF 1 VIAL BY MOUTH WITH NEBULIZER EVERY 6 HOURS WHILE AWAKE  Qty: 360 mL, Refills: 5    Associated Diagnoses: Chronic obstructive pulmonary disease, unspecified COPD type      budesonide-formoterol 160-4.5 mcg (SYMBICORT) 160-4.5 mcg/actuation HFAA Inhale 2 puffs into the lungs every 12 (twelve) hours. Controller  Qty: 10.2 g, Refills: 5    Associated Diagnoses: Chronic obstructive pulmonary disease, unspecified COPD type      buPROPion (WELLBUTRIN) 100 MG tablet TAKE 1 TABLET (100 MG TOTAL) BY MOUTH 3 (THREE) TIMES DAILY.  Qty: 270 tablet, Refills: 1    Associated Diagnoses: Recurrent major depressive disorder, in full remission      calcium carbonate-vitamin D3 (CALCIUM 600 + D,3,) 600-125 mg-unit Tab Take 2 tablets by mouth once daily.  Qty:  180 tablet, Refills: 3    Associated Diagnoses: Osteopenia of both hips      cetirizine (ZYRTEC) 10 MG tablet Take 1 tablet (10 mg total) by mouth once daily.  Qty: 90 tablet, Refills: 5    Associated Diagnoses: Chronic allergic rhinitis      conjugated estrogens (PREMARIN) vaginal cream Place 0.5 g vaginally twice a week.  Qty: 30 g, Refills: 5    Associated Diagnoses: Atrophic vaginitis      cyclobenzaprine (FLEXERIL) 10 MG tablet Take 1 tablet (10 mg total) by mouth nightly.  Qty: 90 tablet, Refills: 3    Associated Diagnoses: Degeneration of lumbar intervertebral disc      doxepin (SINEQUAN) 25 MG capsule Take 2 capsules (50 mg total) by mouth nightly.  Qty: 180 capsule, Refills: 3    Associated Diagnoses: Recurrent major depressive disorder, in full remission      fluticasone propionate (FLONASE) 50 mcg/actuation nasal spray 1 spray (50 mcg total) by Each Nostril route once daily.  Qty: 18.2 mL, Refills: 2    Associated Diagnoses: Chronic obstructive pulmonary disease with acute exacerbation      hydroCHLOROthiazide (HYDRODIURIL) 25 MG tablet Take 1 tablet (25 mg total) by mouth once daily.  Qty: 90 tablet, Refills: 3    Comments: .  Associated Diagnoses: Hypertension, unspecified type      !! HYDROcodone-acetaminophen (NORCO)  mg per tablet Take 1 tablet by mouth every 8 (eight) hours as needed for Pain.  Qty: 90 tablet, Refills: 0    Associated Diagnoses: Degeneration of lumbar intervertebral disc; Chronic pain syndrome      montelukast (SINGULAIR) 10 mg tablet Take 1 tablet (10 mg total) by mouth once daily.  Qty: 90 tablet, Refills: 3    Associated Diagnoses: Chronic obstructive pulmonary disease, unspecified COPD type      nebivoloL (BYSTOLIC) 10 MG Tab TAKE 1 TABLET BY MOUTH ONCE DAILY  Qty: 90 tablet, Refills: 3    Associated Diagnoses: Hypertension, unspecified type      !! ondansetron (ZOFRAN) 4 MG tablet Take 1 tablet (4 mg total) by mouth every 8 (eight) hours as needed for Nausea.  Qty: 15  tablet, Refills: 1    Associated Diagnoses: Nausea      pantoprazole (PROTONIX) 40 MG tablet Take 1 tablet (40 mg total) by mouth once daily.  Qty: 90 tablet, Refills: 3    Associated Diagnoses: Gastroesophageal reflux disease, unspecified whether esophagitis present      pseudoephedrine (SUDAFED) 120 mg 12 hr tablet Take 120 mg by mouth every 12 (twelve) hours.      travoprost (TRAVATAN Z) 0.004 % ophthalmic solution INT 1 GTT IN OU D HS      aspirin (ECOTRIN) 81 MG EC tablet Take 1 tablet by mouth once daily. Pt back on asa    Associated Diagnoses: Hypertension, unspecified type      aspirin-acetaminophen-caffeine 250-250-65 mg (HEADACHE RELIEF, ASA-ACET-CAF,) 250-250-65 mg per tablet Take 1 tablet by mouth every 6 to 8 hours as needed.      celecoxib (CELEBREX) 200 MG capsule Take 1 capsule (200 mg total) by mouth once daily.  Qty: 90 capsule, Refills: 1    Associated Diagnoses: Chronic obstructive pulmonary disease, unspecified COPD type      ibandronate (BONIVA) 150 mg tablet Take 1 tablet (150 mg total) by mouth every 30 days.  Qty: 1 tablet, Refills: 11    Associated Diagnoses: Osteopenia of both hips       !! - Potential duplicate medications found. Please discuss with provider.          Discharge Procedure Orders (must include Diet, Follow-up, Activity):   Discharge Procedure Orders (must include Diet, Follow-up, Activity)   Diet general     Lifting restrictions   Order Comments: No heavy lifting, pushing, pulling, bending, strenuous exercise greater than 10 lb for the next 6 weeks.     Other restrictions (specify):   Order Comments: No swimming or submersion of wounds in lakes, ponds, streams, oceans, bathtubs, etc until seen in clinic for postoperative evaluation.     No dressing needed     Call MD for:  temperature >100.4     Call MD for:  persistent nausea and vomiting     Call MD for:  severe uncontrolled pain     Call MD for:  difficulty breathing, headache or visual disturbances     Call MD for:   redness, tenderness, or signs of infection (pain, swelling, redness, odor or green/yellow discharge around incision site)     Call MD for:  persistent dizziness or light-headedness        Follow Up:  Follow up as scheduled.  Resume routine diet.  Activity as tolerated.    No driving day of procedure.

## 2022-09-08 ENCOUNTER — OFFICE VISIT (OUTPATIENT)
Dept: FAMILY MEDICINE | Facility: CLINIC | Age: 67
End: 2022-09-08
Payer: MEDICARE

## 2022-09-08 VITALS
HEART RATE: 81 BPM | SYSTOLIC BLOOD PRESSURE: 146 MMHG | DIASTOLIC BLOOD PRESSURE: 74 MMHG | TEMPERATURE: 98 F | OXYGEN SATURATION: 96 %

## 2022-09-08 DIAGNOSIS — B37.9 ANTIBIOTIC-INDUCED YEAST INFECTION: ICD-10-CM

## 2022-09-08 DIAGNOSIS — J44.9 CHRONIC OBSTRUCTIVE PULMONARY DISEASE, UNSPECIFIED COPD TYPE: Primary | ICD-10-CM

## 2022-09-08 DIAGNOSIS — K21.9 GASTROESOPHAGEAL REFLUX DISEASE, UNSPECIFIED WHETHER ESOPHAGITIS PRESENT: ICD-10-CM

## 2022-09-08 DIAGNOSIS — D05.11 DUCTAL CARCINOMA IN SITU (DCIS) OF RIGHT BREAST: ICD-10-CM

## 2022-09-08 DIAGNOSIS — T36.95XA ANTIBIOTIC-INDUCED YEAST INFECTION: ICD-10-CM

## 2022-09-08 DIAGNOSIS — R11.0 NAUSEA: ICD-10-CM

## 2022-09-08 DIAGNOSIS — F33.42 RECURRENT MAJOR DEPRESSIVE DISORDER, IN FULL REMISSION: ICD-10-CM

## 2022-09-08 PROCEDURE — 99999 PR PBB SHADOW E&M-EST. PATIENT-LVL IV: CPT | Mod: PBBFAC,,, | Performed by: NURSE PRACTITIONER

## 2022-09-08 PROCEDURE — 99214 PR OFFICE/OUTPT VISIT, EST, LEVL IV, 30-39 MIN: ICD-10-PCS | Mod: S$PBB,,, | Performed by: NURSE PRACTITIONER

## 2022-09-08 PROCEDURE — 99999 PR PBB SHADOW E&M-EST. PATIENT-LVL IV: ICD-10-PCS | Mod: PBBFAC,,, | Performed by: NURSE PRACTITIONER

## 2022-09-08 PROCEDURE — 99214 OFFICE O/P EST MOD 30 MIN: CPT | Mod: PBBFAC,PN | Performed by: NURSE PRACTITIONER

## 2022-09-08 PROCEDURE — 99214 OFFICE O/P EST MOD 30 MIN: CPT | Mod: S$PBB,,, | Performed by: NURSE PRACTITIONER

## 2022-09-08 RX ORDER — FLUCONAZOLE 200 MG/1
200 TABLET ORAL DAILY
COMMUNITY
Start: 2022-06-27 | End: 2022-09-08 | Stop reason: ALTCHOICE

## 2022-09-08 RX ORDER — PANTOPRAZOLE SODIUM 40 MG/1
40 TABLET, DELAYED RELEASE ORAL DAILY
Qty: 90 TABLET | Refills: 3 | Status: SHIPPED | OUTPATIENT
Start: 2022-09-08 | End: 2023-09-14 | Stop reason: SDUPTHER

## 2022-09-08 RX ORDER — ALBUTEROL SULFATE 90 UG/1
2 AEROSOL, METERED RESPIRATORY (INHALATION) EVERY 4 HOURS PRN
Qty: 18 G | Refills: 5 | Status: SHIPPED | OUTPATIENT
Start: 2022-09-08 | End: 2023-02-15 | Stop reason: SDUPTHER

## 2022-09-08 RX ORDER — DOXEPIN HYDROCHLORIDE 25 MG/1
50 CAPSULE ORAL NIGHTLY
Qty: 180 CAPSULE | Refills: 3 | Status: SHIPPED | OUTPATIENT
Start: 2022-09-08 | End: 2023-08-17 | Stop reason: SDUPTHER

## 2022-09-08 RX ORDER — CELECOXIB 200 MG/1
200 CAPSULE ORAL DAILY
Qty: 90 CAPSULE | Refills: 1 | Status: SHIPPED | OUTPATIENT
Start: 2022-09-08 | End: 2023-03-13 | Stop reason: SDUPTHER

## 2022-09-08 RX ORDER — FLUCONAZOLE 150 MG/1
150 TABLET ORAL DAILY
Qty: 2 TABLET | Refills: 0 | Status: SHIPPED | OUTPATIENT
Start: 2022-09-08 | End: 2022-12-02 | Stop reason: SDUPTHER

## 2022-09-08 RX ORDER — FLUCONAZOLE 200 MG/1
200 TABLET ORAL DAILY
Status: CANCELLED | OUTPATIENT
Start: 2022-09-08

## 2022-09-08 RX ORDER — BUPROPION HYDROCHLORIDE 100 MG/1
100 TABLET ORAL 3 TIMES DAILY
Qty: 270 TABLET | Refills: 1 | Status: SHIPPED | OUTPATIENT
Start: 2022-09-08 | End: 2023-02-15 | Stop reason: SDUPTHER

## 2022-09-08 RX ORDER — ONDANSETRON 4 MG/1
4 TABLET, FILM COATED ORAL EVERY 8 HOURS PRN
Qty: 15 TABLET | Refills: 2 | Status: SHIPPED | OUTPATIENT
Start: 2022-09-08 | End: 2022-10-20 | Stop reason: SDUPTHER

## 2022-09-08 RX ORDER — CIPROFLOXACIN 500 MG/1
500 TABLET ORAL 2 TIMES DAILY
COMMUNITY
Start: 2022-09-07 | End: 2022-09-22 | Stop reason: ALTCHOICE

## 2022-09-08 NOTE — PROGRESS NOTES
Subjective:       Patient ID: Laxmi Chand is a 67 y.o. female.    Chief Complaint: Follow-up (6 month follow up ), COPD (Patient reports she is needing her albuterol inhaler prescription re-entered. She advises she needs it q4prn vs. The entered q6prn. She is requesting a new prescription to be entered for this. She also has only been receiving the 6.7 gram cannister vs. The 18 gram. Patient is interested in having bigger cannister ordered on this new prescription. ), and Hypertension (Patient reports she does not monitor BP at home. )    Laxmi Chand presents to the clinic today to follow up on hypertension, COPD and medication refills.   She is under the care of Dr. Ángel White/Hyacinth SOTELO for pain management, Dr. Edwards, and orthopedics Dr. Ruffin, Optometrist Dr. Wilkinson   She recently established with Hematology/Oncology Dr. Marrero as well as General Surgeon Dr. Hua for recent diagnosis of estrogen receptor positive DCIS right breast.   Underwent excision of mass to the right breast 9/7/2022 with Dr. Gutierrez   She has a reported past medical history of COPD, essential hypertension, mixed hyperlipidemia, depression, GERD, Endometrial cancer, chronic pain related to DJD of the lumbar region with history of lumbar fusion by Dr. Farley in [place], open angle glaucoma and osteoarthritis.     She has chronic reports of dyspnea on exertion, wheezing, shortness of breath despite the use of her nebulizer's and rescue inhaler. She had a PFT within the past 2-3 years. She denies ever seeing pulmonology in the past. She reports that she has only been smoking cigarettes intermittently ( was smoking up to 3 packs at times) and has mostly does vape cigarettes, but states she does not know what the nicotine content is in this.       COPD:  Patient reports she is needing her albuterol inhaler prescription re-entered. She advises she needs it q4prn vs. The entered q6prn. She is requesting a new  prescription to be entered for this. She also has only been receiving the 6.7 gram cannister vs. The 18 gram. Patient is interested in having bigger cannister ordered on this new prescription.       Review of Systems   Constitutional:  Negative for activity change, appetite change, chills, diaphoresis and fever.   HENT:  Negative for congestion, ear pain, postnasal drip, sinus pressure, sneezing and sore throat.    Eyes:  Negative for pain, discharge, redness and itching.   Respiratory:  Positive for cough, shortness of breath and wheezing. Negative for apnea and chest tightness.    Cardiovascular:  Negative for chest pain, palpitations and leg swelling.   Gastrointestinal:  Negative for abdominal distention, abdominal pain, constipation, diarrhea, nausea and vomiting.   Genitourinary:  Negative for difficulty urinating, dysuria, flank pain and frequency.   Musculoskeletal:  Positive for arthralgias and gait problem.   Skin:  Negative for color change, rash and wound.        dressing to right breast post excision   Neurological:  Negative for dizziness, speech difficulty, weakness, light-headedness and headaches.   Hematological: Negative.    Psychiatric/Behavioral: Negative.       Patient Active Problem List   Diagnosis    Allergic rhinitis    Chronic obstructive lung disease    Degeneration of lumbar intervertebral disc    Depressive disorder    Gastroesophageal reflux disease    History of smoking    Hyperlipidemia    Hypertensive disorder    Status post total left knee replacement    Chronic pain syndrome    Pleuritic pain    Ductal carcinoma in situ (DCIS) of right breast    Senile osteopenia    History of endometrial cancer       Objective:      Physical Exam  Constitutional:       General: She is not in acute distress.     Appearance: Normal appearance.   Cardiovascular:      Rate and Rhythm: Normal rate and regular rhythm.      Heart sounds: Normal heart sounds. No murmur heard.  Pulmonary:      Effort:  Pulmonary effort is normal. No respiratory distress.      Breath sounds: Normal breath sounds.   Feet:      Comments: Dressing to right foot intact.   Neurological:      General: No focal deficit present.      Mental Status: She is alert. Mental status is at baseline.   Psychiatric:         Mood and Affect: Mood normal.       Lab Results   Component Value Date    WBC 7.12 08/26/2022    HGB 11.7 (L) 08/26/2022    HCT 36.2 (L) 08/26/2022     08/26/2022    CHOL 203 (H) 05/10/2022    TRIG 101 05/10/2022    HDL 50 05/10/2022    ALT 13 08/26/2022    AST 15 08/26/2022     08/26/2022    K 3.7 08/26/2022     08/26/2022    CREATININE 0.8 08/26/2022    BUN 11 08/26/2022    CO2 29 08/26/2022    TSH 1.040 05/10/2022     The 10-year ASCVD risk score (Mihaela FLOWER, et al., 2019) is: 20.7%    Values used to calculate the score:      Age: 67 years      Sex: Female      Is Non- : No      Diabetic: No      Tobacco smoker: Yes      Systolic Blood Pressure: 146 mmHg      Is BP treated: Yes      HDL Cholesterol: 50 mg/dL      Total Cholesterol: 203 mg/dL  Visit Vitals  BP (!) 146/74 (BP Location: Left arm, Patient Position: Sitting, BP Method: Large (Manual))   Pulse 81   Temp 98.2 °F (36.8 °C) (Oral)   SpO2 96%      Assessment:       1. Antibiotic-induced yeast infection    2. Chronic obstructive pulmonary disease, unspecified COPD type    3. Recurrent major depressive disorder, in full remission    4. Gastroesophageal reflux disease, unspecified whether esophagitis present    5. Nausea          Plan:       1. Antibiotic-induced yeast infection  -     fluconazole (DIFLUCAN) 150 MG Tab  Good shasta care encouraged  + probiotics to daily regimen   2. Chronic obstructive pulmonary disease, unspecified COPD type  -     albuterol (PROVENTIL/VENTOLIN HFA) 90 mcg/actuation inhaler  -     celecoxib (CELEBREX) 200 MG capsule  Smoking cessation encouraged.   3. Recurrent major depressive disorder, in full  remission  -     buPROPion (WELLBUTRIN) 100 MG tablet  -     doxepin (SINEQUAN) 25 MG capsule  Stable continue current regimen   4. Gastroesophageal reflux disease, unspecified whether esophagitis present  -     pantoprazole (PROTONIX) 40 MG tablet  Stable- continue current   5. Nausea  -     ondansetron (ZOFRAN) 4 MG tablet     Keep all scheduled appointments with Oncology/General Surgery    No follow-ups on file.      Future Appointments       Date Provider Specialty Appt Notes    9/20/2022 Jatin Gutierrez MD General Surgery RIGHT LUMPECTOMY 09/07/22 9/21/2022  Lab hemonc    9/22/2022 Naveen Marrero MD Hematology and Oncology [facility]/labs/post lumpectomy    3/9/2023 Elba Dill NP Family Medicine 6 month follow up COPD, HTN

## 2022-09-13 VITALS
BODY MASS INDEX: 30.05 KG/M2 | WEIGHT: 176 LBS | DIASTOLIC BLOOD PRESSURE: 93 MMHG | HEIGHT: 64 IN | OXYGEN SATURATION: 94 % | RESPIRATION RATE: 12 BRPM | SYSTOLIC BLOOD PRESSURE: 156 MMHG | TEMPERATURE: 98 F | HEART RATE: 93 BPM

## 2022-09-14 ENCOUNTER — TELEPHONE (OUTPATIENT)
Dept: FAMILY MEDICINE | Facility: CLINIC | Age: 67
End: 2022-09-14
Payer: MEDICARE

## 2022-09-20 LAB
COMMENT: NORMAL
FINAL PATHOLOGIC DIAGNOSIS: NORMAL
GROSS: NORMAL
Lab: NORMAL

## 2022-09-21 ENCOUNTER — LAB VISIT (OUTPATIENT)
Dept: LAB | Facility: HOSPITAL | Age: 67
End: 2022-09-21
Attending: SURGERY
Payer: MEDICARE

## 2022-09-21 DIAGNOSIS — D05.11 DUCTAL CARCINOMA IN SITU (DCIS) OF RIGHT BREAST: ICD-10-CM

## 2022-09-21 LAB
ALBUMIN SERPL BCP-MCNC: 3.5 G/DL (ref 3.5–5.2)
ALP SERPL-CCNC: 68 U/L (ref 55–135)
ALT SERPL W/O P-5'-P-CCNC: 15 U/L (ref 10–44)
ANION GAP SERPL CALC-SCNC: 13 MMOL/L (ref 8–16)
AST SERPL-CCNC: 15 U/L (ref 10–40)
BASOPHILS # BLD AUTO: 0.04 K/UL (ref 0–0.2)
BASOPHILS NFR BLD: 0.6 % (ref 0–1.9)
BILIRUB SERPL-MCNC: 0.3 MG/DL (ref 0.1–1)
BUN SERPL-MCNC: 10 MG/DL (ref 8–23)
CALCIUM SERPL-MCNC: 9.7 MG/DL (ref 8.7–10.5)
CHLORIDE SERPL-SCNC: 100 MMOL/L (ref 95–110)
CO2 SERPL-SCNC: 28 MMOL/L (ref 23–29)
CREAT SERPL-MCNC: 0.8 MG/DL (ref 0.5–1.4)
DIFFERENTIAL METHOD: ABNORMAL
EOSINOPHIL # BLD AUTO: 0.3 K/UL (ref 0–0.5)
EOSINOPHIL NFR BLD: 4 % (ref 0–8)
ERYTHROCYTE [DISTWIDTH] IN BLOOD BY AUTOMATED COUNT: 12.4 % (ref 11.5–14.5)
EST. GFR  (NO RACE VARIABLE): >60 ML/MIN/1.73 M^2
GLUCOSE SERPL-MCNC: 96 MG/DL (ref 70–110)
HCT VFR BLD AUTO: 37.4 % (ref 37–48.5)
HGB BLD-MCNC: 12 G/DL (ref 12–16)
IMM GRANULOCYTES # BLD AUTO: 0.02 K/UL (ref 0–0.04)
IMM GRANULOCYTES NFR BLD AUTO: 0.3 % (ref 0–0.5)
LDH SERPL L TO P-CCNC: 181 U/L (ref 110–260)
LYMPHOCYTES # BLD AUTO: 1.9 K/UL (ref 1–4.8)
LYMPHOCYTES NFR BLD: 26.7 % (ref 18–48)
MAGNESIUM SERPL-MCNC: 1.6 MG/DL (ref 1.6–2.6)
MCH RBC QN AUTO: 29 PG (ref 27–31)
MCHC RBC AUTO-ENTMCNC: 32.1 G/DL (ref 32–36)
MCV RBC AUTO: 90 FL (ref 82–98)
MONOCYTES # BLD AUTO: 0.6 K/UL (ref 0.3–1)
MONOCYTES NFR BLD: 7.9 % (ref 4–15)
NEUTROPHILS # BLD AUTO: 4.2 K/UL (ref 1.8–7.7)
NEUTROPHILS NFR BLD: 60.5 % (ref 38–73)
NRBC BLD-RTO: 0 /100 WBC
PLATELET # BLD AUTO: 369 K/UL (ref 150–450)
PMV BLD AUTO: 9 FL (ref 9.2–12.9)
POTASSIUM SERPL-SCNC: 3.7 MMOL/L (ref 3.5–5.1)
PROT SERPL-MCNC: 6.5 G/DL (ref 6–8.4)
RBC # BLD AUTO: 4.14 M/UL (ref 4–5.4)
SODIUM SERPL-SCNC: 141 MMOL/L (ref 136–145)
WBC # BLD AUTO: 7 K/UL (ref 3.9–12.7)

## 2022-09-21 PROCEDURE — 80053 COMPREHEN METABOLIC PANEL: CPT | Performed by: INTERNAL MEDICINE

## 2022-09-21 PROCEDURE — 85025 COMPLETE CBC W/AUTO DIFF WBC: CPT | Performed by: INTERNAL MEDICINE

## 2022-09-21 PROCEDURE — 36415 COLL VENOUS BLD VENIPUNCTURE: CPT | Performed by: INTERNAL MEDICINE

## 2022-09-21 PROCEDURE — 83615 LACTATE (LD) (LDH) ENZYME: CPT | Performed by: INTERNAL MEDICINE

## 2022-09-21 PROCEDURE — 83735 ASSAY OF MAGNESIUM: CPT | Performed by: INTERNAL MEDICINE

## 2022-09-22 ENCOUNTER — OFFICE VISIT (OUTPATIENT)
Dept: SURGERY | Facility: CLINIC | Age: 67
End: 2022-09-22
Payer: MEDICARE

## 2022-09-22 ENCOUNTER — OFFICE VISIT (OUTPATIENT)
Dept: HEMATOLOGY/ONCOLOGY | Facility: CLINIC | Age: 67
End: 2022-09-22
Payer: MEDICARE

## 2022-09-22 ENCOUNTER — TELEPHONE (OUTPATIENT)
Dept: HEMATOLOGY/ONCOLOGY | Facility: CLINIC | Age: 67
End: 2022-09-22

## 2022-09-22 ENCOUNTER — HOSPITAL ENCOUNTER (OUTPATIENT)
Dept: RADIOLOGY | Facility: HOSPITAL | Age: 67
Discharge: HOME | End: 2022-09-22
Attending: SURGERY
Payer: MEDICARE

## 2022-09-22 VITALS
HEIGHT: 64 IN | SYSTOLIC BLOOD PRESSURE: 134 MMHG | BODY MASS INDEX: 30.39 KG/M2 | OXYGEN SATURATION: 96 % | HEART RATE: 87 BPM | DIASTOLIC BLOOD PRESSURE: 71 MMHG | WEIGHT: 178 LBS

## 2022-09-22 VITALS
HEART RATE: 86 BPM | WEIGHT: 179.38 LBS | SYSTOLIC BLOOD PRESSURE: 149 MMHG | OXYGEN SATURATION: 96 % | BODY MASS INDEX: 30.63 KG/M2 | HEIGHT: 64 IN | DIASTOLIC BLOOD PRESSURE: 71 MMHG

## 2022-09-22 DIAGNOSIS — D22.9 ATYPICAL MOLE: Primary | ICD-10-CM

## 2022-09-22 DIAGNOSIS — Z85.42 HISTORY OF ENDOMETRIAL CANCER: ICD-10-CM

## 2022-09-22 DIAGNOSIS — M85.80 SENILE OSTEOPENIA: ICD-10-CM

## 2022-09-22 DIAGNOSIS — M79.89 RIGHT LEG SWELLING: ICD-10-CM

## 2022-09-22 DIAGNOSIS — D05.11 DUCTAL CARCINOMA IN SITU (DCIS) OF RIGHT BREAST: Primary | ICD-10-CM

## 2022-09-22 DIAGNOSIS — R60.0 LOCALIZED EDEMA: ICD-10-CM

## 2022-09-22 DIAGNOSIS — R60.9 EDEMA, UNSPECIFIED TYPE: ICD-10-CM

## 2022-09-22 DIAGNOSIS — Z09 POSTOP CHECK: ICD-10-CM

## 2022-09-22 PROCEDURE — 99215 OFFICE O/P EST HI 40 MIN: CPT | Mod: PBBFAC,27 | Performed by: INTERNAL MEDICINE

## 2022-09-22 PROCEDURE — 88305 TISSUE EXAM BY PATHOLOGIST: CPT | Mod: 26,,, | Performed by: PATHOLOGY

## 2022-09-22 PROCEDURE — 88305 TISSUE EXAM BY PATHOLOGIST: ICD-10-PCS | Mod: 26,,, | Performed by: PATHOLOGY

## 2022-09-22 PROCEDURE — 99999 PR PBB SHADOW E&M-EST. PATIENT-LVL V: CPT | Mod: PBBFAC,,, | Performed by: INTERNAL MEDICINE

## 2022-09-22 PROCEDURE — 99214 PR OFFICE/OUTPT VISIT, EST, LEVL IV, 30-39 MIN: ICD-10-PCS | Mod: S$PBB,,, | Performed by: INTERNAL MEDICINE

## 2022-09-22 PROCEDURE — 93971 US LOWER EXTREMITY VEINS RIGHT: ICD-10-PCS | Mod: 26,RT,, | Performed by: RADIOLOGY

## 2022-09-22 PROCEDURE — 99214 OFFICE O/P EST MOD 30 MIN: CPT | Mod: S$PBB,,, | Performed by: INTERNAL MEDICINE

## 2022-09-22 PROCEDURE — 99214 OFFICE O/P EST MOD 30 MIN: CPT | Mod: S$PBB,24,, | Performed by: SURGERY

## 2022-09-22 PROCEDURE — 93971 EXTREMITY STUDY: CPT | Mod: TC,RT

## 2022-09-22 PROCEDURE — 99999 PR PBB SHADOW E&M-EST. PATIENT-LVL IV: ICD-10-PCS | Mod: PBBFAC,,, | Performed by: SURGERY

## 2022-09-22 PROCEDURE — 88305 TISSUE EXAM BY PATHOLOGIST: CPT | Performed by: PATHOLOGY

## 2022-09-22 PROCEDURE — 99214 PR OFFICE/OUTPT VISIT, EST, LEVL IV, 30-39 MIN: ICD-10-PCS | Mod: S$PBB,24,, | Performed by: SURGERY

## 2022-09-22 PROCEDURE — 99999 PR PBB SHADOW E&M-EST. PATIENT-LVL IV: CPT | Mod: PBBFAC,,, | Performed by: SURGERY

## 2022-09-22 PROCEDURE — 93971 EXTREMITY STUDY: CPT | Mod: 26,RT,, | Performed by: RADIOLOGY

## 2022-09-22 PROCEDURE — 99999 PR PBB SHADOW E&M-EST. PATIENT-LVL V: ICD-10-PCS | Mod: PBBFAC,,, | Performed by: INTERNAL MEDICINE

## 2022-09-22 PROCEDURE — 99214 OFFICE O/P EST MOD 30 MIN: CPT | Mod: PBBFAC,25 | Performed by: SURGERY

## 2022-09-22 RX ORDER — SULFAMETHOXAZOLE AND TRIMETHOPRIM 800; 160 MG/1; MG/1
1 TABLET ORAL 2 TIMES DAILY
COMMUNITY
Start: 2022-09-14 | End: 2023-02-07

## 2022-09-22 NOTE — PROGRESS NOTES
History of present illness:  The patient is a 66-year-old white female who had been in her usual state of health, had screening mammography, and was found to have microcalcifications within the right breast.  These have been biopsied in reveal high-grade DCIS which is 2 mm in greatest dimension and associated with typical ductal hyperplasia, papilloma, and non-necrotizing granulomata.  Patient's tumor is strongly estrogen positive/progesterone negative.  Patient has a good deal of bruising and tenderness following biopsy.  Patient reports diagnosis of endometrial carcinoma in her 20s which was managed hysterectomy.    Patient is status post lumpectomy and right axillary lymph node dissection.  Patient returns to clinic to review pathology and interval lab as well as to plan care.  Postoperatively, the patient is doing well.  She has anticipated postoperative pain.  No difficulties with lymphedema.  No difficulties with wound healing.  Patient denies decreased range of motion of the right upper extremity.    Past medical history:   1. Allergic rhinitis  2.  Chronic obstructive pulmonary disease   3. Degenerative disc disease of the lumbar spine  4.  Depression  5.  Gastroesophageal reflux disease  6. Tobacco abuse  7.  Hyperlipidemia  8.  Hypertension  9.  Status post total left knee arthroplasty  10.  Chronic pain syndrome   11.  Migraine headache  12.  Glaucoma   14. Osteoporosis  15. History of endometrial carcinoma-status post hysterectomy  16.  Status post lumbar spine surgery  17.  Status post cholecystectomy    Allergies: None known     Medications:    Current Outpatient Medications:     albuterol (PROVENTIL/VENTOLIN HFA) 90 mcg/actuation inhaler, Inhale 2 puffs into the lungs every 4 (four) hours as needed for Wheezing or Shortness of Breath., Disp: 18 g, Rfl: 5    albuterol-ipratropium (DUO-NEB) 2.5 mg-0.5 mg/3 mL nebulizer solution, INHALE CONTENTS OF 1 VIAL BY MOUTH WITH NEBULIZER EVERY 6 HOURS WHILE  AWAKE, Disp: 360 mL, Rfl: 5    aspirin (ECOTRIN) 81 MG EC tablet, Take 1 tablet by mouth once daily. Pt back on asa, Disp: , Rfl:     aspirin-acetaminophen-caffeine 250-250-65 mg (HEADACHE RELIEF, ASA-ACET-CAF,) 250-250-65 mg per tablet, Take 1 tablet by mouth every 6 to 8 hours as needed., Disp: , Rfl:     budesonide-formoterol 160-4.5 mcg (SYMBICORT) 160-4.5 mcg/actuation HFAA, Inhale 2 puffs into the lungs every 12 (twelve) hours. Controller, Disp: 10.2 g, Rfl: 5    buPROPion (WELLBUTRIN) 100 MG tablet, Take 1 tablet (100 mg total) by mouth 3 (three) times daily., Disp: 270 tablet, Rfl: 1    calcium carbonate-vitamin D3 (CALCIUM 600 + D,3,) 600-125 mg-unit Tab, Take 2 tablets by mouth once daily., Disp: 180 tablet, Rfl: 3    celecoxib (CELEBREX) 200 MG capsule, Take 1 capsule (200 mg total) by mouth once daily., Disp: 90 capsule, Rfl: 1    cetirizine (ZYRTEC) 10 MG tablet, Take 1 tablet (10 mg total) by mouth once daily., Disp: 90 tablet, Rfl: 5    conjugated estrogens (PREMARIN) vaginal cream, Place 0.5 g vaginally twice a week., Disp: 30 g, Rfl: 5    cyclobenzaprine (FLEXERIL) 10 MG tablet, Take 1 tablet (10 mg total) by mouth nightly., Disp: 90 tablet, Rfl: 3    doxepin (SINEQUAN) 25 MG capsule, Take 2 capsules (50 mg total) by mouth nightly., Disp: 180 capsule, Rfl: 3    fluticasone propionate (FLONASE) 50 mcg/actuation nasal spray, 1 spray (50 mcg total) by Each Nostril route once daily., Disp: 18.2 mL, Rfl: 2    hydroCHLOROthiazide (HYDRODIURIL) 25 MG tablet, Take 1 tablet (25 mg total) by mouth once daily., Disp: 90 tablet, Rfl: 3    HYDROcodone-acetaminophen (NORCO)  mg per tablet, Take 1 tablet by mouth every 8 (eight) hours as needed for Pain., Disp: 90 tablet, Rfl: 0    montelukast (SINGULAIR) 10 mg tablet, Take 1 tablet (10 mg total) by mouth once daily., Disp: 90 tablet, Rfl: 3    nebivoloL (BYSTOLIC) 10 MG Tab, TAKE 1 TABLET BY MOUTH ONCE DAILY, Disp: 90 tablet, Rfl: 3    ondansetron (ZOFRAN)  4 MG tablet, Take 1 tablet (4 mg total) by mouth every 8 (eight) hours as needed for Nausea., Disp: 15 tablet, Rfl: 2    pantoprazole (PROTONIX) 40 MG tablet, Take 1 tablet (40 mg total) by mouth once daily., Disp: 90 tablet, Rfl: 3    pseudoephedrine (SUDAFED) 120 mg 12 hr tablet, Take 120 mg by mouth every 12 (twelve) hours., Disp: , Rfl:     travoprost (TRAVATAN Z) 0.004 % ophthalmic solution, INT 1 GTT IN OU D HS, Disp: , Rfl:     fluconazole (DIFLUCAN) 150 MG Tab, Take 1 tablet (150 mg total) by mouth once daily. (Patient not taking: Reported on 9/22/2022), Disp: 2 tablet, Rfl: 0    ibandronate (BONIVA) 150 mg tablet, Take 1 tablet (150 mg total) by mouth every 30 days. (Patient not taking: Reported on 9/22/2022), Disp: 1 tablet, Rfl: 11    sulfamethoxazole-trimethoprim 800-160mg (BACTRIM DS) 800-160 mg Tab, Take 1 tablet by mouth 2 (two) times daily., Disp: , Rfl:      Family/social history:  Patient smokes 1/2 pack cigarettes daily and has done so for 25 years.    Social alcohol use.    No family history of breast carcinoma.  Mother suffered from hypertension and diabetes mellitus.    Father suffered from hypertension.      Physical examination:   Well-developed, well-nourished, white female, no acute distress, who has a weight of 179.5 lb (stable).  VITAL SIGNS: Documented  and reviewed this visit.  HEENT: Normocephalic, atraumatic. Oral mucosa pink and moist. Lips without lesions. Tongue midline. Oropharynx clear. Nonicteric sclerae.   NECK: Supple, no adenopathy. No carotid bruits, thyromegaly or thyroid nodule.   HEART: Regular rate and rhythm without murmur, gallop or rub.   LUNGS: Clear to auscultation bilaterally. Normal respiratory effort.   ABDOMEN: Soft, nontender, nondistended with positive normoactive bowel sounds, no hepatosplenomegaly.   EXTREMITIES: No cyanosis, clubbing or edema. Distal pulses are intact.   AXILLAE AND GROIN: No palpable pathologic lymphadenopathy is appreciated.   SKIN:  Intact/turgor normal   NEUROLOGIC: Cranial nerves II-XII grossly intact. Motor: Good muscle bulk and tone. Strength/sensory 5/5 throughout. Gait stable.   BREASTS:  Right breast lumpectomy scar is well approximated and healing.  Drain site is dressed/dry/intact.  There are no signs of infection.    Laboratory:  White count 7, hemoglobin 12, hematocrit 37.4, platelets 369, absolute neutrophil count is 4200.    Sodium 141, potassium 3.7, chloride 100, CO2 28, BUN 10, creatinine 0.8, glucose 96, calcium 9.7, liver function test within normal limits, LDH is 181, GFR is greater than 60.      BRCA1 and 2 analysis:  Negative.      Pathology:  1. Right breast, lumpectomy with wire localization (70.6 grams):       -  Invasive carcinoma of no special type (ductal) with associated   high-grade ductal carcinoma in situ,          see synoptic report       -  Background breast tissue showing intraductal papilloma formation with   associated usual duct          hyperplasia, focal changes of radial scar formation and sclerosing   adenosis with associated          microcalcifications   2. Right axillary contents:       -  Ten reactive axillary lymph nodes, all negative for metastatic   carcinoma (0/10)       -  Immunohistochemical stain with appropriate control for AE1/AE3 is   negative in all lymph node tissue   SYNOPTIC REPORT   PROCEDURE:  Excision/lumpectomy   SPECIMEN LATERALITY:  Right breast   TUMOR SITE:  Outer region (not further classified)   HISTOLOGIC TYPE:  Invasive carcinoma of no special type (ductal)   HISTOLOGIC GRADE:  Grade 1 of 3        Tubular formation: 3        Pleomorphism: 1        Mitotic rate: 1   TUMOR SIZE:  2 mm in greatest dimension   TUMOR FOCALITY:  Single focus of invasive carcinoma   DUCTAL CARCINOMA IN SITU (DCIS):  Present, negative for extensive intraductal   component (EIC)        Size/extent:  Up to 1.5 cm in greatest dimension, present in slice 8-10,   present in 7 of 27 breast tissue blocks         Architectural patterns:  Solid        Nuclear grade:  Grade III (high)        Necrosis:  Not identified   LOBULAR CARCINOMA IN SITU (LCIS):  Not identified   TUMOR EXTENT:  Not applicable (skin and skeletal muscle are absent)   LYMPHOVASCULAR INVASION:  Not identified   MICROCALCIFICATIONS:  Present, associated with DCIS   TREATMENT EFFECT:  No known pre-surgical therapy   MARGINS FOR INVASIVE CARCINOMA:         Invasive carcinoma is located 9 mm from the closest (deep) margin.  All   additional margins are greater         than 10 mm from tumor cells.   MARGINS FOR DUCTAL CARCINOMA IN SITU:          Ductal carcinoma in situ is located 2 mm from the closest (anterior)   margin, 5 mm from the inferior          margin and greater than 10 mm from the remaining inked margins of   resection   REGIONAL LYMPH NODES:          Ten regional lymph nodes are present, all negative for metastatic   carcinoma (0/10)   BREAST BIOMARKERS (performed on patient's current specimen, block 1U):           ER: Positive (strong, greater than 95%)           AL: Negative (0)           Her2:  Positive (3+)           Ki67:  10-15%   PATHOLOGIC STAGE CLASSIFICATION:  pT1a  pN0     Bone mineral density:   Study performed 05/24/2022.    Osteopenia involving both hips.    There is a 18.5% risk of a major osteoporotic fracture and a 3.2% risk of hip fracture in the next 10 years (FRAX).     Impression:   1. Stage I (T1a, N0, M0) infiltrating ductal carcinoma of the right breast which is ER positive, AL negative, and HER2 Roshan positive by IHC/associated with DCIS.  2. Senile osteopenia.  3. Personal history of endometrial carcinoma.    Plan:   1. Obtain Oncotype DX.    2. Consult Radiation Oncology for postlumpectomy adjuvant XRT.    3.  Return to clinic to review results of Oncotype DX analysis at earliest convenience.  Determination as to whether not the patient will benefit from systemic adjuvant chemotherapy will be rendered at that office  visit.    This note was created using voice recognition software and may contain grammatical errors.

## 2022-09-22 NOTE — PROGRESS NOTES
[facility] Surgery  Follow-up    Subjective:     Chief Complaint:  Follow-up right lumpectomy with axillary node sampling.  New issue of right leg swelling.  New issue of abnormal mole left breast.    HPI:  Laxmi Chand is a 67 y.o. female presents today for follow-up examination after lumpectomy with axillary sampling right side.  Patient since surgeries done well.  CHRISSIE drain with minimal output less than 10 cc per day.  Pathology consistent with preoperative clinical disease however of stage I small portion with invasive carcinoma.  Closest margin invasive carcinoma 9 mm.  Closest margin on DCIS 2 mm, anterior.  Per up-to-date, this is adequate.  ER positive HER2 positive.  Lymph node 0 of 10 positive.  Patient since surgeries done well.  No apparent postsurgical issues.    Patient with new issue of right leg swelling.  Up to knee.  Recent history of right foot surgery.  Pin removed recently from toe.  Additionally patient with new issue of left breast abnormal mole, been present for numerous years, change in color 2 flesh-colored now.  Desires for biopsy to ensure there is no other concerns.    Review of Systems   Constitutional:  Negative for activity change, appetite change, chills and fever.   HENT:  Negative for congestion, dental problem and ear discharge.    Eyes:  Negative for discharge and itching.   Respiratory:  Negative for apnea, choking and chest tightness.    Cardiovascular:  Negative for chest pain and leg swelling.   Gastrointestinal:  Negative for abdominal distention, abdominal pain, anal bleeding, constipation, diarrhea and nausea.   Endocrine: Negative for cold intolerance and heat intolerance.   Genitourinary:  Negative for difficulty urinating and dyspareunia.   Musculoskeletal:  Negative for arthralgias and back pain.        Right leg swelling up to knee.   Skin:  Negative for color change and pallor.   Neurological:  Negative for dizziness and facial asymmetry.   Hematological:   "Negative for adenopathy. Does not bruise/bleed easily.   Psychiatric/Behavioral:  Negative for agitation and behavioral problems.      Objective:      Vitals:    09/22/22 0936   BP: 134/71   BP Location: Left arm   Patient Position: Sitting   BP Method: Medium (Automatic)   Pulse: 87   SpO2: 96%   Weight: 80.7 kg (178 lb)   Height: 5' 4" (1.626 m)     Physical Exam  Exam conducted with a chaperone present.   Constitutional:       General: She is not in acute distress.     Appearance: She is well-developed.   HENT:      Head: Normocephalic and atraumatic.   Eyes:      Pupils: Pupils are equal, round, and reactive to light.   Neck:      Thyroid: No thyromegaly.   Cardiovascular:      Rate and Rhythm: Normal rate and regular rhythm.   Pulmonary:      Effort: Pulmonary effort is normal.      Breath sounds: Normal breath sounds.   Chest:      Comments: My nurse Tiffany was present during evaluation.      Right lumpectomy site clean dry intact no signs or symptoms of infection.  CHRISSIE drain with minimal serous output.      Left breast lateral aspect with a 1 x 2 cm flat mole, almost plaque appearing.  Flesh colored.  Abdominal:      General: Bowel sounds are normal. There is no distension.      Palpations: Abdomen is soft.      Tenderness: There is no abdominal tenderness.   Musculoskeletal:         General: No deformity. Normal range of motion.      Cervical back: Normal range of motion and neck supple.   Skin:     General: Skin is warm.      Capillary Refill: Capillary refill takes less than 2 seconds.      Findings: No erythema.   Neurological:      Mental Status: She is alert and oriented to person, place, and time.      Cranial Nerves: No cranial nerve deficit.   Psychiatric:         Behavior: Behavior normal.     Pathology reviewed in depth with patient.  Adequate margins.  Negative nodes.     Assessment:       1. Atypical mole    2. Right leg swelling    3. Edema, unspecified type    4. Localized edema    5. Postop " check          Plan:   Atypical mole  -     Specimen to Pathology Other (skin)    Right leg swelling  -     CV Ultrasound doppler venous DVT leg right; Future  -     US Lower Extremity Veins Right; Future; Expected date: 09/22/2022    Edema, unspecified type  -     CV Ultrasound doppler venous DVT leg right; Future    Localized edema  -     US Lower Extremity Veins Right; Future; Expected date: 09/22/2022    Postop check        Medical Decision Making/Counseling:  Biopsy punch 4 mm of left breast abnormal mole performed in clinic.  Informed consent obtained.  3 cc 0.25% Marcaine with epinephrine was utilized.  Await formal pathology results.      Right leg swelling, significant up to knee, pitting edema 2+.  Recommend ultrasound rule out DVT.  Patient with cancer therefore increased risk of DVT.      Postoperative right lumpectomy with axillary node sampling.  Doing well.  CHRISSIE drain removed.  Referred back to Oncology for radiation therapy and hormone modulation.    Follow up:  2 weeks.    Patient instructed that best way to communicate with my office staff is for patient to get on the [facility] epic patient portal to expedite communication and communication issues that may occur.  Patient was given instructions on how to get on the portal.  I encouraged patient to obtain portal access as well.  Ultimately it is up to the patient to obtain access.  Patient voiced understanding.

## 2022-09-22 NOTE — Clinical Note
Check on status of BRCA1 and 2 results. Oncotype DX analysis.   Radiation Oncology referral.   Return to clinic to review results of Oncotype DX and BRCA1 and 2 testing.

## 2022-09-26 ENCOUNTER — TELEPHONE (OUTPATIENT)
Dept: HEMATOLOGY/ONCOLOGY | Facility: CLINIC | Age: 67
End: 2022-09-26
Payer: MEDICARE

## 2022-09-26 ENCOUNTER — PATIENT MESSAGE (OUTPATIENT)
Dept: ADMINISTRATIVE | Facility: HOSPITAL | Age: 67
End: 2022-09-26
Payer: MEDICARE

## 2022-09-28 ENCOUNTER — TELEPHONE (OUTPATIENT)
Dept: HEMATOLOGY/ONCOLOGY | Facility: CLINIC | Age: 67
End: 2022-09-28
Payer: MEDICARE

## 2022-09-28 LAB
FINAL PATHOLOGIC DIAGNOSIS: NORMAL
GROSS: NORMAL
Lab: NORMAL
MICROSCOPIC EXAM: NORMAL

## 2022-09-28 NOTE — TELEPHONE ENCOUNTER
----- Message from Stacie Pantoja sent at 9/28/2022 11:27 AM CDT -----  Type: Needs Medical Advice  Who Called: [facility]  Symptoms (please be specific):    How long has patient had these symptoms:    Pharmacy name and phone #:   Best Call Back Number:  [phone]  Additional Information: Gerald called requesting a call back. He stated an ICD Code is needed for patient starting with C50.

## 2022-09-29 ENCOUNTER — TELEPHONE (OUTPATIENT)
Dept: HEMATOLOGY/ONCOLOGY | Facility: CLINIC | Age: 67
End: 2022-09-29
Payer: MEDICARE

## 2022-09-29 NOTE — TELEPHONE ENCOUNTER
This nurse notified by [facility] rad onc that pt would like to consider tx location for xrt and will call the office back next wk when she and her  have made a decision.

## 2022-09-29 NOTE — TELEPHONE ENCOUNTER
This nurse spoke with Elba at [employer]. Clarified ICD-10 code to reflect invasive breast carcinoma C50.919

## 2022-10-05 ENCOUNTER — OFFICE VISIT (OUTPATIENT)
Dept: RADIATION ONCOLOGY | Facility: CLINIC | Age: 67
End: 2022-10-05
Payer: MEDICARE

## 2022-10-05 ENCOUNTER — SOCIAL WORK (OUTPATIENT)
Dept: HEMATOLOGY/ONCOLOGY | Facility: CLINIC | Age: 67
End: 2022-10-05

## 2022-10-05 VITALS
HEART RATE: 83 BPM | DIASTOLIC BLOOD PRESSURE: 68 MMHG | RESPIRATION RATE: 19 BRPM | TEMPERATURE: 98 F | HEIGHT: 64 IN | WEIGHT: 175.69 LBS | BODY MASS INDEX: 29.99 KG/M2 | OXYGEN SATURATION: 97 % | SYSTOLIC BLOOD PRESSURE: 116 MMHG

## 2022-10-05 DIAGNOSIS — C50.911 INVASIVE DUCTAL CARCINOMA OF BREAST, FEMALE, RIGHT: Primary | ICD-10-CM

## 2022-10-05 DIAGNOSIS — D05.11 DUCTAL CARCINOMA IN SITU (DCIS) OF RIGHT BREAST: ICD-10-CM

## 2022-10-05 PROCEDURE — 99205 OFFICE O/P NEW HI 60 MIN: CPT | Mod: S$GLB,,, | Performed by: RADIOLOGY

## 2022-10-05 PROCEDURE — 99205 PR OFFICE/OUTPT VISIT, NEW, LEVL V, 60-74 MIN: ICD-10-PCS | Mod: S$GLB,,, | Performed by: RADIOLOGY

## 2022-10-05 NOTE — PROGRESS NOTES
Va Chand is a 67 year old diagnosed with breast cancer.  Patient is here today for a radiation consult with Dr. Ramos.  I met with patient to complete new patient orientation and the NCCN Distress Screening; patient indicated a rating of 5.  Patient denied needing the number to access the [facility] financial assistance application.  Patient expressed concerned with gas to travel for medical appointments; I completed the [employer] and [employer] application and emailed to agency.  Patient denied needing mental health supports at this time.  Patient is under the care of a physician at a pain clinic in [place] for back issues.  I provided patient with the [facility] community events flyer and my contact information in the event supportive services are needed in the future.

## 2022-10-05 NOTE — PROGRESS NOTES
Laxmi Chand  [id number]  1955  10/5/2022  Naveen Marrero Md  [address] [place] [address]    REASON FOR CONSULTATION: IA, [id number] g1 IDC UOQ R breast, ER+/OK(-)/HER2    TREATMENT GOAL: adjuvant    HISTORY OF PRESENT ILLNESS:   Laxmi Chand is a 67 y.o. postmenopausal  female with (-)FHx of BCa and past medical history of endometrial ca s/p hysterectomy 40yrs ago who presented with abnormal screening mammogram noting new calcifications in the upper outer quadrant of the right breast confirmed on diagnostic mammogram.  Core needle biopsy returned high-grade DCIS, solid pattern; ER+ %, OK(-).     She underwent lumpectomy and ALND with Dr. Gutierrez, 2022:   - 2 mm grade 1 (5/) invasive ductal carcinoma; LVSI (-); ER+ 95%, OK(-), HER2 3+   - 1.5 cm grade 3 DCIS, solid pattern without necrosis   - invasive disease margin (-) 9 mm deep; DCIS margin 2 mm anterior   - poor mapping; ALND: 0/10 LNs    BRCA1,2 (-)    She met with Dr. Marrero and has Oncotype DX pending to evaluate role of chemotherapy.  She is referred to discuss adjuvant radiotherapy.    Endorses chronic back pain.  Uses Lortab for pain control.  Has mild discomfort at surgical site but denies pain, nipple discharge and endorses good range of motion without swelling of the right upper extremity.    Review of systems otherwise negative unless indicated in HPI.    Past Medical History:   Diagnosis Date    COPD (chronic obstructive pulmonary disease)     Degeneration of lumbar intervertebral disc     Endometrial cancer     GERD (gastroesophageal reflux disease)     HTN (hypertension)     Hyperlipemia, mixed      Past Surgical History:   Procedure Laterality Date    BIOPSY OF AXILLARY NODE Right 2022    Procedure: BIOPSY, LYMPH NODE, AXILLARY;  Surgeon: Jatin Gutierrez MD;  Location: [facility];  Service: General;  Laterality: Right;    BREAST BIOPSY Right 2022    BREAST MASS EXCISION Right 2022     Procedure: EXCISION, MASS, BREAST;  Surgeon: Jatin Gutierrez MD;  Location: [facility] OR;  Service: General;  Laterality: Right;  wire localized partial mastectomy right breast with margins     CARPAL TUNNEL RELEASE  1985    CHOLECYSTECTOMY  1978    HYSTERECTOMY      KNEE SURGERY Left 06/01/2020    LUMBAR DISC SURGERY  01/01/1990    plate and roland    LUMBAR FUSION  2011    TOTAL KNEE REPLACEMENT USING COMPUTER NAVIGATION Left 02/15/2021     Social History     Socioeconomic History    Marital status: Significant Other   Occupational History    Occupation: disabled   Tobacco Use    Smoking status: Every Day     Packs/day: 0.50     Years: 50.00     Pack years: 25.00     Types: Cigarettes, Vaping with nicotine     Start date: 1969    Smokeless tobacco: Never   Substance and Sexual Activity    Alcohol use: Yes    Drug use: Not Currently    Sexual activity: Yes     Partners: Male   Social History Narrative    [facility] from [facility]         [facility]     Annual    urinary tract infection    yeast infection        Visit Summary    No pap per guidelines    U/A for culture    Wet prep: yeast only    Pt has a PCP    Colonoscopy up to date    Mammo @ [facility]        Prescriptions:    SIG: Cipro 500 mg oral tablet, 3 days, Dispense #6 Tablet, 0 Refills    Directions: Take 1 oral tablet 2 times a day    SIG: Diflucan 100 mg oral tablet, 3 days, Dispense #3 Tablet, 0 Refills    Directions: Take 1 oral tablet once a day        Return for follow up appointment in one year or prn.     [id number]     Vulvar Cyst: Resolved        Visit Summary    Normal external gyn exam. Cyst resolved        Return for follow up appointment annual/prn.     [id number]     Chronic Vaginitis    Paratubal cysts: stable        Visit Summary    Wet prep: mostly yeast, few clue cells    Pt soaks in bath BID, stop, shower only, no douching.    u/s performed today. unchanged from  last scan.        Prescriptions: Clindesse sample given    SIG: Diflucan 100 mg oral tablet, 3 days, Dispense #3 Tablet, 0 Refills    Directions: Take 1 oral tablet once a day    Recommend probiotics        Return for follow up appointment for annual or prn. MDL swab at next appt if needed.    Mammo up to date.     [id number]     Recurrent bacterial vaginosis.  Paratubal cysts.  Dysuria.    Repeat transvaginal ultrasound 6 weeks.        Visit Summary    Ordered:    Urine Culture, Routine     [id number]     path compound melanocytic nevus...ch us...of pelvis....rtc 1y pap     GYN [facility] & [id number] [facility]     3 moles removed from back 5 mm each...same contwainer monsels applied...    one mole removed from under each breasxt 5 mm...mnonsels applied...each one sent to path sepreatelyt..        vag dc bv...sp metrogel...no family h/o breast ca...        pelvic us...complex cyst 2.23 cm on left w possible excrescence and septation        pelvic us for complex cyst at [facility]......rtc 1wk     [id number]     Chronic Bacterial Vaginitis    Chronic Back Pain    Left Lower Quadrant resolved, possible ruptured ovarian cyst        Visit Summary    MDL swab done        Return for follow up appointment in 2 months for follow up pelvic u/s.     GYN Exam/[id number] 20164/6/2017     Annual    Vaginal odor, Bacterial Vaginosis    Ovarian Cyst        Visit Summary    Pap done, reports hx of cervical pre-cancer        Prescriptions:    SIG: metronidazole 500 mg tablet, 7 days, Dispense #14 Tablet, 0 Refills    Directions: Take 1 oral tablet 2 times a day. No alcohol discussed.        U/S today, reports had ovarian cyst on CT scan.    FSH today        Return for follow up appointment  pending results.     Family History   Problem Relation Age of Onset    Hypertension Mother     Diabetes Mother     Hypertension Father     Lung cancer Maternal Grandfather      Breast cancer Neg Hx        PRIOR HISTORY OF CHEMOTHERAPY OR RADIOTHERAPY: Please see HPI for patients prior oncologic history.    Medication List with Changes/Refills   Current Medications    ALBUTEROL (PROVENTIL/VENTOLIN HFA) 90 MCG/ACTUATION INHALER    Inhale 2 puffs into the lungs every 4 (four) hours as needed for Wheezing or Shortness of Breath.    ALBUTEROL-IPRATROPIUM (DUO-NEB) 2.5 MG-0.5 MG/3 ML NEBULIZER SOLUTION    INHALE CONTENTS OF 1 VIAL BY MOUTH WITH NEBULIZER EVERY 6 HOURS WHILE AWAKE    ASPIRIN (ECOTRIN) 81 MG EC TABLET    Take 1 tablet by mouth once daily. Pt back on asa    ASPIRIN-ACETAMINOPHEN-CAFFEINE 250-250-65 MG (HEADACHE RELIEF, ASA-ACET-CAF,) 250-250-65 MG PER TABLET    Take 1 tablet by mouth every 6 to 8 hours as needed.    BUDESONIDE-FORMOTEROL 160-4.5 MCG (SYMBICORT) 160-4.5 MCG/ACTUATION HFAA    Inhale 2 puffs into the lungs every 12 (twelve) hours. Controller    BUPROPION (WELLBUTRIN) 100 MG TABLET    Take 1 tablet (100 mg total) by mouth 3 (three) times daily.    CALCIUM CARBONATE-VITAMIN D3 (CALCIUM 600 + D,3,) 600-125 MG-UNIT TAB    Take 2 tablets by mouth once daily.    CELECOXIB (CELEBREX) 200 MG CAPSULE    Take 1 capsule (200 mg total) by mouth once daily.    CETIRIZINE (ZYRTEC) 10 MG TABLET    Take 1 tablet (10 mg total) by mouth once daily.    CONJUGATED ESTROGENS (PREMARIN) VAGINAL CREAM    Place 0.5 g vaginally twice a week.    CYCLOBENZAPRINE (FLEXERIL) 10 MG TABLET    Take 1 tablet (10 mg total) by mouth nightly.    DOXEPIN (SINEQUAN) 25 MG CAPSULE    Take 2 capsules (50 mg total) by mouth nightly.    FLUCONAZOLE (DIFLUCAN) 150 MG TAB    Take 1 tablet (150 mg total) by mouth once daily.    FLUTICASONE PROPIONATE (FLONASE) 50 MCG/ACTUATION NASAL SPRAY    1 spray (50 mcg total) by Each Nostril route once daily.    HYDROCHLOROTHIAZIDE (HYDRODIURIL) 25 MG TABLET    Take 1 tablet (25 mg total) by mouth once daily.    HYDROCODONE-ACETAMINOPHEN (NORCO)  MG PER TABLET     "Take 1 tablet by mouth every 8 (eight) hours as needed for Pain.    IBANDRONATE (BONIVA) 150 MG TABLET    Take 1 tablet (150 mg total) by mouth every 30 days.    MONTELUKAST (SINGULAIR) 10 MG TABLET    Take 1 tablet (10 mg total) by mouth once daily.    NEBIVOLOL (BYSTOLIC) 10 MG TAB    TAKE 1 TABLET BY MOUTH ONCE DAILY    ONDANSETRON (ZOFRAN) 4 MG TABLET    Take 1 tablet (4 mg total) by mouth every 8 (eight) hours as needed for Nausea.    PANTOPRAZOLE (PROTONIX) 40 MG TABLET    Take 1 tablet (40 mg total) by mouth once daily.    PSEUDOEPHEDRINE (SUDAFED) 120 MG 12 HR TABLET    Take 120 mg by mouth every 12 (twelve) hours.    SULFAMETHOXAZOLE-TRIMETHOPRIM 800-160MG (BACTRIM DS) 800-160 MG TAB    Take 1 tablet by mouth 2 (two) times daily.    TRAVOPROST (TRAVATAN Z) 0.004 % OPHTHALMIC SOLUTION    INT 1 GTT IN OU D HS     Review of patient's allergies indicates:  No Known Allergies    QUALITY OF LIFE: 90%- Able to Carry on Normal Activity: Minor Symptoms of Disease    Vitals:    10/05/22 1006   BP: 116/68   Pulse: 83   Resp: 19   Temp: 98.1 °F (36.7 °C)   SpO2: 97%   Weight: 79.7 kg (175 lb 11.2 oz)   Height: 5' 4" (1.626 m)   PainSc:   6   PainLoc: Back     Body mass index is 30.16 kg/m².    PHYSICAL EXAM:   GENERAL: alert; in no apparent distress.   HEAD: normocephalic, atraumatic.  EYES: pupils are equal, round, reactive to light and accommodation. Sclera anicteric. Conjunctiva not injected.   NOSE/THROAT: no nasal erythema or rhinorrhea. Oropharynx pink, without erythema, ulcerations or thrush.   NECK: no cervical motion rigidity; supple with no masses.    CHEST: Patient is speaking comfortably on room air with normal work of breathing without using accessory muscles of respiration.  CARDIOVASCULAR: regular rate and rhythm  ABDOMEN: soft, nontender, nondistended.   MUSCULOSKELETAL: no tenderness to palpation along the spine or scapulae. Normal range of motion.  NEUROLOGIC: cranial nerves II-XII intact bilaterally. " Strength 5/5 in bilateral upper and lower extremities. No sensory deficits appreciated.   LYMPHATIC: no right axillary adenopathy appreciated.   EXTREMITIES: no clubbing, cyanosis, lymphedema.  SKIN: no erythema, rashes, ulcerations noted.   BREAST:  Incision well healed without active scabbing.  No palpable nodularity, seroma, cellulitis or fluctuance    REVIEW OF IMAGING/PATHOLOGY/LABS: Please see HPI. All images reviewed personally by dictating physician.     ASSESSMENT: Laxmi Chand is a 67 y.o. female with stage IA, [id number] g1 IDC UOQ R breast, ER+/CT(-)/HER2.  PLAN:  Laxmi Chand presented with abnormal screening mammogram and biopsy-proven high-grade DCIS status post lumpectomy with poor sentinel lymph node mapping resulting and axillary lymph node dissection with final pathology revealing a 2 mm grade 1 invasive ductal carcinoma without lymphovascular space invasion, ER +, HER2 + with 0 of 10 lymph nodes containing disease.  There was associated 1.5 cm grade 3 DCIS without comedo necrosis with negative margins on invasive and DCIS.  She has met with Dr. Marrero and has Oncotype DX pending to decide on systemic therapy. BRCA (-).    Today I explained the role of adjuvant radiotherapy following lumpectomy to eradicate residual disease within the breast thereby providing equivalent oncologic results to mastectomy using a breast conserving protocol.  I provided diagrams illustrating a 3D conformal tangential beam approach that allows sparing of the underlying lung and heart.  She is a good candidate for hypofractionation and my recommendation is 4050 cGy followed by 1000 cGy boost to lumpectomy site for a total dose of 5050 cGy.  She is well-healed and while awaiting decision regarding systemic therapy I recommend we proceed with adjuvant radiotherapy.  If systemic therapy is recommended, HER2 agents can be used concurrently but recommend holding cytotoxic agents until completion.  Will discuss with   Elida.    I carefully explained the process of simulation and treatment delivery with weekly physician visits. She wishes to proceed.     We discussed the risks and benefits of the above treatment and have gone over in detail the acute and late toxicities of radiation therapy to the right breast. The patient expressed  understanding and has signed a consent form which is included in the patients chart.      The patient has our contact information and understands that they are free to contact us at any time with questions or concerns regarding radiation therapy.     DISPOSITION: RTC FOR CT SIM     I have personally seen and evaluated this patient with a high complexity diagnosis.      Greater than 60 minutes were dedicated to reviewing/interpreting pertinent laboratory/imaging/pathology as well as prior consultations; reviewing and performing history and physical; counseling patient on oncologic recommendations; documentation in the electronic medical record including ordering of additional tests and/or radiation treatment protocol; and coordination of care with physicians with referrals placed as appropriate.     COVID-19 precautions discussed. Cancer Center policy for COVID-19 testing described.  Patient will be required to wear a mask when in the Cancer Center.    PHYSICIAN: Mark Ramos Jr, MD    Thank you for the opportunity to meet and consult with Laxmi Chand.   Please feel free to contact me to discuss the above recommendation further.

## 2022-10-11 ENCOUNTER — TELEPHONE (OUTPATIENT)
Dept: HEMATOLOGY/ONCOLOGY | Facility: CLINIC | Age: 67
End: 2022-10-11
Payer: MEDICARE

## 2022-10-11 ENCOUNTER — OFFICE VISIT (OUTPATIENT)
Dept: HEMATOLOGY/ONCOLOGY | Facility: CLINIC | Age: 67
End: 2022-10-11
Payer: MEDICARE

## 2022-10-11 VITALS
BODY MASS INDEX: 30.22 KG/M2 | SYSTOLIC BLOOD PRESSURE: 132 MMHG | OXYGEN SATURATION: 97 % | HEIGHT: 64 IN | HEART RATE: 74 BPM | WEIGHT: 177 LBS | DIASTOLIC BLOOD PRESSURE: 69 MMHG

## 2022-10-11 DIAGNOSIS — D05.11 DUCTAL CARCINOMA IN SITU (DCIS) OF RIGHT BREAST: ICD-10-CM

## 2022-10-11 DIAGNOSIS — Z85.42 HISTORY OF ENDOMETRIAL CANCER: ICD-10-CM

## 2022-10-11 DIAGNOSIS — Z79.811 LONG TERM (CURRENT) USE OF AROMATASE INHIBITORS: ICD-10-CM

## 2022-10-11 DIAGNOSIS — C50.011 MALIGNANT NEOPLASM OF NIPPLE OF RIGHT BREAST IN FEMALE, UNSPECIFIED ESTROGEN RECEPTOR STATUS: Primary | ICD-10-CM

## 2022-10-11 DIAGNOSIS — M85.80 SENILE OSTEOPENIA: ICD-10-CM

## 2022-10-11 PROCEDURE — 99999 PR PBB SHADOW E&M-EST. PATIENT-LVL V: CPT | Mod: PBBFAC,,, | Performed by: INTERNAL MEDICINE

## 2022-10-11 PROCEDURE — 99215 OFFICE O/P EST HI 40 MIN: CPT | Mod: PBBFAC | Performed by: INTERNAL MEDICINE

## 2022-10-11 PROCEDURE — 99215 PR OFFICE/OUTPT VISIT, EST, LEVL V, 40-54 MIN: ICD-10-PCS | Mod: S$PBB,,, | Performed by: INTERNAL MEDICINE

## 2022-10-11 PROCEDURE — 99215 OFFICE O/P EST HI 40 MIN: CPT | Mod: S$PBB,,, | Performed by: INTERNAL MEDICINE

## 2022-10-11 PROCEDURE — 99999 PR PBB SHADOW E&M-EST. PATIENT-LVL V: ICD-10-PCS | Mod: PBBFAC,,, | Performed by: INTERNAL MEDICINE

## 2022-10-11 RX ORDER — ANASTROZOLE 1 MG/1
1 TABLET ORAL DAILY
Qty: 90 TABLET | Refills: 3 | Status: SHIPPED | OUTPATIENT
Start: 2022-10-11 | End: 2023-10-09 | Stop reason: SDUPTHER

## 2022-10-11 NOTE — TELEPHONE ENCOUNTER
This nurse called [employer] to request oncotype results for this pt. Spoke with Rasheed, who states that results should be faxed over shortly.

## 2022-10-11 NOTE — PROGRESS NOTES
History of present illness:  The patient is a 67-year-old white female who had been in her usual state of health, had screening mammography, and was found to have microcalcifications within the right breast.  These have been biopsied in reveal high-grade DCIS which is 2 mm in greatest dimension and associated with typical ductal hyperplasia, papilloma, and non-necrotizing granulomata.  Patient's tumor is strongly estrogen positive/progesterone negative.  Patient has a good deal of bruising and tenderness following biopsy.  Patient reports diagnosis of endometrial carcinoma in her 20s which was managed hysterectomy.    Patient is status post lumpectomy and right axillary lymph node dissection.  Patient returns to clinic to review pathology and interval lab as well as to plan care.  Postoperatively, the patient is doing well.  She has anticipated postoperative pain.  No difficulties with lymphedema.  No difficulties with wound healing.  Patient denies decreased range of motion of the right upper extremity.    Patient returns to clinic to review results of Oncotype DX analysis and finalize plan of care.  Unfortunately, there was not enough viable tumor in the submitted specimen to perform the analysis.    Past medical history:   1. Allergic rhinitis  2.  Chronic obstructive pulmonary disease   3. Degenerative disc disease of the lumbar spine  4.  Depression  5.  Gastroesophageal reflux disease  6. Tobacco abuse  7.  Hyperlipidemia  8.  Hypertension  9.  Status post total left knee arthroplasty  10.  Chronic pain syndrome   11.  Migraine headache  12.  Glaucoma   14. Osteoporosis  15. History of endometrial carcinoma-status post hysterectomy  16.  Status post lumbar spine surgery  17.  Status post cholecystectomy    Allergies: None known     Medications:    Current Outpatient Medications:     albuterol (PROVENTIL/VENTOLIN HFA) 90 mcg/actuation inhaler, Inhale 2 puffs into the lungs every 4 (four) hours as needed for  Wheezing or Shortness of Breath., Disp: 18 g, Rfl: 5    albuterol-ipratropium (DUO-NEB) 2.5 mg-0.5 mg/3 mL nebulizer solution, INHALE CONTENTS OF 1 VIAL BY MOUTH WITH NEBULIZER EVERY 6 HOURS WHILE AWAKE, Disp: 360 mL, Rfl: 5    aspirin (ECOTRIN) 81 MG EC tablet, Take 1 tablet by mouth once daily. Pt back on asa, Disp: , Rfl:     aspirin-acetaminophen-caffeine 250-250-65 mg (HEADACHE RELIEF, ASA-ACET-CAF,) 250-250-65 mg per tablet, Take 1 tablet by mouth every 6 to 8 hours as needed., Disp: , Rfl:     budesonide-formoterol 160-4.5 mcg (SYMBICORT) 160-4.5 mcg/actuation HFAA, Inhale 2 puffs into the lungs every 12 (twelve) hours. Controller, Disp: 10.2 g, Rfl: 5    buPROPion (WELLBUTRIN) 100 MG tablet, Take 1 tablet (100 mg total) by mouth 3 (three) times daily., Disp: 270 tablet, Rfl: 1    calcium carbonate-vitamin D3 (CALCIUM 600 + D,3,) 600-125 mg-unit Tab, Take 2 tablets by mouth once daily., Disp: 180 tablet, Rfl: 3    celecoxib (CELEBREX) 200 MG capsule, Take 1 capsule (200 mg total) by mouth once daily., Disp: 90 capsule, Rfl: 1    cetirizine (ZYRTEC) 10 MG tablet, Take 1 tablet (10 mg total) by mouth once daily., Disp: 90 tablet, Rfl: 5    conjugated estrogens (PREMARIN) vaginal cream, Place 0.5 g vaginally twice a week., Disp: 30 g, Rfl: 5    cyclobenzaprine (FLEXERIL) 10 MG tablet, Take 1 tablet (10 mg total) by mouth nightly., Disp: 90 tablet, Rfl: 3    doxepin (SINEQUAN) 25 MG capsule, Take 2 capsules (50 mg total) by mouth nightly., Disp: 180 capsule, Rfl: 3    fluconazole (DIFLUCAN) 150 MG Tab, Take 1 tablet (150 mg total) by mouth once daily. (Patient not taking: Reported on 9/22/2022), Disp: 2 tablet, Rfl: 0    fluticasone propionate (FLONASE) 50 mcg/actuation nasal spray, 1 spray (50 mcg total) by Each Nostril route once daily., Disp: 18.2 mL, Rfl: 2    hydroCHLOROthiazide (HYDRODIURIL) 25 MG tablet, Take 1 tablet (25 mg total) by mouth once daily., Disp: 90 tablet, Rfl: 3    HYDROcodone-acetaminophen  (NORCO)  mg per tablet, Take 1 tablet by mouth every 8 (eight) hours as needed for Pain., Disp: 90 tablet, Rfl: 0    ibandronate (BONIVA) 150 mg tablet, Take 1 tablet (150 mg total) by mouth every 30 days. (Patient not taking: Reported on 9/22/2022), Disp: 1 tablet, Rfl: 11    montelukast (SINGULAIR) 10 mg tablet, Take 1 tablet (10 mg total) by mouth once daily., Disp: 90 tablet, Rfl: 3    nebivoloL (BYSTOLIC) 10 MG Tab, TAKE 1 TABLET BY MOUTH ONCE DAILY, Disp: 90 tablet, Rfl: 3    ondansetron (ZOFRAN) 4 MG tablet, Take 1 tablet (4 mg total) by mouth every 8 (eight) hours as needed for Nausea., Disp: 15 tablet, Rfl: 2    pantoprazole (PROTONIX) 40 MG tablet, Take 1 tablet (40 mg total) by mouth once daily., Disp: 90 tablet, Rfl: 3    pseudoephedrine (SUDAFED) 120 mg 12 hr tablet, Take 120 mg by mouth every 12 (twelve) hours., Disp: , Rfl:     sulfamethoxazole-trimethoprim 800-160mg (BACTRIM DS) 800-160 mg Tab, Take 1 tablet by mouth 2 (two) times daily., Disp: , Rfl:     travoprost (TRAVATAN Z) 0.004 % ophthalmic solution, INT 1 GTT IN OU D HS, Disp: , Rfl:      Family/social history:  Patient smokes 1/2 pack cigarettes daily and has done so for 25 years.    Social alcohol use.    No family history of breast carcinoma.  Mother suffered from hypertension and diabetes mellitus.    Father suffered from hypertension.      Physical examination:   Well-developed, well-nourished, white female, no acute distress, who has a weight of 179.5 lb (stable).  VITAL SIGNS: Documented  and reviewed this visit.  HEENT: Normocephalic, atraumatic. Oral mucosa pink and moist. Lips without lesions. Tongue midline. Oropharynx clear. Nonicteric sclerae.   NECK: Supple, no adenopathy. No carotid bruits, thyromegaly or thyroid nodule.   HEART: Regular rate and rhythm without murmur, gallop or rub.   LUNGS: Clear to auscultation bilaterally. Normal respiratory effort.   ABDOMEN: Soft, nontender, nondistended with positive normoactive  bowel sounds, no hepatosplenomegaly.   EXTREMITIES: No cyanosis, clubbing or edema. Distal pulses are intact.   AXILLAE AND GROIN: No palpable pathologic lymphadenopathy is appreciated.   SKIN: Intact/turgor normal   NEUROLOGIC: Cranial nerves II-XII grossly intact. Motor: Good muscle bulk and tone. Strength/sensory 5/5 throughout. Gait stable.   BREASTS:  Right breast lumpectomy scar is well approximated and healing.  Drain site is dressed/dry/intact.  There are no signs of infection.    Laboratory:  White count 7, hemoglobin 12, hematocrit 37.4, platelets 369, absolute neutrophil count is 4200.    Sodium 141, potassium 3.7, chloride 100, CO2 28, BUN 10, creatinine 0.8, glucose 96, calcium 9.7, liver function test within normal limits, LDH is 181, GFR is greater than 60.      BRCA1 and 2 analysis:  Negative.      Pathology:  1. Right breast, lumpectomy with wire localization (70.6 grams):       -  Invasive carcinoma of no special type (ductal) with associated   high-grade ductal carcinoma in situ,          see synoptic report       -  Background breast tissue showing intraductal papilloma formation with   associated usual duct          hyperplasia, focal changes of radial scar formation and sclerosing   adenosis with associated          microcalcifications   2. Right axillary contents:       -  Ten reactive axillary lymph nodes, all negative for metastatic   carcinoma (0/10)       -  Immunohistochemical stain with appropriate control for AE1/AE3 is   negative in all lymph node tissue   SYNOPTIC REPORT   PROCEDURE:  Excision/lumpectomy   SPECIMEN LATERALITY:  Right breast   TUMOR SITE:  Outer region (not further classified)   HISTOLOGIC TYPE:  Invasive carcinoma of no special type (ductal)   HISTOLOGIC GRADE:  Grade 1 of 3        Tubular formation: 3        Pleomorphism: 1        Mitotic rate: 1   TUMOR SIZE:  2 mm in greatest dimension   TUMOR FOCALITY:  Single focus of invasive carcinoma   DUCTAL CARCINOMA IN SITU  (DCIS):  Present, negative for extensive intraductal   component (EIC)        Size/extent:  Up to 1.5 cm in greatest dimension, present in slice 8-10,   present in 7 of 27 breast tissue blocks        Architectural patterns:  Solid        Nuclear grade:  Grade III (high)        Necrosis:  Not identified   LOBULAR CARCINOMA IN SITU (LCIS):  Not identified   TUMOR EXTENT:  Not applicable (skin and skeletal muscle are absent)   LYMPHOVASCULAR INVASION:  Not identified   MICROCALCIFICATIONS:  Present, associated with DCIS   TREATMENT EFFECT:  No known pre-surgical therapy   MARGINS FOR INVASIVE CARCINOMA:         Invasive carcinoma is located 9 mm from the closest (deep) margin.  All   additional margins are greater         than 10 mm from tumor cells.   MARGINS FOR DUCTAL CARCINOMA IN SITU:          Ductal carcinoma in situ is located 2 mm from the closest (anterior)   margin, 5 mm from the inferior          margin and greater than 10 mm from the remaining inked margins of   resection   REGIONAL LYMPH NODES:          Ten regional lymph nodes are present, all negative for metastatic   carcinoma (0/10)   BREAST BIOMARKERS (performed on patient's current specimen, block 1U):           ER: Positive (strong, greater than 95%)           NJ: Negative (0)           Her2:  Positive (3+)           Ki67:  10-15%   PATHOLOGIC STAGE CLASSIFICATION:  pT1a  pN0     Bone mineral density:   Study performed 05/24/2022.    Osteopenia involving both hips.    There is a 18.5% risk of a major osteoporotic fracture and a 3.2% risk of hip fracture in the next 10 years (FRAX).     Impression:   1. Stage I (T1a, N0, M0) infiltrating ductal carcinoma of the right breast which is ER positive, NJ negative, and HER2 Roshan positive by IHC/associated with DCIS.  2. Senile osteopenia.  3. Personal history of endometrial carcinoma.  4. Chronic/current use of an aromatase inhibitor.    Plan:   1. No indication for postoperative adjuvant Herceptin based  therapy with a primary tumor this small and no high-risk genetic profile.  2. Proceed with postlumpectomy adjuvant XRT.    3.  Begin calcium supplementation with vitamin-D 2 tablets daily.    4. Start adjuvant Arimidex 1 mg p.o. daily with a planned duration of 60 months.    5. Proceed with biannual Prolia for prevention of aromatase inhibitor induced bone loss.    6. Survivorship clinic referral.  7.  Return to clinic 3 months from now with interval CBC, CMP, and LDH prior to appointment.    This note was created using voice recognition software and may contain grammatical errors.

## 2022-10-11 NOTE — Clinical Note
Return to clinic in earliest convenience for 1st dose Prolia.   Patient cleared to proceed with postlumpectomy radiation.   Start Arimidex 1 mg p.o. daily.   Return to clinic 3 months from now with interval CBC, CMP, LDH prior to appointment.   Survivorship clinic referral.

## 2022-10-13 ENCOUNTER — OFFICE VISIT (OUTPATIENT)
Dept: SURGERY | Facility: CLINIC | Age: 67
End: 2022-10-13
Payer: MEDICARE

## 2022-10-13 VITALS
BODY MASS INDEX: 30.39 KG/M2 | HEART RATE: 81 BPM | OXYGEN SATURATION: 96 % | SYSTOLIC BLOOD PRESSURE: 133 MMHG | DIASTOLIC BLOOD PRESSURE: 75 MMHG | WEIGHT: 178 LBS | HEIGHT: 64 IN

## 2022-10-13 DIAGNOSIS — Z09 POSTOP CHECK: Primary | ICD-10-CM

## 2022-10-13 PROCEDURE — 99024 PR POST-OP FOLLOW-UP VISIT: ICD-10-PCS | Mod: POP,,, | Performed by: SURGERY

## 2022-10-13 PROCEDURE — 99999 PR PBB SHADOW E&M-EST. PATIENT-LVL IV: ICD-10-PCS | Mod: PBBFAC,,, | Performed by: SURGERY

## 2022-10-13 PROCEDURE — 99999 PR PBB SHADOW E&M-EST. PATIENT-LVL IV: CPT | Mod: PBBFAC,,, | Performed by: SURGERY

## 2022-10-13 PROCEDURE — 99024 POSTOP FOLLOW-UP VISIT: CPT | Mod: POP,,, | Performed by: SURGERY

## 2022-10-13 PROCEDURE — 99214 OFFICE O/P EST MOD 30 MIN: CPT | Mod: PBBFAC | Performed by: SURGERY

## 2022-10-13 NOTE — PROGRESS NOTES
"General Surgery  [facility]  Follow-up    HPI/Follow-up exam:  Laxmi Chand is a 67 y.o. female presents today for follow-up examination after lumpectomy.  Doing well.  Patient underwent biopsy mole last visit.  Benign seborrheic keratosis.  Patient's lumpectomy site right breast healing well.  No significant fluid collections.  Mild seroma.  No evidence of lymphocele.  No erythema induration fluctuance.  No new issues or complaints.    PHYSICAL EXAM:  /75 (BP Location: Left arm, Patient Position: Sitting, BP Method: Medium (Automatic))   Pulse 81   Ht 5' 4" (1.626 m)   Wt 80.7 kg (178 lb)   SpO2 96%   BMI 30.55 kg/m²   Gen: Wd Wn female in NAD  Heent: Nc/At, MMM  Cv: RRR  Lung: Non-labored breathing, clear bilaterally  Abd: Soft, non-tender, non-distended  Ext: No cyanosis clubbing or edema  Breast:  Right-sided lumpectomy site looks good no signs or symptoms of infection.  My nurse Keiry was present during examination.    Pathology:  Mole biopsy benign seborrheic keratosis left breast.    Assessment:  Laxmi Chand is a 67 y.o. female s/p right breast lumpectomy and axillary node dissection for cancer    Plan/Medical Decision Making:  Doing well.  No issues.  Scheduled for radiation oncology.  Scheduled for hormone therapy receptor blockers.  Follow-up surgery clinic 3 months.    Followup:  3 months.    Patient instructed that best way to communicate with my office staff is for patient to get on the [facility] epic patient portal to expedite communication and communication issues that may occur.  Patient was given instructions on how to get on the portal.  I encouraged patient to obtain portal access as well.  Ultimately it is up to the patient to obtain access.  Patient voiced understanding.          "

## 2022-10-14 ENCOUNTER — OFFICE VISIT (OUTPATIENT)
Dept: HEMATOLOGY/ONCOLOGY | Facility: CLINIC | Age: 67
End: 2022-10-14
Payer: MEDICARE

## 2022-10-14 ENCOUNTER — INFUSION (OUTPATIENT)
Dept: INFUSION THERAPY | Facility: HOSPITAL | Age: 67
End: 2022-10-14
Attending: RADIOLOGY
Payer: MEDICARE

## 2022-10-14 VITALS
TEMPERATURE: 98 F | DIASTOLIC BLOOD PRESSURE: 72 MMHG | HEART RATE: 83 BPM | SYSTOLIC BLOOD PRESSURE: 135 MMHG | WEIGHT: 179.19 LBS | RESPIRATION RATE: 18 BRPM | HEIGHT: 64 IN | BODY MASS INDEX: 30.59 KG/M2 | SYSTOLIC BLOOD PRESSURE: 143 MMHG | HEART RATE: 80 BPM | DIASTOLIC BLOOD PRESSURE: 68 MMHG | OXYGEN SATURATION: 98 %

## 2022-10-14 DIAGNOSIS — Z79.811 LONG TERM (CURRENT) USE OF AROMATASE INHIBITORS: Primary | ICD-10-CM

## 2022-10-14 DIAGNOSIS — C50.011 MALIGNANT NEOPLASM OF NIPPLE OF RIGHT BREAST IN FEMALE, UNSPECIFIED ESTROGEN RECEPTOR STATUS: ICD-10-CM

## 2022-10-14 LAB
ANNOTATION COMMENT IMP: NORMAL
BRCA RESOUCES: NORMAL
GENE STUDIED ID: NORMAL
GENETIC VARIANT DETAILS BLD/T: NORMAL
GENETICIST REVIEW: NORMAL
LAB TEST METHOD: NORMAL
MOL DX INTERP BLD/T QL: NORMAL
PROVIDER SIGNING NAME: NORMAL
SPECIMEN SOURCE: NORMAL
SPECIMEN SOURCE: NORMAL
TEST PERFORMANCE INFO SPEC: NORMAL
TEST PERFORMANCE INFO SPEC: NORMAL

## 2022-10-14 PROCEDURE — 99215 PR OFFICE/OUTPT VISIT, EST, LEVL V, 40-54 MIN: ICD-10-PCS | Mod: S$PBB,,, | Performed by: NURSE PRACTITIONER

## 2022-10-14 PROCEDURE — 99215 OFFICE O/P EST HI 40 MIN: CPT | Mod: S$PBB,,, | Performed by: NURSE PRACTITIONER

## 2022-10-14 PROCEDURE — 99999 PR PBB SHADOW E&M-EST. PATIENT-LVL V: CPT | Mod: PBBFAC,,, | Performed by: NURSE PRACTITIONER

## 2022-10-14 PROCEDURE — 63600175 PHARM REV CODE 636 W HCPCS: Mod: JG | Performed by: INTERNAL MEDICINE

## 2022-10-14 PROCEDURE — 99999 PR PBB SHADOW E&M-EST. PATIENT-LVL V: ICD-10-PCS | Mod: PBBFAC,,, | Performed by: NURSE PRACTITIONER

## 2022-10-14 PROCEDURE — 99215 OFFICE O/P EST HI 40 MIN: CPT | Mod: PBBFAC,25 | Performed by: NURSE PRACTITIONER

## 2022-10-14 PROCEDURE — 96372 THER/PROPH/DIAG INJ SC/IM: CPT

## 2022-10-14 RX ADMIN — DENOSUMAB 60 MG: 60 INJECTION SUBCUTANEOUS at 02:10

## 2022-10-14 NOTE — PLAN OF CARE
Problem: Adult Inpatient Plan of Care  Goal: Plan of Care Review  Outcome: Ongoing, Progressing  Flowsheets (Taken 10/14/2022 1424)  Plan of Care Reviewed With: patient   BP (!) 143/68 (BP Location: Right arm, Patient Position: Sitting)   Pulse 80   Temp 98.2 °F (36.8 °C) (Oral)   Resp 18   SpO2 98%   Pleasant alert and oriented patient to OPT per self via walker for Prolia injection. VSS and all questions and concerns addressed.  Pt tolerated Prolia injection with out adverse medication reactions. Pt discharged from OPT per self, RTC 4/14/2023.

## 2022-10-14 NOTE — PROGRESS NOTES
PATIENT: Laxmi Chand  MRN: [id number]  DATE: 10/14/2022        Chief Complaint: Survivorship Clinic      Subjective:   Oncology History: Ms. Chand is a 67 y.o. female  with history of breast cancer who presents for survivorship clinic visit. She was diagnosed with T1 N0 M0 breast cancer.  She is status post lumpectomy was initiated on Arimidex 09/2022.  She is scheduled to undergo radiation    Survivorship Assessment:  1. Cardiac Toxicity- no symptoms of cardiac toxicity reported  2. Anxiety/Depression/Distress- patient denies any feelings of depression /anxiety  3. Cognitive function- no cognitive impairments noted  4. Fatigue-  no fatigue reported  5. Lymphedema- no lymphedema noted  6. Sexual Function/Hormone related symptoms- no symptoms reported  7. Pain- denies pain  8. Sleep- denies difficulty sleeping  9. Exercise/Dietary Assessment: she reports a relatively active lifestyle and good eating habits    PCP:Elba Dill NP   GYN:    Last Colonoscopy:2021-- performed at outside hospital.  Will obtain results for review  Last WWE/Pelvic Exam:2021    CVD Risk Assessment:  The 10-year ASCVD risk score (Mihaela DK, et al., 2019) is: 20%    Values used to calculate the score:      Age: 67 years      Sex: Female      Is Non- : No      Diabetic: No      Tobacco smoker: Yes      Systolic Blood Pressure: 143 mmHg      Is BP treated: Yes      HDL Cholesterol: 50 mg/dL      Total Cholesterol: 203 mg/dL      Past Medical History:   Past Medical History:   Diagnosis Date    COPD (chronic obstructive pulmonary disease)     Degeneration of lumbar intervertebral disc     Endometrial cancer     GERD (gastroesophageal reflux disease)     HTN (hypertension)     Hyperlipemia, mixed        Past Surgical History:   Past Surgical History:   Procedure Laterality Date    BIOPSY OF AXILLARY NODE Right 9/7/2022    Procedure: BIOPSY, LYMPH NODE, AXILLARY;  Surgeon: Jatin Gutierrez MD;  Location: [facility]  OR;  Service: General;  Laterality: Right;    BREAST BIOPSY Right 08/05/2022    BREAST MASS EXCISION Right 9/7/2022    Procedure: EXCISION, MASS, BREAST;  Surgeon: Jatin Gutierrez MD;  Location: [facility] OR;  Service: General;  Laterality: Right;  wire localized partial mastectomy right breast with margins     CARPAL TUNNEL RELEASE  1985    CHOLECYSTECTOMY  1978    HYSTERECTOMY      KNEE SURGERY Left 06/01/2020    LUMBAR DISC SURGERY  01/01/1990    plate and roland    LUMBAR FUSION  2011    TOTAL KNEE REPLACEMENT USING COMPUTER NAVIGATION Left 02/15/2021       Family History:   Family History   Problem Relation Age of Onset    Hypertension Mother     Diabetes Mother     Hypertension Father     Lung cancer Maternal Grandfather     Breast cancer Neg Hx        Social History:  reports that she has been smoking cigarettes and vaping with nicotine. She started smoking about 53 years ago. She has a 25.00 pack-year smoking history. She has never used smokeless tobacco. She reports current alcohol use. She reports that she does not currently use drugs.    Allergies:  Review of patient's allergies indicates:  No Known Allergies    Medications:  Current Outpatient Medications   Medication Sig Dispense Refill    albuterol (PROVENTIL/VENTOLIN HFA) 90 mcg/actuation inhaler Inhale 2 puffs into the lungs every 4 (four) hours as needed for Wheezing or Shortness of Breath. 18 g 5    albuterol-ipratropium (DUO-NEB) 2.5 mg-0.5 mg/3 mL nebulizer solution INHALE CONTENTS OF 1 VIAL BY MOUTH WITH NEBULIZER EVERY 6 HOURS WHILE AWAKE 360 mL 5    anastrozole (ARIMIDEX) 1 mg Tab Take 1 tablet (1 mg total) by mouth once daily. 90 tablet 3    aspirin (ECOTRIN) 81 MG EC tablet Take 1 tablet by mouth once daily. Pt back on asa      aspirin-acetaminophen-caffeine 250-250-65 mg (HEADACHE RELIEF, ASA-ACET-CAF,) 250-250-65 mg per tablet Take 1 tablet by mouth every 6 to 8 hours as needed.      budesonide-formoterol 160-4.5 mcg (SYMBICORT) 160-4.5  mcg/actuation HFAA Inhale 2 puffs into the lungs every 12 (twelve) hours. Controller 10.2 g 5    buPROPion (WELLBUTRIN) 100 MG tablet Take 1 tablet (100 mg total) by mouth 3 (three) times daily. 270 tablet 1    calcium carbonate-vitamin D3 (CALCIUM 600 + D,3,) 600-125 mg-unit Tab Take 2 tablets by mouth once daily. 180 tablet 3    celecoxib (CELEBREX) 200 MG capsule Take 1 capsule (200 mg total) by mouth once daily. 90 capsule 1    cetirizine (ZYRTEC) 10 MG tablet Take 1 tablet (10 mg total) by mouth once daily. 90 tablet 5    conjugated estrogens (PREMARIN) vaginal cream Place 0.5 g vaginally twice a week. 30 g 5    cyclobenzaprine (FLEXERIL) 10 MG tablet Take 1 tablet (10 mg total) by mouth nightly. 90 tablet 3    doxepin (SINEQUAN) 25 MG capsule Take 2 capsules (50 mg total) by mouth nightly. 180 capsule 3    fluconazole (DIFLUCAN) 150 MG Tab Take 1 tablet (150 mg total) by mouth once daily. 2 tablet 0    fluticasone propionate (FLONASE) 50 mcg/actuation nasal spray 1 spray (50 mcg total) by Each Nostril route once daily. 18.2 mL 2    hydroCHLOROthiazide (HYDRODIURIL) 25 MG tablet Take 1 tablet (25 mg total) by mouth once daily. 90 tablet 3    HYDROcodone-acetaminophen (NORCO)  mg per tablet Take 1 tablet by mouth every 8 (eight) hours as needed for Pain. 90 tablet 0    ibandronate (BONIVA) 150 mg tablet Take 1 tablet (150 mg total) by mouth every 30 days. 1 tablet 11    montelukast (SINGULAIR) 10 mg tablet Take 1 tablet (10 mg total) by mouth once daily. 90 tablet 3    nebivoloL (BYSTOLIC) 10 MG Tab TAKE 1 TABLET BY MOUTH ONCE DAILY 90 tablet 3    ondansetron (ZOFRAN) 4 MG tablet Take 1 tablet (4 mg total) by mouth every 8 (eight) hours as needed for Nausea. 15 tablet 2    pantoprazole (PROTONIX) 40 MG tablet Take 1 tablet (40 mg total) by mouth once daily. 90 tablet 3    pseudoephedrine (SUDAFED) 120 mg 12 hr tablet Take 120 mg by mouth every 12 (twelve) hours.      sulfamethoxazole-trimethoprim 800-160mg  (BACTRIM DS) 800-160 mg Tab Take 1 tablet by mouth 2 (two) times daily.      travoprost (TRAVATAN Z) 0.004 % ophthalmic solution INT 1 GTT IN OU D HS       No current facility-administered medications for this visit.       Review of Systems:  General: No fever, chills, or weight loss.  Chest: No chest pain, shortness of breath, or palpitations.  Breast: No pain, masses, or nipple discharge.  Vulva: No pain, lesions, or itching.  Vagina: No relaxation, itching, discharge, or lesions.  Abdomen: No pain, nausea, vomiting, diarrhea, or constipation.  Urinary: No incontinence, nocturia, frequency, or dysuria.  Extremities:  No leg cramps, edema, or calf pain.  Neurologic: No headaches, dizziness, or visual changes.  Psychiatric: No anxiety, depression, or sleep issues.      Objective:      Vitals: There were no vitals filed for this visit.  BMI: There is no height or weight on file to calculate BMI.    Physical Exam: Deferred exam      Assessment:     1. Malignant neoplasm of nipple of right breast in female, unspecified estrogen receptor status     - initiated on Arimidex 09/2022  - proceed with adjuvant XRT  - see MD in 3 months      Plan:   Reviewed Cancer Treatment Summary with patient. Care Plan was given to the patient and all questions were answered.   Counseled on healthy lifestyle and behavior modifications that could reduce risk of recurrence.     Immunizations:   Recommendations for flu vaccine COVID vaccine and COVID vaccine provided to patient    Plan:   Reviewed Cancer Treatment Summary with patient. Care Plan was given to the patient and all questions were answered. Survivorship handout was also reviewed and given to patient.     We discussed the role of the survivorship clinic in his care. Survivorship discussion had with patient. We discussed all the support services including: social work, psychology, palliative care, physical therapy, acupuncture, dietary, genetics. Today we reviewed all aspects of  treatment and the potential long term side effects of treatment, namely cardiotoxicity and secondary malignancy.  We also discussed the emotional/psychological impact of diagnosis of treatment and let her know out our psychosocial services (dedicated  and Psychologist). We discussed dietary support. Women's Wellness team which includes a GYN for secual dysfunction, hot flashes and vaginal dryness and also discussed acupuncture.  She declines referral to these services at this time.          Counseled on healthy lifestyle and behavior modifications that could reduce risk of recurrence.  Limit alcohol to less than one drink per day, Exercise at least 150 minutes per week of moderate intensity aerobic activity or at least 75 minutes of vigorous activity, Maintaining a healthy weight, Limit red meat to no more than 2-3x per week, Reduce processed foods and meat. Also encouraged wide variety of fruits and vegetables, specifically cruciferous vegetables.    Follow ups with  Med Onc Scheduled.  Follow up with Survivorship clinic PRN.    I spent a total of 50 minutes on the day of the visit.This includes face to face time and non-face to face time preparing to see the patient (eg, review of tests), obtaining and/or reviewing separately obtained history, documenting clinical information in the electronic or other health record, independently interpreting results and communicating results to the patient/family/caregiver, or care coordinator.

## 2022-10-25 ENCOUNTER — DOCUMENTATION ONLY (OUTPATIENT)
Dept: RADIATION ONCOLOGY | Facility: CLINIC | Age: 67
End: 2022-10-25

## 2022-10-25 NOTE — PROGRESS NOTES
Patient given 5 tubes of aquaphor samples with instructions to apply twice daily to affected area.  Do not apply within 6 hours of radiation therapy.  Patient verbalized understanding.

## 2022-11-01 ENCOUNTER — DOCUMENTATION ONLY (OUTPATIENT)
Dept: HEMATOLOGY/ONCOLOGY | Facility: CLINIC | Age: 67
End: 2022-11-01

## 2022-11-11 ENCOUNTER — TELEPHONE (OUTPATIENT)
Dept: HEMATOLOGY/ONCOLOGY | Facility: CLINIC | Age: 67
End: 2022-11-11
Payer: MEDICARE

## 2022-11-11 NOTE — TELEPHONE ENCOUNTER
This nurse called pt per NP Boyte direction to explain who will be pt's provider. This nurse explained that 2 providers will be rotating on Tuesdays. Pt v/u. She states that she is having horrible hot flashes that are making her sick to her stomach. Appt made per request.

## 2022-11-15 ENCOUNTER — DOCUMENTATION ONLY (OUTPATIENT)
Dept: RADIATION ONCOLOGY | Facility: CLINIC | Age: 67
End: 2022-11-15

## 2022-11-15 ENCOUNTER — OFFICE VISIT (OUTPATIENT)
Dept: HEMATOLOGY/ONCOLOGY | Facility: CLINIC | Age: 67
End: 2022-11-15
Payer: MEDICARE

## 2022-11-15 VITALS
HEIGHT: 64 IN | DIASTOLIC BLOOD PRESSURE: 74 MMHG | WEIGHT: 178 LBS | SYSTOLIC BLOOD PRESSURE: 156 MMHG | BODY MASS INDEX: 30.39 KG/M2 | OXYGEN SATURATION: 100 % | HEART RATE: 89 BPM

## 2022-11-15 DIAGNOSIS — T45.1X5A HOT FLASHES RELATED TO AROMATASE INHIBITOR THERAPY: ICD-10-CM

## 2022-11-15 DIAGNOSIS — M85.80 SENILE OSTEOPENIA: ICD-10-CM

## 2022-11-15 DIAGNOSIS — R23.2 HOT FLASHES RELATED TO AROMATASE INHIBITOR THERAPY: ICD-10-CM

## 2022-11-15 DIAGNOSIS — D05.11 DUCTAL CARCINOMA IN SITU (DCIS) OF RIGHT BREAST: ICD-10-CM

## 2022-11-15 DIAGNOSIS — Z79.811 LONG TERM (CURRENT) USE OF AROMATASE INHIBITORS: ICD-10-CM

## 2022-11-15 DIAGNOSIS — C50.011 MALIGNANT NEOPLASM OF NIPPLE OF RIGHT BREAST IN FEMALE, UNSPECIFIED ESTROGEN RECEPTOR STATUS: Primary | ICD-10-CM

## 2022-11-15 DIAGNOSIS — Z85.42 HISTORY OF ENDOMETRIAL CANCER: ICD-10-CM

## 2022-11-15 PROCEDURE — 99999 PR PBB SHADOW E&M-EST. PATIENT-LVL V: CPT | Mod: PBBFAC,,, | Performed by: INTERNAL MEDICINE

## 2022-11-15 PROCEDURE — 99215 OFFICE O/P EST HI 40 MIN: CPT | Mod: PBBFAC | Performed by: INTERNAL MEDICINE

## 2022-11-15 PROCEDURE — 99213 OFFICE O/P EST LOW 20 MIN: CPT | Mod: S$PBB,,, | Performed by: INTERNAL MEDICINE

## 2022-11-15 PROCEDURE — 99213 PR OFFICE/OUTPT VISIT, EST, LEVL III, 20-29 MIN: ICD-10-PCS | Mod: S$PBB,,, | Performed by: INTERNAL MEDICINE

## 2022-11-15 PROCEDURE — 99999 PR PBB SHADOW E&M-EST. PATIENT-LVL V: ICD-10-PCS | Mod: PBBFAC,,, | Performed by: INTERNAL MEDICINE

## 2022-11-15 RX ORDER — OXYCODONE AND ACETAMINOPHEN 7.5; 325 MG/1; MG/1
1 TABLET ORAL 3 TIMES DAILY PRN
COMMUNITY
Start: 2022-11-02 | End: 2023-10-31

## 2022-11-15 RX ORDER — VENLAFAXINE HYDROCHLORIDE 37.5 MG/1
37.5 CAPSULE, EXTENDED RELEASE ORAL DAILY
Qty: 90 CAPSULE | Refills: 3 | Status: SHIPPED | OUTPATIENT
Start: 2022-11-15 | End: 2023-02-07

## 2022-11-15 NOTE — PROGRESS NOTES
History of present illness:  The patient is a 67-year-old white female who had been in her usual state of health, had screening mammography, and was found to have microcalcifications within the right breast.  These have been biopsied in reveal high-grade DCIS which is 2 mm in greatest dimension and associated with typical ductal hyperplasia, papilloma, and non-necrotizing granulomata.  Patient's tumor is strongly estrogen positive/progesterone negative.  Patient has a good deal of bruising and tenderness following biopsy.  Patient reports diagnosis of endometrial carcinoma in her 20s which was managed hysterectomy.    Patient is status post lumpectomy and right axillary lymph node dissection.  Patient returns to clinic to review pathology and interval lab as well as to plan care.  Postoperatively, the patient is doing well.  She has anticipated postoperative pain.  No difficulties with lymphedema.  No difficulties with wound healing.  Patient denies decreased range of motion of the right upper extremity.    Patient returns to clinic to review results of Oncotype DX analysis and finalize plan of care.  Unfortunately, there was not enough viable tumor in the submitted specimen to perform the analysis.  As patient had no clear indication for postoperative adjuvant chemotherapy, she was placed on Arimidex, calcium supplementation with vitamin-D, and biannual Prolia for prevention of aromatase inhibitor induced bone loss.  Patient returns to clinic earlier than scheduled 3 month post therapy surveillance examination.  Patient is having difficulty with severe/frequent hot flashes.  She wishes to discuss medical therapy for this.    Past medical history:   1. Allergic rhinitis  2.  Chronic obstructive pulmonary disease   3. Degenerative disc disease of the lumbar spine  4.  Depression  5.  Gastroesophageal reflux disease  6. Tobacco abuse  7.  Hyperlipidemia  8.  Hypertension  9.  Status post total left knee  arthroplasty  10.  Chronic pain syndrome   11.  Migraine headache  12.  Glaucoma   14. Osteoporosis  15. History of endometrial carcinoma-status post hysterectomy  16.  Status post lumbar spine surgery  17.  Status post cholecystectomy    Allergies: None known     Medications:    Current Outpatient Medications:     albuterol (PROVENTIL/VENTOLIN HFA) 90 mcg/actuation inhaler, Inhale 2 puffs into the lungs every 4 (four) hours as needed for Wheezing or Shortness of Breath., Disp: 18 g, Rfl: 5    albuterol-ipratropium (DUO-NEB) 2.5 mg-0.5 mg/3 mL nebulizer solution, INHALE CONTENTS OF 1 VIAL BY MOUTH WITH NEBULIZER EVERY 6 HOURS WHILE AWAKE, Disp: 360 mL, Rfl: 5    anastrozole (ARIMIDEX) 1 mg Tab, Take 1 tablet (1 mg total) by mouth once daily., Disp: 90 tablet, Rfl: 3    aspirin (ECOTRIN) 81 MG EC tablet, Take 1 tablet by mouth once daily. Pt back on asa, Disp: , Rfl:     aspirin-acetaminophen-caffeine 250-250-65 mg (HEADACHE RELIEF, ASA-ACET-CAF,) 250-250-65 mg per tablet, Take 1 tablet by mouth every 6 to 8 hours as needed., Disp: , Rfl:     budesonide-formoterol 160-4.5 mcg (SYMBICORT) 160-4.5 mcg/actuation HFAA, Inhale 2 puffs into the lungs every 12 (twelve) hours. Controller, Disp: 10.2 g, Rfl: 5    buPROPion (WELLBUTRIN) 100 MG tablet, Take 1 tablet (100 mg total) by mouth 3 (three) times daily., Disp: 270 tablet, Rfl: 1    calcium carbonate-vitamin D3 (CALCIUM 600 + D,3,) 600-125 mg-unit Tab, Take 2 tablets by mouth once daily., Disp: 180 tablet, Rfl: 3    celecoxib (CELEBREX) 200 MG capsule, Take 1 capsule (200 mg total) by mouth once daily., Disp: 90 capsule, Rfl: 1    cetirizine (ZYRTEC) 10 MG tablet, Take 1 tablet (10 mg total) by mouth once daily., Disp: 90 tablet, Rfl: 5    conjugated estrogens (PREMARIN) vaginal cream, Place 0.5 g vaginally twice a week., Disp: 30 g, Rfl: 5    cyclobenzaprine (FLEXERIL) 10 MG tablet, Take 1 tablet (10 mg total) by mouth nightly., Disp: 90 tablet, Rfl: 3    doxepin  (SINEQUAN) 25 MG capsule, Take 2 capsules (50 mg total) by mouth nightly., Disp: 180 capsule, Rfl: 3    fluconazole (DIFLUCAN) 150 MG Tab, Take 1 tablet (150 mg total) by mouth once daily., Disp: 2 tablet, Rfl: 0    fluticasone propionate (FLONASE) 50 mcg/actuation nasal spray, 1 spray (50 mcg total) by Each Nostril route once daily., Disp: 18.2 mL, Rfl: 2    hydroCHLOROthiazide (HYDRODIURIL) 25 MG tablet, Take 1 tablet (25 mg total) by mouth once daily., Disp: 90 tablet, Rfl: 3    montelukast (SINGULAIR) 10 mg tablet, Take 1 tablet (10 mg total) by mouth once daily., Disp: 90 tablet, Rfl: 3    nebivoloL (BYSTOLIC) 10 MG Tab, TAKE 1 TABLET BY MOUTH ONCE DAILY, Disp: 90 tablet, Rfl: 3    ondansetron (ZOFRAN) 4 MG tablet, Take 1 tablet (4 mg total) by mouth every 8 (eight) hours as needed for Nausea., Disp: 15 tablet, Rfl: 2    oxyCODONE-acetaminophen (PERCOCET) 7.5-325 mg per tablet, Take 1 tablet by mouth 3 (three) times daily as needed., Disp: , Rfl:     pantoprazole (PROTONIX) 40 MG tablet, Take 1 tablet (40 mg total) by mouth once daily., Disp: 90 tablet, Rfl: 3    pseudoephedrine (SUDAFED) 120 mg 12 hr tablet, Take 120 mg by mouth every 12 (twelve) hours., Disp: , Rfl:     travoprost (TRAVATAN Z) 0.004 % ophthalmic solution, INT 1 GTT IN OU D HS, Disp: , Rfl:     HYDROcodone-acetaminophen (NORCO)  mg per tablet, Take 1 tablet by mouth every 8 (eight) hours as needed for Pain. (Patient not taking: Reported on 11/15/2022), Disp: 90 tablet, Rfl: 0    sulfamethoxazole-trimethoprim 800-160mg (BACTRIM DS) 800-160 mg Tab, Take 1 tablet by mouth 2 (two) times daily., Disp: , Rfl:     venlafaxine (EFFEXOR-XR) 37.5 MG 24 hr capsule, Take 1 capsule (37.5 mg total) by mouth once daily., Disp: 90 capsule, Rfl: 3     Family/social history:  Patient smokes 1/2 pack cigarettes daily and has done so for 25 years.    Social alcohol use.    No family history of breast carcinoma.  Mother suffered from hypertension and  diabetes mellitus.    Father suffered from hypertension.      Physical examination:   Well-developed, well-nourished, white female, no acute distress, who has a weight of 178 lb (decreased by 1.5 lb).  VITAL SIGNS: Documented  and reviewed this visit.  HEENT: Normocephalic, atraumatic. Oral mucosa pink and moist. Lips without lesions. Tongue midline. Oropharynx clear. Nonicteric sclerae.   NECK: Supple, no adenopathy. No carotid bruits, thyromegaly or thyroid nodule.   HEART: Regular rate and rhythm without murmur, gallop or rub.   LUNGS: Clear to auscultation bilaterally. Normal respiratory effort.   ABDOMEN: Soft, nontender, nondistended with positive normoactive bowel sounds, no hepatosplenomegaly.   EXTREMITIES: No cyanosis, clubbing or edema. Distal pulses are intact.   AXILLAE AND GROIN: No palpable pathologic lymphadenopathy is appreciated.   SKIN: Intact/turgor normal   NEUROLOGIC: Cranial nerves II-XII grossly intact. Motor: Good muscle bulk and tone. Strength/sensory 5/5 throughout. Gait stable.   BREASTS:  Right breast lumpectomy scar is well approximated and healing.      BRCA1 and 2 analysis:  Negative.       Impression:   1. Stage I (T1a, N0, M0) infiltrating ductal carcinoma of the right breast which is ER positive, UT negative, and HER2 Roshan positive by IHC/associated with DCIS.  2. Senile osteopenia.  3. Personal history of endometrial carcinoma.  4. Chronic/current use of an aromatase inhibitor.    Plan:   1. Continue calcium supplementation with vitamin-D 2 tablets daily.    2. Continue adjuvant Arimidex 1 mg p.o. daily with a planned duration of 60 months.    3. Proceed with biannual Prolia for prevention of aromatase inhibitor induced bone loss.    4. Return to clinic 3 months from now with interval CBC, CMP, and LDH prior to appointment.    This note was created using voice recognition software and may contain grammatical errors.

## 2022-11-18 ENCOUNTER — TREATMENT (OUTPATIENT)
Dept: RADIATION ONCOLOGY | Facility: CLINIC | Age: 67
End: 2022-11-18
Payer: MEDICARE

## 2022-11-18 PROCEDURE — G6012 RADIATION TREATMENT DELIVERY: HCPCS | Mod: S$GLB,,, | Performed by: RADIOLOGY

## 2022-11-18 PROCEDURE — 77014 PR  CT GUIDANCE PLACEMENT RAD THERAPY FIELDS: ICD-10-PCS | Mod: S$GLB,,, | Performed by: RADIOLOGY

## 2022-11-18 PROCEDURE — 77014 PR  CT GUIDANCE PLACEMENT RAD THERAPY FIELDS: CPT | Mod: S$GLB,,, | Performed by: RADIOLOGY

## 2022-11-18 PROCEDURE — G6012 PR RADN TX DELIVERY, 6-10 MEV, >= 3 TX AREAS: ICD-10-PCS | Mod: S$GLB,,, | Performed by: RADIOLOGY

## 2022-12-02 ENCOUNTER — OFFICE VISIT (OUTPATIENT)
Dept: FAMILY MEDICINE | Facility: CLINIC | Age: 67
End: 2022-12-02
Payer: MEDICARE

## 2022-12-02 ENCOUNTER — PATIENT MESSAGE (OUTPATIENT)
Dept: HEMATOLOGY/ONCOLOGY | Facility: CLINIC | Age: 67
End: 2022-12-02
Payer: MEDICARE

## 2022-12-02 VITALS
DIASTOLIC BLOOD PRESSURE: 82 MMHG | HEART RATE: 80 BPM | WEIGHT: 174.81 LBS | HEIGHT: 64 IN | TEMPERATURE: 98 F | BODY MASS INDEX: 29.84 KG/M2 | SYSTOLIC BLOOD PRESSURE: 136 MMHG | OXYGEN SATURATION: 95 %

## 2022-12-02 DIAGNOSIS — S50.861A INSECT BITE OF RIGHT FOREARM, INITIAL ENCOUNTER: ICD-10-CM
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
## 2022-12-02 DIAGNOSIS — J44.1 CHRONIC OBSTRUCTIVE PULMONARY DISEASE WITH ACUTE EXACERBATION: Primary | ICD-10-CM

## 2022-12-02 DIAGNOSIS — B37.9 ANTIBIOTIC-INDUCED YEAST INFECTION: ICD-10-CM

## 2022-12-02 DIAGNOSIS — J32.9 SINUSITIS, UNSPECIFIED CHRONICITY, UNSPECIFIED LOCATION: ICD-10-CM

## 2022-12-02 DIAGNOSIS — T36.95XA ANTIBIOTIC-INDUCED YEAST INFECTION: ICD-10-CM

## 2022-12-02 DIAGNOSIS — I10 PRIMARY HYPERTENSION: ICD-10-CM

## 2022-12-02 PROCEDURE — 99215 OFFICE O/P EST HI 40 MIN: CPT | Mod: PBBFAC,PN

## 2022-12-02 PROCEDURE — 99999 PR PBB SHADOW E&M-EST. PATIENT-LVL V: CPT | Mod: PBBFAC,,,

## 2022-12-02 PROCEDURE — 99214 OFFICE O/P EST MOD 30 MIN: CPT | Mod: S$PBB,,,

## 2022-12-02 PROCEDURE — 99999 PR PBB SHADOW E&M-EST. PATIENT-LVL V: ICD-10-PCS | Mod: PBBFAC,,,

## 2022-12-02 PROCEDURE — 99214 PR OFFICE/OUTPT VISIT, EST, LEVL IV, 30-39 MIN: ICD-10-PCS | Mod: S$PBB,,,

## 2022-12-02 RX ORDER — FLUTICASONE PROPIONATE 50 MCG
1 SPRAY, SUSPENSION (ML) NASAL DAILY
Qty: 18.2 ML | Refills: 2 | Status: SHIPPED | OUTPATIENT
Start: 2022-12-02 | End: 2024-02-21 | Stop reason: SDUPTHER

## 2022-12-02 RX ORDER — PREDNISONE 20 MG/1
40 TABLET ORAL DAILY
Qty: 10 TABLET | Refills: 0 | Status: SHIPPED | OUTPATIENT
Start: 2022-12-02 | End: 2022-12-07

## 2022-12-02 RX ORDER — FLUCONAZOLE 150 MG/1
150 TABLET ORAL DAILY
Qty: 2 TABLET | Refills: 0 | Status: SHIPPED | OUTPATIENT
Start: 2022-12-02 | End: 2023-02-07

## 2022-12-02 RX ORDER — DOXYCYCLINE 100 MG/1
100 CAPSULE ORAL 2 TIMES DAILY
Qty: 20 CAPSULE | Refills: 0 | Status: SHIPPED | OUTPATIENT
Start: 2022-12-02 | End: 2022-12-12

## 2022-12-02 NOTE — PROGRESS NOTES
Subjective:       Patient ID: Laxmi Chand is a 67 y.o. female.    Chief Complaint: Cough (Congestion, thick yellow, wheezing , increased sob. No fever, mild sore throat. Symptoms began 2-3 weeks ago.), Otalgia (Feeling full with pain in the right one. ), Sinus Problem (Burning, some stuffy nose and runny nose), and Insect Bite (Right forearm. Red and swollen.)    Patient presents to the clinic with complaint of cough.     Symptoms started 2-3 weeks ago and are getting worse. Symptoms include cough, wheezing, shortness of breath, otalgia, sinus pressure and sinus pain. States cough productive with greenish colored sputum.     She also has a complaint of a insect bite to right forearm. States it appeared yesterday and is now red and swollen.     Has no other complaints or concerns at this time.     Patient educated on plan of care, verbalized understanding.      Review of Systems   Constitutional:  Negative for activity change, appetite change, chills, diaphoresis and fever.   HENT:  Positive for congestion, ear pain, postnasal drip and sinus pressure. Negative for sneezing and sore throat.    Eyes:  Negative for pain, discharge, redness and itching.   Respiratory:  Positive for cough, shortness of breath and wheezing. Negative for apnea and chest tightness.    Cardiovascular:  Negative for chest pain and leg swelling.   Gastrointestinal:  Negative for abdominal distention, abdominal pain, constipation, diarrhea, nausea and vomiting.   Genitourinary:  Negative for difficulty urinating, dysuria, flank pain and frequency.   Skin:  Negative for color change, rash and wound.   Neurological:  Negative for dizziness.   All other systems reviewed and are negative.    Patient Active Problem List   Diagnosis    Allergic rhinitis    Chronic obstructive lung disease    Degeneration of lumbar intervertebral disc    Depressive disorder    Gastroesophageal reflux disease    History of smoking    Hyperlipidemia    Hypertensive  disorder    Status post total left knee replacement    Chronic pain syndrome    Pleuritic pain    Ductal carcinoma in situ (DCIS) of right breast    Senile osteopenia    History of endometrial cancer    Invasive ductal carcinoma of breast, female, right    Long term (current) use of aromatase inhibitors       Objective:      Physical Exam  Vitals and nursing note reviewed.   Constitutional:       General: She is not in acute distress.     Appearance: Normal appearance. She is well-developed.   HENT:      Head: Normocephalic.      Nose:      Right Sinus: Maxillary sinus tenderness and frontal sinus tenderness present.      Left Sinus: Maxillary sinus tenderness and frontal sinus tenderness present.   Eyes:      Conjunctiva/sclera: Conjunctivae normal.      Pupils: Pupils are equal, round, and reactive to light.   Cardiovascular:      Rate and Rhythm: Normal rate and regular rhythm.      Heart sounds: Normal heart sounds.   Pulmonary:      Effort: Pulmonary effort is normal. No respiratory distress.      Breath sounds: Normal breath sounds.   Abdominal:      General: Bowel sounds are normal. There is no distension.      Palpations: Abdomen is soft.      Tenderness: There is no abdominal tenderness.   Musculoskeletal:      Cervical back: Normal range of motion and neck supple.   Skin:     General: Skin is warm and dry.      Findings: No rash.             Comments: 3 cm x 3 cm red area noted to right forearm. No drainage noted. Area is warm to touch.    Neurological:      Mental Status: She is alert and oriented to person, place, and time.   Psychiatric:         Behavior: Behavior normal.       Lab Results   Component Value Date    WBC 7.00 09/21/2022    HGB 12.0 09/21/2022    HCT 37.4 09/21/2022     09/21/2022    CHOL 203 (H) 05/10/2022    TRIG 101 05/10/2022    HDL 50 05/10/2022    ALT 15 09/21/2022    AST 15 09/21/2022     09/21/2022    K 3.7 09/21/2022     09/21/2022    CREATININE 0.8 09/21/2022  "   BUN 10 09/21/2022    CO2 28 09/21/2022    TSH 1.040 05/10/2022     The 10-year ASCVD risk score (Mihaela DK, et al., 2019) is: 18.2%    Values used to calculate the score:      Age: 67 years      Sex: Female      Is Non- : No      Diabetic: No      Tobacco smoker: Yes      Systolic Blood Pressure: 136 mmHg      Is BP treated: Yes      HDL Cholesterol: 50 mg/dL      Total Cholesterol: 203 mg/dL  Visit Vitals  /82 (BP Location: Left arm, Patient Position: Sitting, BP Method: Medium (Manual))   Pulse 80   Temp 98.3 °F (36.8 °C) (Temporal)   Ht 5' 4" (1.626 m)   Wt 79.3 kg (174 lb 13.2 oz)   SpO2 95%   BMI 30.01 kg/m²      Assessment:       1. Chronic obstructive pulmonary disease with acute exacerbation    2. Antibiotic-induced yeast infection    3. Primary hypertension    4. Sinusitis, unspecified chronicity, unspecified location    5. Insect bite of right forearm, initial encounter          Plan:       1. Chronic obstructive pulmonary disease with acute exacerbation  -     doxycycline (VIBRAMYCIN) 100 MG Cap; Take 1 capsule (100 mg total) by mouth 2 (two) times daily. for 10 days  Dispense: 20 capsule; Refill: 0  -     predniSONE (DELTASONE) 20 MG tablet; Take 2 tablets (40 mg total) by mouth once daily. for 5 days  Dispense: 10 tablet; Refill: 0    2. Antibiotic-induced yeast infection  -     fluconazole (DIFLUCAN) 150 MG Tab; Take 1 tablet (150 mg total) by mouth once daily.  Dispense: 2 tablet; Refill: 0    3. Primary hypertension   - Stable   - Continue current plan of care   - Follow up with PCP    4. Sinusitis, unspecified chronicity, unspecified location  -     doxycycline (VIBRAMYCIN) 100 MG Cap; Take 1 capsule (100 mg total) by mouth 2 (two) times daily. for 10 days  Dispense: 20 capsule; Refill: 0  -     predniSONE (DELTASONE) 20 MG tablet; Take 2 tablets (40 mg total) by mouth once daily. for 5 days  Dispense: 10 tablet; Refill: 0   - Nasal saline flushes twice daily  -     " fluticasone propionate (FLONASE) 50 mcg/actuation nasal spray; 1 spray (50 mcg total) by Each Nostril route once daily.  Dispense: 18.2 mL; Refill: 2    5. Insect bite of right forearm, initial encounter    - Monitor for s/s of infection   - Cleanse with antibacterial soap and water twice daily.     Follow up if symptoms worsen or fail to improve.      Future Appointments       Date Provider Specialty Appt Notes    1/10/2023 Jatin Gutierrez MD General Surgery f/u 3 months cont care lumpectomy 09/07/22    2/3/2023  Lab hemonc    2/7/2023 Aurash Khoobehi, MD Hematology and Oncology BreastCa/Labs/3mfu    3/9/2023 Elba Dill NP Family Medicine 6 month follow up COPD, HTN    4/14/2023  Infusion Therapy prolia [id number]

## 2022-12-05 ENCOUNTER — PATIENT OUTREACH (OUTPATIENT)
Dept: ADMINISTRATIVE | Facility: HOSPITAL | Age: 67
End: 2022-12-05
Payer: MEDICARE

## 2022-12-05 NOTE — PROGRESS NOTES
Health Maintenance Due   Topic Date Due    Pneumococcal Vaccines (Age 65+) (1 - PCV) Never done    Shingles Vaccine (1 of 2) Never done    Colorectal Cancer Screening  Never done

## 2022-12-20 ENCOUNTER — CLINICAL SUPPORT (OUTPATIENT)
Dept: RADIATION ONCOLOGY | Facility: CLINIC | Age: 67
End: 2022-12-20
Payer: MEDICARE

## 2022-12-20 DIAGNOSIS — C50.911 INVASIVE DUCTAL CARCINOMA OF BREAST, FEMALE, RIGHT: Primary | ICD-10-CM

## 2022-12-20 NOTE — PROGRESS NOTES
DIAGNOSIS: IA, [id number] g1 IDC UOQ R breast, ER+/DC(-)/HER2    TREATMENT  Completed adjuvant radiotherapy, 4050 cGy to the right breast followed by 1000 cGy boost to lumpectomy cavity on November 18, 2022.  Treatment was well tolerated suspected fatigue and radiation dermatitis.    Contacted patient today for 3 week checkup.  Patient reports energy has recovered.  She has some tenderness within right breast and continues to use moisturizer.    A/P  1.  Transition to moisturizer containing vitamin E with nightly massages right breast and range of motion exercises of right upper extremity for 6mos.  2.  Follow-up with Dr. Khoobehi; on AI  3.  Return to clinic in 6 months with MMG.  4.  COVID-19 precautions discussed.

## 2022-12-21 DIAGNOSIS — C50.411 MALIGNANT NEOPLASM OF UPPER-OUTER QUADRANT OF RIGHT BREAST IN FEMALE, ESTROGEN RECEPTOR POSITIVE: Primary | ICD-10-CM

## 2022-12-21 DIAGNOSIS — Z17.0 MALIGNANT NEOPLASM OF UPPER-OUTER QUADRANT OF RIGHT BREAST IN FEMALE, ESTROGEN RECEPTOR POSITIVE: Primary | ICD-10-CM

## 2023-01-04 ENCOUNTER — TELEPHONE (OUTPATIENT)
Dept: HEMATOLOGY/ONCOLOGY | Facility: CLINIC | Age: 68
End: 2023-01-04
Payer: MEDICARE

## 2023-01-06 ENCOUNTER — TELEPHONE (OUTPATIENT)
Dept: HEMATOLOGY/ONCOLOGY | Facility: CLINIC | Age: 68
End: 2023-01-06
Payer: MEDICARE

## 2023-01-06 ENCOUNTER — OFFICE VISIT (OUTPATIENT)
Dept: HEMATOLOGY/ONCOLOGY | Facility: CLINIC | Age: 68
End: 2023-01-06
Payer: MEDICARE

## 2023-01-06 ENCOUNTER — LAB VISIT (OUTPATIENT)
Dept: LAB | Facility: HOSPITAL | Age: 68
End: 2023-01-06
Attending: NURSE PRACTITIONER
Payer: MEDICARE

## 2023-01-06 VITALS
HEART RATE: 81 BPM | RESPIRATION RATE: 18 BRPM | DIASTOLIC BLOOD PRESSURE: 65 MMHG | WEIGHT: 177.31 LBS | HEIGHT: 64 IN | OXYGEN SATURATION: 94 % | BODY MASS INDEX: 30.27 KG/M2 | TEMPERATURE: 98 F | SYSTOLIC BLOOD PRESSURE: 136 MMHG

## 2023-01-06 DIAGNOSIS — C50.011 MALIGNANT NEOPLASM OF NIPPLE OF RIGHT BREAST IN FEMALE, UNSPECIFIED ESTROGEN RECEPTOR STATUS: ICD-10-CM

## 2023-01-06 DIAGNOSIS — C50.011 MALIGNANT NEOPLASM OF NIPPLE OF RIGHT BREAST IN FEMALE, UNSPECIFIED ESTROGEN RECEPTOR STATUS: Primary | ICD-10-CM

## 2023-01-06 LAB
ALBUMIN SERPL BCP-MCNC: 3.6 G/DL (ref 3.5–5.2)
ALP SERPL-CCNC: 54 U/L (ref 55–135)
ALT SERPL W/O P-5'-P-CCNC: 16 U/L (ref 10–44)
ANION GAP SERPL CALC-SCNC: 7 MMOL/L (ref 8–16)
AST SERPL-CCNC: 14 U/L (ref 10–40)
BASOPHILS # BLD AUTO: 0.03 K/UL (ref 0–0.2)
BASOPHILS NFR BLD: 0.5 % (ref 0–1.9)
BILIRUB SERPL-MCNC: 0.3 MG/DL (ref 0.1–1)
BUN SERPL-MCNC: 16 MG/DL (ref 8–23)
CALCIUM SERPL-MCNC: 10 MG/DL (ref 8.7–10.5)
CHLORIDE SERPL-SCNC: 101 MMOL/L (ref 95–110)
CO2 SERPL-SCNC: 31 MMOL/L (ref 23–29)
CREAT SERPL-MCNC: 0.9 MG/DL (ref 0.5–1.4)
DIFFERENTIAL METHOD: ABNORMAL
EOSINOPHIL # BLD AUTO: 0.2 K/UL (ref 0–0.5)
EOSINOPHIL NFR BLD: 2.5 % (ref 0–8)
ERYTHROCYTE [DISTWIDTH] IN BLOOD BY AUTOMATED COUNT: 13.3 % (ref 11.5–14.5)
EST. GFR  (NO RACE VARIABLE): >60 ML/MIN/1.73 M^2
GLUCOSE SERPL-MCNC: 96 MG/DL (ref 70–110)
HCT VFR BLD AUTO: 39.1 % (ref 37–48.5)
HGB BLD-MCNC: 12.7 G/DL (ref 12–16)
IMM GRANULOCYTES # BLD AUTO: 0.02 K/UL (ref 0–0.04)
IMM GRANULOCYTES NFR BLD AUTO: 0.3 % (ref 0–0.5)
LYMPHOCYTES # BLD AUTO: 1.3 K/UL (ref 1–4.8)
LYMPHOCYTES NFR BLD: 20.9 % (ref 18–48)
MCH RBC QN AUTO: 29.1 PG (ref 27–31)
MCHC RBC AUTO-ENTMCNC: 32.5 G/DL (ref 32–36)
MCV RBC AUTO: 90 FL (ref 82–98)
MONOCYTES # BLD AUTO: 0.5 K/UL (ref 0.3–1)
MONOCYTES NFR BLD: 8 % (ref 4–15)
NEUTROPHILS # BLD AUTO: 4.3 K/UL (ref 1.8–7.7)
NEUTROPHILS NFR BLD: 67.8 % (ref 38–73)
NRBC BLD-RTO: 0 /100 WBC
PLATELET # BLD AUTO: 301 K/UL (ref 150–450)
PMV BLD AUTO: 8.7 FL (ref 9.2–12.9)
POTASSIUM SERPL-SCNC: 3.9 MMOL/L (ref 3.5–5.1)
PROT SERPL-MCNC: 6.3 G/DL (ref 6–8.4)
RBC # BLD AUTO: 4.37 M/UL (ref 4–5.4)
SODIUM SERPL-SCNC: 139 MMOL/L (ref 136–145)
WBC # BLD AUTO: 6.28 K/UL (ref 3.9–12.7)

## 2023-01-06 PROCEDURE — 80053 COMPREHEN METABOLIC PANEL: CPT | Performed by: NURSE PRACTITIONER

## 2023-01-06 PROCEDURE — 99214 OFFICE O/P EST MOD 30 MIN: CPT | Mod: S$PBB,,, | Performed by: NURSE PRACTITIONER

## 2023-01-06 PROCEDURE — 99999 PR PBB SHADOW E&M-EST. PATIENT-LVL III: ICD-10-PCS | Mod: PBBFAC,,, | Performed by: NURSE PRACTITIONER

## 2023-01-06 PROCEDURE — 99999 PR PBB SHADOW E&M-EST. PATIENT-LVL III: CPT | Mod: PBBFAC,,, | Performed by: NURSE PRACTITIONER

## 2023-01-06 PROCEDURE — 85025 COMPLETE CBC W/AUTO DIFF WBC: CPT | Performed by: NURSE PRACTITIONER

## 2023-01-06 PROCEDURE — 99213 OFFICE O/P EST LOW 20 MIN: CPT | Mod: PBBFAC | Performed by: NURSE PRACTITIONER

## 2023-01-06 PROCEDURE — 99214 PR OFFICE/OUTPT VISIT, EST, LEVL IV, 30-39 MIN: ICD-10-PCS | Mod: S$PBB,,, | Performed by: NURSE PRACTITIONER

## 2023-01-06 NOTE — PROGRESS NOTES
History of present illness:  The patient is a 67-year-old white female who had been in her usual state of health, had screening mammography, and was found to have microcalcifications within the right breast.  These have been biopsied in reveal high-grade DCIS which is 2 mm in greatest dimension and associated with typical ductal hyperplasia, papilloma, and non-necrotizing granulomata.  Patient's tumor is strongly estrogen positive/progesterone negative.  Patient has a good deal of bruising and tenderness following biopsy.  Patient reports diagnosis of endometrial carcinoma in her 20s which was managed hysterectomy.    Patient is status post lumpectomy and right axillary lymph node dissection.  Patient returns to clinic to review pathology and interval lab as well as to plan care.  Postoperatively, the patient is doing well.  She has anticipated postoperative pain.  No difficulties with lymphedema.  No difficulties with wound healing.  Patient denies decreased range of motion of the right upper extremity.    Patient returns to clinic to review results of Oncotype DX analysis and finalize plan of care.  Unfortunately, there was not enough viable tumor in the submitted specimen to perform the analysis.  As patient had no clear indication for postoperative adjuvant chemotherapy, she was placed on Arimidex, calcium supplementation with vitamin-D, and biannual Prolia for prevention of aromatase inhibitor induced bone loss.  Patient returns to clinic earlier than scheduled 3 month post therapy surveillance examination.  Patient is having difficulty with severe/frequent hot flashes.  She wishes to discuss medical therapy for this.    1/6/2023:  She is complaining today of swelling in the right breast.    Review of Systems   Constitutional: Negative.    HENT: Negative.     Eyes: Negative.    Respiratory: Negative.     Cardiovascular: Negative.    Gastrointestinal: Negative.    Genitourinary: Negative.    Musculoskeletal:  Negative.    Skin:         Swelling of right breast   Neurological: Negative.    Endo/Heme/Allergies: Negative.    Psychiatric/Behavioral: Negative.        Past medical history:   1. Allergic rhinitis  2.  Chronic obstructive pulmonary disease   3. Degenerative disc disease of the lumbar spine  4.  Depression  5.  Gastroesophageal reflux disease  6. Tobacco abuse  7.  Hyperlipidemia  8.  Hypertension  9.  Status post total left knee arthroplasty  10.  Chronic pain syndrome   11.  Migraine headache  12.  Glaucoma   14. Osteoporosis  15. History of endometrial carcinoma-status post hysterectomy  16.  Status post lumbar spine surgery  17.  Status post cholecystectomy    Allergies: None known     Medications:    Current Outpatient Medications:     albuterol (PROVENTIL/VENTOLIN HFA) 90 mcg/actuation inhaler, Inhale 2 puffs into the lungs every 4 (four) hours as needed for Wheezing or Shortness of Breath., Disp: 18 g, Rfl: 5    albuterol-ipratropium (DUO-NEB) 2.5 mg-0.5 mg/3 mL nebulizer solution, INHALE CONTENTS OF 1 VIAL BY MOUTH WITH NEBULIZER EVERY 6 HOURS WHILE AWAKE, Disp: 360 mL, Rfl: 5    anastrozole (ARIMIDEX) 1 mg Tab, Take 1 tablet (1 mg total) by mouth once daily., Disp: 90 tablet, Rfl: 3    aspirin (ECOTRIN) 81 MG EC tablet, Take 1 tablet by mouth once daily. Pt back on asa, Disp: , Rfl:     aspirin-acetaminophen-caffeine 250-250-65 mg (HEADACHE RELIEF, ASA-ACET-CAF,) 250-250-65 mg per tablet, Take 1 tablet by mouth every 6 to 8 hours as needed., Disp: , Rfl:     budesonide-formoterol 160-4.5 mcg (SYMBICORT) 160-4.5 mcg/actuation HFAA, Inhale 2 puffs into the lungs every 12 (twelve) hours. Controller, Disp: 10.2 g, Rfl: 5    buPROPion (WELLBUTRIN) 100 MG tablet, Take 1 tablet (100 mg total) by mouth 3 (three) times daily., Disp: 270 tablet, Rfl: 1    calcium carbonate-vitamin D3 (CALCIUM 600 + D,3,) 600-125 mg-unit Tab, Take 2 tablets by mouth once daily., Disp: 180 tablet, Rfl: 3    celecoxib (CELEBREX) 200  MG capsule, Take 1 capsule (200 mg total) by mouth once daily., Disp: 90 capsule, Rfl: 1    cetirizine (ZYRTEC) 10 MG tablet, Take 1 tablet (10 mg total) by mouth once daily., Disp: 90 tablet, Rfl: 5    conjugated estrogens (PREMARIN) vaginal cream, Place 0.5 g vaginally twice a week., Disp: 30 g, Rfl: 5    cyclobenzaprine (FLEXERIL) 10 MG tablet, Take 1 tablet (10 mg total) by mouth nightly., Disp: 90 tablet, Rfl: 3    doxepin (SINEQUAN) 25 MG capsule, Take 2 capsules (50 mg total) by mouth nightly., Disp: 180 capsule, Rfl: 3    fluconazole (DIFLUCAN) 150 MG Tab, Take 1 tablet (150 mg total) by mouth once daily., Disp: 2 tablet, Rfl: 0    fluticasone propionate (FLONASE) 50 mcg/actuation nasal spray, 1 spray (50 mcg total) by Each Nostril route once daily., Disp: 18.2 mL, Rfl: 2    hydroCHLOROthiazide (HYDRODIURIL) 25 MG tablet, Take 1 tablet (25 mg total) by mouth once daily., Disp: 90 tablet, Rfl: 3    HYDROcodone-acetaminophen (NORCO)  mg per tablet, Take 1 tablet by mouth every 8 (eight) hours as needed for Pain. (Patient not taking: Reported on 11/15/2022), Disp: 90 tablet, Rfl: 0    montelukast (SINGULAIR) 10 mg tablet, Take 1 tablet (10 mg total) by mouth once daily., Disp: 90 tablet, Rfl: 3    nebivoloL (BYSTOLIC) 10 MG Tab, TAKE 1 TABLET BY MOUTH ONCE DAILY, Disp: 90 tablet, Rfl: 3    ondansetron (ZOFRAN) 4 MG tablet, Take 1 tablet (4 mg total) by mouth every 8 (eight) hours as needed for Nausea., Disp: 15 tablet, Rfl: 2    oxyCODONE-acetaminophen (PERCOCET) 7.5-325 mg per tablet, Take 1 tablet by mouth 3 (three) times daily as needed., Disp: , Rfl:     pantoprazole (PROTONIX) 40 MG tablet, Take 1 tablet (40 mg total) by mouth once daily., Disp: 90 tablet, Rfl: 3    pseudoephedrine (SUDAFED) 120 mg 12 hr tablet, Take 120 mg by mouth every 12 (twelve) hours., Disp: , Rfl:     sulfamethoxazole-trimethoprim 800-160mg (BACTRIM DS) 800-160 mg Tab, Take 1 tablet by mouth 2 (two) times daily., Disp: , Rfl:      travoprost (TRAVATAN Z) 0.004 % ophthalmic solution, INT 1 GTT IN OU D HS, Disp: , Rfl:     venlafaxine (EFFEXOR-XR) 37.5 MG 24 hr capsule, Take 1 capsule (37.5 mg total) by mouth once daily. (Patient not taking: Reported on 12/2/2022), Disp: 90 capsule, Rfl: 3     Family/social history:  Patient smokes 1/2 pack cigarettes daily and has done so for 25 years.    Social alcohol use.    No family history of breast carcinoma.  Mother suffered from hypertension and diabetes mellitus.    Father suffered from hypertension.      Physical examination:   Well-developed, well-nourished, white female, no acute distress, who has a weight of 178 lb (decreased by 1.5 lb).  VITAL SIGNS: Documented  and reviewed this visit.  HEENT: Normocephalic, atraumatic. Oral mucosa pink and moist. Lips without lesions. Tongue midline. Oropharynx clear. Nonicteric sclerae.   NECK: Supple, no adenopathy. No carotid bruits, thyromegaly or thyroid nodule.   HEART: Regular rate and rhythm without murmur, gallop or rub.   LUNGS: Clear to auscultation bilaterally. Normal respiratory effort.   ABDOMEN: Soft, nontender, nondistended with positive normoactive bowel sounds, no hepatosplenomegaly.   EXTREMITIES: No cyanosis, clubbing or edema. Distal pulses are intact.   AXILLAE AND GROIN: No palpable pathologic lymphadenopathy is appreciated.   SKIN: Intact/turgor normal   NEUROLOGIC: Cranial nerves II-XII grossly intact. Motor: Good muscle bulk and tone. Strength/sensory 5/5 throughout. Gait stable.   BREASTS:  Right breast lumpectomy scar is well approximated and healing.  Right breast is enlarged with no palpable masses and no nipple discharge.  Left breast is within normal limits    BRCA1 and 2 analysis:  Negative.       Impression/plan:   1. Stage I (T1a, N0, M0) infiltrating ductal carcinoma of the right breast which is ER positive, FL negative, and HER2 Roshan positive by IHC/associated with DCIS.  -continue Arimidex 1 mg p.o. daily    2. Senile  osteopenia.  -continue Caltrate plus D  -Prolia 60 mg every 6 months    3. Personal history of endometrial carcinoma.  -continue to monitor    4. Chronic/current use of an aromatase inhibitor.  -bone density every 2 years    Enlarged right breast:   -obvious swelling of right breast.  No nipple discharge or palpable masses noted on exam  -arrange unilateral right mammogram

## 2023-01-06 NOTE — TELEPHONE ENCOUNTER
Called to schedule mammo with the pt. Appt for lab and f/u also scheduled and verified with the pt

## 2023-01-09 LAB — CANCER AG27-29 SERPL-ACNC: 20.8 U/ML

## 2023-01-10 ENCOUNTER — OFFICE VISIT (OUTPATIENT)
Dept: SURGERY | Facility: CLINIC | Age: 68
End: 2023-01-10
Payer: MEDICARE

## 2023-01-10 ENCOUNTER — HOSPITAL ENCOUNTER (OUTPATIENT)
Dept: RADIOLOGY | Facility: HOSPITAL | Age: 68
Discharge: HOME | End: 2023-01-10
Attending: FAMILY MEDICINE
Payer: MEDICARE

## 2023-01-10 VITALS
WEIGHT: 178 LBS | SYSTOLIC BLOOD PRESSURE: 140 MMHG | OXYGEN SATURATION: 97 % | BODY MASS INDEX: 30.39 KG/M2 | DIASTOLIC BLOOD PRESSURE: 72 MMHG | RESPIRATION RATE: 16 BRPM | HEART RATE: 80 BPM | HEIGHT: 64 IN

## 2023-01-10 DIAGNOSIS — E72.20: ICD-10-CM

## 2023-01-10 DIAGNOSIS — B37.9 YEAST INFECTION: ICD-10-CM

## 2023-01-10 DIAGNOSIS — M53.3 SI (SACROILIAC) JOINT DYSFUNCTION: ICD-10-CM

## 2023-01-10 DIAGNOSIS — N61.0 MASTITIS: Primary | ICD-10-CM

## 2023-01-10 DIAGNOSIS — M47.816 LUMBAR SPONDYLOSIS: ICD-10-CM

## 2023-01-10 PROCEDURE — 99215 OFFICE O/P EST HI 40 MIN: CPT | Mod: PBBFAC,25 | Performed by: SURGERY

## 2023-01-10 PROCEDURE — 99214 PR OFFICE/OUTPT VISIT, EST, LEVL IV, 30-39 MIN: ICD-10-PCS | Mod: S$PBB,,, | Performed by: SURGERY

## 2023-01-10 PROCEDURE — 72148 MRI LUMBAR SPINE W/O DYE: CPT | Mod: TC

## 2023-01-10 PROCEDURE — 99214 OFFICE O/P EST MOD 30 MIN: CPT | Mod: S$PBB,,, | Performed by: SURGERY

## 2023-01-10 PROCEDURE — 72148 MRI LUMBAR SPINE W/O DYE: CPT | Mod: 26,,, | Performed by: RADIOLOGY

## 2023-01-10 PROCEDURE — 99999 PR PBB SHADOW E&M-EST. PATIENT-LVL V: CPT | Mod: PBBFAC,,, | Performed by: SURGERY

## 2023-01-10 PROCEDURE — 99999 PR PBB SHADOW E&M-EST. PATIENT-LVL V: ICD-10-PCS | Mod: PBBFAC,,, | Performed by: SURGERY

## 2023-01-10 PROCEDURE — 72148 MRI LUMBAR SPINE WITHOUT CONTRAST: ICD-10-PCS | Mod: 26,,, | Performed by: RADIOLOGY

## 2023-01-10 RX ORDER — FLUCONAZOLE 150 MG/1
150 TABLET ORAL DAILY
Qty: 3 TABLET | Refills: 0 | Status: SHIPPED | OUTPATIENT
Start: 2023-01-10 | End: 2023-01-13

## 2023-01-10 RX ORDER — METHYLPREDNISOLONE 4 MG/1
TABLET ORAL
Qty: 21 EACH | Refills: 0 | Status: SHIPPED | OUTPATIENT
Start: 2023-01-10 | End: 2023-02-15 | Stop reason: ALTCHOICE

## 2023-01-10 RX ORDER — AMOXICILLIN AND CLAVULANATE POTASSIUM 875; 125 MG/1; MG/1
1 TABLET, FILM COATED ORAL 2 TIMES DAILY
Qty: 28 TABLET | Refills: 0 | Status: SHIPPED | OUTPATIENT
Start: 2023-01-10 | End: 2023-02-15 | Stop reason: ALTCHOICE

## 2023-01-10 NOTE — PROGRESS NOTES
"[facility] [place] General Surgery  Follow-up    Subjective:     Chief Complaint:  A follow-up right breast swelling.    HPI:  Laxmi Chand is a 67 y.o. female history of partial mastectomy for breast cancer presents today for evaluation of right breast swelling and erythema.  Patient states she is episodic issues of right breast swelling and erythema.  She since last visit has done well.  Swelling and erythema most prominent inferior and right lateral the nipple.  No fevers chills.  She presents today for evaluation.    Review of Systems   Constitutional:  Negative for activity change, appetite change, chills and fever.   HENT:  Negative for congestion, dental problem and ear discharge.    Eyes:  Negative for discharge and itching.   Respiratory:  Negative for apnea, choking and chest tightness.    Cardiovascular:  Negative for chest pain and leg swelling.   Gastrointestinal:  Negative for abdominal distention, abdominal pain, anal bleeding, constipation, diarrhea and nausea.   Endocrine: Negative for cold intolerance and heat intolerance.   Genitourinary:  Negative for difficulty urinating and dyspareunia.   Musculoskeletal:  Negative for arthralgias and back pain.   Skin:  Negative for color change and pallor.   Neurological:  Negative for dizziness and facial asymmetry.   Hematological:  Negative for adenopathy. Does not bruise/bleed easily.   Psychiatric/Behavioral:  Negative for agitation and behavioral problems.      Objective:      Vitals:    01/10/23 1018   BP: (!) 140/72   Pulse: 80   Resp: 16   SpO2: 97%   Weight: 80.7 kg (178 lb)   Height: 5' 4" (1.626 m)     Physical Exam  Constitutional:       General: She is not in acute distress.     Appearance: She is well-developed.   HENT:      Head: Normocephalic and atraumatic.   Eyes:      Pupils: Pupils are equal, round, and reactive to light.   Neck:      Thyroid: No thyromegaly.   Cardiovascular:      Rate and Rhythm: Normal rate and regular rhythm.   Pulmonary: "      Effort: Pulmonary effort is normal.      Breath sounds: Normal breath sounds.   Chest:          Comments: My nurse Tiffany was present during evaluation.  Right breast mild swelling.  No significant erythema.  No fluid collections palpated.  Abdominal:      General: Bowel sounds are normal. There is no distension.      Palpations: Abdomen is soft.      Tenderness: There is no abdominal tenderness.   Musculoskeletal:         General: No deformity. Normal range of motion.      Cervical back: Normal range of motion and neck supple.   Skin:     General: Skin is warm.      Capillary Refill: Capillary refill takes less than 2 seconds.      Findings: No erythema.   Neurological:      Mental Status: She is alert and oriented to person, place, and time.      Cranial Nerves: No cranial nerve deficit.   Psychiatric:         Behavior: Behavior normal.        Assessment:       1. Mastitis    2. Yeast infection          Plan:   Mastitis  -     amoxicillin-clavulanate 875-125mg (AUGMENTIN) 875-125 mg per tablet; Take 1 tablet by mouth 2 (two) times daily. for 14 days  Dispense: 28 tablet; Refill: 0  -     methylPREDNISolone (MEDROL DOSEPACK) 4 mg tablet; use as directed  Dispense: 21 each; Refill: 0  -     fluconazole (DIFLUCAN) 150 MG Tab; Take 1 tablet (150 mg total) by mouth once daily. for 3 days  Dispense: 3 tablet; Refill: 0    Yeast infection  -     amoxicillin-clavulanate 875-125mg (AUGMENTIN) 875-125 mg per tablet; Take 1 tablet by mouth 2 (two) times daily. for 14 days  Dispense: 28 tablet; Refill: 0  -     methylPREDNISolone (MEDROL DOSEPACK) 4 mg tablet; use as directed  Dispense: 21 each; Refill: 0  -     fluconazole (DIFLUCAN) 150 MG Tab; Take 1 tablet (150 mg total) by mouth once daily. for 3 days  Dispense: 3 tablet; Refill: 0        Medical Decision Making/Counseling:  Likely mastitis, mild.  Recommendation be antibiotics.  Medrol Dosepak as well.  Additionally fluconazole for yeast infection.  Follow-up  surgery clinic 2-3 weeks.  Consider ultrasound if no improvement.    Follow up:  2-3 weeks.    Patient instructed that best way to communicate with my office staff is for patient to get on the [facility] patient portal to expedite communication and communication issues that may occur.  Patient was given instructions on how to get on the portal.  I encouraged patient to obtain portal access as well.  Ultimately it is up to the patient to obtain access.  Patient voiced understanding.

## 2023-01-19 ENCOUNTER — TELEPHONE (OUTPATIENT)
Dept: FAMILY MEDICINE | Facility: CLINIC | Age: 68
End: 2023-01-19
Payer: MEDICARE

## 2023-01-19 NOTE — TELEPHONE ENCOUNTER
Called and spoke to pt about setting up AWV  Appt set up for 2/25/2023 @8:00am w/Ms You at [facility]

## 2023-02-02 ENCOUNTER — OFFICE VISIT (OUTPATIENT)
Dept: SURGERY | Facility: CLINIC | Age: 68
End: 2023-02-02
Payer: MEDICARE

## 2023-02-02 VITALS
HEART RATE: 82 BPM | WEIGHT: 179 LBS | BODY MASS INDEX: 30.56 KG/M2 | HEIGHT: 64 IN | SYSTOLIC BLOOD PRESSURE: 160 MMHG | OXYGEN SATURATION: 95 % | DIASTOLIC BLOOD PRESSURE: 90 MMHG

## 2023-02-02 DIAGNOSIS — N61.0 MASTITIS: Primary | ICD-10-CM

## 2023-02-02 PROCEDURE — 99214 OFFICE O/P EST MOD 30 MIN: CPT | Mod: S$PBB,,, | Performed by: SURGERY

## 2023-02-02 PROCEDURE — 99999 PR PBB SHADOW E&M-EST. PATIENT-LVL V: CPT | Mod: PBBFAC,,, | Performed by: SURGERY

## 2023-02-02 PROCEDURE — 99214 PR OFFICE/OUTPT VISIT, EST, LEVL IV, 30-39 MIN: ICD-10-PCS | Mod: S$PBB,,, | Performed by: SURGERY

## 2023-02-02 PROCEDURE — 99999 PR PBB SHADOW E&M-EST. PATIENT-LVL V: ICD-10-PCS | Mod: PBBFAC,,, | Performed by: SURGERY

## 2023-02-02 PROCEDURE — 99215 OFFICE O/P EST HI 40 MIN: CPT | Mod: PBBFAC | Performed by: SURGERY

## 2023-02-02 RX ORDER — BIMATOPROST 0.1 MG/ML
SOLUTION/ DROPS OPHTHALMIC
COMMUNITY
Start: 2023-01-23

## 2023-02-02 NOTE — PROGRESS NOTES
"[facility] General Surgery  Follow-up    Subjective:     Chief Complaint:  Follow-up mastitis right breast.    HPI:  Laxmi Chand is a 67 y.o. female presents today for follow-up examination.  Patient previously underwent right breast lumpectomy with lymph node resection for breast cancer.  Subsequently completed radiation therapy before Thanksgiving.  She in the last month has developed right breast edema with erythema consistent with mastitis.  She underwent a 2 to three-week course of antibiotics.  No improvement.  Scheduled for mammogram ultrasound tomorrow.  She presents today for follow-up evaluation.    Review of Systems   Constitutional:  Negative for activity change, appetite change, chills and fever.   HENT:  Negative for congestion, dental problem and ear discharge.    Eyes:  Negative for discharge and itching.   Respiratory:  Negative for apnea, choking and chest tightness.    Cardiovascular:  Negative for chest pain and leg swelling.   Gastrointestinal:  Negative for abdominal distention, abdominal pain, anal bleeding, constipation, diarrhea and nausea.   Endocrine: Negative for cold intolerance and heat intolerance.   Genitourinary:  Negative for difficulty urinating and dyspareunia.   Musculoskeletal:  Negative for arthralgias and back pain.   Skin:  Negative for color change and pallor.   Neurological:  Negative for dizziness and facial asymmetry.   Hematological:  Negative for adenopathy. Does not bruise/bleed easily.   Psychiatric/Behavioral:  Negative for agitation and behavioral problems.      Objective:      Vitals:    02/02/23 0842   BP: (!) 160/90   Pulse: 82   SpO2: 95%   Weight: 81.2 kg (179 lb)   Height: 5' 4" (1.626 m)     Physical Exam  Constitutional:       General: She is not in acute distress.     Appearance: She is well-developed.   HENT:      Head: Normocephalic and atraumatic.   Eyes:      Pupils: Pupils are equal, round, and reactive to light.   Neck:      Thyroid: No " thyromegaly.   Cardiovascular:      Rate and Rhythm: Normal rate and regular rhythm.   Pulmonary:      Effort: Pulmonary effort is normal.      Breath sounds: Normal breath sounds.   Chest:          Comments: Previous surgical scar looks good.  Right breast with edema and mild erythema entire breast.  Abdominal:      General: Bowel sounds are normal. There is no distension.      Palpations: Abdomen is soft.      Tenderness: There is no abdominal tenderness.   Musculoskeletal:         General: No deformity. Normal range of motion.      Cervical back: Normal range of motion and neck supple.   Skin:     General: Skin is warm.      Capillary Refill: Capillary refill takes less than 2 seconds.      Findings: No erythema.   Neurological:      Mental Status: She is alert and oriented to person, place, and time.      Cranial Nerves: No cranial nerve deficit.   Psychiatric:         Behavior: Behavior normal.        Assessment:       1. Mastitis          Plan:   Mastitis        Medical Decision Making/Counseling:  Mastitis.  Mammogram ultrasound indicated at this point.  Refractory to antibiotic therapy.  As air fluid collection causing this?  Is this radiation induced changes?  Will re-evaluate patient once mammogram and ultrasound complete.    Follow up:  1 week    Patient instructed that best way to communicate with my office staff is for patient to get on the [facility] epic patient portal to expedite communication and communication issues that may occur.  Patient was given instructions on how to get on the portal.  I encouraged patient to obtain portal access as well.  Ultimately it is up to the patient to obtain access.  Patient voiced understanding.

## 2023-02-03 ENCOUNTER — HOSPITAL ENCOUNTER (OUTPATIENT)
Dept: RADIOLOGY | Facility: HOSPITAL | Age: 68
Discharge: HOME | End: 2023-02-03
Attending: RADIOLOGY
Payer: MEDICARE

## 2023-02-03 DIAGNOSIS — C50.411 MALIGNANT NEOPLASM OF UPPER-OUTER QUADRANT OF RIGHT BREAST IN FEMALE, ESTROGEN RECEPTOR POSITIVE: ICD-10-CM

## 2023-02-03 DIAGNOSIS — Z17.0 MALIGNANT NEOPLASM OF UPPER-OUTER QUADRANT OF RIGHT BREAST IN FEMALE, ESTROGEN RECEPTOR POSITIVE: ICD-10-CM

## 2023-02-03 PROCEDURE — 76642 US BREAST RIGHT LIMITED: ICD-10-PCS | Mod: 26,RT,, | Performed by: RADIOLOGY

## 2023-02-03 PROCEDURE — 77062 BREAST TOMOSYNTHESIS BI: CPT | Mod: 26,,, | Performed by: RADIOLOGY

## 2023-02-03 PROCEDURE — 77066 DX MAMMO INCL CAD BI: CPT | Mod: 26,,, | Performed by: RADIOLOGY

## 2023-02-03 PROCEDURE — 77066 DX MAMMO INCL CAD BI: CPT | Mod: TC

## 2023-02-03 PROCEDURE — 77066 MAMMO DIGITAL DIAGNOSTIC BILAT WITH TOMO: ICD-10-PCS | Mod: 26,,, | Performed by: RADIOLOGY

## 2023-02-03 PROCEDURE — 77062 MAMMO DIGITAL DIAGNOSTIC BILAT WITH TOMO: ICD-10-PCS | Mod: 26,,, | Performed by: RADIOLOGY

## 2023-02-03 PROCEDURE — 76642 ULTRASOUND BREAST LIMITED: CPT | Mod: TC,RT

## 2023-02-03 PROCEDURE — 76642 ULTRASOUND BREAST LIMITED: CPT | Mod: 26,RT,, | Performed by: RADIOLOGY

## 2023-02-07 ENCOUNTER — OFFICE VISIT (OUTPATIENT)
Dept: HEMATOLOGY/ONCOLOGY | Facility: CLINIC | Age: 68
End: 2023-02-07
Payer: MEDICARE

## 2023-02-07 VITALS
DIASTOLIC BLOOD PRESSURE: 59 MMHG | OXYGEN SATURATION: 98 % | SYSTOLIC BLOOD PRESSURE: 136 MMHG | BODY MASS INDEX: 30.39 KG/M2 | HEART RATE: 81 BPM | WEIGHT: 178 LBS | HEIGHT: 64 IN | TEMPERATURE: 99 F

## 2023-02-07 DIAGNOSIS — D05.11 DUCTAL CARCINOMA IN SITU (DCIS) OF RIGHT BREAST: ICD-10-CM

## 2023-02-07 DIAGNOSIS — M85.80 SENILE OSTEOPENIA: ICD-10-CM

## 2023-02-07 DIAGNOSIS — C50.011 MALIGNANT NEOPLASM OF NIPPLE OF RIGHT BREAST IN FEMALE, UNSPECIFIED ESTROGEN RECEPTOR STATUS: Primary | ICD-10-CM

## 2023-02-07 PROCEDURE — 99999 PR PBB SHADOW E&M-EST. PATIENT-LVL V: CPT | Mod: PBBFAC,,, | Performed by: INTERNAL MEDICINE

## 2023-02-07 PROCEDURE — 99999 PR PBB SHADOW E&M-EST. PATIENT-LVL V: ICD-10-PCS | Mod: PBBFAC,,, | Performed by: INTERNAL MEDICINE

## 2023-02-07 PROCEDURE — 99214 PR OFFICE/OUTPT VISIT, EST, LEVL IV, 30-39 MIN: ICD-10-PCS | Mod: S$PBB,,, | Performed by: INTERNAL MEDICINE

## 2023-02-07 PROCEDURE — 99214 OFFICE O/P EST MOD 30 MIN: CPT | Mod: S$PBB,,, | Performed by: INTERNAL MEDICINE

## 2023-02-07 PROCEDURE — 99215 OFFICE O/P EST HI 40 MIN: CPT | Mod: PBBFAC | Performed by: INTERNAL MEDICINE

## 2023-02-07 NOTE — PROGRESS NOTES
Service Date:  2/7/23    Chief Complaint: Breast Cancer    Laxmi Chand is a 67 y.o. female with right breast invasive ductal carcinoma only 2 mm in size and right breast DCIS.  S/p lumpectomy on 9/7/22.  Currently on aromatase inhibitor therapy.  Tolerating it okay.  For follow-up.    Review of Systems   Constitutional:  Positive for fatigue.   HENT: Negative.     Eyes: Negative.    Respiratory: Negative.     Cardiovascular: Negative.    Gastrointestinal: Negative.    Endocrine: Negative.    Genitourinary: Negative.    Musculoskeletal: Negative.    Integumentary:  Negative.   Neurological: Negative.    Hematological: Negative.    Psychiatric/Behavioral: Negative.        Current Outpatient Medications   Medication Instructions    albuterol (PROVENTIL/VENTOLIN HFA) 90 mcg/actuation inhaler 2 puffs, Inhalation, Every 4 hours PRN    albuterol-ipratropium (DUO-NEB) 2.5 mg-0.5 mg/3 mL nebulizer solution INHALE CONTENTS OF 1 VIAL BY MOUTH WITH NEBULIZER EVERY 6 HOURS WHILE AWAKE    anastrozole (ARIMIDEX) 1 mg, Oral, Daily    aspirin (ECOTRIN) 81 MG EC tablet 1 tablet, Oral, Daily, Pt back on asa    aspirin-acetaminophen-caffeine 250-250-65 mg (HEADACHE RELIEF, ASA-ACET-CAF,) 250-250-65 mg per tablet 1 tablet, Oral, Every 6-8 hours PRN    budesonide-formoterol 160-4.5 mcg (SYMBICORT) 160-4.5 mcg/actuation HFAA 2 puffs, Inhalation, Every 12 hours, Controller    buPROPion (WELLBUTRIN) 100 mg, Oral, 3 times daily    calcium carbonate-vitamin D3 (CALCIUM 600 + D,3,) 600-125 mg-unit Tab 2 tablets, Oral, Daily    celecoxib (CELEBREX) 200 mg, Oral, Daily    cetirizine (ZYRTEC) 10 mg, Oral, Daily    conjugated estrogens (PREMARIN) 0.5 g, Vaginal, Twice weekly    cyclobenzaprine (FLEXERIL) 10 mg, Oral, Nightly    doxepin (SINEQUAN) 50 mg, Oral, Nightly    fluconazole (DIFLUCAN) 150 mg, Oral, Daily    fluticasone propionate (FLONASE) 50 mcg, Each Nostril, Daily    hydroCHLOROthiazide (HYDRODIURIL) 25 mg, Oral, Daily     HYDROcodone-acetaminophen (NORCO)  mg per tablet 1 tablet, Oral, Every 8 hours PRN    LUMIGAN 0.01 % Drop Both Eyes    montelukast (SINGULAIR) 10 mg, Oral, Daily    nebivoloL (BYSTOLIC) 10 MG Tab TAKE 1 TABLET BY MOUTH ONCE DAILY    ondansetron (ZOFRAN) 4 mg, Oral, Every 8 hours PRN    oxyCODONE-acetaminophen (PERCOCET) 7.5-325 mg per tablet 1 tablet, Oral, 3 times daily PRN    pantoprazole (PROTONIX) 40 mg, Oral, Daily    pseudoephedrine (SUDAFED) 120 mg, Oral, Every 12 hours (non-standard times)    sulfamethoxazole-trimethoprim 800-160mg (BACTRIM DS) 800-160 mg Tab 1 tablet, Oral, 2 times daily    travoprost (TRAVATAN Z) 0.004 % ophthalmic solution INT 1 GTT IN OU D HS    venlafaxine (EFFEXOR-XR) 37.5 mg, Oral, Daily        Past Medical History:   Diagnosis Date    Breast cancer in female 09/07/2022    IDC with high grade DCIS    COPD (chronic obstructive pulmonary disease)     Degeneration of lumbar intervertebral disc     Endometrial cancer     GERD (gastroesophageal reflux disease)     HTN (hypertension)     Hyperlipemia, mixed         Past Surgical History:   Procedure Laterality Date    BIOPSY OF AXILLARY NODE Right 9/7/2022    Procedure: BIOPSY, LYMPH NODE, AXILLARY;  Surgeon: Jatin Gutierrez MD;  Location: [facility] OR;  Service: General;  Laterality: Right;    BREAST BIOPSY Right 08/05/2022    BREAST MASS EXCISION Right 9/7/2022    Procedure: EXCISION, MASS, BREAST;  Surgeon: Jatin Gutierrez MD;  Location: [facility] OR;  Service: General;  Laterality: Right;  wire localized partial mastectomy right breast with margins     CARPAL TUNNEL RELEASE  1985    CHOLECYSTECTOMY  1978    HYSTERECTOMY      KNEE SURGERY Left 06/01/2020    LUMBAR DISC SURGERY  01/01/1990    plate and roland    LUMBAR FUSION  2011    TOTAL KNEE REPLACEMENT USING COMPUTER NAVIGATION Left 02/15/2021        Family History   Problem Relation Age of Onset    Hypertension Mother     Diabetes Mother     Hypertension Father     Lung  "cancer Maternal Grandfather     Breast cancer Neg Hx        Social History     Tobacco Use    Smoking status: Every Day     Types: Vaping with nicotine    Smokeless tobacco: Never   Substance Use Topics    Alcohol use: Yes    Drug use: Not Currently         Vitals:    02/07/23 1006   BP: (!) 136/59   Pulse: 81   Temp: 98.9 °F (37.2 °C)        Physical Exam:  BP (!) 136/59   Pulse 81   Temp 98.9 °F (37.2 °C)   Ht 5' 4" (1.626 m)   Wt 80.7 kg (178 lb)   SpO2 98%   BMI 30.55 kg/m²     Physical Exam  Constitutional:       Appearance: Normal appearance.   HENT:      Head: Normocephalic and atraumatic.      Nose: Nose normal.      Mouth/Throat:      Mouth: Mucous membranes are moist.      Pharynx: Oropharynx is clear.   Eyes:      Conjunctiva/sclera: Conjunctivae normal.   Cardiovascular:      Rate and Rhythm: Normal rate and regular rhythm.      Heart sounds: Normal heart sounds.   Pulmonary:      Effort: Pulmonary effort is normal.      Breath sounds: Normal breath sounds.   Abdominal:      General: Abdomen is flat. Bowel sounds are normal.      Palpations: Abdomen is soft.   Musculoskeletal:         General: Normal range of motion.      Cervical back: Normal range of motion and neck supple.   Skin:     General: Skin is warm and dry.   Neurological:      General: No focal deficit present.      Mental Status: She is alert and oriented to person, place, and time. Mental status is at baseline.   Psychiatric:         Mood and Affect: Mood normal.        Labs:  Lab Results   Component Value Date    WBC 6.01 02/03/2023    RBC 4.24 02/03/2023    HGB 12.5 02/03/2023    HCT 38.8 02/03/2023    MCV 92 02/03/2023    MCH 29.5 02/03/2023    MCHC 32.2 02/03/2023    RDW 13.5 02/03/2023     02/03/2023    MPV 8.6 (L) 02/03/2023    GRAN 3.4 02/03/2023    GRAN 57.2 02/03/2023    LYMPH 1.6 02/03/2023    LYMPH 27.0 02/03/2023    MONO 0.6 02/03/2023    MONO 9.5 02/03/2023    EOS 0.3 02/03/2023    BASO 0.03 02/03/2023    " EOSINOPHIL 5.3 02/03/2023    BASOPHIL 0.5 02/03/2023     Sodium   Date Value Ref Range Status   02/03/2023 140 136 - 145 mmol/L Final     Potassium   Date Value Ref Range Status   02/03/2023 3.6 3.5 - 5.1 mmol/L Final     Chloride   Date Value Ref Range Status   02/03/2023 101 95 - 110 mmol/L Final     CO2   Date Value Ref Range Status   02/03/2023 30 (H) 23 - 29 mmol/L Final     Glucose   Date Value Ref Range Status   02/03/2023 99 70 - 110 mg/dL Final     BUN   Date Value Ref Range Status   02/03/2023 12 8 - 23 mg/dL Final     Creatinine   Date Value Ref Range Status   02/03/2023 0.8 0.5 - 1.4 mg/dL Final     Calcium   Date Value Ref Range Status   02/03/2023 10.0 8.7 - 10.5 mg/dL Final     Total Protein   Date Value Ref Range Status   02/03/2023 6.3 6.0 - 8.4 g/dL Final     Albumin   Date Value Ref Range Status   02/03/2023 3.5 3.5 - 5.2 g/dL Final     Total Bilirubin   Date Value Ref Range Status   02/03/2023 0.4 0.1 - 1.0 mg/dL Final     Comment:     For infants and newborns, interpretation of results should be based  on gestational age, weight and in agreement with clinical  observations.    Premature Infant recommended reference ranges:  Up to 24 hours.............<8.0 mg/dL  Up to 48 hours............<12.0 mg/dL  3-5 days..................<15.0 mg/dL  6-29 days.................<15.0 mg/dL       Alkaline Phosphatase   Date Value Ref Range Status   02/03/2023 64 55 - 135 U/L Final     AST   Date Value Ref Range Status   02/03/2023 16 10 - 40 U/L Final     ALT   Date Value Ref Range Status   02/03/2023 19 10 - 44 U/L Final     Anion Gap   Date Value Ref Range Status   02/03/2023 9 8 - 16 mmol/L Final     eGFR if    Date Value Ref Range Status   05/10/2022 >60 >60 mL/min/1.73 m^2 Final     eGFR if non    Date Value Ref Range Status   05/10/2022 >60 >60 mL/min/1.73 m^2 Final     Comment:     Calculation used to obtain the estimated glomerular filtration  rate (eGFR) is the CKD-EPI  equation.          A/P:    R breast DCIS  R breast IDCA [id number]  - DCIS was strongly hormone positive so patient is on aromatase inhibitor  -invasive component was only 2 mm, so even though it was HER2 positive, it was not treated with any adjuvant chemotherapy.    -completed radiation therapy to the breast  -on anastrozole currently, started 10/11/22  - mammogram done 2/3/23, results showing probably benign with recommend repeat in 6 months   -return to clinic in 6 months with blood work    Osteopenia  - DEXA done 5/24/22  -on every 6 month prolia Aurash Khoobehi, MD  Hematology and Oncology

## 2023-02-09 ENCOUNTER — OFFICE VISIT (OUTPATIENT)
Dept: SURGERY | Facility: CLINIC | Age: 68
End: 2023-02-09
Payer: MEDICARE

## 2023-02-09 VITALS
HEART RATE: 87 BPM | WEIGHT: 179 LBS | OXYGEN SATURATION: 93 % | HEIGHT: 64 IN | RESPIRATION RATE: 16 BRPM | DIASTOLIC BLOOD PRESSURE: 81 MMHG | SYSTOLIC BLOOD PRESSURE: 176 MMHG | BODY MASS INDEX: 30.56 KG/M2

## 2023-02-09 DIAGNOSIS — T66.XXXD ADVERSE EFFECT OF RADIATION, SUBSEQUENT ENCOUNTER: ICD-10-CM

## 2023-02-09 DIAGNOSIS — J06.9 UPPER RESPIRATORY TRACT INFECTION, UNSPECIFIED TYPE: Primary | ICD-10-CM

## 2023-02-09 PROCEDURE — 99214 OFFICE O/P EST MOD 30 MIN: CPT | Mod: S$PBB,,, | Performed by: SURGERY

## 2023-02-09 PROCEDURE — 99214 PR OFFICE/OUTPT VISIT, EST, LEVL IV, 30-39 MIN: ICD-10-PCS | Mod: S$PBB,,, | Performed by: SURGERY

## 2023-02-09 PROCEDURE — 99999 PR PBB SHADOW E&M-EST. PATIENT-LVL V: CPT | Mod: PBBFAC,,, | Performed by: SURGERY

## 2023-02-09 PROCEDURE — 99999 PR PBB SHADOW E&M-EST. PATIENT-LVL V: ICD-10-PCS | Mod: PBBFAC,,, | Performed by: SURGERY

## 2023-02-09 PROCEDURE — 99215 OFFICE O/P EST HI 40 MIN: CPT | Mod: PBBFAC | Performed by: SURGERY

## 2023-02-09 RX ORDER — AZITHROMYCIN 250 MG/1
TABLET, FILM COATED ORAL
Qty: 6 TABLET | Refills: 0 | Status: SHIPPED | OUTPATIENT
Start: 2023-02-09 | End: 2023-02-15 | Stop reason: ALTCHOICE

## 2023-02-09 RX ORDER — METHYLPREDNISOLONE 4 MG/1
TABLET ORAL
Qty: 21 EACH | Refills: 0 | Status: SHIPPED | OUTPATIENT
Start: 2023-02-09 | End: 2023-02-15 | Stop reason: ALTCHOICE

## 2023-02-09 NOTE — PROGRESS NOTES
"[facility] General Surgery  Follow-up    Subjective:     Chief Complaint:  Follow-up mastitis.  New issue of upper respiratory tract infection.    HPI:  Laxmi Chand is a 67 y.o. female presents today for follow-up evaluation.  Patient since last visit has done well.  She underwent mammogram ultrasound.  There is a post partial mastectomy fluid collection, which appears sterile.  Radiation induced changes of the breast.  No fevers chills.  Patient with new issue of upper respiratory tract illness/infection.  Desires for antibiotics steroids for this.  She presents today for follow-up.    Review of Systems   Constitutional:  Negative for activity change, appetite change, chills and fever.   HENT:  Negative for congestion, dental problem and ear discharge.    Eyes:  Negative for discharge and itching.   Respiratory:  Negative for apnea, choking and chest tightness.    Cardiovascular:  Negative for chest pain and leg swelling.   Gastrointestinal:  Negative for abdominal distention, abdominal pain, anal bleeding, constipation, diarrhea and nausea.   Endocrine: Negative for cold intolerance and heat intolerance.   Genitourinary:  Negative for difficulty urinating and dyspareunia.   Musculoskeletal:  Negative for arthralgias and back pain.   Skin:  Negative for color change and pallor.   Neurological:  Negative for dizziness and facial asymmetry.   Hematological:  Negative for adenopathy. Does not bruise/bleed easily.   Psychiatric/Behavioral:  Negative for agitation and behavioral problems.      Objective:      Vitals:    02/09/23 0847   BP: (!) 176/81   Pulse: 87   Resp: 16   SpO2: (!) 93%   Weight: 81.2 kg (179 lb)   Height: 5' 4" (1.626 m)     Physical Exam  Constitutional:       General: She is not in acute distress.     Appearance: She is well-developed.   HENT:      Head: Normocephalic and atraumatic.   Eyes:      Pupils: Pupils are equal, round, and reactive to light.   Neck:      Thyroid: No thyromegaly. "   Cardiovascular:      Rate and Rhythm: Normal rate and regular rhythm.   Pulmonary:      Effort: Pulmonary effort is normal.      Breath sounds: Normal breath sounds.   Abdominal:      General: Bowel sounds are normal. There is no distension.      Palpations: Abdomen is soft.      Tenderness: There is no abdominal tenderness.   Musculoskeletal:         General: No deformity. Normal range of motion.      Cervical back: Normal range of motion and neck supple.   Skin:     General: Skin is warm.      Capillary Refill: Capillary refill takes less than 2 seconds.      Findings: No erythema.   Neurological:      Mental Status: She is alert and oriented to person, place, and time.      Cranial Nerves: No cranial nerve deficit.   Psychiatric:         Behavior: Behavior normal.       Assessment:       1. Upper respiratory tract infection, unspecified type    2. Adverse effect of radiation, subsequent encounter          Plan:   Upper respiratory tract infection, unspecified type  -     azithromycin (Z-OG) 250 MG tablet; Take 2 tablets by mouth on day 1; Take 1 tablet by mouth on days 2-5  Dispense: 6 tablet; Refill: 0  -     methylPREDNISolone (MEDROL DOSEPACK) 4 mg tablet; use as directed  Dispense: 21 each; Refill: 0    Adverse effect of radiation, subsequent encounter        Medical Decision Making/Counseling:  Upper respiratory tract infection.  Initiate azithromycin Medrol Dosepak.      Radiation induced changes of the right breast.  Appears to be a post mastectomy, partial, sterile fluid collection.  Continue to monitor.  Does not appear infected.  Follow-up surgery clinic 2 months    Follow up:  2 months    Patient instructed that best way to communicate with my office staff is for patient to get on the [facility] epic patient portal to expedite communication and communication issues that may occur.  Patient was given instructions on how to get on the portal.  I encouraged patient to obtain portal access as well.   Ultimately it is up to the patient to obtain access.  Patient voiced understanding.

## 2023-02-13 PROBLEM — I70.0 ATHEROSCLEROSIS OF AORTA: Status: ACTIVE | Noted: 2023-02-13

## 2023-02-13 PROBLEM — J98.4 CALCIFIED GRANULOMA OF LUNG: Status: ACTIVE | Noted: 2023-02-13

## 2023-02-13 PROBLEM — J84.10 CALCIFIED GRANULOMA OF LUNG: Status: ACTIVE | Noted: 2023-02-13

## 2023-02-13 NOTE — PROGRESS NOTES
"  Laxmi Chand presented for a  Medicare AWV and comprehensive Health Risk Assessment today. The following components were reviewed and updated:    Medical history  Family History  Social history  Allergies and Current Medications  Health Risk Assessment  Health Maintenance  Care Team         ** See Completed Assessments for Annual Wellness Visit within the encounter summary.**         The following assessments were completed:  Living Situation  CAGE  Depression Screening  Timed Get Up and Go  Whisper Test  Cognitive Function Screening  Nutrition Screening  ADL Screening  PAQ Screening          Vitals:    02/15/23 0802   BP: 138/86   BP Location: Left arm   Patient Position: Sitting   BP Method: Medium (Manual)   Pulse: 71   Resp: 16   Temp: 98 °F (36.7 °C)   TempSrc: Oral   SpO2: 96%   Weight: 81.1 kg (178 lb 12.7 oz)   Height: 5' 4" (1.626 m)     Body mass index is 30.69 kg/m².  Physical Exam  Vitals reviewed.   Constitutional:       Appearance: Normal appearance.   HENT:      Head: Normocephalic and atraumatic.   Eyes:      Pupils: Pupils are equal, round, and reactive to light.   Pulmonary:      Effort: Pulmonary effort is normal. No respiratory distress.   Skin:     General: Skin is warm and dry.   Neurological:      General: No focal deficit present.      Mental Status: She is alert and oriented to person, place, and time.   Psychiatric:         Mood and Affect: Mood normal.         Behavior: Behavior normal.     The 10-year ASCVD risk score (Mihaela FLOWER, et al., 2019) is: 18.7%    Values used to calculate the score:      Age: 67 years      Sex: Female      Is Non- : No      Diabetic: No      Tobacco smoker: Yes      Systolic Blood Pressure: 138 mmHg      Is BP treated: Yes      HDL Cholesterol: 50 mg/dL      Total Cholesterol: 203 mg/dL        Diagnoses and health risks identified today and associated recommendations/orders:    1. Encounter for preventive health examination    2. Obesity " (BMI 30.0-34.9)  Body mass index is 30.69 kg/m².  - Hemoglobin A1C; Future  Continue healthy diet and regular exercise as tolerated.  Continue medications as prescribed.  Follow up with PCP , Zoraida Orta NP     3. Primary hypertension  Stable  Continue medications as prescribed.  Follow up with PCP     4. Atherosclerosis of aorta  Stable  Continue medications as prescribed.  Follow up with PCP     5. Calcified granuloma of lung  Stable  Continue medications as prescribed.  Follow up with PCP     6. Chronic obstructive pulmonary disease with acute exacerbation  Stable  Continue medications as prescribed.  Follow up with PCP and pulmonology as needed    7. History of endometrial cancer  Stable  Continue medications as prescribed.  Follow up with PCP and hem/onc, Dr Khoobehi    8. Ductal carcinoma in situ (DCIS) of right breast  Stable  Continue medications as prescribed.  Follow up with PCP and hem/onc    9. Chronic obstructive pulmonary disease, unspecified COPD type  Stable  Continue medications as prescribed.  Follow up with PCP and pulmonology as needed    10.  Recurrent major depressive disorder, in full remission  Stable  Continue medications as prescribed.  Follow up with PCP     11. Urge incontinence of urine  Stable  Continue medications as prescribed.  Follow up with PCP     12. Screening for diabetes mellitus (DM)  - Hemoglobin A1C; Future    14. Elevated glucose  - Hemoglobin A1C; Future      Provided Laxmi with a 5-10 year written screening schedule and personal prevention plan. Recommendations were developed using the USPSTF age appropriate recommendations. Education, counseling, and referrals were provided as needed. After Visit Summary printed and given to patient which includes a list of additional screenings\tests needed.    Follow up if symptoms worsen or fail to improve, for scheduled appt, 1 year for AWV.    Zoraida Orta NP        Future Appointments       Date Provider Specialty  Appt Notes    4/11/2023 Jatin Gutierrez MD General Surgery 2 mth f/u Mastitis    4/14/2023  Infusion Therapy prolia [id number]    8/4/2023  Lab hemonc    8/7/2023 Mell Veloz NP Hematology and Oncology BreastCa/Labs/6mfu    8/17/2023 Zoraida Orta NP Family Medicine 6 month follow up COPD, HTN    2/21/2024 Zoraida Orta, HERBER Family Medicine AWV              Review for Opioid Screening: Risk factors reviewed for any potential opioid use disorder  Review for Substance Use Disorders: Risk factors reviewed for any potential opioid use disorder       I offered to discuss advanced care planning, including how to pick a person who would make decisions for you if you were unable to make them for yourself, called a health care power of , and what kind of decisions you might make such as use of life sustaining treatments such as ventilators and tube feeding when faced with a life limiting illness recorded on a living will that they will need to know. (How you want to be cared for as you near the end of your natural life)     X Patient is interested in learning more about how to make advanced directives.  I provided them paperwork and offered to discuss this with them.

## 2023-02-15 ENCOUNTER — OFFICE VISIT (OUTPATIENT)
Dept: FAMILY MEDICINE | Facility: CLINIC | Age: 68
End: 2023-02-15
Payer: MEDICARE

## 2023-02-15 ENCOUNTER — PATIENT OUTREACH (OUTPATIENT)
Dept: ADMINISTRATIVE | Facility: HOSPITAL | Age: 68
End: 2023-02-15
Payer: MEDICARE

## 2023-02-15 VITALS
HEIGHT: 64 IN | RESPIRATION RATE: 16 BRPM | TEMPERATURE: 98 F | WEIGHT: 178.81 LBS | HEART RATE: 71 BPM | DIASTOLIC BLOOD PRESSURE: 86 MMHG | OXYGEN SATURATION: 96 % | BODY MASS INDEX: 30.53 KG/M2 | SYSTOLIC BLOOD PRESSURE: 138 MMHG

## 2023-02-15 DIAGNOSIS — J84.10 CALCIFIED GRANULOMA OF LUNG: ICD-10-CM

## 2023-02-15 DIAGNOSIS — I10 PRIMARY HYPERTENSION: ICD-10-CM

## 2023-02-15 DIAGNOSIS — D05.11 DUCTAL CARCINOMA IN SITU (DCIS) OF RIGHT BREAST: ICD-10-CM

## 2023-02-15 DIAGNOSIS — J44.9 CHRONIC OBSTRUCTIVE PULMONARY DISEASE, UNSPECIFIED COPD TYPE: ICD-10-CM

## 2023-02-15 DIAGNOSIS — R73.09 ELEVATED GLUCOSE: ICD-10-CM

## 2023-02-15 DIAGNOSIS — F33.42 RECURRENT MAJOR DEPRESSIVE DISORDER, IN FULL REMISSION: ICD-10-CM

## 2023-02-15 DIAGNOSIS — J44.1 CHRONIC OBSTRUCTIVE PULMONARY DISEASE WITH ACUTE EXACERBATION: ICD-10-CM

## 2023-02-15 DIAGNOSIS — N39.41 URGE INCONTINENCE OF URINE: ICD-10-CM

## 2023-02-15 DIAGNOSIS — Z00.00 ENCOUNTER FOR PREVENTIVE HEALTH EXAMINATION: Primary | ICD-10-CM

## 2023-02-15 DIAGNOSIS — I70.0 ATHEROSCLEROSIS OF AORTA: ICD-10-CM

## 2023-02-15 DIAGNOSIS — Z85.42 HISTORY OF ENDOMETRIAL CANCER: ICD-10-CM

## 2023-02-15 DIAGNOSIS — Z13.1 SCREENING FOR DIABETES MELLITUS (DM): ICD-10-CM

## 2023-02-15 DIAGNOSIS — E66.9 OBESITY (BMI 30.0-34.9): ICD-10-CM

## 2023-02-15 DIAGNOSIS — R11.0 NAUSEA: ICD-10-CM

## 2023-02-15 PROCEDURE — G0439 PPPS, SUBSEQ VISIT: HCPCS | Mod: ,,, | Performed by: FAMILY MEDICINE

## 2023-02-15 PROCEDURE — G0439 PR MEDICARE ANNUAL WELLNESS SUBSEQUENT VISIT: ICD-10-PCS | Mod: ,,, | Performed by: FAMILY MEDICINE

## 2023-02-15 RX ORDER — ONDANSETRON 4 MG/1
4 TABLET, FILM COATED ORAL EVERY 8 HOURS PRN
Qty: 15 TABLET | Refills: 2 | Status: SHIPPED | OUTPATIENT
Start: 2023-02-15 | End: 2023-05-31 | Stop reason: SDUPTHER

## 2023-02-15 RX ORDER — BUDESONIDE AND FORMOTEROL FUMARATE DIHYDRATE 160; 4.5 UG/1; UG/1
2 AEROSOL RESPIRATORY (INHALATION) EVERY 12 HOURS
Qty: 10.2 G | Refills: 6 | Status: SHIPPED | OUTPATIENT
Start: 2023-02-15 | End: 2023-08-14 | Stop reason: ALTCHOICE

## 2023-02-15 RX ORDER — ALBUTEROL SULFATE 90 UG/1
2 AEROSOL, METERED RESPIRATORY (INHALATION) EVERY 4 HOURS PRN
Qty: 18 G | Refills: 6 | Status: SHIPPED | OUTPATIENT
Start: 2023-02-15 | End: 2023-05-31 | Stop reason: SDUPTHER

## 2023-02-15 RX ORDER — BUPROPION HYDROCHLORIDE 100 MG/1
100 TABLET ORAL 3 TIMES DAILY
Qty: 270 TABLET | Refills: 3 | Status: SHIPPED | OUTPATIENT
Start: 2023-02-15 | End: 2024-02-05

## 2023-02-15 NOTE — PATIENT INSTRUCTIONS
Counseling and Referral of Other Preventative  (Italic type indicates deductible and co-insurance are waived)    Patient Name: Laxmi Chand  Today's Date: 2/15/2023    Health Maintenance       Date Due Completion Date    Pneumococcal Vaccines (Age 65+) (1 - PCV) Never done ---    Shingles Vaccine (1 of 2) Never done ---    High Dose Statin Never done ---    Colorectal Cancer Screening Never done ---    Hemoglobin A1c (Diabetic Prevention Screening) 06/02/2020 6/2/2017    TETANUS VACCINE 03/10/2023 (Originally 8/27/1973) ---    COVID-19 Vaccine (1) 03/10/2023 (Originally 2/27/1956) ---    Mammogram 02/03/2024 2/3/2023    DEXA Scan 05/24/2025 5/24/2022    Lipid Panel 05/10/2027 5/10/2022        Orders Placed This Encounter   Procedures    Hemoglobin A1C     The following information is provided to all patients.  This information is to help you find resources for any of the problems found today that may be affecting your health:                Living healthy guide: www.[facility].[place].gov      Understanding Diabetes: www.diabetes.org      Eating healthy: www.cdc.gov/healthyweight      CDC home safety checklist: www.cdc.gov/steadi/patient.html      Agency on Aging: www.goea.[place].gov      Alcoholics anonymous (AA): www.aa.org      Physical Activity: www.radha.nih.gov/xb3opuv      Tobacco use: www.quitwithusla.org

## 2023-02-15 NOTE — PROGRESS NOTES
Subjective:       Patient ID: Laxmi Chand is a 67 y.o. female.    Chief Complaint: Medicare AWV    Laxmi Chand since the clinic today for enhanced annual wellness visit.  Patient reports she also needs medication refills while she is here.  Patient reports that she has been having increased shortness of breath recently and is needing refills on her inhaler.  Patient reports that previously her inhaler was not prescribed correctly I would like this corrected today.    Patient educated on plan of care, verbalized understanding.       Review of Systems   Constitutional:  Negative for activity change, appetite change, chills, diaphoresis and fever.   HENT:  Negative for congestion, ear pain, postnasal drip, sinus pressure, sneezing and sore throat.    Eyes:  Negative for pain, discharge, redness and itching.   Respiratory:  Positive for shortness of breath. Negative for apnea, cough, chest tightness and wheezing.    Cardiovascular:  Negative for chest pain and leg swelling.   Gastrointestinal:  Negative for abdominal distention, abdominal pain, constipation, diarrhea, nausea and vomiting.   Genitourinary:  Negative for difficulty urinating, dysuria, flank pain and frequency.   Skin:  Negative for color change, rash and wound.   Neurological:  Negative for dizziness.     Patient Active Problem List   Diagnosis    Allergic rhinitis    Chronic obstructive lung disease    Degeneration of lumbar intervertebral disc    Depressive disorder    Gastroesophageal reflux disease    History of smoking    Hyperlipidemia    Hypertensive disorder    Status post total left knee replacement    Chronic pain syndrome    Pleuritic pain    Ductal carcinoma in situ (DCIS) of right breast    Senile osteopenia    History of endometrial cancer    Invasive ductal carcinoma of breast, female, right    Long term (current) use of aromatase inhibitors    Atherosclerosis of aorta    Calcified granuloma of lung    Urge incontinence of urine  "      Objective:      Physical Exam  Vitals reviewed.   Constitutional:       Appearance: Normal appearance.   HENT:      Head: Normocephalic and atraumatic.   Eyes:      Pupils: Pupils are equal, round, and reactive to light.   Pulmonary:      Effort: Pulmonary effort is normal. No respiratory distress.      Breath sounds: No stridor. Wheezing present. No rhonchi or rales.   Skin:     General: Skin is warm and dry.   Neurological:      General: No focal deficit present.      Mental Status: She is alert and oriented to person, place, and time.   Psychiatric:         Mood and Affect: Mood normal.         Behavior: Behavior normal.       Lab Results   Component Value Date    WBC 6.01 02/03/2023    HGB 12.5 02/03/2023    HCT 38.8 02/03/2023     02/03/2023    CHOL 203 (H) 05/10/2022    TRIG 101 05/10/2022    HDL 50 05/10/2022    ALT 19 02/03/2023    AST 16 02/03/2023     02/03/2023    K 3.6 02/03/2023     02/03/2023    CREATININE 0.8 02/03/2023    BUN 12 02/03/2023    CO2 30 (H) 02/03/2023    TSH 1.040 05/10/2022     The 10-year ASCVD risk score (Mihaela FLOWER, et al., 2019) is: 18.7%    Values used to calculate the score:      Age: 67 years      Sex: Female      Is Non- : No      Diabetic: No      Tobacco smoker: Yes      Systolic Blood Pressure: 138 mmHg      Is BP treated: Yes      HDL Cholesterol: 50 mg/dL      Total Cholesterol: 203 mg/dL  Visit Vitals  /86 (BP Location: Left arm, Patient Position: Sitting, BP Method: Medium (Manual))   Pulse 71   Temp 98 °F (36.7 °C) (Oral)   Resp 16   Ht 5' 4" (1.626 m)   Wt 81.1 kg (178 lb 12.7 oz)   SpO2 96%   BMI 30.69 kg/m²      Assessment:       1. Encounter for preventive health examination    2. Obesity (BMI 30.0-34.9)    3. Primary hypertension    4. Atherosclerosis of aorta    5. Calcified granuloma of lung    6. Chronic obstructive pulmonary disease with acute exacerbation    7. History of endometrial cancer    8. Ductal " carcinoma in situ (DCIS) of right breast    9. Chronic obstructive pulmonary disease, unspecified COPD type    10. Nausea    11. Recurrent major depressive disorder, in full remission    12. Urge incontinence of urine    13. Screening for diabetes mellitus (DM)    14. Elevated glucose          Plan:       Laxmi was seen today for medicare awv.    Diagnoses and all orders for this visit:    Obesity (BMI 30.0-34.9)  -     Hemoglobin A1C; Future  Body mass index is 30.69 kg/m².  Continue healthy diet and regular exercise as tolerated.  Continue medications as prescribed.  Follow up with PCP     Primary hypertension  Stable  Continue medications as prescribed.  Follow up with PCP     Chronic obstructive pulmonary disease, unspecified COPD type  -     albuterol (PROVENTIL/VENTOLIN HFA) 90 mcg/actuation inhaler; Inhale 2 puffs into the lungs every 4 (four) hours as needed for Wheezing or Shortness of Breath.  -     budesonide-formoterol 160-4.5 mcg (SYMBICORT) 160-4.5 mcg/actuation HFAA; Inhale 2 puffs into the lungs every 12 (twelve) hours. Controller    Nausea  -     ondansetron (ZOFRAN) 4 MG tablet; Take 1 tablet (4 mg total) by mouth every 8 (eight) hours as needed for Nausea.    Recurrent major depressive disorder, in full remission  -     buPROPion (WELLBUTRIN) 100 MG tablet; Take 1 tablet (100 mg total) by mouth 3 (three) times daily.         Follow up if symptoms worsen or fail to improve, for scheduled appt, 1 year for AWV.      Future Appointments       Date Provider Specialty Appt Notes    4/11/2023 Jatin Gutierrez MD General Surgery 2 mth f/u Mastitis    4/14/2023  Infusion Therapy prolia [id number]    8/4/2023  Lab hemonc    8/7/2023 Mell Veloz NP Hematology and Oncology [facility]/Labs/6mfu    8/17/2023 Zoraida Orta NP Family Medicine 6 month follow up COPD, HTN    2/21/2024 Zoraida Orta NP Family Medicine AWV

## 2023-02-17 NOTE — PROGRESS NOTES
I have reviewed the notes, assessments, and/or procedures performed by Zoraida Orta NP, I concur with her/his documentation of Laxmi Chand.

## 2023-02-20 ENCOUNTER — PATIENT OUTREACH (OUTPATIENT)
Dept: ADMINISTRATIVE | Facility: HOSPITAL | Age: 68
End: 2023-02-20
Payer: MEDICARE

## 2023-02-22 ENCOUNTER — TELEPHONE (OUTPATIENT)
Dept: FAMILY MEDICINE | Facility: CLINIC | Age: 68
End: 2023-02-22
Payer: MEDICARE

## 2023-02-22 NOTE — TELEPHONE ENCOUNTER
Spoke with the patient offered a 10 am appt and pt couldn't do it, offered a virtual pt refused. Encouraged her to go to [facility] urgent [facility] if can't wait for her visit in the morning. Pt agrees

## 2023-02-22 NOTE — TELEPHONE ENCOUNTER
----- Message from Kathy Negron sent at 2/22/2023  8:05 AM CST -----  Contact: pt  Patient is calling want to know can she get something to treat yeast infection   Please give pt a call back [phone]

## 2023-02-23 ENCOUNTER — OFFICE VISIT (OUTPATIENT)
Dept: FAMILY MEDICINE | Facility: CLINIC | Age: 68
End: 2023-02-23
Payer: MEDICARE

## 2023-02-23 VITALS
TEMPERATURE: 98 F | WEIGHT: 180.25 LBS | BODY MASS INDEX: 30.94 KG/M2 | SYSTOLIC BLOOD PRESSURE: 138 MMHG | OXYGEN SATURATION: 95 % | DIASTOLIC BLOOD PRESSURE: 82 MMHG | HEART RATE: 79 BPM

## 2023-02-23 DIAGNOSIS — R05.9 COUGH, UNSPECIFIED TYPE: ICD-10-CM

## 2023-02-23 DIAGNOSIS — B37.31 VAGINAL YEAST INFECTION: Primary | ICD-10-CM

## 2023-02-23 DIAGNOSIS — E66.9 OBESITY, CLASS I, BMI 30-34.9: ICD-10-CM

## 2023-02-23 DIAGNOSIS — J44.1 COPD WITH ACUTE EXACERBATION: ICD-10-CM

## 2023-02-23 DIAGNOSIS — G89.29 CHRONIC PAIN OF RIGHT KNEE: ICD-10-CM

## 2023-02-23 DIAGNOSIS — M25.561 CHRONIC PAIN OF RIGHT KNEE: ICD-10-CM

## 2023-02-23 PROCEDURE — 99214 PR OFFICE/OUTPT VISIT, EST, LEVL IV, 30-39 MIN: ICD-10-PCS | Mod: ,,, | Performed by: FAMILY MEDICINE

## 2023-02-23 PROCEDURE — 99214 OFFICE O/P EST MOD 30 MIN: CPT | Mod: ,,, | Performed by: FAMILY MEDICINE

## 2023-02-23 RX ORDER — DOXYCYCLINE HYCLATE 100 MG
100 TABLET ORAL 2 TIMES DAILY
Qty: 20 TABLET | Refills: 0 | Status: SHIPPED | OUTPATIENT
Start: 2023-02-23 | End: 2023-03-05

## 2023-02-23 RX ORDER — METHYLPREDNISOLONE 4 MG/1
TABLET ORAL
Qty: 21 TABLET | Refills: 0 | Status: SHIPPED | OUTPATIENT
Start: 2023-02-23 | End: 2023-03-20

## 2023-02-23 RX ORDER — FLUCONAZOLE 150 MG/1
150 TABLET ORAL DAILY
Qty: 3 TABLET | Refills: 2 | Status: SHIPPED | OUTPATIENT
Start: 2023-02-23 | End: 2023-02-24

## 2023-02-23 NOTE — PROGRESS NOTES
Subjective:       Patient ID: Laxmi Chand is a 67 y.o. female.    Chief Complaint: Knee Pain (Patient c/o pain in right knee that she reports began 2-3 weeks ago but has worsened over last week. Patient rates pain currently 5/10 on pain scale. Patient reports she had issues years ago with this same knee but none until this recent episode. ) and Vaginal Itching  (Patient reports that she is having issues with vaginal itching and vaginal odor for approximately 1 week. She denies any urinary issues at this time. Patient reports she believes she may have a yeast infection. )    Laxmi Chand presents to the clinic today with complaints of a yeast infection.  Patient also reports that she recently started having right knee pain.  Patient states this has been going on for the last 2 weeks but has been worse in the last week or so.  Patient reports she previously was getting knee injections at [facility] but has not had this in some time.  Patient reports that if this does not improve she will follow up with [facility]   Patient also reports that she woke up this morning with coughing and chest congestion.  Patient reports that what she is coughing up is yellow and green and taste of infection.  Patient reports she is been taken over-the-counter with no improvement.    Patient educated on plan of care, verbalized understanding.    Knee Pain   The incident occurred more than 1 week ago. There was no injury mechanism. The pain is present in the right knee. The quality of the pain is described as aching. The pain is moderate. The pain has been Constant since onset. She reports no foreign bodies present. The symptoms are aggravated by movement.   Review of Systems   Constitutional:  Negative for activity change, appetite change, chills, diaphoresis and fever.   HENT:  Negative for congestion, ear pain, postnasal drip, sinus pressure, sneezing and sore throat.    Eyes:  Negative for pain, discharge,  redness and itching.   Respiratory:  Positive for cough, shortness of breath and wheezing. Negative for apnea and chest tightness.    Cardiovascular:  Negative for chest pain and leg swelling.   Gastrointestinal:  Negative for abdominal distention, abdominal pain, constipation, diarrhea, nausea and vomiting.   Genitourinary:  Positive for vaginal discharge. Negative for difficulty urinating, dysuria, flank pain and frequency.   Skin:  Negative for color change, rash and wound.   Neurological:  Negative for dizziness.     Patient Active Problem List   Diagnosis    Allergic rhinitis    Chronic obstructive lung disease    Degeneration of lumbar intervertebral disc    Depressive disorder    Gastroesophageal reflux disease    History of smoking    Hyperlipidemia    Hypertensive disorder    Status post total left knee replacement    Chronic pain syndrome    Pleuritic pain    Ductal carcinoma in situ (DCIS) of right breast    Senile osteopenia    History of endometrial cancer    Invasive ductal carcinoma of breast, female, right    Long term (current) use of aromatase inhibitors    Atherosclerosis of aorta    Calcified granuloma of lung    Urge incontinence of urine       Objective:      Physical Exam  Vitals reviewed.   Constitutional:       General: She is not in acute distress.     Appearance: Normal appearance. She is well-developed.   HENT:      Head: Normocephalic.      Nose: Nose normal.   Eyes:      Conjunctiva/sclera: Conjunctivae normal.      Pupils: Pupils are equal, round, and reactive to light.   Cardiovascular:      Rate and Rhythm: Normal rate and regular rhythm.      Heart sounds: Normal heart sounds.   Pulmonary:      Effort: Pulmonary effort is normal. No respiratory distress.      Breath sounds: Wheezing present.   Musculoskeletal:      Cervical back: Normal range of motion and neck supple.   Skin:     General: Skin is warm and dry.      Findings: No rash.   Neurological:      Mental Status: She is alert  and oriented to person, place, and time.   Psychiatric:         Mood and Affect: Mood normal.         Behavior: Behavior normal.       Lab Results   Component Value Date    WBC 6.01 02/03/2023    HGB 12.5 02/03/2023    HCT 38.8 02/03/2023     02/03/2023    CHOL 203 (H) 05/10/2022    TRIG 101 05/10/2022    HDL 50 05/10/2022    ALT 19 02/03/2023    AST 16 02/03/2023     02/03/2023    K 3.6 02/03/2023     02/03/2023    CREATININE 0.8 02/03/2023    BUN 12 02/03/2023    CO2 30 (H) 02/03/2023    TSH 1.040 05/10/2022     The 10-year ASCVD risk score (Mihaela FLOWER, et al., 2019) is: 18.7%    Values used to calculate the score:      Age: 67 years      Sex: Female      Is Non- : No      Diabetic: No      Tobacco smoker: Yes      Systolic Blood Pressure: 138 mmHg      Is BP treated: Yes      HDL Cholesterol: 50 mg/dL      Total Cholesterol: 203 mg/dL  Visit Vitals  /82 (BP Location: Left arm, Patient Position: Sitting, BP Method: Large (Manual))   Pulse 79   Temp 98.1 °F (36.7 °C) (Oral)   Wt 81.8 kg (180 lb 3.6 oz)   SpO2 95%   BMI 30.94 kg/m²      Assessment:       1. Vaginal yeast infection    2. Chronic pain of right knee    3. Cough, unspecified type    4. COPD with acute exacerbation    5. Obesity, Class I, BMI 30-34.9        Plan:       Laxmi was seen today for knee pain and vaginal itching .    Diagnoses and all orders for this visit:    Vaginal yeast infection  -     fluconazole (DIFLUCAN) 150 MG Tab; Take 1 tablet (150 mg total) by mouth once daily. If symptoms persist for 3 days repeat dose on day 4 for 1 day    Chronic pain of right knee  Follow up with orthopedic at [facility]    Cough, unspecified type / COPD with acute exacerbation  -     doxycycline (VIBRA-TABS) 100 MG tablet; Take 1 tablet (100 mg total) by mouth 2 (two) times daily. for 10 days  -     methylPREDNISolone (MEDROL DOSEPACK) 4 mg tablet; use as directed  Continue medications as  prescribed.  Follow up with PCP     Obesity, Class I, BMI 30-34.9  Body mass index is 30.94 kg/m².  Continue healthy diet and regular exercise as tolerated.  Continue medications as prescribed.  Follow up with PCP        Follow up if symptoms worsen or fail to improve, for scheduled appt.      Future Appointments       Date Provider Specialty Appt Notes    4/11/2023 Jatin Gutierrez MD General Surgery 2 mth f/u Mastitis    4/14/2023  Infusion Therapy prolia [id number]    8/4/2023  Lab hemonc    8/7/2023 Mell Veloz NP Hematology and Oncology BreastCa/Labs/6mfu    8/17/2023 Zoraida Orta NP Family Medicine 6 month follow up COPD, HTN    2/21/2024 Zoraida Orta NP Family Medicine AWV

## 2023-03-13 ENCOUNTER — TELEPHONE (OUTPATIENT)
Dept: FAMILY MEDICINE | Facility: CLINIC | Age: 68
End: 2023-03-13
Payer: MEDICARE

## 2023-03-13 DIAGNOSIS — J44.9 CHRONIC OBSTRUCTIVE PULMONARY DISEASE, UNSPECIFIED COPD TYPE: ICD-10-CM

## 2023-03-13 DIAGNOSIS — J30.9 CHRONIC ALLERGIC RHINITIS: ICD-10-CM

## 2023-03-13 DIAGNOSIS — M51.36 DEGENERATION OF LUMBAR INTERVERTEBRAL DISC: ICD-10-CM

## 2023-03-13 DIAGNOSIS — I10 HYPERTENSION, UNSPECIFIED TYPE: Primary | ICD-10-CM

## 2023-03-13 RX ORDER — MONTELUKAST SODIUM 10 MG/1
10 TABLET ORAL DAILY
Qty: 90 TABLET | Refills: 3 | Status: SHIPPED | OUTPATIENT
Start: 2023-03-13 | End: 2023-11-08 | Stop reason: SDUPTHER

## 2023-03-13 RX ORDER — HYDROCHLOROTHIAZIDE 25 MG/1
25 TABLET ORAL DAILY
Qty: 90 TABLET | Refills: 3 | Status: SHIPPED | OUTPATIENT
Start: 2023-03-13 | End: 2023-11-08 | Stop reason: SDUPTHER

## 2023-03-13 RX ORDER — CETIRIZINE HYDROCHLORIDE 10 MG/1
10 TABLET ORAL DAILY
Qty: 90 TABLET | Refills: 5 | Status: SHIPPED | OUTPATIENT
Start: 2023-03-13

## 2023-03-13 RX ORDER — CELECOXIB 200 MG/1
200 CAPSULE ORAL DAILY
Qty: 90 CAPSULE | Refills: 1 | Status: SHIPPED | OUTPATIENT
Start: 2023-03-13 | End: 2023-08-17 | Stop reason: SDUPTHER

## 2023-03-20 ENCOUNTER — OFFICE VISIT (OUTPATIENT)
Dept: PODIATRY | Facility: CLINIC | Age: 68
End: 2023-03-20
Payer: MEDICARE

## 2023-03-20 VITALS
WEIGHT: 180.31 LBS | HEIGHT: 64 IN | SYSTOLIC BLOOD PRESSURE: 154 MMHG | BODY MASS INDEX: 30.78 KG/M2 | DIASTOLIC BLOOD PRESSURE: 84 MMHG | HEART RATE: 76 BPM

## 2023-03-20 DIAGNOSIS — L60.0 INGROWN NAIL: Primary | ICD-10-CM

## 2023-03-20 PROCEDURE — 99999 PR PBB SHADOW E&M-EST. PATIENT-LVL V: ICD-10-PCS | Mod: PBBFAC,,, | Performed by: PODIATRIST

## 2023-03-20 PROCEDURE — 99999 PR PBB SHADOW E&M-EST. PATIENT-LVL V: CPT | Mod: PBBFAC,,, | Performed by: PODIATRIST

## 2023-03-20 PROCEDURE — 99203 OFFICE O/P NEW LOW 30 MIN: CPT | Mod: S$PBB,,, | Performed by: PODIATRIST

## 2023-03-20 PROCEDURE — 99215 OFFICE O/P EST HI 40 MIN: CPT | Mod: PBBFAC | Performed by: PODIATRIST

## 2023-03-20 PROCEDURE — 99203 PR OFFICE/OUTPT VISIT, NEW, LEVL III, 30-44 MIN: ICD-10-PCS | Mod: S$PBB,,, | Performed by: PODIATRIST

## 2023-03-20 RX ORDER — SULFAMETHOXAZOLE AND TRIMETHOPRIM 800; 160 MG/1; MG/1
1 TABLET ORAL 2 TIMES DAILY
Qty: 20 TABLET | Refills: 0 | Status: SHIPPED | OUTPATIENT
Start: 2023-03-20 | End: 2023-03-30

## 2023-03-20 RX ORDER — FLUCONAZOLE 150 MG/1
150 TABLET ORAL ONCE
COMMUNITY
Start: 2023-03-14 | End: 2023-04-03

## 2023-03-20 RX ORDER — TRAVOPROST OPHTHALMIC SOLUTION 0.04 MG/ML
SOLUTION OPHTHALMIC
COMMUNITY
End: 2023-09-11

## 2023-03-21 NOTE — PROGRESS NOTES
Subjective:       Patient ID: Laxmi Chand is a 67 y.o. female.    Chief Complaint: Ingrown Toenail (Right foot third digit)  Patient presents today she is complaining of a painfully ingrown 3rd digit right foot she states it has been going on for about 4-5 weeks she has tried to trim this herself on several occasions but has not had success and states it has gotten worse.  Patient states that she had previous foot surgery right side for bunion and hammertoes and states that it did not come out very well.  Patient is currently using a walker and states she has difficulty ambulating due to multiple conditions.    Past Medical History:   Diagnosis Date    Breast cancer in female 09/07/2022    IDC with high grade DCIS    COPD (chronic obstructive pulmonary disease)     Degeneration of lumbar intervertebral disc     Endometrial cancer     GERD (gastroesophageal reflux disease)     HTN (hypertension)     Hyperlipemia, mixed      Past Surgical History:   Procedure Laterality Date    BIOPSY OF AXILLARY NODE Right 9/7/2022    Procedure: BIOPSY, LYMPH NODE, AXILLARY;  Surgeon: Jatin Gutierrez MD;  Location: [facility] OR;  Service: General;  Laterality: Right;    BREAST BIOPSY Right 08/05/2022    BREAST MASS EXCISION Right 9/7/2022    Procedure: EXCISION, MASS, BREAST;  Surgeon: Jatin Gutierrez MD;  Location: [facility] OR;  Service: General;  Laterality: Right;  wire localized partial mastectomy right breast with margins     CARPAL TUNNEL RELEASE  1985    CHOLECYSTECTOMY  1978    HYSTERECTOMY      KNEE SURGERY Left 06/01/2020    LUMBAR DISC SURGERY  01/01/1990    plate and roland    LUMBAR FUSION  2011    TOTAL KNEE REPLACEMENT USING COMPUTER NAVIGATION Left 02/15/2021     Family History   Problem Relation Age of Onset    Hypertension Mother     Diabetes Mother     Hypertension Father     Lung cancer Maternal Grandfather     Breast cancer Neg Hx      Social History     Socioeconomic History    Marital status: Significant  Other   Occupational History    Occupation: disabled   Tobacco Use    Smoking status: Every Day     Types: Vaping with nicotine, Cigarettes    Smokeless tobacco: Never   Substance and Sexual Activity    Alcohol use: Yes    Drug use: Not Currently    Sexual activity: Yes     Partners: Male   Social History Narrative    Plans from [facility]         [facility]     Annual    urinary tract infection    yeast infection        Visit Summary    No pap per guidelines    U/A for culture    Wet prep: yeast only    Pt has a PCP    Colonoscopy up to date    Mammo @ [facility] [place]        Prescriptions:    SIG: Cipro 500 mg oral tablet, 3 days, Dispense #6 Tablet, 0 Refills    Directions: Take 1 oral tablet 2 times a day    SIG: Diflucan 100 mg oral tablet, 3 days, Dispense #3 Tablet, 0 Refills    Directions: Take 1 oral tablet once a day        Return for follow up appointment in one year or prn.     GYN Visit & [id number] [facility]2/4/2018     Vulvar Cyst: Resolved        Visit Summary    Normal external gyn exam. Cyst resolved        Return for follow up appointment annual/prn.     [id number]     Chronic Vaginitis    Paratubal cysts: stable        Visit Summary    Wet prep: mostly yeast, few clue cells    Pt soaks in bath BID, stop, shower only, no douching.    u/s performed today. unchanged from last scan.        Prescriptions: Clindesse sample given    SIG: Diflucan 100 mg oral tablet, 3 days, Dispense #3 Tablet, 0 Refills    Directions: Take 1 oral tablet once a day    Recommend probiotics        Return for follow up appointment for annual or prn. MDL swab at next appt if needed.    Mammo up to date.     GYN Visit & [id number] [facility]1/16/2017     Recurrent bacterial vaginosis.  Paratubal cysts.  Dysuria.    Repeat transvaginal ultrasound 6 weeks.        Visit Summary    Ordered:    Urine Culture, Routine     GYN Visit & Wphatdu20 [facility]     path compound melanocytic  nevus... us...of pelvis....rtc 1y pap     [id number]     3 moles removed from back 5 mm each...same contwainer monsels applied...    one mole removed from under each breasxt 5 mm...mnonsels applied...[facility]        [facility]ag [facility]...sp metrogel...no family h/o breast ca...        pelvic us...complex cyst 2.23 cm on left w possible excrescence and septation        pelvic us for complex cyst at [facility]......rtc 1wk     [facility]     Chronic Bacterial Vaginitis    Chronic Back Pain    Left Lower Quadrant resolved, possible ruptured ovarian cyst        Visit Summary    MDL swab done        Return for follow up appointment in 2 months for follow up pelvic u/s.     GYN Exam/[id number] 20164/6/2017     Annual    Vaginal odor, Bacterial Vaginosis    Ovarian Cyst        Visit Summary    Pap done, reports hx of cervical pre-cancer        Prescriptions:    SIG: metronidazole 500 mg tablet, 7 days, Dispense #14 Tablet, 0 Refills    Directions: Take 1 oral tablet 2 times a day. No alcohol discussed.        U/S today, reports had ovarian cyst on CT scan.    FSH today        Return for follow up appointment  pending results.       Current Outpatient Medications   Medication Sig Dispense Refill    albuterol (PROVENTIL/VENTOLIN HFA) 90 mcg/actuation inhaler Inhale 2 puffs into the lungs every 4 (four) hours as needed for Wheezing or Shortness of Breath. 18 g 6    albuterol-ipratropium (DUO-NEB) 2.5 mg-0.5 mg/3 mL nebulizer solution INHALE CONTENTS OF 1 VIAL BY MOUTH WITH NEBULIZER EVERY 6 HOURS WHILE AWAKE 360 mL 5    anastrozole (ARIMIDEX) 1 mg Tab Take 1 tablet (1 mg total) by mouth once daily. 90 tablet 3    aspirin (ECOTRIN) 81 MG EC tablet Take 1 tablet by mouth once daily. Pt back on asa      aspirin-acetaminophen-caffeine 250-250-65 mg (HEADACHE RELIEF, ASA-ACET-CAF,) 250-250-65 mg per tablet Take 1 tablet by mouth every 6 to 8 hours as needed.       budesonide-formoterol 160-4.5 mcg (SYMBICORT) 160-4.5 mcg/actuation HFAA Inhale 2 puffs into the lungs every 12 (twelve) hours. Controller 10.2 g 6    buPROPion (WELLBUTRIN) 100 MG tablet Take 1 tablet (100 mg total) by mouth 3 (three) times daily. 270 tablet 3    calcium carbonate-vitamin D3 (CALCIUM 600 + D,3,) 600-125 mg-unit Tab Take 2 tablets by mouth once daily. 180 tablet 3    celecoxib (CELEBREX) 200 MG capsule Take 1 capsule (200 mg total) by mouth once daily. 90 capsule 1    cetirizine (ZYRTEC) 10 MG tablet Take 1 tablet (10 mg total) by mouth once daily. 90 tablet 5    conjugated estrogens (PREMARIN) vaginal cream Place 0.5 g vaginally twice a week. 30 g 5    cyclobenzaprine (FLEXERIL) 10 MG tablet Take 1 tablet (10 mg total) by mouth nightly. 90 tablet 3    doxepin (SINEQUAN) 25 MG capsule Take 2 capsules (50 mg total) by mouth nightly. 180 capsule 3    fluconazole (DIFLUCAN) 150 MG Tab Take 150 mg by mouth once.      fluticasone propionate (FLONASE) 50 mcg/actuation nasal spray 1 spray (50 mcg total) by Each Nostril route once daily. 18.2 mL 2    hydroCHLOROthiazide (HYDRODIURIL) 25 MG tablet Take 1 tablet (25 mg total) by mouth once daily. 90 tablet 3    LUMIGAN 0.01 % Drop Place into both eyes.      montelukast (SINGULAIR) 10 mg tablet Take 1 tablet (10 mg total) by mouth once daily. 90 tablet 3    nebivoloL (BYSTOLIC) 10 MG Tab TAKE 1 TABLET BY MOUTH ONCE DAILY 90 tablet 3    ondansetron (ZOFRAN) 4 MG tablet Take 1 tablet (4 mg total) by mouth every 8 (eight) hours as needed for Nausea. 15 tablet 2    oxyCODONE-acetaminophen (PERCOCET) 7.5-325 mg per tablet Take 1 tablet by mouth 3 (three) times daily as needed.      pantoprazole (PROTONIX) 40 MG tablet Take 1 tablet (40 mg total) by mouth once daily. 90 tablet 3    pseudoephedrine (SUDAFED) 120 mg 12 hr tablet Take 120 mg by mouth every 12 (twelve) hours.      travoprost (TRAVATAN Z) 0.004 % ophthalmic solution travoprost 0.004 % eye drops   INSTILL  "1 DROP IN BOTH EYES DAILY AT BEDTIME      sulfamethoxazole-trimethoprim 800-160mg (BACTRIM DS) 800-160 mg Tab Take 1 tablet by mouth 2 (two) times daily. for 10 days 20 tablet 0     No current facility-administered medications for this visit.     Review of patient's allergies indicates:  No Known Allergies    Review of Systems   Musculoskeletal:  Positive for arthralgias and joint swelling.   Skin:  Positive for color change.   All other systems reviewed and are negative.    Objective:      Vitals:    03/20/23 1349   BP: (!) 154/84   Pulse: 76   Weight: 81.8 kg (180 lb 5.4 oz)   Height: 5' 4" (1.626 m)     Physical Exam  Vitals and nursing note reviewed.   Constitutional:       Appearance: Normal appearance.   Cardiovascular:      Pulses:           Dorsalis pedis pulses are 1+ on the right side and 1+ on the left side.        Posterior tibial pulses are 0 on the right side and 0 on the left side.   Pulmonary:      Effort: Pulmonary effort is normal.   Musculoskeletal:         General: Swelling and tenderness present.      Right foot: Decreased range of motion. Deformity and bunion present.      Left foot: Deformity present.        Feet:    Feet:      Right foot:      Protective Sensation: 2 sites tested.  2 sites sensed.      Skin integrity: Erythema and warmth present.      Toenail Condition: Right toenails are ingrown.      Left foot:      Protective Sensation: 2 sites tested.  2 sites sensed.   Skin:     Capillary Refill: Capillary refill takes more than 3 seconds.      Findings: Erythema present.   Neurological:      General: No focal deficit present.      Mental Status: She is alert.   Psychiatric:         Mood and Affect: Mood normal.         Behavior: Behavior normal.                                  Assessment:       1. Ingrown nail          Plan:       Patient presents today she is complaining of a painfully ingrown 3rd digit right foot she states it has been going on for about 4-5 weeks she has tried to " trim this herself on several occasions but has not had success and states it has gotten worse.  Patient states that she had previous foot surgery right side for bunion and hammertoes and states that it did not come out very well.  Patient is currently using a walker and states she has difficulty ambulating due to multiple conditions.  A comprehensive new patient evaluation was performed today patient does have obvious signs of infection 3rd digit right there was no drainage to do a culture and sensitivity however I am starting the patient on Bactrim I was able to trim and remove some of the callus tissue at the distal lateral border 3rd digit right I was also able to remove a large portion of nail that appeared to be ingrown patient tolerated this well I have advised the patient there may be additional nail in the area she may need a nail avulsion however we are going to proceed conservatively she does have some peripheral vascular disease which brings into question her ability to heal so we want to be as conservative as possible.  Patient will start soaking as directed I plan to follow up with her in 10 days this should start to settle down over the next several days with antibiotic therapy removal of the nail and the soaking if for any reason this gets worse she is to contact us immediately she did notice some relief today after removal of the callus tissue and the ingrowing nail.This note was created using [employer] voice recognition software that occasionally misinterpreted phrases or words.

## 2023-03-29 ENCOUNTER — OFFICE VISIT (OUTPATIENT)
Dept: PODIATRY | Facility: CLINIC | Age: 68
End: 2023-03-29
Payer: MEDICARE

## 2023-03-29 VITALS
BODY MASS INDEX: 30.39 KG/M2 | HEIGHT: 64 IN | HEART RATE: 70 BPM | DIASTOLIC BLOOD PRESSURE: 76 MMHG | WEIGHT: 178 LBS | SYSTOLIC BLOOD PRESSURE: 138 MMHG

## 2023-03-29 DIAGNOSIS — L60.0 INGROWN NAIL: Primary | ICD-10-CM

## 2023-03-29 PROCEDURE — 99215 OFFICE O/P EST HI 40 MIN: CPT | Mod: PBBFAC | Performed by: PODIATRIST

## 2023-03-29 PROCEDURE — 99213 PR OFFICE/OUTPT VISIT, EST, LEVL III, 20-29 MIN: ICD-10-PCS | Mod: S$PBB,,, | Performed by: PODIATRIST

## 2023-03-29 PROCEDURE — 99999 PR PBB SHADOW E&M-EST. PATIENT-LVL V: ICD-10-PCS | Mod: PBBFAC,,, | Performed by: PODIATRIST

## 2023-03-29 PROCEDURE — 99999 PR PBB SHADOW E&M-EST. PATIENT-LVL V: CPT | Mod: PBBFAC,,, | Performed by: PODIATRIST

## 2023-03-29 PROCEDURE — 99213 OFFICE O/P EST LOW 20 MIN: CPT | Mod: S$PBB,,, | Performed by: PODIATRIST

## 2023-03-30 NOTE — PROGRESS NOTES
Subjective:       Patient ID: Laxmi Chand is a 67 y.o. female.    Chief Complaint: Follow-up and Nail Problem  Patient presents today she is complaining of a painfully ingrown 3rd digit right foot she states it has been going on for about 4-5 weeks she has tried to trim this herself on several occasions but has not had success and states it has gotten worse.  Patient states that she had previous foot surgery right side for bunion and hammertoes and states that it did not come out very well.  Patient is currently using a walker and states she has difficulty ambulating due to multiple conditions.    Past Medical History:   Diagnosis Date    Breast cancer in female 09/07/2022    IDC with high grade DCIS    COPD (chronic obstructive pulmonary disease)     Degeneration of lumbar intervertebral disc     Endometrial cancer     GERD (gastroesophageal reflux disease)     HTN (hypertension)     Hyperlipemia, mixed      Past Surgical History:   Procedure Laterality Date    BIOPSY OF AXILLARY NODE Right 9/7/2022    Procedure: BIOPSY, LYMPH NODE, AXILLARY;  Surgeon: Jatin Gutierrez MD;  Location: [facility] OR;  Service: General;  Laterality: Right;    BREAST BIOPSY Right 08/05/2022    BREAST MASS EXCISION Right 9/7/2022    Procedure: EXCISION, MASS, BREAST;  Surgeon: Jatin Gutierrez MD;  Location: [facility] OR;  Service: General;  Laterality: Right;  wire localized partial mastectomy right breast with margins     CARPAL TUNNEL RELEASE  1985    CHOLECYSTECTOMY  1978    HYSTERECTOMY      KNEE SURGERY Left 06/01/2020    LUMBAR DISC SURGERY  01/01/1990    plate and roland    LUMBAR FUSION  2011    TOTAL KNEE REPLACEMENT USING COMPUTER NAVIGATION Left 02/15/2021     Family History   Problem Relation Age of Onset    Hypertension Mother     Diabetes Mother     Hypertension Father     Lung cancer Maternal Grandfather     Breast cancer Neg Hx      Social History     Socioeconomic History    Marital status: Significant Other    Occupational History    Occupation: disabled   Tobacco Use    Smoking status: Every Day     Types: Vaping with nicotine    Smokeless tobacco: Never   Substance and Sexual Activity    Alcohol use: Yes    Drug use: Not Currently    Sexual activity: Yes     Partners: Male   Social History Narrative    Plans from [facility]         [facility]     Annual    urinary tract infection    yeast infection        Visit Summary    No pap per guidelines    U/A for culture    Wet prep: yeast only    Pt has a PCP    Colonoscopy up to date    Mammo @ [facility] [place]        Prescriptions:    SIG: Cipro 500 mg oral tablet, 3 days, Dispense #6 Tablet, 0 Refills    Directions: Take 1 oral tablet 2 times a day    SIG: Diflucan 100 mg oral tablet, 3 days, Dispense #3 Tablet, 0 Refills    Directions: Take 1 oral tablet once a day        Return for follow up appointment in one year or prn.     GYN Visit & [id number] [facility]2/4/2018     Vulvar Cyst: Resolved        Visit Summary    Normal external gyn exam. Cyst resolved        Return for follow up appointment annual/prn.     [id number]     Chronic Vaginitis    Paratubal cysts: stable        Visit Summary    Wet prep: mostly yeast, few clue cells    Pt soaks in bath BID, stop, shower only, no douching.    u/s performed today. unchanged from last scan.        Prescriptions: Clindesse sample given    SIG: Diflucan 100 mg oral tablet, 3 days, Dispense #3 Tablet, 0 Refills    Directions: Take 1 oral tablet once a day    Recommend probiotics        Return for follow up appointment for annual or prn. MDL swab at next appt if needed.    Mammo up to date.     GYN Visit & Softfrd69 [facility]1/16/2017     Recurrent bacterial vaginosis.  Paratubal cysts.  Dysuria.    Repeat transvaginal ultrasound 6 weeks.        Visit Summary    Ordered:    Urine Culture, Routine     GY[facility] & [id number] [facility]     path compound melanocytic nevus...ch us...of  pelvis....rtc 1y pap     GYN Visit & Tchuguf40 [facility]5/4/2017     3 moles removed from back 5 mm each...same contwainer monsels applied...    one mole removed from under each breasxt 5 mm...mnonsels applied...[facility]        vag dc bv...sp metrogel...no family h/o breast ca...        pelvic us...complex cyst 2.23 cm on left w possible excrescence and septation        pelvic us for complex cyst at [facility]......rtc 1wk     [facility]     Chronic Bacterial Vaginitis    Chronic Back Pain    Left Lower Quadrant resolved, possible ruptured ovarian cyst        Visit Summary    MDL swab done        Return for follow up appointment in 2 months for follow up pelvic u/s.     GYN Exam/[id number] 20164/6/2017     Annual    Vaginal odor, Bacterial Vaginosis    Ovarian Cyst        Visit Summary    Pap done, reports hx of cervical pre-cancer        Prescriptions:    SIG: metronidazole 500 mg tablet, 7 days, Dispense #14 Tablet, 0 Refills    Directions: Take 1 oral tablet 2 times a day. No alcohol discussed.        U/S today, reports had ovarian cyst on CT scan.    FSH today        Return for follow up appointment  pending results.       Current Outpatient Medications   Medication Sig Dispense Refill    albuterol (PROVENTIL/VENTOLIN HFA) 90 mcg/actuation inhaler Inhale 2 puffs into the lungs every 4 (four) hours as needed for Wheezing or Shortness of Breath. 18 g 6    albuterol-ipratropium (DUO-NEB) 2.5 mg-0.5 mg/3 mL nebulizer solution INHALE CONTENTS OF 1 VIAL BY MOUTH WITH NEBULIZER EVERY 6 HOURS WHILE AWAKE 360 mL 5    anastrozole (ARIMIDEX) 1 mg Tab Take 1 tablet (1 mg total) by mouth once daily. 90 tablet 3    aspirin (ECOTRIN) 81 MG EC tablet Take 1 tablet by mouth once daily. Pt back on asa      aspirin-acetaminophen-caffeine 250-250-65 mg (HEADACHE RELIEF, ASA-ACET-CAF,) 250-250-65 mg per tablet Take 1 tablet by mouth every 6 to 8 hours as needed.       budesonide-formoterol 160-4.5 mcg (SYMBICORT) 160-4.5 mcg/actuation HFAA Inhale 2 puffs into the lungs every 12 (twelve) hours. Controller 10.2 g 6    buPROPion (WELLBUTRIN) 100 MG tablet Take 1 tablet (100 mg total) by mouth 3 (three) times daily. 270 tablet 3    calcium carbonate-vitamin D3 (CALCIUM 600 + D,3,) 600-125 mg-unit Tab Take 2 tablets by mouth once daily. 180 tablet 3    celecoxib (CELEBREX) 200 MG capsule Take 1 capsule (200 mg total) by mouth once daily. 90 capsule 1    cetirizine (ZYRTEC) 10 MG tablet Take 1 tablet (10 mg total) by mouth once daily. 90 tablet 5    conjugated estrogens (PREMARIN) vaginal cream Place 0.5 g vaginally twice a week. 30 g 5    cyclobenzaprine (FLEXERIL) 10 MG tablet Take 1 tablet (10 mg total) by mouth nightly. 90 tablet 3    doxepin (SINEQUAN) 25 MG capsule Take 2 capsules (50 mg total) by mouth nightly. 180 capsule 3    fluconazole (DIFLUCAN) 150 MG Tab Take 150 mg by mouth once.      fluticasone propionate (FLONASE) 50 mcg/actuation nasal spray 1 spray (50 mcg total) by Each Nostril route once daily. 18.2 mL 2    hydroCHLOROthiazide (HYDRODIURIL) 25 MG tablet Take 1 tablet (25 mg total) by mouth once daily. 90 tablet 3    LUMIGAN 0.01 % Drop Place into both eyes.      montelukast (SINGULAIR) 10 mg tablet Take 1 tablet (10 mg total) by mouth once daily. 90 tablet 3    nebivoloL (BYSTOLIC) 10 MG Tab TAKE 1 TABLET BY MOUTH ONCE DAILY 90 tablet 3    ondansetron (ZOFRAN) 4 MG tablet Take 1 tablet (4 mg total) by mouth every 8 (eight) hours as needed for Nausea. 15 tablet 2    oxyCODONE-acetaminophen (PERCOCET) 7.5-325 mg per tablet Take 1 tablet by mouth 3 (three) times daily as needed.      pantoprazole (PROTONIX) 40 MG tablet Take 1 tablet (40 mg total) by mouth once daily. 90 tablet 3    pseudoephedrine (SUDAFED) 120 mg 12 hr tablet Take 120 mg by mouth every 12 (twelve) hours.      sulfamethoxazole-trimethoprim 800-160mg (BACTRIM DS) 800-160 mg Tab Take 1 tablet by mouth  "2 (two) times daily. for 10 days 20 tablet 0    travoprost (TRAVATAN Z) 0.004 % ophthalmic solution travoprost 0.004 % eye drops   INSTILL 1 DROP IN BOTH EYES DAILY AT BEDTIME       No current facility-administered medications for this visit.     Review of patient's allergies indicates:  No Known Allergies    Review of Systems   Musculoskeletal:  Positive for arthralgias and joint swelling.   Skin:  Positive for color change.   All other systems reviewed and are negative.    Objective:      Vitals:    03/29/23 0919   BP: 138/76   Pulse: 70   Weight: 80.7 kg (178 lb)   Height: 5' 4" (1.626 m)     Physical Exam  Vitals and nursing note reviewed.   Constitutional:       Appearance: Normal appearance.   Cardiovascular:      Pulses:           Dorsalis pedis pulses are 1+ on the right side and 1+ on the left side.        Posterior tibial pulses are 0 on the right side and 0 on the left side.   Pulmonary:      Effort: Pulmonary effort is normal.   Musculoskeletal:         General: Swelling and tenderness present.      Right foot: Decreased range of motion. Deformity and bunion present.      Left foot: Deformity present.        Feet:    Feet:      Right foot:      Protective Sensation: 2 sites tested.  2 sites sensed.      Skin integrity: Erythema and warmth present.      Toenail Condition: Right toenails are ingrown.      Left foot:      Protective Sensation: 2 sites tested.  2 sites sensed.   Skin:     Capillary Refill: Capillary refill takes more than 3 seconds.      Findings: Erythema present.   Neurological:      General: No focal deficit present.      Mental Status: She is alert.   Psychiatric:         Mood and Affect: Mood normal.         Behavior: Behavior normal.                                        Assessment:       1. Ingrown nail          Plan:       Patient presents today follow-up of a painfully ingrown toenail 3rd digit right with noted infection.  Patient is currently taking Bactrim and states she has 1-2 " days left she states the area is feeling better.  On evaluation patient has a significant reduction in previously noted erythema and edema 3rd digit right clearly the infection is being well controlled I am going to have her finish taking her antibiotics as directed I was able to trim and remove additional nail from the lateral border 3rd digit right which gave the patient considerable relief bacitracin ointment and a light dressing applied follow-up will be as needed patient advised this should be completely pain-free in a few days if not or should it become red swollen or painful she is to contact us immediately.  This note was created using [employer] voice recognition software that occasionally misinterpreted phrases or words.

## 2023-04-03 ENCOUNTER — PATIENT MESSAGE (OUTPATIENT)
Dept: FAMILY MEDICINE | Facility: CLINIC | Age: 68
End: 2023-04-03
Payer: MEDICARE

## 2023-04-11 ENCOUNTER — OFFICE VISIT (OUTPATIENT)
Dept: SURGERY | Facility: CLINIC | Age: 68
End: 2023-04-11
Payer: MEDICARE

## 2023-04-11 VITALS
HEART RATE: 78 BPM | DIASTOLIC BLOOD PRESSURE: 84 MMHG | SYSTOLIC BLOOD PRESSURE: 160 MMHG | OXYGEN SATURATION: 97 % | WEIGHT: 180 LBS | BODY MASS INDEX: 30.73 KG/M2 | HEIGHT: 64 IN

## 2023-04-11 DIAGNOSIS — C50.911 HORMONE RECEPTOR POSITIVE MALIGNANT NEOPLASM OF RIGHT BREAST: Primary | ICD-10-CM

## 2023-04-11 PROCEDURE — 99999 PR PBB SHADOW E&M-EST. PATIENT-LVL V: ICD-10-PCS | Mod: PBBFAC,,, | Performed by: SURGERY

## 2023-04-11 PROCEDURE — 99024 PR POST-OP FOLLOW-UP VISIT: ICD-10-PCS | Mod: POP,,, | Performed by: SURGERY

## 2023-04-11 PROCEDURE — 99215 OFFICE O/P EST HI 40 MIN: CPT | Mod: PBBFAC | Performed by: SURGERY

## 2023-04-11 PROCEDURE — 99024 POSTOP FOLLOW-UP VISIT: CPT | Mod: POP,,, | Performed by: SURGERY

## 2023-04-11 PROCEDURE — 99999 PR PBB SHADOW E&M-EST. PATIENT-LVL V: CPT | Mod: PBBFAC,,, | Performed by: SURGERY

## 2023-04-11 NOTE — PROGRESS NOTES
Doing well since last visit.  Radiation induced changes to the right breast significantly improved.  No recurrent erythema.  Some leather appearance as to be expected.  No evidence of infection.  No evidence of recurrence.  Doing well.  Follow-up surgery clinic 3 months

## 2023-04-12 ENCOUNTER — PATIENT MESSAGE (OUTPATIENT)
Dept: ADMINISTRATIVE | Facility: HOSPITAL | Age: 68
End: 2023-04-12
Payer: MEDICARE

## 2023-04-18 ENCOUNTER — PATIENT OUTREACH (OUTPATIENT)
Dept: ADMINISTRATIVE | Facility: HOSPITAL | Age: 68
End: 2023-04-18
Payer: MEDICARE

## 2023-05-30 DIAGNOSIS — I10 HYPERTENSION, UNSPECIFIED TYPE: ICD-10-CM

## 2023-05-30 NOTE — TELEPHONE ENCOUNTER
LOV:2/23/23 You    NOV: 8/17/23 You      Preffered Pharmacy:   [facility] [place] [place] - [address]

## 2023-05-31 ENCOUNTER — OFFICE VISIT (OUTPATIENT)
Dept: FAMILY MEDICINE | Facility: CLINIC | Age: 68
End: 2023-05-31
Payer: MEDICARE

## 2023-05-31 ENCOUNTER — TELEPHONE (OUTPATIENT)
Dept: FAMILY MEDICINE | Facility: CLINIC | Age: 68
End: 2023-05-31
Payer: MEDICAID

## 2023-05-31 VITALS
HEIGHT: 64 IN | SYSTOLIC BLOOD PRESSURE: 132 MMHG | WEIGHT: 181.88 LBS | HEART RATE: 82 BPM | BODY MASS INDEX: 31.05 KG/M2 | DIASTOLIC BLOOD PRESSURE: 80 MMHG | OXYGEN SATURATION: 97 % | TEMPERATURE: 97 F

## 2023-05-31 DIAGNOSIS — D05.11 DUCTAL CARCINOMA IN SITU (DCIS) OF RIGHT BREAST: ICD-10-CM

## 2023-05-31 DIAGNOSIS — J44.9 CHRONIC OBSTRUCTIVE PULMONARY DISEASE, UNSPECIFIED COPD TYPE: ICD-10-CM

## 2023-05-31 DIAGNOSIS — I10 PRIMARY HYPERTENSION: ICD-10-CM

## 2023-05-31 DIAGNOSIS — N89.8 VAGINAL ITCHING: Primary | ICD-10-CM

## 2023-05-31 DIAGNOSIS — R11.0 NAUSEA: ICD-10-CM

## 2023-05-31 PROCEDURE — 99214 OFFICE O/P EST MOD 30 MIN: CPT | Mod: ,,,

## 2023-05-31 PROCEDURE — 99214 PR OFFICE/OUTPT VISIT, EST, LEVL IV, 30-39 MIN: ICD-10-PCS | Mod: ,,,

## 2023-05-31 PROCEDURE — 81514 NFCT DS BV&VAGINITIS DNA ALG: CPT

## 2023-05-31 RX ORDER — NEBIVOLOL 10 MG/1
TABLET ORAL
Qty: 90 TABLET | Refills: 3 | Status: SHIPPED | OUTPATIENT
Start: 2023-05-31 | End: 2023-11-30 | Stop reason: ALTCHOICE

## 2023-05-31 RX ORDER — PREDNISONE 20 MG/1
40 TABLET ORAL DAILY
Qty: 10 TABLET | Refills: 0 | Status: SHIPPED | OUTPATIENT
Start: 2023-05-31 | End: 2023-06-05

## 2023-05-31 RX ORDER — AMOXICILLIN AND CLAVULANATE POTASSIUM 875; 125 MG/1; MG/1
1 TABLET, FILM COATED ORAL 2 TIMES DAILY
Qty: 20 TABLET | Refills: 0 | Status: SHIPPED | OUTPATIENT
Start: 2023-05-31 | End: 2023-06-10

## 2023-05-31 RX ORDER — ALBUTEROL SULFATE 90 UG/1
2 AEROSOL, METERED RESPIRATORY (INHALATION) EVERY 4 HOURS PRN
Qty: 18 G | Refills: 6 | Status: SHIPPED | OUTPATIENT
Start: 2023-05-31 | End: 2023-09-13

## 2023-05-31 RX ORDER — ONDANSETRON 4 MG/1
4 TABLET, FILM COATED ORAL EVERY 8 HOURS PRN
Qty: 30 TABLET | Refills: 2 | Status: SHIPPED | OUTPATIENT
Start: 2023-05-31

## 2023-05-31 NOTE — PROGRESS NOTES
Subjective:       Patient ID: Laxmi Chand is a 67 y.o. female.    Chief Complaint: Sinus Problem (Pressure and pain.), Cough (Productive green , with wheezing and sob. No fever), and Vaginal Discharge (With itching. Ongoing x 5-6 weeks. Not resolved with last medication given)    Patient presents to the clinic with complaint of sinus problem and vaginal discharge.     Cough- started 2-3 weeks ago. Symptoms include cough, SOB, wheezing. Denies fever. States cough is productive with green sputum. Has been taking her inhalers without relief.     Vaginal itching with discharge- states has been on going for 5-6 weeks. States she was treated with Diflucan x 2 doses and this did not help.     Has no other complaints or concerns today.     Patient educated on plan of care, verbalized understanding.       Review of Systems   Constitutional:  Negative for activity change, appetite change, chills, diaphoresis and fever.   HENT:  Positive for congestion. Negative for ear pain, postnasal drip, sinus pressure, sneezing and sore throat.    Eyes:  Negative for pain, discharge, redness and itching.   Respiratory:  Positive for cough, shortness of breath and wheezing. Negative for apnea and chest tightness.    Cardiovascular:  Negative for chest pain and leg swelling.   Gastrointestinal:  Negative for abdominal distention, abdominal pain, constipation, diarrhea, nausea and vomiting.   Genitourinary:  Positive for vaginal discharge. Negative for difficulty urinating, dysuria, flank pain and frequency.        Vaginal discharge   Skin:  Negative for color change, rash and wound.   Neurological:  Negative for dizziness.   All other systems reviewed and are negative.    Patient Active Problem List   Diagnosis    Allergic rhinitis    Chronic obstructive lung disease    Degeneration of lumbar intervertebral disc    Depressive disorder    Gastroesophageal reflux disease    History of smoking    Hyperlipidemia    Hypertensive disorder     Status post total left knee replacement    Chronic pain syndrome    Pleuritic pain    Ductal carcinoma in situ (DCIS) of right breast    Senile osteopenia    History of endometrial cancer    Invasive ductal carcinoma of breast, female, right    Long term (current) use of aromatase inhibitors    Atherosclerosis of aorta    Calcified granuloma of lung    Urge incontinence of urine    Ingrown nail       Objective:      Physical Exam  Vitals and nursing note reviewed.   Constitutional:       General: She is not in acute distress.     Appearance: Normal appearance. She is well-developed.   HENT:      Head: Normocephalic.      Nose: Nose normal.   Eyes:      Conjunctiva/sclera: Conjunctivae normal.      Pupils: Pupils are equal, round, and reactive to light.   Cardiovascular:      Rate and Rhythm: Normal rate and regular rhythm.      Heart sounds: Normal heart sounds.   Pulmonary:      Effort: Pulmonary effort is normal. No respiratory distress.      Breath sounds: Wheezing present.   Musculoskeletal:      Cervical back: Normal range of motion and neck supple.   Skin:     General: Skin is warm and dry.      Findings: No rash.   Neurological:      Mental Status: She is alert and oriented to person, place, and time.   Psychiatric:         Behavior: Behavior normal.       Lab Results   Component Value Date    WBC 6.01 02/03/2023    HGB 12.5 02/03/2023    HCT 38.8 02/03/2023     02/03/2023    CHOL 203 (H) 05/10/2022    TRIG 101 05/10/2022    HDL 50 05/10/2022    ALT 19 02/03/2023    AST 16 02/03/2023     02/03/2023    K 3.6 02/03/2023     02/03/2023    CREATININE 0.8 02/03/2023    BUN 12 02/03/2023    CO2 30 (H) 02/03/2023    TSH 1.040 05/10/2022     The 10-year ASCVD risk score (Mihaela FLOWER, et al., 2019) is: 17.3%    Values used to calculate the score:      Age: 67 years      Sex: Female      Is Non- : No      Diabetic: No      Tobacco smoker: Yes      Systolic Blood Pressure: 132  "mmHg      Is BP treated: Yes      HDL Cholesterol: 50 mg/dL      Total Cholesterol: 203 mg/dL  Visit Vitals  /80 (BP Location: Left arm, Patient Position: Sitting, BP Method: Medium (Manual))   Pulse 82   Temp 97 °F (36.1 °C) (Temporal)   Ht 5' 4" (1.626 m)   Wt 82.5 kg (181 lb 14.1 oz)   SpO2 97%   BMI 31.22 kg/m²      Assessment:       1. Vaginal itching    2. Chronic obstructive pulmonary disease, unspecified COPD type    3. Nausea    4. Primary hypertension    5. Ductal carcinoma in situ (DCIS) of right breast        Plan:       1. Vaginal itching  -     Vaginosis Screen by DNA Probe    2. Chronic obstructive pulmonary disease, unspecified COPD type  -     albuterol (PROVENTIL/VENTOLIN HFA) 90 mcg/actuation inhaler; Inhale 2 puffs into the lungs every 4 (four) hours as needed for Wheezing or Shortness of Breath.  Dispense: 18 g; Refill: 6  -     amoxicillin-clavulanate 875-125mg (AUGMENTIN) 875-125 mg per tablet; Take 1 tablet by mouth 2 (two) times daily. for 10 days  Dispense: 20 tablet; Refill: 0  -     predniSONE (DELTASONE) 20 MG tablet; Take 2 tablets (40 mg total) by mouth once daily. for 5 days  Dispense: 10 tablet; Refill: 0   - Continue albuterol PRN and Duoneb nebulizer PRN   - The diagnosis, treatment plan, instructions for follow-up and reevaluation as well as ED precautions were discussed and understanding was verbalized. All questions or concerns have been addressed.     3. Nausea  -     ondansetron (ZOFRAN) 4 MG tablet; Take 1 tablet (4 mg total) by mouth every 8 (eight) hours as needed for Nausea.  Dispense: 30 tablet; Refill: 2    4. Primary hypertension   - Stable-Continue Bystolic and HCTZ   - Continue current plan of care    5. Ductal carcinoma in situ (DCIS) of right breast   - Stable-Continue Anastrozole   - Continue current plan of care   - Follow up with Oncology- Dr. Khoobehi       Follow up if symptoms worsen or fail to improve.      Future Appointments       Date Provider " Specialty Appt Notes    7/11/2023 Jatin Gutierrez MD General Surgery 3 mth f/u    8/4/2023  Lab hemonc    8/7/2023 Mell Veloz NP Hematology and Oncology [facility]/6mfu    8/17/2023 Zoraida Orta NP Family Medicine 6 month follow up COPD, HTN    2/21/2024 Zoraida Orta NP Family Medicine [facility]

## 2023-05-31 NOTE — TELEPHONE ENCOUNTER
----- Message from Jennifer Ross sent at 5/31/2023  8:49 AM CDT -----  Contact: LISA VELEZ [phone]  Type:  Same Day Appointment Request    Caller is requesting a same day appointment.  Caller declined first available appointment listed below.      Name of Caller:  LISA VELEZ    When is the first available appointment?  06-01-23    Symptoms:  Yeast Infection / Sinus Infection     Best Call Back Number:  [phone] (home)     Additional Information:   Patient is calling office to speak with nurse/MA to schedule same day appointment. Patient complains of Yeast Infection and Sinus Infection .   Please call back and advise. Thanks.

## 2023-05-31 NOTE — TELEPHONE ENCOUNTER
----- Message from Jennifer Ross sent at 5/31/2023  8:49 AM CDT -----  Contact: LISA VELEZ [phone]  Type:  Same Day Appointment Request    Caller is requesting a same day appointment.  Caller declined first available appointment listed below.      Name of Caller:  LISA VELEZ    When is the first available appointment?  06-01-23    Symptoms:  Yeast Infection / Sinus Infection     Best Call Back Number:  [phone] (home)     Additional Information:   Patient is calling office to speak with nurse/MA to schedule same day appointment. Patient complains of Yeast Infection and Sinus Infection .   Please call back and advise. Thanks.            DVT (deep venous thrombosis)    HTN (hypertension)    Vertigo

## 2023-06-01 LAB
BACTERIAL VAGINOSIS DNA: NEGATIVE
CANDIDA GLABRATA DNA: NEGATIVE
CANDIDA KRUSEI DNA: NEGATIVE
CANDIDA RRNA VAG QL PROBE: NEGATIVE
T VAGINALIS RRNA GENITAL QL PROBE: NEGATIVE

## 2023-06-13 ENCOUNTER — TELEPHONE (OUTPATIENT)
Dept: FAMILY MEDICINE | Facility: CLINIC | Age: 68
End: 2023-06-13
Payer: MEDICAID

## 2023-06-13 NOTE — TELEPHONE ENCOUNTER
----- Message from Geraldine Enamorado sent at 6/13/2023  3:45 PM CDT -----  Contact: pt  Type:  RX Refill Request    Who Called:  pt  Refill or New Rx:  refill  RX Name and Strength:  nebivoloL (BYSTOLIC) 10 MG Tab 90 tablet 3 5/31/2023  No  Sig: TAKE 1 TABLET BY MOUTH ONCE DAILY      How is the patient currently taking it? (ex. 1XDay):  as order  Is this a 30 day or 90 day RX:  as order  [facility] with phone number[facility] [place] [facility] [address] [facility]  [address] [address]  Phone: [phone] Fax: [phone]      Local or Mail Order:  local  Ordering Provider:  janessa Elena Call Back Number:  [phone]  Additional Information:

## 2023-06-13 NOTE — TELEPHONE ENCOUNTER
Patient is requesting refills on bystolic prescription - this was actually just refilled 5/31/23 for 1 year supply to [facility] pharmacy. Called patient to make aware - no answer; left VM requesting phone call back.

## 2023-06-13 NOTE — TELEPHONE ENCOUNTER
Received phone call back from patient - relayed information to her that this has already been submitted to pharmacy in May. Patient verbalized understanding and will check with pharmacy.

## 2023-06-26 ENCOUNTER — OFFICE VISIT (OUTPATIENT)
Dept: RADIATION ONCOLOGY | Facility: CLINIC | Age: 68
End: 2023-06-26
Payer: MEDICARE

## 2023-06-26 VITALS
BODY MASS INDEX: 30.9 KG/M2 | WEIGHT: 181 LBS | HEART RATE: 84 BPM | HEIGHT: 64 IN | DIASTOLIC BLOOD PRESSURE: 92 MMHG | SYSTOLIC BLOOD PRESSURE: 172 MMHG | OXYGEN SATURATION: 97 % | RESPIRATION RATE: 19 BRPM | TEMPERATURE: 98 F

## 2023-06-26 DIAGNOSIS — C50.411 MALIGNANT NEOPLASM OF UPPER-OUTER QUADRANT OF RIGHT BREAST IN FEMALE, ESTROGEN RECEPTOR POSITIVE: Primary | ICD-10-CM

## 2023-06-26 DIAGNOSIS — Z17.0 MALIGNANT NEOPLASM OF UPPER-OUTER QUADRANT OF RIGHT BREAST IN FEMALE, ESTROGEN RECEPTOR POSITIVE: Primary | ICD-10-CM

## 2023-06-26 PROCEDURE — 99214 OFFICE O/P EST MOD 30 MIN: CPT | Mod: S$GLB,,, | Performed by: RADIOLOGY

## 2023-06-26 PROCEDURE — 99214 PR OFFICE/OUTPT VISIT, EST, LEVL IV, 30-39 MIN: ICD-10-PCS | Mod: S$GLB,,, | Performed by: RADIOLOGY

## 2023-06-26 NOTE — PROGRESS NOTES
Laxmi Chand  [id number]  1955    DIAGNOSIS:  Cancer Staging   Invasive ductal carcinoma of breast, female, right  Staging form: Breast, AJCC 8th Edition  - Pathologic: Stage IA (pT1a, pN0, cM0, G1, ER+, VA-, HER2+) - Signed by Mark Ramos Jr., MD on 10/5/2022      REASON FOR VISIT: Routine scheduled follow-up.     HISTORY OF PRESENT ILLNESS:   Laxmi Chand is a 67 y.o. postmenopausal  female with (-)FHx of BCa and past medical history of endometrial ca s/p hysterectomy 40yrs ago who presented with abnormal screening mammogram noting new calcifications in the upper outer quadrant of the right breast confirmed on diagnostic mammogram.  Core needle biopsy returned high-grade DCIS, solid pattern; ER+ %, VA(-).      She underwent lumpectomy and ALND with Dr. Gutierrez, 2022:              - 2 mm grade 1 (5/9) invasive ductal carcinoma; LVSI (-); ER+ 95%, VA(-), HER2 3+              - 1.5 cm grade 3 DCIS, solid pattern without necrosis              - invasive disease margin (-) 9 mm deep; DCIS margin 2 mm anterior              - poor mapping; ALND: 0/10 LNs     BRCA1,2 (-)    She then completed adj WBRT @ 4050 cGy in 15 fxns to the right breast followed by 1000 cGy boost to lumpectomy cavity on 2022, and was rx'd adj AI tx as well.    INTERVAL HISTORY:     The patient tolerated and has recovered from her adj RT very well.  She continues adjuvant endocrine therapy via AI w/o issues/compalints.       Dx MMG w/ U/S (23):          REVIEW OF SYSTEMS:  A complete review of systems was performed and the patient denies any acute concerns/changes other than per HPI    Past Medical History:   Diagnosis Date    Breast cancer in female 2022    IDC with high grade DCIS    COPD (chronic obstructive pulmonary disease)     Degeneration of lumbar intervertebral disc     Endometrial cancer     GERD (gastroesophageal reflux disease)     HTN (hypertension)     Hyperlipemia, mixed       Past Surgical History:   Procedure Laterality Date    BIOPSY OF AXILLARY NODE Right 9/7/2022    Procedure: BIOPSY, LYMPH NODE, AXILLARY;  Surgeon: Jatin Gutierrez MD;  Location: [facility] OR;  Service: General;  Laterality: Right;    BREAST BIOPSY Right 08/05/2022    BREAST MASS EXCISION Right 9/7/2022    Procedure: EXCISION, MASS, BREAST;  Surgeon: Jatin Gutierrez MD;  Location: [facility] OR;  Service: General;  Laterality: Right;  wire localized partial mastectomy right breast with margins     CARPAL TUNNEL RELEASE  1985    CHOLECYSTECTOMY  1978    HYSTERECTOMY      KNEE SURGERY Left 06/01/2020    LUMBAR DISC SURGERY  01/01/1990    plate and roland    LUMBAR FUSION  2011    TOTAL KNEE REPLACEMENT USING COMPUTER NAVIGATION Left 02/15/2021     Social History     Socioeconomic History    Marital status: Significant Other   Occupational History    Occupation: disabled   Tobacco Use    Smoking status: Former     Types: Vaping with nicotine    Smokeless tobacco: Never   Substance and Sexual Activity    Alcohol use: Yes    Drug use: Not Currently    Sexual activity: Yes     Partners: Male   Social History Narrative    Plans from [facility]         [facility]     Annual    urinary tract infection    yeast infection        Visit Summary    No pap per guidelines    U/A for culture    Wet prep: yeast only    Pt has a PCP    Colonoscopy up to date    Mammo @ [facility]        Prescriptions:    SIG: Cipro 500 mg oral tablet, 3 days, Dispense #6 Tablet, 0 Refills    Directions: Take 1 oral tablet 2 times a day    SIG: Diflucan 100 mg oral tablet, 3 days, Dispense #3 Tablet, 0 Refills    Directions: Take 1 oral tablet once a day        Return for follow up appointment in one year or prn.     GYN Visit & [id number] [facility]/4/2018     Vulvar Cyst: Resolved        Visit Summary    Normal external gyn exam. Cyst resolved        Return for follow up appointment annual/prn.     GYN Visit & [id number]  [facility]/24/[facility]     Chronic Vaginitis    Paratubal cysts: stable        Visit Summary    Wet prep: mostly yeast, few clue cells    Pt soaks in bath BID, stop, shower only, no douching.    u/s performed today. unchanged from last scan.        Prescriptions: Clindesse sample given    SIG: Diflucan 100 mg oral tablet, 3 days, Dispense #3 Tablet, 0 Refills    Directions: Take 1 oral tablet once a day    Recommend probiotics        Return for follow up appointment for annual or prn. MDL swab at next appt if needed.    Mammo up to date.     [id number]     Recurrent bacterial vaginosis.  Paratubal cysts.  Dysuria.    Repeat transvaginal ultrasound 6 weeks.        Visit Summary    Ordered:    Urine Culture, Routine     [id number]     path compound melanocytic nevus...ch us...of pelvis....rtc 1y pap     [id number]     3 moles removed from back 5 mm each...same contwainer monsels applied...    one mole removed from under each breasxt 5 mm...mnonsels applied...each one sent to path sepreatelyt..        vag dc bv...sp metrogel...no family h/o breast ca...        pelvic us...complex cyst 2.23 cm on left w possible excrescence and septation        pelvic us for complex cyst at [facility]rtc 1wk     [id number]     Chronic Bacterial Vaginitis    Chronic Back Pain    Left Lower Quadrant resolved, possible ruptured ovarian cyst        Visit Summary    MDL swab done        Return for follow up appointment in 2 months for follow up pelvic u/s.     GYN Exam/[id number] 20164/6/2017     Annual    Vaginal odor, Bacterial Vaginosis    Ovarian Cyst        Visit Summary    Pap done, reports hx of cervical pre-cancer        Prescriptions:    SIG: metronidazole 500 mg tablet, 7 days, Dispense #14 Tablet, 0 Refills    Directions: Take 1 oral tablet 2 times a day. No alcohol discussed.        U/S today, reports had ovarian cyst on CT scan.     FSH today        Return for follow up appointment  pending results.     Family History   Problem Relation Age of Onset    Hypertension Mother     Diabetes Mother     Hypertension Father     Lung cancer Maternal Grandfather     Breast cancer Neg Hx      Medication List with Changes/Refills   Current Medications    ALBUTEROL (PROVENTIL/VENTOLIN HFA) 90 MCG/ACTUATION INHALER    Inhale 2 puffs into the lungs every 4 (four) hours as needed for Wheezing or Shortness of Breath.    ALBUTEROL-IPRATROPIUM (DUO-NEB) 2.5 MG-0.5 MG/3 ML NEBULIZER SOLUTION    INHALE CONTENTS OF 1 VIAL BY MOUTH WITH NEBULIZER EVERY 6 HOURS WHILE AWAKE    ANASTROZOLE (ARIMIDEX) 1 MG TAB    Take 1 tablet (1 mg total) by mouth once daily.    ASPIRIN (ECOTRIN) 81 MG EC TABLET    Take 1 tablet by mouth once daily. Pt back on asa    ASPIRIN-ACETAMINOPHEN-CAFFEINE 250-250-65 MG (HEADACHE RELIEF, ASA-ACET-CAF,) 250-250-65 MG PER TABLET    Take 1 tablet by mouth every 6 to 8 hours as needed.    BUDESONIDE-FORMOTEROL 160-4.5 MCG (SYMBICORT) 160-4.5 MCG/ACTUATION HFAA    Inhale 2 puffs into the lungs every 12 (twelve) hours. Controller    BUPROPION (WELLBUTRIN) 100 MG TABLET    Take 1 tablet (100 mg total) by mouth 3 (three) times daily.    CALCIUM CARBONATE-VITAMIN D3 (CALCIUM 600 + D,3,) 600-125 MG-UNIT TAB    Take 2 tablets by mouth once daily.    CELECOXIB (CELEBREX) 200 MG CAPSULE    Take 1 capsule (200 mg total) by mouth once daily.    CETIRIZINE (ZYRTEC) 10 MG TABLET    Take 1 tablet (10 mg total) by mouth once daily.    CONJUGATED ESTROGENS (PREMARIN) VAGINAL CREAM    Place 0.5 g vaginally twice a week.    CYCLOBENZAPRINE (FLEXERIL) 10 MG TABLET    Take 1 tablet (10 mg total) by mouth nightly.    DOXEPIN (SINEQUAN) 25 MG CAPSULE    Take 2 capsules (50 mg total) by mouth nightly.    FLUCONAZOLE (DIFLUCAN) 150 MG TAB    TAKE 1 TABLET (150 MG TOTAL) BY MOUTH ONCE DAILY. IF SYMPTOMS PERSIST FOR 3 DAYS REPEAT DOSE ON DAY 4 FOR 1 DAY    FLUTICASONE  "PROPIONATE (FLONASE) 50 MCG/ACTUATION NASAL SPRAY    1 spray (50 mcg total) by Each Nostril route once daily.    HYDROCHLOROTHIAZIDE (HYDRODIURIL) 25 MG TABLET    Take 1 tablet (25 mg total) by mouth once daily.    LUMIGAN 0.01 % DROP    Place into both eyes.    MONTELUKAST (SINGULAIR) 10 MG TABLET    Take 1 tablet (10 mg total) by mouth once daily.    NEBIVOLOL (BYSTOLIC) 10 MG TAB    TAKE 1 TABLET BY MOUTH ONCE DAILY    ONDANSETRON (ZOFRAN) 4 MG TABLET    Take 1 tablet (4 mg total) by mouth every 8 (eight) hours as needed for Nausea.    OXYCODONE-ACETAMINOPHEN (PERCOCET) 7.5-325 MG PER TABLET    Take 1 tablet by mouth 3 (three) times daily as needed.    PANTOPRAZOLE (PROTONIX) 40 MG TABLET    Take 1 tablet (40 mg total) by mouth once daily.    PSEUDOEPHEDRINE (SUDAFED) 120 MG 12 HR TABLET    Take 120 mg by mouth every 12 (twelve) hours.    TRAVOPROST (TRAVATAN Z) 0.004 % OPHTHALMIC SOLUTION    travoprost 0.004 % eye drops   INSTILL 1 DROP IN BOTH EYES DAILY AT BEDTIME     Review of patient's allergies indicates:  No Known Allergies    QUALITY OF LIFE: 90%- Able to Carry on Normal Activity: Minor Symptoms of Disease    Vitals:    06/26/23 1124   BP: (!) 172/92   Pulse: 84   Resp: 19   Temp: 98.1 °F (36.7 °C)   SpO2: 97%   Weight: 82.1 kg (181 lb)   Height: 5' 4" (1.626 m)   PainSc:   4   PainLoc: Back     Body mass index is 31.07 kg/m².    PHYSICAL EXAM:  GENERAL: alert; in no apparent distress.   HEAD: normocephalic, atraumatic.  EYES: pupils are equal, round, reactive to light and accommodation. Sclera anicteric. Conjunctiva not injected.   NOSE/THROAT: no nasal erythema or rhinorrhea. Oropharynx pink, without erythema, ulcerations or thrush.   NECK: no cervical motion rigidity; supple with no masses.  CHEST: clear to auscultation bilaterally; no wheezes, crackles or rubs. Patient is speaking comfortably on room air with normal work of breathing without using accessory muscles of respiration.  CARDIOVASCULAR: " regular rate and rhythm; no murmurs, rubs or gallops.  ABDOMEN: soft, nontender, nondistended. Bowel sounds present.   MUSCULOSKELETAL: no tenderness to palpation along the spine or scapulae. Normal range of motion.  NEUROLOGIC: cranial nerves II-XII intact bilaterally. Strength 5/5 in bilateral upper and lower extremities. No sensory deficits appreciated. Reflexes globally intact. No cerebellar signs. Normal gait.  LYMPHATIC: no cervical, supraclavicular or axillary adenopathy appreciated bilaterally.   EXTREMITIES: no clubbing, cyanosis, edema.  SKIN: no erythema, rashes, ulcerations noted.   BREAST:  The bilateral breasts were examined in the upright and supine position. The breasts were grossly symmetrical in terms of size and configuration. The right breast lumpectomy and axillary incisions appeared well healed w/o inflammation or significant seroma with moderate fibrosis / post treatment effect. Palpation revealed soft, pliable tissue of both breasts without any discrete lesions or masses. The right and left axillae did not exhibit any evidence of lymphadenopathy. No nipple retraction/inversion or discharge appreciated.      ASSESSMENT: Laxmi Chand is a 67 y.o. female with h/o stage IA, [id number] g1 IDC UOQ R breast, ER+/MO(-)/HER2(+)    PLAN:   - LAKESHA per exam and imaging  - continue AI and MedOnc f/u as directed by Dr. BROWN    She verbalized understanding to continue skin care moisturizing, and he has upcoming follow-up within the next 1-3 months with medical oncology.  It was explained to her that he will require at least biannual clinical breast exams as well as annual diagnostic mammograms going forward.      She will return to clinic annually here for exams and surveillance in conjunction w/ her MedOnc and surgery teams, or earlier as requested.    All questions answered and contact information provided. Patient understands free to call us anytime with any questions or concerns regarding radiation  therapy.    I have personally seen and evaluated this patient. Greater than 50% of this time was spent discussing coordination of care and/or counseling.    PHYSICIAN: Nguyễn Hsu III, MD

## 2023-07-03 ENCOUNTER — TELEPHONE (OUTPATIENT)
Dept: FAMILY MEDICINE | Facility: CLINIC | Age: 68
End: 2023-07-03
Payer: MEDICAID

## 2023-07-03 NOTE — TELEPHONE ENCOUNTER
----- Message from Hyacinth Schwab sent at 7/3/2023  3:53 PM CDT -----  Contact: Self  Type: Needs Medical Advice  Who Called:  Patient  Symptoms (please be specific):  UTI symptoms  How long has patient had these symptoms:  2-3 days   Pharmacy name and phone #:    [id number] - [place], [place] - [address] [facility] [address] [address] [address]  [address]  [facility] [address]  Phone: [phone] Fax: [phone]  Best Call Back Number: [phone]  Additional Information: Please call pt back to advise. Thank You

## 2023-07-03 NOTE — TELEPHONE ENCOUNTER
Informed patient we can't get her in right now and her provider is out of office today, patient wanted something called into pharmacy, I suggested for her to go to urgent care, they could test her urine and give her an antibiotic, voiced understanding

## 2023-07-11 ENCOUNTER — OFFICE VISIT (OUTPATIENT)
Dept: SURGERY | Facility: CLINIC | Age: 68
End: 2023-07-11
Payer: MEDICARE

## 2023-07-11 VITALS
SYSTOLIC BLOOD PRESSURE: 165 MMHG | HEIGHT: 64 IN | DIASTOLIC BLOOD PRESSURE: 77 MMHG | BODY MASS INDEX: 30.73 KG/M2 | WEIGHT: 180 LBS | OXYGEN SATURATION: 96 % | HEART RATE: 86 BPM

## 2023-07-11 DIAGNOSIS — Z09 POSTOP CHECK: Primary | ICD-10-CM

## 2023-07-11 PROCEDURE — 99024 PR POST-OP FOLLOW-UP VISIT: ICD-10-PCS | Mod: POP,,, | Performed by: SURGERY

## 2023-07-11 PROCEDURE — 99999 PR PBB SHADOW E&M-EST. PATIENT-LVL IV: ICD-10-PCS | Mod: PBBFAC,,, | Performed by: SURGERY

## 2023-07-11 PROCEDURE — 99999 PR PBB SHADOW E&M-EST. PATIENT-LVL IV: CPT | Mod: PBBFAC,,, | Performed by: SURGERY

## 2023-07-11 PROCEDURE — 99024 POSTOP FOLLOW-UP VISIT: CPT | Mod: POP,,, | Performed by: SURGERY

## 2023-07-11 PROCEDURE — 99214 OFFICE O/P EST MOD 30 MIN: CPT | Mod: PBBFAC | Performed by: SURGERY

## 2023-07-11 NOTE — PROGRESS NOTES
Radiation induced changes to the right breast have resolved.  Still some tenderness to the right axilla however no evidence of seroma or induration or fluctuance.  Likely scar versus radiation induced trauma and nerve in salt.  From a motor standpoint however thoracodorsal and long thoracic nerves are completely intact.  Recommendation will be massages, heat pads, vitamin-E oils, etcetera to assist with pain control tried to loosen radiation induced changes as well as scar tissue from surgery to improve pain symptomatology.  Follow-up surgery clinic as needed.

## 2023-07-21 ENCOUNTER — OFFICE VISIT (OUTPATIENT)
Dept: FAMILY MEDICINE | Facility: CLINIC | Age: 68
End: 2023-07-21
Payer: MEDICARE

## 2023-07-21 VITALS
OXYGEN SATURATION: 96 % | BODY MASS INDEX: 31.67 KG/M2 | DIASTOLIC BLOOD PRESSURE: 76 MMHG | RESPIRATION RATE: 18 BRPM | HEART RATE: 82 BPM | HEIGHT: 64 IN | SYSTOLIC BLOOD PRESSURE: 132 MMHG | WEIGHT: 185.5 LBS

## 2023-07-21 DIAGNOSIS — N30.90 CYSTITIS WITHOUT HEMATURIA: ICD-10-CM

## 2023-07-21 DIAGNOSIS — R82.90 FOUL SMELLING URINE: Primary | ICD-10-CM

## 2023-07-21 DIAGNOSIS — B37.9 ANTIBIOTIC-INDUCED YEAST INFECTION: ICD-10-CM

## 2023-07-21 DIAGNOSIS — E66.9 OBESITY, CLASS I, BMI 30-34.9: ICD-10-CM

## 2023-07-21 DIAGNOSIS — I70.0 ATHEROSCLEROSIS OF AORTA: ICD-10-CM

## 2023-07-21 DIAGNOSIS — T36.95XA ANTIBIOTIC-INDUCED YEAST INFECTION: ICD-10-CM

## 2023-07-21 DIAGNOSIS — J44.1 CHRONIC OBSTRUCTIVE PULMONARY DISEASE WITH ACUTE EXACERBATION: ICD-10-CM

## 2023-07-21 DIAGNOSIS — B37.2 YEAST DERMATITIS: ICD-10-CM

## 2023-07-21 DIAGNOSIS — J84.10 CALCIFIED GRANULOMA OF LUNG: ICD-10-CM

## 2023-07-21 LAB
BACTERIA #/AREA URNS HPF: ABNORMAL /HPF
BILIRUB UR QL STRIP: NEGATIVE
BILIRUBIN, UA POC OHS: NEGATIVE
BLOOD, UA POC OHS: ABNORMAL
CLARITY UR: CLEAR
CLARITY, UA POC OHS: ABNORMAL
COLOR UR: YELLOW
COLOR, UA POC OHS: YELLOW
GLUCOSE UR QL STRIP: NEGATIVE
GLUCOSE, UA POC OHS: NEGATIVE
HGB UR QL STRIP: ABNORMAL
KETONES UR QL STRIP: NEGATIVE
KETONES, UA POC OHS: NEGATIVE
LEUKOCYTE ESTERASE UR QL STRIP: ABNORMAL
LEUKOCYTES, UA POC OHS: ABNORMAL
MICROSCOPIC COMMENT: ABNORMAL
NITRITE UR QL STRIP: NEGATIVE
NITRITE, UA POC OHS: NEGATIVE
PH UR STRIP: 6 [PH] (ref 5–8)
PH, UA POC OHS: 5
PROT UR QL STRIP: NEGATIVE
PROTEIN, UA POC OHS: NEGATIVE
RBC #/AREA URNS HPF: 5 /HPF (ref 0–4)
SP GR UR STRIP: 1.01 (ref 1–1.03)
SPECIFIC GRAVITY, UA POC OHS: 1.01
URN SPEC COLLECT METH UR: ABNORMAL
UROBILINOGEN UR STRIP-ACNC: NEGATIVE EU/DL
UROBILINOGEN, UA POC OHS: 1
WBC #/AREA URNS HPF: 15 /HPF (ref 0–5)
WBC CLUMPS URNS QL MICRO: ABNORMAL

## 2023-07-21 PROCEDURE — 87088 URINE BACTERIA CULTURE: CPT | Performed by: FAMILY MEDICINE

## 2023-07-21 PROCEDURE — 87077 CULTURE AEROBIC IDENTIFY: CPT | Performed by: FAMILY MEDICINE

## 2023-07-21 PROCEDURE — 81003 URINALYSIS AUTO W/O SCOPE: CPT | Mod: QW,RHCUB | Performed by: FAMILY MEDICINE

## 2023-07-21 PROCEDURE — 87186 SC STD MICRODIL/AGAR DIL: CPT | Performed by: FAMILY MEDICINE

## 2023-07-21 PROCEDURE — 99214 OFFICE O/P EST MOD 30 MIN: CPT | Mod: ,,, | Performed by: FAMILY MEDICINE

## 2023-07-21 PROCEDURE — 81000 URINALYSIS NONAUTO W/SCOPE: CPT | Performed by: FAMILY MEDICINE

## 2023-07-21 PROCEDURE — 99214 PR OFFICE/OUTPT VISIT, EST, LEVL IV, 30-39 MIN: ICD-10-PCS | Mod: ,,, | Performed by: FAMILY MEDICINE

## 2023-07-21 PROCEDURE — 87086 URINE CULTURE/COLONY COUNT: CPT | Performed by: FAMILY MEDICINE

## 2023-07-21 RX ORDER — FLUCONAZOLE 150 MG/1
TABLET ORAL
Qty: 3 TABLET | Refills: 1 | Status: SHIPPED | OUTPATIENT
Start: 2023-07-21 | End: 2023-09-29 | Stop reason: SDUPTHER

## 2023-07-21 RX ORDER — NYSTATIN 100000 [USP'U]/G
POWDER TOPICAL 4 TIMES DAILY
Qty: 60 G | Refills: 1 | Status: SHIPPED | OUTPATIENT
Start: 2023-07-21

## 2023-07-21 RX ORDER — CIPROFLOXACIN 500 MG/1
500 TABLET ORAL 2 TIMES DAILY
Qty: 6 TABLET | Refills: 0 | Status: SHIPPED | OUTPATIENT
Start: 2023-07-21 | End: 2023-08-17 | Stop reason: ALTCHOICE

## 2023-07-21 NOTE — PROGRESS NOTES
Subjective:       Patient ID: Laxmi Chand is a 67 y.o. female.    Chief Complaint: Pyelonephritis (PT presents to the clinic with c/o urine complaints. Pt states the symptoms having been going on for 1 week, pt states the urine smells awful and a little irritable. /Pain level 5/10)    Laxmi Chand presents to clinic today with complaints of a bladder infection.  Patient states this started about a week ago with foul-smelling urine and a little bit of bladder irritability.  Patient states she went to the Urgent Care was given Macrobid but did not get a urine culture done.  Patient states that she felt like the problem got better and then it came right back.  Patient is requesting a urine culture today as well as some different antibiotics to help with this.  Patient states when she gets antibiotics she gets a yeast infection and is requesting this today as well.  Patient states she has also been having a yeast rash under her stomach and breasts. Patient states she had nystatin powder at home which helped but needs a refill on this.   Patient educated on plan of care, verbalized understanding.       Review of Systems   Constitutional:  Negative for activity change, appetite change, chills, diaphoresis and fever.   HENT:  Negative for congestion, ear pain, postnasal drip, sinus pressure, sneezing and sore throat.    Eyes:  Negative for pain, discharge, redness and itching.   Respiratory:  Negative for apnea, cough, chest tightness, shortness of breath and wheezing.    Cardiovascular:  Negative for chest pain and leg swelling.   Gastrointestinal:  Negative for abdominal distention, abdominal pain, constipation, diarrhea, nausea and vomiting.   Genitourinary:  Positive for dysuria, flank pain and frequency. Negative for difficulty urinating.   Skin:  Negative for color change, rash and wound.   Neurological:  Negative for dizziness.     Patient Active Problem List   Diagnosis    Allergic rhinitis    Chronic  obstructive lung disease    Degeneration of lumbar intervertebral disc    Depressive disorder    Gastroesophageal reflux disease    History of smoking    Hyperlipidemia    Hypertensive disorder    Status post total left knee replacement    Chronic pain syndrome    Pleuritic pain    Ductal carcinoma in situ (DCIS) of right breast    Senile osteopenia    History of endometrial cancer    Invasive ductal carcinoma of breast, female, right    Long term (current) use of aromatase inhibitors    Atherosclerosis of aorta    Calcified granuloma of lung    Urge incontinence of urine    Ingrown nail       Objective:      Physical Exam  Vitals reviewed.   Constitutional:       General: She is not in acute distress.     Appearance: Normal appearance. She is well-developed.   HENT:      Head: Normocephalic.      Nose: Nose normal.   Eyes:      Conjunctiva/sclera: Conjunctivae normal.      Pupils: Pupils are equal, round, and reactive to light.   Cardiovascular:      Rate and Rhythm: Normal rate.   Pulmonary:      Effort: Pulmonary effort is normal. No respiratory distress.   Abdominal:      Tenderness: There is right CVA tenderness. There is no left CVA tenderness.   Musculoskeletal:      Cervical back: Normal range of motion and neck supple.   Skin:     General: Skin is warm and dry.      Findings: No rash.   Neurological:      Mental Status: She is alert and oriented to person, place, and time.   Psychiatric:         Mood and Affect: Mood normal.         Behavior: Behavior normal.       Lab Results   Component Value Date    WBC 6.01 02/03/2023    HGB 12.5 02/03/2023    HCT 38.8 02/03/2023     02/03/2023    CHOL 203 (H) 05/10/2022    TRIG 101 05/10/2022    HDL 50 05/10/2022    ALT 19 02/03/2023    AST 16 02/03/2023     02/03/2023    K 3.6 02/03/2023     02/03/2023    CREATININE 0.8 02/03/2023    BUN 12 02/03/2023    CO2 30 (H) 02/03/2023    TSH 1.040 05/10/2022     The 10-year ASCVD risk score (Mihaela DK, et  "al., 2019) is: 10.1%    Values used to calculate the score:      Age: 67 years      Sex: Female      Is Non- : No      Diabetic: No      Tobacco smoker: No      Systolic Blood Pressure: 132 mmHg      Is BP treated: Yes      HDL Cholesterol: 50 mg/dL      Total Cholesterol: 203 mg/dL  Visit Vitals  /76 (BP Location: Left arm, Patient Position: Sitting, BP Method: Medium (Manual))   Pulse 82   Resp 18   Ht 5' 4" (1.626 m)   Wt 84.1 kg (185 lb 8.3 oz)   SpO2 96%   BMI 31.84 kg/m²      Assessment:       1. Foul smelling urine    2. Cystitis without hematuria    3. Antibiotic-induced yeast infection    4. Yeast dermatitis    5. Obesity, Class I, BMI 30-34.9        Plan:       Laxmi was seen today for pyelonephritis.    Diagnoses and all orders for this visit:    Foul smelling urine / Cystitis without hematuria  -     ciprofloxacin HCl (CIPRO) 500 MG tablet; Take 1 tablet (500 mg total) by mouth 2 (two) times daily. for 3 days  -     POCT Urinalysis(Instrument)  -     Urine culture  -     Urinalysis  Continue medications as prescribed.  Follow up with PCP     Antibiotic-induced yeast infection  -     fluconazole (DIFLUCAN) 150 MG Tab; TAKE 1 TABLET (150 MG TOTAL) BY MOUTH ONCE DAILY. IF SYMPTOMS PERSIST FOR 3 DAYS REPEAT DOSE ON DAY 4 FOR 1 DAY    Yeast dermatitis  -     nystatin (MYCOSTATIN) powder; Apply topically 4 (four) times daily.    Obesity, Class I, BMI 30-34.9  Body mass index is 31.84 kg/m².  Continue healthy diet and regular exercise as tolerated.  Continue medications as prescribed.  Follow up with PCP      Chronic obstructive pulmonary disease with acute exacerbation / Atherosclerosis of aorta / Calcified granuloma of lung  Stable  Continue medications as prescribed.  Follow up with PCP     Follow up if symptoms worsen or fail to improve.      Future Appointments       Date Provider Specialty Appt Notes    8/4/2023  Lab hemonc    8/7/2023 Mell Veloz NP Hematology and " Oncology BreastCa/Labs/6mfu    8/17/2023 Zoraida Orta NP Family Medicine 6 month follow up COPD, HTN    2/21/2024 Zoraida Orta NP Family Medicine AWV             Tests to Keep You Healthy    Mammogram: Met on 2/3/2023  Colon Cancer Screening: DUE  Last Blood Pressure <= 139/89 (7/21/2023): Yes  Tobacco Cessation: Yes

## 2023-07-21 NOTE — PATIENT INSTRUCTIONS
Lusí Hair,     If you are due for any health screening(s) below please notify me so we can arrange them to be ordered and scheduled to maintain your health. Most healthy patients complete it. Don't lose out on improving your health.     Tests to Keep You Healthy    Mammogram: Met on 2/3/2023  Colon Cancer Screening: DUE  Last Blood Pressure <= 139/89 (7/21/2023): Yes  Tobacco Cessation: Yes         Colon Cancer Screening    Of cancers affecting both men and women, colorectal cancer is the third leading cancer killer in the [place]. But it doesnt have to be. Screening can prevent colorectal cancer or find it at an early stage when treatment often leads to a cure.    A colonoscopy is the preferred test for detecting colon cancer. It is needed only once every 10 years if results are negative. While sedated, a flexible, lighted tube with a tiny camera is inserted into the rectum and advanced through the colon to look for cancers. An alternative screening test that is used at home and returned to the lab may also be used. It detects hidden blood in bowel movements which could indicate cancer in the colon. If results are positive, you will need a colonoscopy to determine if the blood is a sign of cancer. This type of follow up (diagnostic) colonoscopy usually requires additional copays as required by your insurance provider. Please contact your PCP if you have any questions.                       Thank you for allowing me to be part of your healthcare team at [facility]. It is a pleasure to care for you today.   Please take all of your medications as instructed and follow all new instructions from your visit today.  If you received labs or medical tests today you should hear information about results or scheduling either by phone or mychart within approximately a week.   If you have any questions or concerns please do not hesitate to call. Have a blessed day and I hope to see you again soon.  Zoraida Orta

## 2023-07-24 LAB — BACTERIA UR CULT: ABNORMAL

## 2023-07-25 ENCOUNTER — TELEPHONE (OUTPATIENT)
Dept: FAMILY MEDICINE | Facility: CLINIC | Age: 68
End: 2023-07-25
Payer: MEDICARE

## 2023-07-25 NOTE — TELEPHONE ENCOUNTER
"Called patient for clarification. Patient advises that she had bunion removed from right foot approximately 1 year ago. Patient reports that she had a pin placed in her foot at this time as well. Patient reports she began to notice swelling and discomfort in her right leg from the knee down into foot recently. She reports due to this she went ahead and followed up with both podiatry and "knee doctor". Patient reports she was told by both of these providers that it was not related to their specialty. Patient reports she also has redness/discoloration in her right foot toes.     Reached out to management for assistance with getting this patient scheduled for tomorrow. Will call patient back with appointment time after she is scheduled.     Patient is now scheduled for 9am tomorrow with Fouzia.     Called patient to make aware of new appointment - patient verbalized understanding.   "

## 2023-07-25 NOTE — TELEPHONE ENCOUNTER
----- Message from Zoey Herrera sent at 7/25/2023  2:41 PM CDT -----  Contact: Patient  Type:  Patient Requesting Referral    Who Called:Patient    Does the patient already have the specialty appointment scheduled?:No    If yes, what is the date of that appointment?:NA    Referral to What Specialty:Cardio    Reason for Referral:Possible blood clots    Does the patient want the referral with a specific physician?:No    Is the specialist an [facility]?:Unknown    Patient Requesting a Response?:Yes    Would the patient rather a call back or a response via [facility]? Call    Best Call Back Number:[phone] (home)     Additional Information: please call to advise

## 2023-07-26 ENCOUNTER — OFFICE VISIT (OUTPATIENT)
Dept: FAMILY MEDICINE | Facility: CLINIC | Age: 68
End: 2023-07-26
Payer: MEDICARE

## 2023-07-26 ENCOUNTER — TELEPHONE (OUTPATIENT)
Dept: FAMILY MEDICINE | Facility: CLINIC | Age: 68
End: 2023-07-26

## 2023-07-26 ENCOUNTER — LAB VISIT (OUTPATIENT)
Dept: LAB | Facility: CLINIC | Age: 68
End: 2023-07-26
Payer: MEDICARE

## 2023-07-26 VITALS
BODY MASS INDEX: 31.91 KG/M2 | SYSTOLIC BLOOD PRESSURE: 138 MMHG | OXYGEN SATURATION: 98 % | TEMPERATURE: 98 F | WEIGHT: 186.94 LBS | DIASTOLIC BLOOD PRESSURE: 82 MMHG | HEIGHT: 64 IN | HEART RATE: 78 BPM

## 2023-07-26 DIAGNOSIS — M79.89 RIGHT LEG SWELLING: Primary | ICD-10-CM

## 2023-07-26 DIAGNOSIS — M79.604 RIGHT LEG PAIN: ICD-10-CM

## 2023-07-26 DIAGNOSIS — I10 PRIMARY HYPERTENSION: ICD-10-CM

## 2023-07-26 DIAGNOSIS — M79.89 RIGHT LEG SWELLING: ICD-10-CM

## 2023-07-26 DIAGNOSIS — Z13.1 SCREENING FOR DIABETES MELLITUS (DM): ICD-10-CM

## 2023-07-26 DIAGNOSIS — J44.9 CHRONIC OBSTRUCTIVE PULMONARY DISEASE, UNSPECIFIED COPD TYPE: Primary | ICD-10-CM

## 2023-07-26 DIAGNOSIS — E66.9 OBESITY (BMI 30.0-34.9): ICD-10-CM

## 2023-07-26 DIAGNOSIS — J44.9 CHRONIC OBSTRUCTIVE PULMONARY DISEASE, UNSPECIFIED COPD TYPE: ICD-10-CM

## 2023-07-26 DIAGNOSIS — R73.09 ELEVATED GLUCOSE: ICD-10-CM

## 2023-07-26 DIAGNOSIS — L03.031 CELLULITIS OF RIGHT TOE: ICD-10-CM

## 2023-07-26 LAB
ALBUMIN SERPL BCP-MCNC: 3.5 G/DL (ref 3.5–5.2)
ALP SERPL-CCNC: 75 U/L (ref 55–135)
ALT SERPL W/O P-5'-P-CCNC: 13 U/L (ref 10–44)
ANION GAP SERPL CALC-SCNC: 10 MMOL/L (ref 8–16)
AST SERPL-CCNC: 15 U/L (ref 10–40)
BILIRUB SERPL-MCNC: 0.3 MG/DL (ref 0.1–1)
BUN SERPL-MCNC: 17 MG/DL (ref 8–23)
CALCIUM SERPL-MCNC: 9.6 MG/DL (ref 8.7–10.5)
CHLORIDE SERPL-SCNC: 99 MMOL/L (ref 95–110)
CO2 SERPL-SCNC: 29 MMOL/L (ref 23–29)
CREAT SERPL-MCNC: 0.8 MG/DL (ref 0.5–1.4)
EST. GFR  (NO RACE VARIABLE): >60 ML/MIN/1.73 M^2
ESTIMATED AVG GLUCOSE: 111 MG/DL (ref 68–131)
GLUCOSE SERPL-MCNC: 97 MG/DL (ref 70–110)
HBA1C MFR BLD: 5.5 % (ref 4–5.6)
POTASSIUM SERPL-SCNC: 3.6 MMOL/L (ref 3.5–5.1)
PROT SERPL-MCNC: 6.3 G/DL (ref 6–8.4)
SODIUM SERPL-SCNC: 138 MMOL/L (ref 136–145)
URATE SERPL-MCNC: 4.5 MG/DL (ref 2.4–5.7)

## 2023-07-26 PROCEDURE — 99214 PR OFFICE/OUTPT VISIT, EST, LEVL IV, 30-39 MIN: ICD-10-PCS | Mod: ,,,

## 2023-07-26 PROCEDURE — 83036 HEMOGLOBIN GLYCOSYLATED A1C: CPT | Performed by: FAMILY MEDICINE

## 2023-07-26 PROCEDURE — 80053 COMPREHEN METABOLIC PANEL: CPT

## 2023-07-26 PROCEDURE — 84550 ASSAY OF BLOOD/URIC ACID: CPT

## 2023-07-26 PROCEDURE — 99214 OFFICE O/P EST MOD 30 MIN: CPT | Mod: ,,,

## 2023-07-26 RX ORDER — CEPHALEXIN 500 MG/1
500 CAPSULE ORAL 4 TIMES DAILY
Qty: 28 CAPSULE | Refills: 0 | Status: SHIPPED | OUTPATIENT
Start: 2023-07-26 | End: 2023-08-16

## 2023-07-26 NOTE — PROGRESS NOTES
Subjective:       Patient ID: Laxmi Chand is a 67 y.o. female.    Chief Complaint: Edema (Right foot and leg. Seen foot two weeks ago was swollen then. )    Patient presents to the clinic with complaint of right leg swelling and second toe pain and swelling.     States right has has been swelling on and off for the last month but has worsened over the last 2 weeks.   States pain is in the right knee and second right toe. The second right toe is the toe she had a surgical pin in when she had bunion surgery last year. She saw foot surgeon 2 weeks ago. States second toe has gotten so painful, she cannot even touch it.     She has not tried compression stockings. States the swelling does not improve overnight when she lays down.     Patient educated on plan of care, verbalized understanding.              Review of Systems   Constitutional:  Negative for activity change, appetite change, chills, diaphoresis and fever.   HENT:  Negative for congestion, ear pain, postnasal drip, sinus pressure, sneezing and sore throat.    Eyes:  Negative for pain, discharge, redness and itching.   Respiratory:  Negative for apnea, cough, chest tightness, shortness of breath and wheezing.    Cardiovascular:  Positive for leg swelling. Negative for chest pain.   Gastrointestinal:  Negative for abdominal distention, abdominal pain, constipation, diarrhea, nausea and vomiting.   Genitourinary:  Negative for difficulty urinating, dysuria, flank pain and frequency.   Musculoskeletal:  Positive for arthralgias and myalgias.        Right leg and right second toe pain   Skin:  Negative for color change, rash and wound.   Neurological:  Negative for dizziness.   All other systems reviewed and are negative.    Patient Active Problem List   Diagnosis    Allergic rhinitis    Chronic obstructive lung disease    Degeneration of lumbar intervertebral disc    Depressive disorder    Gastroesophageal reflux disease    History of smoking    Hyperlipidemia     Hypertensive disorder    Status post total left knee replacement    Chronic pain syndrome    Pleuritic pain    Ductal carcinoma in situ (DCIS) of right breast    Senile osteopenia    History of endometrial cancer    Invasive ductal carcinoma of breast, female, right    Long term (current) use of aromatase inhibitors    Atherosclerosis of aorta    Calcified granuloma of lung    Urge incontinence of urine    Ingrown nail       Objective:      Physical Exam  Vitals and nursing note reviewed.   Constitutional:       General: She is not in acute distress.     Appearance: Normal appearance. She is well-developed.   HENT:      Head: Normocephalic.      Nose: Nose normal.   Eyes:      Conjunctiva/sclera: Conjunctivae normal.      Pupils: Pupils are equal, round, and reactive to light.   Cardiovascular:      Rate and Rhythm: Normal rate.   Pulmonary:      Effort: Pulmonary effort is normal. No respiratory distress.   Musculoskeletal:      Cervical back: Normal range of motion.   Skin:     General: Skin is warm and dry.      Findings: No rash.   Neurological:      Mental Status: She is alert and oriented to person, place, and time.   Psychiatric:         Behavior: Behavior normal.             Lab Results   Component Value Date    WBC 6.01 02/03/2023    HGB 12.5 02/03/2023    HCT 38.8 02/03/2023     02/03/2023    CHOL 203 (H) 05/10/2022    TRIG 101 05/10/2022    HDL 50 05/10/2022    ALT 19 02/03/2023    AST 16 02/03/2023     02/03/2023    K 3.6 02/03/2023     02/03/2023    CREATININE 0.8 02/03/2023    BUN 12 02/03/2023    CO2 30 (H) 02/03/2023    TSH 1.040 05/10/2022     The 10-year ASCVD risk score (Mihaela FLOWER, et al., 2019) is: 11%    Values used to calculate the score:      Age: 67 years      Sex: Female      Is Non- : No      Diabetic: No      Tobacco smoker: No      Systolic Blood Pressure: 138 mmHg      Is BP treated: Yes      HDL Cholesterol: 50 mg/dL      Total Cholesterol:  "203 mg/dL  Visit Vitals  /82 (BP Location: Left arm, Patient Position: Sitting, BP Method: Medium (Manual))   Pulse 78   Temp 98.4 °F (36.9 °C) (Temporal)   Ht 5' 4" (1.626 m)   Wt 84.8 kg (186 lb 15.2 oz)   SpO2 98%   BMI 32.09 kg/m²      Assessment:       1. Right leg swelling    2. Right leg pain    3. Cellulitis of right toe    4. Chronic obstructive pulmonary disease, unspecified COPD type    5. Primary hypertension        Plan:       1. Right leg swelling  -     Uric acid; Future; Expected date: 07/26/2023  -     Comprehensive metabolic panel; Future; Expected date: 07/26/2023  -     US Lower Extremity Veins Right; Future; Expected date: 07/26/2023    2. Right leg pain  -     US Lower Extremity Veins Right; Future; Expected date: 07/26/2023    3. Cellulitis of right toe  -     cephALEXin (KEFLEX) 500 MG capsule; Take 1 capsule (500 mg total) by mouth 4 (four) times daily. for 7 days  Dispense: 28 capsule; Refill: 0   - The diagnosis, treatment plan, instructions for follow-up and reevaluation as well as ED precautions were discussed and understanding was verbalized. All questions or concerns have been addressed.     4. Chronic obstructive pulmonary disease, unspecified COPD type   - Stable-Continue Albuterol   - Continue current plan of care    5. Primary hypertension   - Stable-Continue HCTZ, Bystolic   - Continue current plan of care       Follow up if symptoms worsen or fail to improve.      Future Appointments       Date Provider Specialty Appt Notes    7/26/2023  Lab lab    8/1/2023 Judy Mccullough DPM Podiatry swelling from the foot to the knee, one toe is inflamed    8/3/2023  Radiology M79.89]M79.604]    8/4/2023  Lab hemonc    8/7/2023 Mell Veloz NP Hematology and Oncology BreastCa/Labs/6mfu    8/17/2023 Zoraida Orta NP Family Medicine 6 month follow up COPD, HTN    2/21/2024 Zoraida Orta NP Family Medicine AWV             "

## 2023-07-26 NOTE — TELEPHONE ENCOUNTER
----- Message from Jennifer Ross sent at 7/26/2023 10:05 AM CDT -----  Regarding: RX Refill Request  Contact: LISA VELEZ [phone]  Type:  RX Refill Request      Who Called: LISA VELEZ     Refill or New Rx:  REFILL    RX Name and Strength:  nebulizer machine    How is the patient currently taking it? (ex. 1XDay):  1X DAY     Is this a 30 day or 90 day RX:  30 DAY     Preferred Pharmacy with phone number:    [facility] - [place], [place] - [address]  [address] [address]  Phone: [phone] Fax: [phone]    [id number] - [place], [place] - [address] [facility] [address] [facility] [address]  [address]  [place] [address]  Phone: [phone] Fax: [phone]      Local or Mail Order:  LOCAL    Ordering Provider:  BENEDICTO Elena Call Back Number:  [phone] (home)       Additional Information:  Patient is calling to request Rx refill on nebulizer machine  Please call back and advise. Thanks

## 2023-08-01 ENCOUNTER — OFFICE VISIT (OUTPATIENT)
Dept: PODIATRY | Facility: CLINIC | Age: 68
End: 2023-08-01
Payer: MEDICARE

## 2023-08-01 ENCOUNTER — HOSPITAL ENCOUNTER (OUTPATIENT)
Dept: RADIOLOGY | Facility: HOSPITAL | Age: 68
Discharge: HOME | End: 2023-08-01
Attending: PODIATRIST
Payer: MEDICARE

## 2023-08-01 VITALS
HEART RATE: 71 BPM | WEIGHT: 186 LBS | SYSTOLIC BLOOD PRESSURE: 162 MMHG | HEIGHT: 64 IN | DIASTOLIC BLOOD PRESSURE: 87 MMHG | RESPIRATION RATE: 18 BRPM | BODY MASS INDEX: 31.76 KG/M2

## 2023-08-01 DIAGNOSIS — R60.0 PEDAL EDEMA: ICD-10-CM

## 2023-08-01 DIAGNOSIS — M79.674 PAIN IN TOE OF RIGHT FOOT: ICD-10-CM

## 2023-08-01 DIAGNOSIS — M20.60 ACQUIRED DEFORMITY OF JOINT OF LESSER TOE: ICD-10-CM

## 2023-08-01 DIAGNOSIS — M20.11 HALLUX ABDUCTO VALGUS, RIGHT: ICD-10-CM

## 2023-08-01 DIAGNOSIS — G57.91 NEURITIS OF RIGHT FOOT: ICD-10-CM

## 2023-08-01 DIAGNOSIS — G57.61 NEUROMA OF SECOND INTERSPACE OF RIGHT FOOT: Primary | ICD-10-CM

## 2023-08-01 PROCEDURE — 73630 XR FOOT COMPLETE 3 VIEW RIGHT: ICD-10-PCS | Mod: 26,RT,, | Performed by: RADIOLOGY

## 2023-08-01 PROCEDURE — 99215 OFFICE O/P EST HI 40 MIN: CPT | Mod: S$PBB,,, | Performed by: PODIATRIST

## 2023-08-01 PROCEDURE — 73630 X-RAY EXAM OF FOOT: CPT | Mod: 26,RT,, | Performed by: RADIOLOGY

## 2023-08-01 PROCEDURE — 96372 THER/PROPH/DIAG INJ SC/IM: CPT | Mod: PBBFAC

## 2023-08-01 PROCEDURE — 99999PBSHW PR PBB SHADOW TECHNICAL ONLY FILED TO HB: ICD-10-PCS | Mod: PBBFAC,,,

## 2023-08-01 PROCEDURE — 99999 PR PBB SHADOW E&M-EST. PATIENT-LVL V: ICD-10-PCS | Mod: PBBFAC,,, | Performed by: PODIATRIST

## 2023-08-01 PROCEDURE — 99999 PR PBB SHADOW E&M-EST. PATIENT-LVL V: CPT | Mod: PBBFAC,,, | Performed by: PODIATRIST

## 2023-08-01 PROCEDURE — 99215 OFFICE O/P EST HI 40 MIN: CPT | Mod: PBBFAC | Performed by: PODIATRIST

## 2023-08-01 PROCEDURE — 99999PBSHW PR PBB SHADOW TECHNICAL ONLY FILED TO HB: Mod: PBBFAC,,,

## 2023-08-01 PROCEDURE — 99215 PR OFFICE/OUTPT VISIT, EST, LEVL V, 40-54 MIN: ICD-10-PCS | Mod: S$PBB,,, | Performed by: PODIATRIST

## 2023-08-01 PROCEDURE — 73630 X-RAY EXAM OF FOOT: CPT | Mod: TC,RT

## 2023-08-01 RX ORDER — LEVOMILNACIPRAN HYDROCHLORIDE 20 MG/1
CAPSULE, EXTENDED RELEASE ORAL
COMMUNITY

## 2023-08-01 RX ORDER — BETAMETHASONE SODIUM PHOSPHATE AND BETAMETHASONE ACETATE 3; 3 MG/ML; MG/ML
18 INJECTION, SUSPENSION INTRA-ARTICULAR; INTRALESIONAL; INTRAMUSCULAR; SOFT TISSUE
Status: COMPLETED | OUTPATIENT
Start: 2023-08-01 | End: 2023-08-01

## 2023-08-01 RX ADMIN — BETAMETHASONE ACETATE AND BETAMETHASONE SODIUM PHOSPHATE 18 MG: 3; 3 INJECTION, SUSPENSION INTRA-ARTICULAR; INTRALESIONAL; INTRAMUSCULAR; SOFT TISSUE at 11:08

## 2023-08-03 ENCOUNTER — HOSPITAL ENCOUNTER (OUTPATIENT)
Dept: RADIOLOGY | Facility: HOSPITAL | Age: 68
Discharge: HOME | End: 2023-08-03
Payer: MEDICARE

## 2023-08-03 ENCOUNTER — TELEPHONE (OUTPATIENT)
Dept: FAMILY MEDICINE | Facility: CLINIC | Age: 68
End: 2023-08-03
Payer: MEDICARE

## 2023-08-03 DIAGNOSIS — M79.89 RIGHT LEG SWELLING: ICD-10-CM

## 2023-08-03 DIAGNOSIS — M79.604 RIGHT LEG PAIN: ICD-10-CM

## 2023-08-03 PROCEDURE — 93971 EXTREMITY STUDY: CPT | Mod: 26,RT,, | Performed by: RADIOLOGY

## 2023-08-03 PROCEDURE — 93971 EXTREMITY STUDY: CPT | Mod: TC,RT

## 2023-08-03 PROCEDURE — 93971 US LOWER EXTREMITY VEINS RIGHT: ICD-10-PCS | Mod: 26,RT,, | Performed by: RADIOLOGY

## 2023-08-03 NOTE — TELEPHONE ENCOUNTER
Spoke with the patient , nebulizer sent to [facility] per her request. Pt also picked up hard copy to hand carry to the drug store

## 2023-08-03 NOTE — TELEPHONE ENCOUNTER
----- Message from Cele Gonzales sent at 8/3/2023 11:36 AM CDT -----  Contact: self  Type:  RX Refill Request    Who Called:  patient   Refill or New Rx:  nex rx  RX Name and Strength:  patient needs a new nebulizer machine her is broke   How is the patient currently taking it? (ex. 1XDay):  as directed  Is this a 30 day or 90 day RX:  30  Preferred Pharmacy with phone number:    [facility] [place]  Local or Mail Order:  local  Ordering Provider:  Fouzia Elena Call Back Number:  [phone]  Additional Information:  Patient has been trying to get this machine now for over a week and was told it was ordered but it was not please call her back after sending in over today and thanks

## 2023-08-03 NOTE — TELEPHONE ENCOUNTER
----- Message from Cele Gonzales sent at 8/3/2023 11:36 AM CDT -----  Contact: self  Type:  RX Refill Request    Who Called:  patient   Refill or New Rx:  nex rx  RX Name and Strength:  patient needs a new nebulizer machine her is broke   How is the patient currently taking it? (ex. 1XDay):  as directed  Is this a 30 day or 90 day RX:  30  Preferred Pharmacy with phone number:    [facility] [place]  Local or Mail Order:  local  Ordering Provider:  Fouzia Elena Call Back Number:  [phone]  Additional Information:  Patient has been trying to get this machine now for over a week and was told it was ordered but it was not please call her back after sending in over today and thanks

## 2023-08-05 PROBLEM — G57.61 NEUROMA OF SECOND INTERSPACE OF RIGHT FOOT: Status: ACTIVE | Noted: 2023-08-05

## 2023-08-05 PROBLEM — M20.11 HALLUX ABDUCTO VALGUS, RIGHT: Status: ACTIVE | Noted: 2023-08-05

## 2023-08-05 NOTE — PROGRESS NOTES
Subjective:       Patient ID: Laxmi Chand is a 67 y.o. female.    Chief Complaint: Foot Swelling, Joint Swelling, Toe Pain, and Leg Problem  Patient presents with complaint continued swelling pain right foot.  Had bunion correction including hammertoe of the right foot through [place] bone and joint a year ago 7/27/22.  Patient relates unfortunately she has had no relief following surgery.  The foot remains swollen and painful, pain will vary depending on the swelling and her activity.  She is currently being treated for other health issues as well as knee problems.  Currently using a walker.  She saw Dr. Devyn Mccullough for an ingrown nail month ago, at that time there was some swelling of the foot but since then it has progressed significantly.  Her PCP prescribed a steroid and antibiotic and some of the swelling went down, now it is back and worse.  PCP ordered ultrasound of her right leg scheduled for tomorrow.  Patient relates the swelling of the 2nd toe has been awful, pain starts in the morning around a 3 or a 4 and escalates throughout the day with burning tingling radiating pulsating pain all surrounding the 2nd digit.  Pain level 3/10      Past Medical History:   Diagnosis Date    Breast cancer in female 09/07/2022    IDC with high grade DCIS    COPD (chronic obstructive pulmonary disease)     Degeneration of lumbar intervertebral disc     Endometrial cancer     GERD (gastroesophageal reflux disease)     HTN (hypertension)     Hyperlipemia, mixed      Past Surgical History:   Procedure Laterality Date    BIOPSY OF AXILLARY NODE Right 9/7/2022    Procedure: BIOPSY, LYMPH NODE, AXILLARY;  Surgeon: Jatin Gutierrez MD;  Location: [facility] OR;  Service: General;  Laterality: Right;    BREAST BIOPSY Right 08/05/2022    BREAST MASS EXCISION Right 9/7/2022    Procedure: EXCISION, MASS, BREAST;  Surgeon: Jatin Gutierrez MD;  Location: [facility] OR;  Service: General;  Laterality: Right;  wire localized  partial mastectomy right breast with margins     CARPAL TUNNEL RELEASE  1985    CHOLECYSTECTOMY  1978    HYSTERECTOMY      KNEE SURGERY Left 06/01/2020    LUMBAR DISC SURGERY  01/01/1990    plate and roland    LUMBAR FUSION  2011    TOTAL KNEE REPLACEMENT USING COMPUTER NAVIGATION Left 02/15/2021     Family History   Problem Relation Age of Onset    Hypertension Mother     Diabetes Mother     Hypertension Father     Lung cancer Maternal Grandfather     Breast cancer Neg Hx      Social History     Socioeconomic History    Marital status: Significant Other   Occupational History    Occupation: disabled   Tobacco Use    Smoking status: Former     Current packs/day: 0.00     Types: Vaping with nicotine    Smokeless tobacco: Never   Substance and Sexual Activity    Alcohol use: Yes    Drug use: Not Currently    Sexual activity: Yes     Partners: Male   Social History Narrative    [facility] from [facility]         [facility]     Annual    urinary tract infection    yeast infection        Visit Summary    No pap per guidelines    U/A for culture    Wet prep: yeast only    Pt has a PCP    Colonoscopy up to date    [place] [facility] [place]criptions:    SIG: Cipro 500 mg oral tablet, 3 days, Dispense #6 Tablet, 0 Refills    Directions: Take 1 oral tablet 2 times a day    SIG: Diflucan 100 mg oral tablet, 3 days, Dispense #3 Tablet, 0 Refills    Directions: Take 1 oral tablet once a day        Return for follow up appointment in one year or prn.     [id number]     Vulvar Cyst: Resolved        Visit Summary    Normal external gyn exam. Cyst resolved        Return for follow up appointment annual/prn.     [id number]     Chronic Vaginitis    Paratubal cysts: stable        Visit Summary    Wet prep: mostly yeast, few clue cells    Pt soaks in bath BID, stop, shower only, no douching.    u/s performed today. unchanged from last scan.        Prescriptions:  Clindesse sample given    SIG: Diflucan 100 mg oral tablet, 3 days, Dispense #3 Tablet, 0 Refills    Directions: Take 1 oral tablet once a day    Recommend probiotics        Return for follow up appointment for annual or prn. MDL swab at next appt if needed.    Mammo up to date.     [id number]     Recurrent bacterial vaginosis.  Paratubal cysts.  Dysuria.    Repeat transvaginal ultrasound 6 weeks.        Visit Summary    Ordered:    Urine Culture, Routine     [id number]     [facility] compound melanocytic nevus...ch us...of pelvis....rtc 1y pap     [id number]     3 moles removed from back 5 mm each...same contwainer monsels applied...    one mole removed from under each breasxt 5 mm...mnonsels applied...each one sent to path sepreatelyt..        vag dc bv...sp metrogel...no family h/o breast ca...        pelvic us...complex cyst 2.23 cm on left w possible excrescence and septation        pelvic us [place] complex [place] [facility] [place]rtc 1wk     [id number]     Chronic Bacterial Vaginitis    Chronic Back Pain    Left Lower Quadrant resolved, possible ruptured ovarian cyst        Visit Summary    MDL swab done        Return for follow up appointment in 2 months for follow up pelvic u/s.     GYN Exam/[id number] 20164/6/2017     Annual    Vaginal odor, Bacterial Vaginosis    Ovarian Cyst        Visit Summary    Pap done, reports hx of cervical pre-cancer        Prescriptions:    SIG: metronidazole 500 mg tablet, 7 days, Dispense #14 Tablet, 0 Refills    Directions: Take 1 oral tablet 2 times a day. No alcohol discussed.        U/S today, reports had ovarian cyst on CT scan.    FSH today        Return for follow up appointment  pending results.       Current Outpatient Medications   Medication Sig Dispense Refill    albuterol (PROVENTIL/VENTOLIN HFA) 90 mcg/actuation inhaler Inhale 2 puffs into the lungs every 4 (four) hours as  needed for Wheezing or Shortness of Breath. 18 g 6    albuterol-ipratropium (DUO-NEB) 2.5 mg-0.5 mg/3 mL nebulizer solution INHALE CONTENTS OF 1 VIAL BY MOUTH WITH NEBULIZER EVERY 6 HOURS WHILE AWAKE 360 mL 5    anastrozole (ARIMIDEX) 1 mg Tab Take 1 tablet (1 mg total) by mouth once daily. 90 tablet 3    aspirin (ECOTRIN) 81 MG EC tablet Take 1 tablet by mouth once daily. Pt back on asa      aspirin-acetaminophen-caffeine 250-250-65 mg (HEADACHE RELIEF, ASA-ACET-CAF,) 250-250-65 mg per tablet Take 1 tablet by mouth every 6 to 8 hours as needed.      budesonide-formoterol 160-4.5 mcg (SYMBICORT) 160-4.5 mcg/actuation HFAA Inhale 2 puffs into the lungs every 12 (twelve) hours. Controller 10.2 g 6    buPROPion (WELLBUTRIN) 100 MG tablet Take 1 tablet (100 mg total) by mouth 3 (three) times daily. 270 tablet 3    calcium carbonate-vitamin D3 (CALCIUM 600 + D,3,) 600-125 mg-unit Tab Take 2 tablets by mouth once daily. 180 tablet 3    celecoxib (CELEBREX) 200 MG capsule Take 1 capsule (200 mg total) by mouth once daily. 90 capsule 1    cetirizine (ZYRTEC) 10 MG tablet Take 1 tablet (10 mg total) by mouth once daily. 90 tablet 5    cyclobenzaprine (FLEXERIL) 10 MG tablet Take 1 tablet (10 mg total) by mouth nightly. 90 tablet 3    doxepin (SINEQUAN) 25 MG capsule Take 2 capsules (50 mg total) by mouth nightly. 180 capsule 3    fluconazole (DIFLUCAN) 150 MG Tab TAKE 1 TABLET (150 MG TOTAL) BY MOUTH ONCE DAILY. IF SYMPTOMS PERSIST FOR 3 DAYS REPEAT DOSE ON DAY 4 FOR 1 DAY 3 tablet 1    fluticasone propionate (FLONASE) 50 mcg/actuation nasal spray 1 spray (50 mcg total) by Each Nostril route once daily. 18.2 mL 2    hydroCHLOROthiazide (HYDRODIURIL) 25 MG tablet Take 1 tablet (25 mg total) by mouth once daily. 90 tablet 3    levomilnacipran (FETZIMA) 20 mg Cs24 Take 1 capsule(s) every day by oral route.      LUMIGAN 0.01 % Drop Place into both eyes.      montelukast (SINGULAIR) 10 mg tablet Take 1 tablet (10 mg total) by  "mouth once daily. 90 tablet 3    nebivoloL (BYSTOLIC) 10 MG Tab TAKE 1 TABLET BY MOUTH ONCE DAILY 90 tablet 3    nystatin (MYCOSTATIN) powder Apply topically 4 (four) times daily. 60 g 1    ondansetron (ZOFRAN) 4 MG tablet Take 1 tablet (4 mg total) by mouth every 8 (eight) hours as needed for Nausea. 30 tablet 2    oxyCODONE-acetaminophen (PERCOCET) 7.5-325 mg per tablet Take 1 tablet by mouth 3 (three) times daily as needed.      pantoprazole (PROTONIX) 40 MG tablet Take 1 tablet (40 mg total) by mouth once daily. 90 tablet 3    pseudoephedrine (SUDAFED) 120 mg 12 hr tablet Take 120 mg by mouth every 12 (twelve) hours.      travoprost (TRAVATAN Z) 0.004 % ophthalmic solution travoprost 0.004 % eye drops   INSTILL 1 DROP IN BOTH EYES DAILY AT BEDTIME      conjugated estrogens (PREMARIN) vaginal cream Place 0.5 g vaginally twice a week. 30 g 5     No current facility-administered medications for this visit.     Review of patient's allergies indicates:  No Known Allergies    Review of Systems   Musculoskeletal:  Positive for arthralgias, gait problem and joint swelling.        Walker   All other systems reviewed and are negative.      Objective:      Vitals:    08/01/23 1020   BP: (!) 162/87   Pulse: 71   Resp: 18   Weight: 84.4 kg (186 lb)   Height: 5' 4" (1.626 m)     Physical Exam  Vitals and nursing note reviewed.   Constitutional:       Appearance: Normal appearance.   Cardiovascular:      Pulses:           Dorsalis pedis pulses are 1+ on the right side and 1+ on the left side.        Posterior tibial pulses are 1+ on the right side and 1+ on the left side.   Pulmonary:      Effort: Pulmonary effort is normal.   Musculoskeletal:         General: Swelling and tenderness present.      Right foot: Decreased range of motion (Fused painful 1st MPJ rigid painful 2nd MPJ). Deformity and bunion present.      Left foot: Deformity present.      Comments: Right foot   Feet:      Right foot:      Skin integrity: Erythema " and warmth present.   Skin:     Capillary Refill: Capillary refill takes more than 3 seconds.      Findings: Erythema present.   Neurological:      General: No focal deficit present.      Mental Status: She is alert.      Comments: Patient has developed a very painful neuroma the 2nd common plantar digital nerve radiating to the dorsal cutaneous nerves right foot with a lot of edema, erythema throughout forefoot   Psychiatric:         Behavior: Behavior normal.         Thought Content: Thought content normal.                                     EXAMINATION:  XR FOOT COMPLETE 3 VIEW RIGHT     CLINICAL HISTORY:  . Pain in right toe(s)     TECHNIQUE:  AP, lateral, and oblique views of the right foot were performed.     COMPARISON:  None     FINDINGS:  The patient has had fixation of the 1st metatarsophalangeal joint with a cortical plate and screws in place however there remains hallux valgus.  There is also lateral angulation of the 2nd toe with degenerative changes at the 2nd metatarsophalangeal joint and fusion of the PIP joint.  Degenerative changes are noted at the tarsal metatarsal joints in the midfoot and a small heel spur is noted.     Impression:  Prior fixation of the 1st metatarsophalangeal joint with hardware in place but continued hallux valgus.  Lateral angulation 2nd toe with degenerative changes at the 2nd metatarsophalangeal joint and fusion of the PIP joint.  Degenerative changes at the tarsometatarsal joints in the midfoot and small heel spur.        Electronically signed by: Ángel Spears MD  Date:                                            08/01/2023     Assessment:       1. Neuroma of second interspace of right foot    2. Acquired deformity of joint of lesser toe - Right Foot    3. Pain in toe of right foot    4. Neuritis of right foot    5. Hallux abducto valgus, right    6. Pedal edema - Right Foot          Plan:         X-RAY COMPLETE RIGHT FOOT  18 MG BETAMETHASONE IM RIGHT HIP    Reviewed  x-ray at length and advised patient fusion of the 1st MPJ/big toe joint is not complete.  As she is already aware correction of the bunion has not been achieved.  We had a lengthy discussion regarding condition surgical procedure and outcome  We then had a lengthy discussion regarding the issues surrounding the 2nd digit.  We discussed its previous surgical procedure which sounds like a K-wire was utilized to removed after surgery.  We discussed changes at the 2nd MPJ, this toe is essentially dislocated.  Explained to patient this is caused impingement the nerve affecting the toe 2nd webspace is severe, moderate in the 1st webspace.  We discussed anatomy structures in this location and underlying bony issues, nerve issues progressing to the top of the foot.  Advised patient these nerve issues will cause a lot of chronic swelling and it will radiate up the leg.  Advised patient this is most likely the cause of her swelling, however she is to go for her venous ultrasound tomorrow   Explained steroid definitely helps and I would recommend IM cortisone injection today due to level of her pain in the amount of time swelling has been in this foot, almost a year.  We discussed medication, it is effectiveness in decreasing inflammation, potential side effects and length of it may be beneficial  Advised patient this injection is always effective but it is to help start the healing process, she still needs to utilize treatments to the foot daily to resolve swelling.  Recommend ice/cool therapy, start out slow, submerge the whole foot cool water with ice cubes 10-20 minutes.  No longer than 20 minutes.  If comfortable 20 minutes in, 20 minutes out and can repeat.  As long as this is comfortable she can start to increase the amount of ice she uses in the water.  This should be performed 3 times a day  We also discussed use of over-the-counter Voltaren gel as directed  In the meantime she needs to decrease her activity if  possible and elevate this foot over the next 7 days to get the swelling under control.  Again topical treatments are used every day  Taking Celebrex daily  Briefly discussed cortisone injection but advised patient due to the placement of the bone it is difficult to say how effective this would be  I encouraged patient to visit with Dr. Devyn Mccullough regarding a surgical consult.  Not to pursue more surgery but to reviewed x-rays with her which can help her determine the next step in treatment of the right foot.  Advised patient if she gets significant relief with treatments today to reduce inflammation she continues to do these daily as she decides if anything surgical needs to be done  Patient was in understanding and agreement with treatment plan, did want a surgical consult, does not want surgery but understands she most likely can not walk long term on this foot the way it is  Instructed patient to call office with any questions or concerns prior to follow-up visit  I counseled the patient on their conditions, implications and medical management.  Instructed patient to contact the office with any changes, questions, concerns, worsening of symptoms.   Total face to face time 45 minutes, exam, assessment, treatment, discussion, additional time for review of chart prior to and following appointment and visit documentation, consultation and coordination of care.    Follow up with Devyn Mccullough 8/16    This note was created using [employer] voice recognition software that occasionally misinterpreted phrases or words.

## 2023-08-07 ENCOUNTER — TELEPHONE (OUTPATIENT)
Dept: HEMATOLOGY/ONCOLOGY | Facility: CLINIC | Age: 68
End: 2023-08-07

## 2023-08-07 ENCOUNTER — OFFICE VISIT (OUTPATIENT)
Dept: HEMATOLOGY/ONCOLOGY | Facility: CLINIC | Age: 68
End: 2023-08-07
Payer: MEDICARE

## 2023-08-07 VITALS
HEIGHT: 64 IN | WEIGHT: 184 LBS | BODY MASS INDEX: 31.41 KG/M2 | TEMPERATURE: 97 F | RESPIRATION RATE: 18 BRPM | HEART RATE: 97 BPM | SYSTOLIC BLOOD PRESSURE: 150 MMHG | OXYGEN SATURATION: 99 % | DIASTOLIC BLOOD PRESSURE: 60 MMHG

## 2023-08-07 DIAGNOSIS — C50.011 MALIGNANT NEOPLASM OF NIPPLE OF RIGHT BREAST IN FEMALE, UNSPECIFIED ESTROGEN RECEPTOR STATUS: Primary | ICD-10-CM

## 2023-08-07 PROCEDURE — 99999 PR PBB SHADOW E&M-EST. PATIENT-LVL V: ICD-10-PCS | Mod: PBBFAC,,, | Performed by: NURSE PRACTITIONER

## 2023-08-07 PROCEDURE — 99214 OFFICE O/P EST MOD 30 MIN: CPT | Mod: S$PBB,,, | Performed by: NURSE PRACTITIONER

## 2023-08-07 PROCEDURE — 99215 OFFICE O/P EST HI 40 MIN: CPT | Mod: PBBFAC | Performed by: NURSE PRACTITIONER

## 2023-08-07 PROCEDURE — 99999 PR PBB SHADOW E&M-EST. PATIENT-LVL V: CPT | Mod: PBBFAC,,, | Performed by: NURSE PRACTITIONER

## 2023-08-07 PROCEDURE — 99214 PR OFFICE/OUTPT VISIT, EST, LEVL IV, 30-39 MIN: ICD-10-PCS | Mod: S$PBB,,, | Performed by: NURSE PRACTITIONER

## 2023-08-07 NOTE — PROGRESS NOTES
Service Date:  8/7/23    Chief Complaint: No chief complaint on file.    Laxmi Chand is a 67 y.o. female with right breast invasive ductal carcinoma only 2 mm in size and right breast DCIS.  S/p lumpectomy on 9/7/22.  Currently on aromatase inhibitor therapy.  Tolerating it okay.  For follow-up.    8/7/2023:  She returns today for follow-up.  Patient has no new symptoms and no new complaints.  She continues to have tenderness at surgical site    Review of Systems   Constitutional:  Positive for fatigue.   HENT: Negative.     Eyes: Negative.    Respiratory: Negative.     Cardiovascular: Negative.    Gastrointestinal: Negative.    Endocrine: Negative.    Genitourinary: Negative.    Musculoskeletal: Negative.    Integumentary:  Negative.   Neurological: Negative.    Hematological: Negative.    Psychiatric/Behavioral: Negative.        Current Outpatient Medications   Medication Instructions    albuterol (PROVENTIL/VENTOLIN HFA) 90 mcg/actuation inhaler 2 puffs, Inhalation, Every 4 hours PRN    albuterol-ipratropium (DUO-NEB) 2.5 mg-0.5 mg/3 mL nebulizer solution INHALE CONTENTS OF 1 VIAL BY MOUTH WITH NEBULIZER EVERY 6 HOURS WHILE AWAKE    anastrozole (ARIMIDEX) 1 mg, Oral, Daily    aspirin (ECOTRIN) 81 MG EC tablet 1 tablet, Oral, Daily, Pt back on asa    aspirin-acetaminophen-caffeine 250-250-65 mg (HEADACHE RELIEF, ASA-ACET-CAF,) 250-250-65 mg per tablet 1 tablet, Oral, Every 6-8 hours PRN    budesonide-formoterol 160-4.5 mcg (SYMBICORT) 160-4.5 mcg/actuation HFAA 2 puffs, Inhalation, Every 12 hours, Controller    buPROPion (WELLBUTRIN) 100 mg, Oral, 3 times daily    calcium carbonate-vitamin D3 (CALCIUM 600 + D,3,) 600-125 mg-unit Tab 2 tablets, Oral, Daily    celecoxib (CELEBREX) 200 mg, Oral, Daily    cetirizine (ZYRTEC) 10 mg, Oral, Daily    conjugated estrogens (PREMARIN) 0.5 g, Vaginal, Twice weekly    cyclobenzaprine (FLEXERIL) 10 mg, Oral, Nightly    doxepin (SINEQUAN) 50 mg, Oral, Nightly    fluconazole  (DIFLUCAN) 150 MG Tab TAKE 1 TABLET (150 MG TOTAL) BY MOUTH ONCE DAILY. IF SYMPTOMS PERSIST FOR 3 DAYS REPEAT DOSE ON DAY 4 FOR 1 DAY    fluticasone propionate (FLONASE) 50 mcg, Each Nostril, Daily    hydroCHLOROthiazide (HYDRODIURIL) 25 mg, Oral, Daily    levomilnacipran (FETZIMA) 20 mg Cs24 Take 1 capsule(s) every day by oral route.    LUMIGAN 0.01 % Drop Both Eyes    montelukast (SINGULAIR) 10 mg, Oral, Daily    nebivoloL (BYSTOLIC) 10 MG Tab TAKE 1 TABLET BY MOUTH ONCE DAILY    nystatin (MYCOSTATIN) powder Topical (Top), 4 times daily    ondansetron (ZOFRAN) 4 mg, Oral, Every 8 hours PRN    oxyCODONE-acetaminophen (PERCOCET) 7.5-325 mg per tablet 1 tablet, Oral, 3 times daily PRN    pantoprazole (PROTONIX) 40 mg, Oral, Daily    pseudoephedrine (SUDAFED) 120 mg, Oral, Every 12 hours (non-standard times)    travoprost (TRAVATAN Z) 0.004 % ophthalmic solution travoprost 0.004 % eye drops   INSTILL 1 DROP IN BOTH EYES DAILY AT BEDTIME        Past Medical History:   Diagnosis Date    Breast cancer in female 09/07/2022    IDC with high grade DCIS    COPD (chronic obstructive pulmonary disease)     Degeneration of lumbar intervertebral disc     Endometrial cancer     GERD (gastroesophageal reflux disease)     HTN (hypertension)     Hyperlipemia, mixed         Past Surgical History:   Procedure Laterality Date    BIOPSY OF AXILLARY NODE Right 9/7/2022    Procedure: BIOPSY, LYMPH NODE, AXILLARY;  Surgeon: Jatin Gutierrez MD;  Location: [facility] OR;  Service: General;  Laterality: Right;    BREAST BIOPSY Right 08/05/2022    BREAST MASS EXCISION Right 9/7/2022    Procedure: EXCISION, MASS, BREAST;  Surgeon: Jatin Gutierrez MD;  Location: [facility] OR;  Service: General;  Laterality: Right;  wire localized partial mastectomy right breast with margins     CARPAL TUNNEL RELEASE  1985    CHOLECYSTECTOMY  1978    HYSTERECTOMY      KNEE SURGERY Left 06/01/2020    LUMBAR DISC SURGERY  01/01/1990    plate and roland    LUMBAR  "FUSION  2011    TOTAL KNEE REPLACEMENT USING COMPUTER NAVIGATION Left 02/15/2021        Family History   Problem Relation Age of Onset    Hypertension Mother     Diabetes Mother     Hypertension Father     Lung cancer Maternal Grandfather     Breast cancer Neg Hx        Social History     Tobacco Use    Smoking status: Former     Current packs/day: 0.00     Types: Vaping with nicotine    Smokeless tobacco: Never   Substance Use Topics    Alcohol use: Yes    Drug use: Not Currently         Vitals:    08/07/23 1010   BP: (!) 150/60   Pulse: 97   Resp: 18   Temp: 96.6 °F (35.9 °C)        Physical Exam:  BP (!) 150/60 (BP Location: Left arm, Patient Position: Sitting, BP Method: Medium (Automatic))   Pulse 97   Temp 96.6 °F (35.9 °C) (Temporal)   Resp 18   Ht 5' 4" (1.626 m)   Wt 83.5 kg (184 lb)   SpO2 99%   BMI 31.58 kg/m²     Physical Exam  Constitutional:       Appearance: Normal appearance.   HENT:      Head: Normocephalic and atraumatic.      Nose: Nose normal.      Mouth/Throat:      Mouth: Mucous membranes are moist.      Pharynx: Oropharynx is clear.   Eyes:      Conjunctiva/sclera: Conjunctivae normal.   Cardiovascular:      Rate and Rhythm: Normal rate and regular rhythm.      Heart sounds: Normal heart sounds.   Pulmonary:      Effort: Pulmonary effort is normal.      Breath sounds: Normal breath sounds.   Abdominal:      General: Abdomen is flat. Bowel sounds are normal.      Palpations: Abdomen is soft.   Musculoskeletal:         General: Normal range of motion.      Cervical back: Normal range of motion and neck supple.   Skin:     General: Skin is warm and dry.   Neurological:      General: No focal deficit present.      Mental Status: She is alert and oriented to person, place, and time. Mental status is at baseline.   Psychiatric:         Mood and Affect: Mood normal.        Labs:  Lab Results   Component Value Date    WBC 9.96 08/03/2023    RBC 4.24 08/03/2023    HGB 12.6 08/03/2023    HCT 38.0 " 08/03/2023    MCV 90 08/03/2023    MCH 29.7 08/03/2023    MCHC 33.2 08/03/2023    RDW 14.1 08/03/2023     08/03/2023    MPV 8.6 (L) 08/03/2023    GRAN 7.3 08/03/2023    GRAN 73.7 (H) 08/03/2023    LYMPH 1.7 08/03/2023    LYMPH 17.2 (L) 08/03/2023    MONO 0.8 08/03/2023    MONO 8.3 08/03/2023    EOS 0.0 08/03/2023    BASO 0.02 08/03/2023    EOSINOPHIL 0.0 08/03/2023    BASOPHIL 0.2 08/03/2023     Sodium   Date Value Ref Range Status   08/03/2023 140 136 - 145 mmol/L Final     Potassium   Date Value Ref Range Status   08/03/2023 3.6 3.5 - 5.1 mmol/L Final     Chloride   Date Value Ref Range Status   08/03/2023 102 95 - 110 mmol/L Final     CO2   Date Value Ref Range Status   08/03/2023 30 (H) 23 - 29 mmol/L Final     Glucose   Date Value Ref Range Status   08/03/2023 77 70 - 110 mg/dL Final     BUN   Date Value Ref Range Status   08/03/2023 26 (H) 8 - 23 mg/dL Final     Creatinine   Date Value Ref Range Status   08/03/2023 0.9 0.5 - 1.4 mg/dL Final     Calcium   Date Value Ref Range Status   08/03/2023 9.6 8.7 - 10.5 mg/dL Final     Total Protein   Date Value Ref Range Status   08/03/2023 6.6 6.0 - 8.4 g/dL Final     Albumin   Date Value Ref Range Status   08/03/2023 3.9 3.5 - 5.2 g/dL Final     Total Bilirubin   Date Value Ref Range Status   08/03/2023 0.3 0.1 - 1.0 mg/dL Final     Comment:     For infants and newborns, interpretation of results should be based  on gestational age, weight and in agreement with clinical  observations.    Premature Infant recommended reference ranges:  Up to 24 hours.............<8.0 mg/dL  Up to 48 hours............<12.0 mg/dL  3-5 days..................<15.0 mg/dL  6-29 days.................<15.0 mg/dL       Alkaline Phosphatase   Date Value Ref Range Status   08/03/2023 66 55 - 135 U/L Final     AST   Date Value Ref Range Status   08/03/2023 13 10 - 40 U/L Final     ALT   Date Value Ref Range Status   08/03/2023 17 10 - 44 U/L Final     Anion Gap   Date Value Ref Range Status    08/03/2023 8 8 - 16 mmol/L Final     eGFR if    Date Value Ref Range Status   05/10/2022 >60 >60 mL/min/1.73 m^2 Final     eGFR if non    Date Value Ref Range Status   05/10/2022 >60 >60 mL/min/1.73 m^2 Final     Comment:     Calculation used to obtain the estimated glomerular filtration  rate (eGFR) is the CKD-EPI equation.          A/P:    R breast DCIS  R breast IDCA [id number]  - DCIS was strongly hormone positive so patient is on aromatase inhibitor  -invasive component was only 2 mm, so even though it was HER2 positive, it was not treated with any adjuvant chemotherapy.    -completed radiation therapy to the breast  -on anastrozole currently, started 10/11/22  - mammogram done 2/3/23, results showing probably benign with recommend repeat in 6 months   -return to clinic in 6 months with blood work  -repeat right mammogram.  I will follow-up on results    Osteopenia  - DEXA done 5/24/22  -on every 6 month prolia

## 2023-08-07 NOTE — TELEPHONE ENCOUNTER
Reached out to mammo.  Appt scheduled for 8/18 with u/s following if needed.              ----- Message from Mell Veloz NP sent at 8/7/2023 10:23 AM CDT -----  Mammo now  Rtc in 6 mo with labs

## 2023-08-14 ENCOUNTER — TELEPHONE (OUTPATIENT)
Dept: FAMILY MEDICINE | Facility: CLINIC | Age: 68
End: 2023-08-14

## 2023-08-14 DIAGNOSIS — J44.9 CHRONIC OBSTRUCTIVE PULMONARY DISEASE, UNSPECIFIED COPD TYPE: Primary | ICD-10-CM

## 2023-08-14 RX ORDER — FLUTICASONE PROPIONATE AND SALMETEROL XINAFOATE 115; 21 UG/1; UG/1
2 AEROSOL, METERED RESPIRATORY (INHALATION) EVERY 12 HOURS
Qty: 12 G | Refills: 3 | Status: SHIPPED | OUTPATIENT
Start: 2023-08-14 | End: 2023-11-08 | Stop reason: SDUPTHER

## 2023-08-14 NOTE — TELEPHONE ENCOUNTER
Received incoming fax from patients pharmacy stating that   BUDESONIDE/FORM 160-4.5MCG (120 INH)    Drug not covered by patient plan. The preferred alternative is ADVAIRDISKUS, ADVAIRHFA,BREOELLIPTA,DULERA.

## 2023-08-16 ENCOUNTER — OFFICE VISIT (OUTPATIENT)
Dept: PODIATRY | Facility: CLINIC | Age: 68
End: 2023-08-16
Payer: MEDICARE

## 2023-08-16 VITALS
HEART RATE: 76 BPM | SYSTOLIC BLOOD PRESSURE: 144 MMHG | HEIGHT: 64 IN | DIASTOLIC BLOOD PRESSURE: 80 MMHG | BODY MASS INDEX: 31.41 KG/M2 | WEIGHT: 184 LBS

## 2023-08-16 DIAGNOSIS — M35.7 HYPERMOBILE JOINT SYNDROME OF RIGHT FOOT: ICD-10-CM

## 2023-08-16 DIAGNOSIS — M20.41 HAMMER TOE OF RIGHT FOOT: ICD-10-CM

## 2023-08-16 DIAGNOSIS — M19.079 OSTEOARTHRITIS OF ANKLE AND FOOT, UNSPECIFIED LATERALITY: ICD-10-CM

## 2023-08-16 DIAGNOSIS — M20.11 HALLUX ABDUCTO VALGUS, RIGHT: Primary | ICD-10-CM

## 2023-08-16 PROCEDURE — 99215 OFFICE O/P EST HI 40 MIN: CPT | Mod: S$PBB,,, | Performed by: PODIATRIST

## 2023-08-16 PROCEDURE — 99999 PR PBB SHADOW E&M-EST. PATIENT-LVL V: CPT | Mod: PBBFAC,,, | Performed by: PODIATRIST

## 2023-08-16 PROCEDURE — 99999 PR PBB SHADOW E&M-EST. PATIENT-LVL V: ICD-10-PCS | Mod: PBBFAC,,, | Performed by: PODIATRIST

## 2023-08-16 PROCEDURE — 99215 PR OFFICE/OUTPT VISIT, EST, LEVL V, 40-54 MIN: ICD-10-PCS | Mod: S$PBB,,, | Performed by: PODIATRIST

## 2023-08-16 PROCEDURE — 99215 OFFICE O/P EST HI 40 MIN: CPT | Mod: PBBFAC | Performed by: PODIATRIST

## 2023-08-17 ENCOUNTER — OFFICE VISIT (OUTPATIENT)
Dept: FAMILY MEDICINE | Facility: CLINIC | Age: 68
End: 2023-08-17
Payer: MEDICARE

## 2023-08-17 VITALS
HEART RATE: 72 BPM | HEIGHT: 64 IN | SYSTOLIC BLOOD PRESSURE: 136 MMHG | BODY MASS INDEX: 31.54 KG/M2 | RESPIRATION RATE: 18 BRPM | DIASTOLIC BLOOD PRESSURE: 80 MMHG | OXYGEN SATURATION: 99 % | WEIGHT: 184.75 LBS

## 2023-08-17 DIAGNOSIS — E66.9 OBESITY, CLASS I, BMI 30-34.9: Primary | ICD-10-CM

## 2023-08-17 DIAGNOSIS — I70.0 ATHEROSCLEROSIS OF AORTA: ICD-10-CM

## 2023-08-17 DIAGNOSIS — Z99.89 WALKER AS AMBULATION AID: ICD-10-CM

## 2023-08-17 DIAGNOSIS — I10 ESSENTIAL HYPERTENSION: ICD-10-CM

## 2023-08-17 DIAGNOSIS — J84.10 CALCIFIED GRANULOMA OF LUNG: ICD-10-CM

## 2023-08-17 DIAGNOSIS — F33.42 RECURRENT MAJOR DEPRESSIVE DISORDER, IN FULL REMISSION: ICD-10-CM

## 2023-08-17 DIAGNOSIS — E78.5 HYPERLIPIDEMIA, UNSPECIFIED HYPERLIPIDEMIA TYPE: ICD-10-CM

## 2023-08-17 DIAGNOSIS — J44.9 CHRONIC OBSTRUCTIVE PULMONARY DISEASE, UNSPECIFIED COPD TYPE: ICD-10-CM

## 2023-08-17 DIAGNOSIS — M51.36 DEGENERATION OF LUMBAR INTERVERTEBRAL DISC: ICD-10-CM

## 2023-08-17 PROBLEM — N64.4 PAIN OF BREAST: Status: ACTIVE | Noted: 2021-03-03

## 2023-08-17 PROCEDURE — 99214 OFFICE O/P EST MOD 30 MIN: CPT | Mod: ,,, | Performed by: FAMILY MEDICINE

## 2023-08-17 PROCEDURE — 99214 PR OFFICE/OUTPT VISIT, EST, LEVL IV, 30-39 MIN: ICD-10-PCS | Mod: ,,, | Performed by: FAMILY MEDICINE

## 2023-08-17 RX ORDER — CELECOXIB 200 MG/1
200 CAPSULE ORAL DAILY
Qty: 90 CAPSULE | Refills: 3 | Status: SHIPPED | OUTPATIENT
Start: 2023-08-17 | End: 2023-11-13 | Stop reason: SDUPTHER

## 2023-08-17 RX ORDER — DOXEPIN HYDROCHLORIDE 25 MG/1
50 CAPSULE ORAL NIGHTLY
Qty: 180 CAPSULE | Refills: 3 | Status: SHIPPED | OUTPATIENT
Start: 2023-08-17

## 2023-08-17 RX ORDER — DOXYCYCLINE HYCLATE 100 MG
100 TABLET ORAL 2 TIMES DAILY
Qty: 20 EACH | Refills: 0 | Status: SHIPPED | OUTPATIENT
Start: 2023-08-17 | End: 2023-08-27

## 2023-08-17 NOTE — PROGRESS NOTES
Subjective:       Patient ID: Laxmi Chand is a 67 y.o. female.    Chief Complaint: Follow-up (PT presents to the clinic for a 6m f/u HTN COPD)    Laxmi Chand presents to the clinic today for 6 month follow up. Patient states she has been doing well but does think she is getting a respiratory infection. Patient states when she coughs the mucus is yellow and tastes like infection. Patient states she would like antibiotics to have on hand if this gets worse. Patient states she was wheezing yesterday and this morning.   Patient educated on plan of care, verbalized understanding.         Review of Systems   Constitutional:  Negative for activity change, appetite change, chills, diaphoresis and fever.   HENT:  Negative for congestion, ear pain, postnasal drip, sinus pressure, sneezing and sore throat.    Eyes:  Negative for pain, discharge, redness and itching.   Respiratory:  Positive for wheezing. Negative for apnea, cough, chest tightness and shortness of breath.    Cardiovascular:  Negative for chest pain and leg swelling.   Gastrointestinal:  Negative for abdominal distention, abdominal pain, constipation, diarrhea, nausea and vomiting.   Genitourinary:  Negative for difficulty urinating, dysuria, flank pain and frequency.   Skin:  Negative for color change, rash and wound.   Neurological:  Negative for dizziness.       Patient Active Problem List   Diagnosis    Allergic rhinitis    Chronic obstructive lung disease    Degeneration of lumbar intervertebral disc    Depressive disorder    Gastroesophageal reflux disease    History of smoking    Hyperlipidemia    Essential hypertension    Status post total left knee replacement    Chronic pain syndrome    Pain of breast    Ductal carcinoma in situ (DCIS) of right breast    Senile osteopenia    History of endometrial cancer    Invasive ductal carcinoma of breast, female, right    Long term (current) use of aromatase inhibitors    Atherosclerosis of aorta     Calcified granuloma of lung    Urge incontinence of urine    Ingrown nail    Neuroma of second interspace of right foot    Hallux abducto valgus, right    Hammer toe of right foot    Osteoarthritis of ankle and foot    Hypermobile joint syndrome of right foot       Objective:      Physical Exam  Vitals reviewed.   Constitutional:       General: She is not in acute distress.     Appearance: Normal appearance. She is well-developed.   HENT:      Head: Normocephalic.      Nose: Nose normal.   Eyes:      Conjunctiva/sclera: Conjunctivae normal.      Pupils: Pupils are equal, round, and reactive to light.   Cardiovascular:      Rate and Rhythm: Normal rate and regular rhythm.      Heart sounds: Normal heart sounds.   Pulmonary:      Effort: Pulmonary effort is normal. No respiratory distress.      Breath sounds: Normal breath sounds.   Musculoskeletal:      Cervical back: Normal range of motion and neck supple.   Skin:     General: Skin is warm and dry.      Findings: No rash.   Neurological:      Mental Status: She is alert and oriented to person, place, and time.   Psychiatric:         Mood and Affect: Mood normal.         Behavior: Behavior normal.         Lab Results   Component Value Date    WBC 9.96 08/03/2023    HGB 12.6 08/03/2023    HCT 38.0 08/03/2023     08/03/2023    CHOL 203 (H) 05/10/2022    TRIG 101 05/10/2022    HDL 50 05/10/2022    ALT 17 08/03/2023    AST 13 08/03/2023     08/03/2023    K 3.6 08/03/2023     08/03/2023    CREATININE 0.9 08/03/2023    BUN 26 (H) 08/03/2023    CO2 30 (H) 08/03/2023    TSH 1.040 05/10/2022    HGBA1C 5.5 07/26/2023     The 10-year ASCVD risk score (Mihaela FLOWER, et al., 2019) is: 10.7%    Values used to calculate the score:      Age: 67 years      Sex: Female      Is Non- : No      Diabetic: No      Tobacco smoker: No      Systolic Blood Pressure: 136 mmHg      Is BP treated: Yes      HDL Cholesterol: 50 mg/dL      Total Cholesterol:  "203 mg/dL  Visit Vitals  /80 (BP Location: Right arm, Patient Position: Sitting, BP Method: Medium (Manual))   Pulse 72   Resp 18   Ht 5' 4" (1.626 m)   Wt 83.8 kg (184 lb 11.9 oz)   SpO2 99%   BMI 31.71 kg/m²      Assessment:       1. Obesity, Class I, BMI 30-34.9    2. Essential hypertension    3. Hyperlipidemia, unspecified hyperlipidemia type    4. Calcified granuloma of lung    5. Atherosclerosis of aorta    6. Chronic obstructive pulmonary disease, unspecified COPD type    7. Recurrent major depressive disorder, in full remission    8. Degeneration of lumbar intervertebral disc    9. Walker as ambulation aid        Plan:       Laxmi was seen today for follow-up.    Diagnoses and all orders for this visit:    Obesity, Class I, BMI 30-34.9  Body mass index is 31.71 kg/m².  Continue healthy diet and regular exercise as tolerated..  Continue medications as prescribed.  Follow up with PCP     Essential hypertension  Stable  Continue medications as prescribed.  Follow up with PCP     Hyperlipidemia, unspecified hyperlipidemia type  Stable  Continue medications as prescribed.  Follow up with PCP     Calcified granuloma of lung  Stable  Continue medications as prescribed.  Follow up with PCP and pulmonology as needed    Atherosclerosis of aorta  Stable  Continue medications as prescribed.  Follow up with PCP     Chronic obstructive pulmonary disease, unspecified COPD type  -     doxycycline (VIBRA-TABS) 100 MG tablet; Take 1 tablet (100 mg total) by mouth 2 (two) times daily. for 10 days    Recurrent major depressive disorder, in full remission  -     doxepin (SINEQUAN) 25 MG capsule; Take 2 capsules (50 mg total) by mouth nightly.  Stable  Continue medications as prescribed.  Follow up with PCP     Degeneration of lumbar intervertebral disc  -     celecoxib (CELEBREX) 200 MG capsule; Take 1 capsule (200 mg total) by mouth once daily.  Stable  Continue medications as prescribed.  Follow up with PCP   Walker as " ambulation aid       Follow up in about 6 months (around 2/17/2024), or if symptoms worsen or fail to improve, for scheduled appt.      Future Appointments       Date Provider Specialty Appt Notes    8/18/2023  Radiology     8/18/2023  Radiology     9/11/2023 Devyn Mccullough, TANJAM Podiatry Pre OP    2/6/2024  Lab hemonc    2/8/2024 Mell Veloz NP Hematology and Oncology [facility]/Labs/6mfu  confirm vch    2/21/2024 Zoraida Orta, NP Family Medicine [facility]             All of your core healthy metrics are met.

## 2023-08-17 NOTE — PATIENT INSTRUCTIONS
Luís Hair,     If you are due for any health screening(s) below please notify me so we can arrange them to be ordered and scheduled to maintain your health. Most healthy patients complete it. Don't lose out on improving your health.     Tests to Keep You Healthy    Mammogram: Met on 2/3/2023  Colon Cancer Screening: DUE  Last Blood Pressure <= 139/89 (8/17/2023): Yes  Tobacco Cessation: Yes         Colon Cancer Screening    Of cancers affecting both men and women, colorectal cancer is the third leading cancer killer in the [place]. But it doesnt have to be. Screening can prevent colorectal cancer or find it at an early stage when treatment often leads to a cure.    A colonoscopy is the preferred test for detecting colon cancer. It is needed only once every 10 years if results are negative. While sedated, a flexible, lighted tube with a tiny camera is inserted into the rectum and advanced through the colon to look for cancers. An alternative screening test that is used at home and returned to the lab may also be used. It detects hidden blood in bowel movements which could indicate cancer in the colon. If results are positive, you will need a colonoscopy to determine if the blood is a sign of cancer. This type of follow up (diagnostic) colonoscopy usually requires additional copays as required by your insurance provider. Please contact your PCP if you have any questions.                       Thank you for allowing me to be part of your healthcare team at [facility]. It is a pleasure to care for you today.   Please take all of your medications as instructed and follow all new instructions from your visit today.  If you received labs or medical tests today you should hear information about results or scheduling either by phone or mychart within approximately a week.   If you have any questions or concerns please do not hesitate to call. Have a blessed day and I hope to see you again soon.  Zoraida Orta

## 2023-08-18 ENCOUNTER — HOSPITAL ENCOUNTER (OUTPATIENT)
Dept: RADIOLOGY | Facility: HOSPITAL | Age: 68
Discharge: HOME | End: 2023-08-18
Attending: NURSE PRACTITIONER
Payer: MEDICARE

## 2023-08-18 DIAGNOSIS — C50.011 MALIGNANT NEOPLASM OF NIPPLE OF RIGHT BREAST IN FEMALE, UNSPECIFIED ESTROGEN RECEPTOR STATUS: ICD-10-CM

## 2023-08-18 PROCEDURE — 77065 MAMMO DIGITAL DIAGNOSTIC RIGHT WITH TOMO: ICD-10-PCS | Mod: 26,RT,, | Performed by: RADIOLOGY

## 2023-08-18 PROCEDURE — 76642 US BREAST RIGHT LIMITED: ICD-10-PCS | Mod: 26,RT,, | Performed by: RADIOLOGY

## 2023-08-18 PROCEDURE — 77065 DX MAMMO INCL CAD UNI: CPT | Mod: 26,RT,, | Performed by: RADIOLOGY

## 2023-08-18 PROCEDURE — 77061 BREAST TOMOSYNTHESIS UNI: CPT | Mod: TC,RT

## 2023-08-18 PROCEDURE — 76642 ULTRASOUND BREAST LIMITED: CPT | Mod: 26,RT,, | Performed by: RADIOLOGY

## 2023-08-18 PROCEDURE — 77061 MAMMO DIGITAL DIAGNOSTIC RIGHT WITH TOMO: ICD-10-PCS | Mod: 26,RT,, | Performed by: RADIOLOGY

## 2023-08-18 PROCEDURE — 76642 ULTRASOUND BREAST LIMITED: CPT | Mod: TC,RT

## 2023-08-18 PROCEDURE — 77061 BREAST TOMOSYNTHESIS UNI: CPT | Mod: 26,RT,, | Performed by: RADIOLOGY

## 2023-08-19 NOTE — PROGRESS NOTES
Subjective:       Patient ID: Laxmi Chand is a 67 y.o. female.    Chief Complaint: No chief complaint on file.  Patient presents today for surgical consultation due to severe deformity and pain of the right foot.  Patient is currently using a walker and states she has difficulty ambulating due to multiple conditions.    Past Medical History:   Diagnosis Date    Breast cancer in female 09/07/2022    IDC with high grade DCIS    COPD (chronic obstructive pulmonary disease)     Degeneration of lumbar intervertebral disc     Endometrial cancer     GERD (gastroesophageal reflux disease)     HTN (hypertension)     Hyperlipemia, mixed      Past Surgical History:   Procedure Laterality Date    BIOPSY OF AXILLARY NODE Right 9/7/2022    Procedure: BIOPSY, LYMPH NODE, AXILLARY;  Surgeon: Jatin Gutierrez MD;  Location: [facility] OR;  Service: General;  Laterality: Right;    BREAST BIOPSY Right 08/05/2022    BREAST MASS EXCISION Right 9/7/2022    Procedure: EXCISION, MASS, BREAST;  Surgeon: Jatin Gutierrez MD;  Location: [facility] OR;  Service: General;  Laterality: Right;  wire localized partial mastectomy right breast with margins     CARPAL TUNNEL RELEASE  1985    CHOLECYSTECTOMY  1978    HYSTERECTOMY      KNEE SURGERY Left 06/01/2020    LUMBAR DISC SURGERY  01/01/1990    plate and roland    LUMBAR FUSION  2011    TOTAL KNEE REPLACEMENT USING COMPUTER NAVIGATION Left 02/15/2021     Family History   Problem Relation Age of Onset    Hypertension Mother     Diabetes Mother     Hypertension Father     Lung cancer Maternal Grandfather     Breast cancer Neg Hx      Social History     Socioeconomic History    Marital status: Significant Other   Occupational History    Occupation: disabled   Tobacco Use    Smoking status: Former     Current packs/day: 0.00     Types: Vaping with nicotine    Smokeless tobacco: Never   Substance and Sexual Activity    Alcohol use: Yes    Drug use: Not Currently    Sexual activity: Yes     Partners:  Male   Social History Narrative    [facility] from [facility]         [facility]     Annual    urinary tract infection    yeast infection        Visit Summary    No pap per guidelines    U/A for culture    Wet prep: yeast only    Pt has a PCP    Colonoscopy up to date    Mammo @ [facility]        Prescriptions:    SIG: Cipro 500 mg oral tablet, 3 days, Dispense #6 Tablet, 0 Refills    Directions: Take 1 oral tablet 2 times a day    SIG: Diflucan 100 mg oral tablet, 3 days, Dispense #3 Tablet, 0 Refills    Directions: Take 1 oral tablet once a day        Return for follow up appointment in one year or prn.     [id number]     Vulvar Cyst: Resolved        Visit Summary    Normal external gyn exam. Cyst resolved        Return for follow up appointment annual/prn.     [id number]     Chronic Vaginitis    Paratubal cysts: stable        Visit Summary    Wet prep: mostly yeast, few clue cells    Pt soaks in bath BID, stop, shower only, no douching.    u/s performed today. unchanged from last scan.        Prescriptions: Clindesse sample given    SIG: Diflucan 100 mg oral tablet, 3 days, Dispense #3 Tablet, 0 Refills    Directions: Take 1 oral tablet once a day    Recommend probiotics        Return for follow up appointment for annual or prn. MDL swab at next appt if needed.    Mammo up to date.     [id number]     Recurrent bacterial vaginosis.  Paratubal cysts.  Dysuria.    Repeat transvaginal ultrasound 6 weeks.        Visit Summary    Ordered:    Urine Culture, Routine     [id number]     path compound melanocytic nevus...ch us...of pelvis....rtc 1y pap     [id number]     3 moles removed from back 5 mm each...same contwainer monsels applied...    one mole removed from under each breasxt 5 mm...mnonsels applied...each one sent to path sepreatelyt..        vag dc bv...sp  metrogel...no family h/o breast ca...        pelvic us...complex cyst 2.23 cm on left w possible excrescence and septation        pelvic us for complex cyst at [facility]rtc 1wk     [id number]     Chronic Bacterial Vaginitis    Chronic Back Pain    Left Lower Quadrant resolved, possible ruptured ovarian cyst        Visit Summary    MDL swab done        Return for follow up appointment in 2 months for follow up pelvic u/s.     GYN Exam/[id number] 20164/6/2017     Annual    Vaginal odor, Bacterial Vaginosis    Ovarian Cyst        Visit Summary    Pap done, reports hx of cervical pre-cancer        Prescriptions:    SIG: metronidazole 500 mg tablet, 7 days, Dispense #14 Tablet, 0 Refills    Directions: Take 1 oral tablet 2 times a day. No alcohol discussed.        U/S today, reports had ovarian cyst on CT scan.    FSH today        Return for follow up appointment  pending results.       Current Outpatient Medications   Medication Sig Dispense Refill    ADVAIR -21 mcg/actuation HFAA inhaler Inhale 2 puffs into the lungs every 12 (twelve) hours. Controller 12 g 3    albuterol (PROVENTIL/VENTOLIN HFA) 90 mcg/actuation inhaler Inhale 2 puffs into the lungs every 4 (four) hours as needed for Wheezing or Shortness of Breath. 18 g 6    albuterol-ipratropium (DUO-NEB) 2.5 mg-0.5 mg/3 mL nebulizer solution INHALE CONTENTS OF 1 VIAL BY MOUTH WITH NEBULIZER EVERY 6 HOURS WHILE AWAKE 360 mL 5    anastrozole (ARIMIDEX) 1 mg Tab Take 1 tablet (1 mg total) by mouth once daily. 90 tablet 3    aspirin (ECOTRIN) 81 MG EC tablet Take 1 tablet by mouth once daily. Pt back on asa      aspirin-acetaminophen-caffeine 250-250-65 mg (HEADACHE RELIEF, ASA-ACET-CAF,) 250-250-65 mg per tablet Take 1 tablet by mouth every 6 to 8 hours as needed.      buPROPion (WELLBUTRIN) 100 MG tablet Take 1 tablet (100 mg total) by mouth 3 (three) times daily. 270 tablet 3    calcium carbonate-vitamin D3 (CALCIUM 600 + D,3,)  600-125 mg-unit Tab Take 2 tablets by mouth once daily. 180 tablet 3    cetirizine (ZYRTEC) 10 MG tablet Take 1 tablet (10 mg total) by mouth once daily. 90 tablet 5    conjugated estrogens (PREMARIN) vaginal cream Place 0.5 g vaginally twice a week. 30 g 5    cyclobenzaprine (FLEXERIL) 10 MG tablet Take 1 tablet (10 mg total) by mouth nightly. 90 tablet 3    fluconazole (DIFLUCAN) 150 MG Tab TAKE 1 TABLET (150 MG TOTAL) BY MOUTH ONCE DAILY. IF SYMPTOMS PERSIST FOR 3 DAYS REPEAT DOSE ON DAY 4 FOR 1 DAY 3 tablet 1    fluticasone propionate (FLONASE) 50 mcg/actuation nasal spray 1 spray (50 mcg total) by Each Nostril route once daily. 18.2 mL 2    hydroCHLOROthiazide (HYDRODIURIL) 25 MG tablet Take 1 tablet (25 mg total) by mouth once daily. 90 tablet 3    levomilnacipran (FETZIMA) 20 mg Cs24 Take 1 capsule(s) every day by oral route.      LUMIGAN 0.01 % Drop Place into both eyes.      montelukast (SINGULAIR) 10 mg tablet Take 1 tablet (10 mg total) by mouth once daily. 90 tablet 3    nebivoloL (BYSTOLIC) 10 MG Tab TAKE 1 TABLET BY MOUTH ONCE DAILY 90 tablet 3    nystatin (MYCOSTATIN) powder Apply topically 4 (four) times daily. 60 g 1    ondansetron (ZOFRAN) 4 MG tablet Take 1 tablet (4 mg total) by mouth every 8 (eight) hours as needed for Nausea. 30 tablet 2    oxyCODONE-acetaminophen (PERCOCET) 7.5-325 mg per tablet Take 1 tablet by mouth 3 (three) times daily as needed.      pantoprazole (PROTONIX) 40 MG tablet Take 1 tablet (40 mg total) by mouth once daily. 90 tablet 3    pseudoephedrine (SUDAFED) 120 mg 12 hr tablet Take 120 mg by mouth every 12 (twelve) hours.      travoprost (TRAVATAN Z) 0.004 % ophthalmic solution travoprost 0.004 % eye drops   INSTILL 1 DROP IN BOTH EYES DAILY AT BEDTIME      celecoxib (CELEBREX) 200 MG capsule Take 1 capsule (200 mg total) by mouth once daily. 90 capsule 3    doxepin (SINEQUAN) 25 MG capsule Take 2 capsules (50 mg total) by mouth nightly. 180 capsule 3    doxycycline  "(VIBRA-TABS) 100 MG tablet Take 1 tablet (100 mg total) by mouth 2 (two) times daily. for 10 days 20 each 0     No current facility-administered medications for this visit.     Review of patient's allergies indicates:  No Known Allergies    Review of Systems   Musculoskeletal:  Positive for arthralgias, gait problem and joint swelling.   Skin:  Positive for color change.   All other systems reviewed and are negative.      Objective:      Vitals:    08/16/23 1022   BP: (!) 144/80   BP Location: Left arm   Patient Position: Sitting   Pulse: 76   Weight: 83.5 kg (184 lb)   Height: 5' 4" (1.626 m)     Physical Exam  Vitals and nursing note reviewed.   Constitutional:       Appearance: Normal appearance.   Cardiovascular:      Pulses:           Dorsalis pedis pulses are 1+ on the right side and 1+ on the left side.        Posterior tibial pulses are 0 on the right side and 0 on the left side.   Pulmonary:      Effort: Pulmonary effort is normal.   Musculoskeletal:         General: Swelling, tenderness and deformity present.      Right lower leg: Edema present.      Right foot: Decreased range of motion. Deformity, bunion and prominent metatarsal heads present.        Feet:    Feet:      Right foot:      Protective Sensation: 2 sites tested.  2 sites sensed.      Skin integrity: Erythema and warmth present.      Left foot:      Protective Sensation: 2 sites tested.  2 sites sensed.   Skin:     Capillary Refill: Capillary refill takes more than 3 seconds.      Findings: Erythema present.   Neurological:      General: No focal deficit present.      Mental Status: She is alert.   Psychiatric:         Mood and Affect: Mood normal.         Behavior: Behavior normal.                                                Assessment:       1. Hallux abducto valgus, right    2. Hammer toe of right foot    3. Osteoarthritis of ankle and foot, unspecified laterality    4. Hypermobile joint syndrome of right foot          Plan:     "   Patient presents today for surgical consultation regarding severe deformity of the right foot the patient states it is very difficult for her to walk and she is in almost constant pain.  Patient had previous hammertoe surgery and her 1st MPJ fused at [facility] she states she did not have good results and continues to have severe pain at all times.  Patient states she would like to know what could be done to correct and address the deformities of the right foot.  On evaluation patient has hypermobility at the level of the 1st metatarsal medial cuneiform joint this is causing her discomfort upon evaluation of this area she does have fusion of the 1st MPJ however this is poorly aligned and it is not clear that the area is completely fused.  If mention of the 2nd digit the areas very the.  I did discuss with the patient that she would likely need to have the hardware removed from the 1st MPJ fusion at the base of the 1st metatarsal medial cuneiform joint to address the hypermobility this could also be utilized to address the excessive shortening of the 1st metatarsal as well as the angular deformity and increased metatarsal angle right.  Patient's 1st MPJ would have to be reef fused in a properly aligned position and she would likely need to have the 2nd digit redone properly aligning the 2nd digit she would also likely have to have the 2nd metatarsal head resected due to the severity of the deformity degenerative changes I have advised the patient there are little options to address the 2nd MPJ was should excessively painful and there is almost no motion at the 2nd MPJ right.  Patient was advised this is an extensive surgical procedure to address all of these problems she would be nonweightbearing for a minimum of 4 weeks followed by several weeks of partial weight-bearing in a fracture boot before gradual return to activity she understands the risks involved she understands the lengthy recovery  especially because she is had surgery previously.  Patient indicated she would like to proceed with surgery we have tentatively scheduled surgery for September 15, 2023 patient already has pain management I have advised her that she would need to contact her pain management provider to advised them that she is having surgery on the right foot.  Patient has been advised to contact us with any problems questions or concerns prior to her preoperative evaluation.  Total face-to-face time including discussion evaluation treatment discussion of treatment options treatment plan extensive review of imaging studies and past medical history equaled 45 minutes.  This note was created using [employer] voice recognition software that occasionally misinterpreted phrases or words.

## 2023-08-28 ENCOUNTER — OFFICE VISIT (OUTPATIENT)
Dept: FAMILY MEDICINE | Facility: CLINIC | Age: 68
End: 2023-08-28
Payer: MEDICARE

## 2023-08-28 VITALS
OXYGEN SATURATION: 96 % | DIASTOLIC BLOOD PRESSURE: 86 MMHG | WEIGHT: 184.31 LBS | HEART RATE: 78 BPM | HEIGHT: 64 IN | BODY MASS INDEX: 31.47 KG/M2 | SYSTOLIC BLOOD PRESSURE: 130 MMHG | RESPIRATION RATE: 18 BRPM

## 2023-08-28 DIAGNOSIS — R30.0 DYSURIA: Primary | ICD-10-CM

## 2023-08-28 DIAGNOSIS — N76.0 VAGINOSIS: ICD-10-CM

## 2023-08-28 DIAGNOSIS — N30.90 CYSTITIS WITHOUT HEMATURIA: ICD-10-CM

## 2023-08-28 DIAGNOSIS — E66.9 OBESITY, CLASS I, BMI 30-34.9: ICD-10-CM

## 2023-08-28 LAB
BACTERIA #/AREA URNS HPF: ABNORMAL /HPF
BILIRUB UR QL STRIP: NEGATIVE
BILIRUBIN, UA POC OHS: NEGATIVE
BLOOD, UA POC OHS: ABNORMAL
CLARITY UR: ABNORMAL
CLARITY, UA POC OHS: ABNORMAL
COLOR UR: YELLOW
COLOR, UA POC OHS: ABNORMAL
GLUCOSE UR QL STRIP: NEGATIVE
GLUCOSE, UA POC OHS: NEGATIVE
HGB UR QL STRIP: ABNORMAL
KETONES UR QL STRIP: NEGATIVE
KETONES, UA POC OHS: NEGATIVE
LEUKOCYTE ESTERASE UR QL STRIP: ABNORMAL
LEUKOCYTES, UA POC OHS: ABNORMAL
MICROSCOPIC COMMENT: ABNORMAL
NITRITE UR QL STRIP: POSITIVE
NITRITE, UA POC OHS: POSITIVE
PH UR STRIP: 6 [PH] (ref 5–8)
PH, UA POC OHS: 6
PROT UR QL STRIP: NEGATIVE
PROTEIN, UA POC OHS: 30
RBC #/AREA URNS HPF: 5 /HPF (ref 0–4)
SP GR UR STRIP: 1.02 (ref 1–1.03)
SPECIFIC GRAVITY, UA POC OHS: 1.02
SQUAMOUS #/AREA URNS HPF: 3 /HPF
URN SPEC COLLECT METH UR: ABNORMAL
UROBILINOGEN UR STRIP-ACNC: NEGATIVE EU/DL
UROBILINOGEN, UA POC OHS: 0.2
WBC #/AREA URNS HPF: 20 /HPF (ref 0–5)
WBC CLUMPS URNS QL MICRO: ABNORMAL

## 2023-08-28 PROCEDURE — 81003 URINALYSIS AUTO W/O SCOPE: CPT | Mod: QW,RHCUB | Performed by: FAMILY MEDICINE

## 2023-08-28 PROCEDURE — 81000 URINALYSIS NONAUTO W/SCOPE: CPT | Mod: 59 | Performed by: FAMILY MEDICINE

## 2023-08-28 PROCEDURE — 87077 CULTURE AEROBIC IDENTIFY: CPT | Performed by: FAMILY MEDICINE

## 2023-08-28 PROCEDURE — 87186 SC STD MICRODIL/AGAR DIL: CPT | Performed by: FAMILY MEDICINE

## 2023-08-28 PROCEDURE — 99213 OFFICE O/P EST LOW 20 MIN: CPT | Mod: ,,, | Performed by: FAMILY MEDICINE

## 2023-08-28 PROCEDURE — 87086 URINE CULTURE/COLONY COUNT: CPT | Performed by: FAMILY MEDICINE

## 2023-08-28 PROCEDURE — 87088 URINE BACTERIA CULTURE: CPT | Performed by: FAMILY MEDICINE

## 2023-08-28 PROCEDURE — 99213 PR OFFICE/OUTPT VISIT, EST, LEVL III, 20-29 MIN: ICD-10-PCS | Mod: ,,, | Performed by: FAMILY MEDICINE

## 2023-08-28 RX ORDER — PHENAZOPYRIDINE HYDROCHLORIDE 100 MG/1
100 TABLET, FILM COATED ORAL 3 TIMES DAILY PRN
Qty: 90 TABLET | Refills: 1 | Status: SHIPPED | OUTPATIENT
Start: 2023-08-28 | End: 2023-10-02

## 2023-08-28 RX ORDER — NITROFURANTOIN 25; 75 MG/1; MG/1
100 CAPSULE ORAL 2 TIMES DAILY
Qty: 14 EACH | Refills: 0 | Status: SHIPPED | OUTPATIENT
Start: 2023-08-28 | End: 2023-09-04

## 2023-08-28 RX ORDER — METRONIDAZOLE 7.5 MG/G
1 GEL VAGINAL DAILY
Qty: 70 G | Refills: 0 | Status: SHIPPED | OUTPATIENT
Start: 2023-08-28

## 2023-08-28 NOTE — PROGRESS NOTES
Subjective:       Patient ID: Laxmi Chand is a 68 y.o. female.    Chief Complaint: Urinary Tract Infection    Laxmi Chand presents to the clinic today with complaints of a possible UTI. Patient was seen with similar complaints about a month ago. Patient states she has been taking AZO for the last two days as she has been in pain all weekend. Patient states she also still has vaginal discharge after taking 3 doses of diflucan.   Patient educated on plan of care, verbalized understanding.         Review of Systems   Constitutional:  Negative for activity change, appetite change, chills, diaphoresis and fever.   HENT:  Negative for congestion, ear pain, postnasal drip, sinus pressure, sneezing and sore throat.    Eyes:  Negative for pain, discharge, redness and itching.   Respiratory:  Negative for apnea, cough, chest tightness, shortness of breath and wheezing.    Cardiovascular:  Negative for chest pain and leg swelling.   Gastrointestinal:  Negative for abdominal distention, abdominal pain, constipation, diarrhea, nausea and vomiting.   Genitourinary:  Positive for dysuria, frequency and vaginal discharge. Negative for difficulty urinating and flank pain.   Skin:  Negative for color change, rash and wound.   Neurological:  Negative for dizziness.       Patient Active Problem List   Diagnosis    Allergic rhinitis    Chronic obstructive lung disease    Degeneration of lumbar intervertebral disc    Depressive disorder    Gastroesophageal reflux disease    History of smoking    Hyperlipidemia    Essential hypertension    Status post total left knee replacement    Chronic pain syndrome    Pain of breast    Ductal carcinoma in situ (DCIS) of right breast    Senile osteopenia    History of endometrial cancer    Invasive ductal carcinoma of breast, female, right    Long term (current) use of aromatase inhibitors    Atherosclerosis of aorta    Calcified granuloma of lung    Urge incontinence of urine    Ingrown nail     Neuroma of second interspace of right foot    Hallux abducto valgus, right    Hammer toe of right foot    Osteoarthritis of ankle and foot    Hypermobile joint syndrome of right foot       Objective:      Physical Exam  Vitals reviewed.   Constitutional:       General: She is not in acute distress.     Appearance: Normal appearance. She is well-developed.   HENT:      Head: Normocephalic.      Nose: Nose normal.   Eyes:      General: Lids are normal. Lids are everted, no foreign bodies appreciated.      Conjunctiva/sclera:      Left eye: Hemorrhage present.      Pupils: Pupils are equal, round, and reactive to light.   Cardiovascular:      Rate and Rhythm: Normal rate.   Pulmonary:      Effort: Pulmonary effort is normal. No respiratory distress.   Abdominal:      Tenderness: There is no right CVA tenderness or left CVA tenderness.   Musculoskeletal:      Cervical back: Normal range of motion and neck supple.   Skin:     General: Skin is warm and dry.      Findings: No rash.   Neurological:      Mental Status: She is alert and oriented to person, place, and time.   Psychiatric:         Mood and Affect: Mood normal.         Behavior: Behavior normal.         Lab Results   Component Value Date    WBC 9.96 08/03/2023    HGB 12.6 08/03/2023    HCT 38.0 08/03/2023     08/03/2023    CHOL 203 (H) 05/10/2022    TRIG 101 05/10/2022    HDL 50 05/10/2022    ALT 17 08/03/2023    AST 13 08/03/2023     08/03/2023    K 3.6 08/03/2023     08/03/2023    CREATININE 0.9 08/03/2023    BUN 26 (H) 08/03/2023    CO2 30 (H) 08/03/2023    TSH 1.040 05/10/2022    HGBA1C 5.5 07/26/2023     The 10-year ASCVD risk score (Mihaela FLOWER, et al., 2019) is: 10.9%    Values used to calculate the score:      Age: 68 years      Sex: Female      Is Non- : No      Diabetic: No      Tobacco smoker: No      Systolic Blood Pressure: 130 mmHg      Is BP treated: Yes      HDL Cholesterol: 50 mg/dL      Total  "Cholesterol: 203 mg/dL  Visit Vitals  /86 (BP Location: Right arm, Patient Position: Sitting, BP Method: Large (Manual))   Pulse 78   Resp 18   Ht 5' 4" (1.626 m)   Wt 83.6 kg (184 lb 4.9 oz)   SpO2 96%   BMI 31.64 kg/m²      Assessment:       1. Dysuria    2. Cystitis without hematuria    3. Vaginosis    4. Obesity, Class I, BMI 30-34.9        Plan:       Laxmi was seen today for urinary tract infection.    Diagnoses and all orders for this visit:    Dysuria / Cystitis without hematuria  -     nitrofurantoin, macrocrystal-monohydrate, (MACROBID) 100 MG capsule; Take 1 capsule (100 mg total) by mouth 2 (two) times daily. for 7 days  -     phenazopyridine (PYRIDIUM) 100 MG tablet; Take 1 tablet (100 mg total) by mouth 3 (three) times daily as needed for Pain.  -     Urinalysis  -     Urine culture  -     POCT Urinalysis(Instrument)    Vaginosis  -     metroNIDAZOLE (METROGEL) 0.75 % (37.5mg/5 gram) vaginal gel; Place 1 applicator vaginally Daily.    Obesity, Class I, BMI 30-34.9  Body mass index is 31.64 kg/m².  Continue healthy diet and regular exercise as tolerated.  Continue medications as prescribed.  Follow up with PCP      Follow up if symptoms worsen or fail to improve.      Future Appointments       Date Provider Specialty Appt Notes    9/11/2023 Devyn Mccullough DPM Podiatry Pre OP    2/6/2024  Lab hemonc    2/8/2024 Mell Veloz NP Hematology and Oncology [facility]/Labs/6mfu  confirm vch    2/21/2024 Zoraida Orta NP Family Medicine AWV             All of your core healthy metrics are met.       "

## 2023-08-30 DIAGNOSIS — J44.9 CHRONIC OBSTRUCTIVE PULMONARY DISEASE, UNSPECIFIED COPD TYPE: ICD-10-CM

## 2023-08-30 RX ORDER — IPRATROPIUM BROMIDE AND ALBUTEROL SULFATE 2.5; .5 MG/3ML; MG/3ML
SOLUTION RESPIRATORY (INHALATION)
Qty: 360 ML | Refills: 5 | Status: SHIPPED | OUTPATIENT
Start: 2023-08-30

## 2023-08-30 NOTE — TELEPHONE ENCOUNTER
LOV: 8/28/23 You      NOV: 2/21/24 You    Preffered Pharmacy:[facility] [place], [place] - [phone]

## 2023-08-31 LAB — BACTERIA UR CULT: ABNORMAL

## 2023-09-07 ENCOUNTER — TELEPHONE (OUTPATIENT)
Dept: PODIATRY | Facility: CLINIC | Age: 68
End: 2023-09-07
Payer: MEDICARE

## 2023-09-07 RX ORDER — SULFAMETHOXAZOLE AND TRIMETHOPRIM 800; 160 MG/1; MG/1
1 TABLET ORAL 2 TIMES DAILY
Qty: 28 TABLET | Refills: 0 | Status: SHIPPED | OUTPATIENT
Start: 2023-09-07 | End: 2023-09-21

## 2023-09-07 NOTE — TELEPHONE ENCOUNTER
Contacted patient and instructed her she had new prescription, start soaking foot epsom salt, and keep the toes . Patient verbalized understanding.

## 2023-09-07 NOTE — TELEPHONE ENCOUNTER
Called patient for appt reminder for Monday for her pre op. She says the right foot that you are doing surgery on 2nd toe is swollen more than usual and yesterday there is a small opening with clear to bloody drainage. Patient wanted to know if she should start antibiotic. She is going out of town later today and won't be back until night before appointment. She uses [facility].

## 2023-09-10 PROBLEM — M20.21 HALLUX RIGIDUS OF RIGHT FOOT: Status: ACTIVE | Noted: 2023-09-10

## 2023-09-10 PROBLEM — T84.84XA PAINFUL ORTHOPAEDIC HARDWARE: Status: ACTIVE | Noted: 2023-09-10

## 2023-09-11 ENCOUNTER — HOSPITAL ENCOUNTER (OUTPATIENT)
Dept: RADIOLOGY | Facility: HOSPITAL | Age: 68
Discharge: HOME | End: 2023-09-11
Attending: PODIATRIST
Payer: MEDICARE

## 2023-09-11 ENCOUNTER — OFFICE VISIT (OUTPATIENT)
Dept: PODIATRY | Facility: CLINIC | Age: 68
End: 2023-09-11
Payer: MEDICARE

## 2023-09-11 VITALS
HEART RATE: 80 BPM | WEIGHT: 186 LBS | DIASTOLIC BLOOD PRESSURE: 88 MMHG | SYSTOLIC BLOOD PRESSURE: 144 MMHG | HEIGHT: 64 IN | BODY MASS INDEX: 31.76 KG/M2

## 2023-09-11 DIAGNOSIS — M20.21 HALLUX RIGIDUS OF RIGHT FOOT: ICD-10-CM

## 2023-09-11 DIAGNOSIS — M20.11 HALLUX ABDUCTO VALGUS, RIGHT: ICD-10-CM

## 2023-09-11 DIAGNOSIS — T84.84XA PAINFUL ORTHOPAEDIC HARDWARE: ICD-10-CM

## 2023-09-11 DIAGNOSIS — M20.41 HAMMER TOE OF RIGHT FOOT: ICD-10-CM

## 2023-09-11 DIAGNOSIS — M19.079 OSTEOARTHRITIS OF ANKLE AND FOOT, UNSPECIFIED LATERALITY: ICD-10-CM

## 2023-09-11 DIAGNOSIS — M35.7 HYPERMOBILE JOINT SYNDROME OF RIGHT FOOT: ICD-10-CM

## 2023-09-11 DIAGNOSIS — M35.7 HYPERMOBILE JOINT SYNDROME OF RIGHT FOOT: Primary | ICD-10-CM

## 2023-09-11 PROCEDURE — 73630 XR FOOT COMPLETE 3 VIEW RIGHT: ICD-10-PCS | Mod: 26,RT,, | Performed by: RADIOLOGY

## 2023-09-11 PROCEDURE — 99999 PR PBB SHADOW E&M-EST. PATIENT-LVL III: ICD-10-PCS | Mod: PBBFAC,,, | Performed by: PODIATRIST

## 2023-09-11 PROCEDURE — 99999 PR PBB SHADOW E&M-EST. PATIENT-LVL III: CPT | Mod: PBBFAC,,, | Performed by: PODIATRIST

## 2023-09-11 PROCEDURE — 99215 OFFICE O/P EST HI 40 MIN: CPT | Mod: 57,S$PBB,, | Performed by: PODIATRIST

## 2023-09-11 PROCEDURE — 73630 X-RAY EXAM OF FOOT: CPT | Mod: TC,RT

## 2023-09-11 PROCEDURE — 73630 X-RAY EXAM OF FOOT: CPT | Mod: 26,RT,, | Performed by: RADIOLOGY

## 2023-09-11 PROCEDURE — 99213 OFFICE O/P EST LOW 20 MIN: CPT | Mod: PBBFAC | Performed by: PODIATRIST

## 2023-09-11 PROCEDURE — 99215 PR OFFICE/OUTPT VISIT, EST, LEVL V, 40-54 MIN: ICD-10-PCS | Mod: 57,S$PBB,, | Performed by: PODIATRIST

## 2023-09-11 RX ORDER — SODIUM CHLORIDE, SODIUM LACTATE, POTASSIUM CHLORIDE, CALCIUM CHLORIDE 600; 310; 30; 20 MG/100ML; MG/100ML; MG/100ML; MG/100ML
INJECTION, SOLUTION INTRAVENOUS CONTINUOUS
Status: CANCELLED | OUTPATIENT
Start: 2023-09-15

## 2023-09-11 RX ORDER — SULFAMETHOXAZOLE AND TRIMETHOPRIM 800; 160 MG/1; MG/1
1 TABLET ORAL 2 TIMES DAILY
Qty: 28 TABLET | Refills: 0 | Status: SHIPPED | OUTPATIENT
Start: 2023-09-11 | End: 2023-09-25

## 2023-09-11 RX ORDER — ONDANSETRON HYDROCHLORIDE 8 MG/1
8 TABLET, FILM COATED ORAL EVERY 8 HOURS PRN
Qty: 42 TABLET | Refills: 1 | Status: SHIPPED | OUTPATIENT
Start: 2023-09-11 | End: 2023-09-25

## 2023-09-11 NOTE — H&P (VIEW-ONLY)
Subjective:       Patient ID: Laxmi Chand is a 68 y.o. female.    Chief Complaint: Pre-op Exam  Patient presents today for surgical consultation due to severe deformity and pain of the right foot.  Patient is currently using a walker and states she has difficulty ambulating due to multiple conditions.    Past Medical History:   Diagnosis Date    Breast cancer in female 09/07/2022    IDC with high grade DCIS    COPD (chronic obstructive pulmonary disease)     Degeneration of lumbar intervertebral disc     Endometrial cancer     GERD (gastroesophageal reflux disease)     HTN (hypertension)     Hyperlipemia, mixed      Past Surgical History:   Procedure Laterality Date    BIOPSY OF AXILLARY NODE Right 9/7/2022    Procedure: BIOPSY, LYMPH NODE, AXILLARY;  Surgeon: Jatin Gutierrez MD;  Location: [facility] OR;  Service: General;  Laterality: Right;    BREAST BIOPSY Right 08/05/2022    BREAST MASS EXCISION Right 9/7/2022    Procedure: EXCISION, MASS, BREAST;  Surgeon: Jatin Gutierrez MD;  Location: [facility] OR;  Service: General;  Laterality: Right;  wire localized partial mastectomy right breast with margins     CARPAL TUNNEL RELEASE  1985    CHOLECYSTECTOMY  1978    HYSTERECTOMY      KNEE SURGERY Left 06/01/2020    LUMBAR DISC SURGERY  01/01/1990    plate and roland    LUMBAR FUSION  2011    TOTAL KNEE REPLACEMENT USING COMPUTER NAVIGATION Left 02/15/2021     Family History   Problem Relation Age of Onset    Hypertension Mother     Diabetes Mother     Hypertension Father     Lung cancer Maternal Grandfather     Breast cancer Neg Hx      Social History     Socioeconomic History    Marital status: Significant Other   Occupational History    Occupation: disabled   Tobacco Use    Smoking status: Former     Types: Vaping with nicotine    Smokeless tobacco: Never   Substance and Sexual Activity    Alcohol use: Yes    Drug use: Not Currently    Sexual activity: Yes     Partners: Male   Social History Narrative    [facility]         [facility]     Annual    urinary tract infection    yeast infection        Visit Summary    No pap per guidelines    U/A for culture    Wet prep: yeast only    Pt has a PCP    Colonoscopy up to date    Mammo @ [facility] [place]        Prescriptions:    SIG: Cipro 500 mg oral tablet, 3 days, Dispense #6 Tablet, 0 Refills    Directions: Take 1 oral tablet 2 times a day    SIG: Diflucan 100 mg oral tablet, 3 days, Dispense #3 Tablet, 0 Refills    Directions: Take 1 oral tablet once a day        Return for follow up appointment in one year or prn.     GYN Visit & [id number] [facility]     Vulvar Cyst: Resolved        Visit Summary    Normal external gyn exam. Cyst resolved        Return for follow up appointment annual/prn.     [id number]     Chronic Vaginitis    Paratubal cysts: stable        Visit Summary    Wet prep: mostly yeast, few clue cells    Pt soaks in bath BID, stop, shower only, no douching.    u/s performed today. unchanged from last scan.        Prescriptions: Clindesse sample given    SIG: Diflucan 100 mg oral tablet, 3 days, Dispense #3 Tablet, 0 Refills    Directions: Take 1 oral tablet once a day    Recommend probiotics        Return for follow up appointment for annual or prn. MDL swab at next appt if needed.    Mammo up to date.     [id number]     Recurrent bacterial vaginosis.  Paratubal cysts.  Dysuria.    Repeat transvaginal ultrasound 6 weeks.        Visit Summary    Ordered:    Urine Culture, Routine     GYN Visit & [id number] [facility]5/12/2017     path compound melanocytic nevus...ch us...of pelvis....rtc 1y pap     GYN Visit & [id number] [facility]5/4/2017     3 moles removed from back 5 mm each...same contwainer monsels applied...    one mole removed from under each breasxt 5 mm...mnonsels applied...each one sent to path sepreatelyt..        vag dc bv...sp metrogel...no family h/o breast ca...         pelvic us...complex cyst 2.23 cm on left w possible excrescence and septation        pelvic us for complex cyst at [facility]......rtc 1wk     GYN Visit & [id number] [facility]4/25/2017     Chronic Bacterial Vaginitis    Chronic Back Pain    Left Lower Quadrant resolved, possible ruptured ovarian cyst        Visit Summary    MDL swab done        Return for follow up appointment in 2 months for follow up pelvic u/s.     GYN Exam/[id number] 20164/6/2017     Annual    Vaginal odor, Bacterial Vaginosis    Ovarian Cyst        Visit Summary    Pap done, reports hx of cervical pre-cancer        Prescriptions:    SIG: metronidazole 500 mg tablet, 7 days, Dispense #14 Tablet, 0 Refills    Directions: Take 1 oral tablet 2 times a day. No alcohol discussed.        U/S today, reports had ovarian cyst on CT scan.    FSH today        Return for follow up appointment  pending results.       Current Outpatient Medications   Medication Sig Dispense Refill    ADVAIR -21 mcg/actuation HFAA inhaler Inhale 2 puffs into the lungs every 12 (twelve) hours. Controller 12 g 3    albuterol (PROVENTIL/VENTOLIN HFA) 90 mcg/actuation inhaler Inhale 2 puffs into the lungs every 4 (four) hours as needed for Wheezing or Shortness of Breath. 18 g 6    albuterol-ipratropium (DUO-NEB) 2.5 mg-0.5 mg/3 mL nebulizer solution INHALE CONTENTS OF 1 VIAL BY MOUTH WITH NEBULIZER EVERY 6 HOURS WHILE AWAKE AS DIRECTED 360 mL 5    anastrozole (ARIMIDEX) 1 mg Tab Take 1 tablet (1 mg total) by mouth once daily. 90 tablet 3    buPROPion (WELLBUTRIN) 100 MG tablet Take 1 tablet (100 mg total) by mouth 3 (three) times daily. 270 tablet 3    calcium carbonate-vitamin D3 (CALCIUM 600 + D,3,) 600-125 mg-unit Tab Take 2 tablets by mouth once daily. 180 tablet 3    celecoxib (CELEBREX) 200 MG capsule Take 1 capsule (200 mg total) by mouth once daily. 90 capsule 3    cetirizine (ZYRTEC) 10 MG tablet Take 1 tablet (10 mg total) by mouth once daily. 90 tablet 5    conjugated  estrogens (PREMARIN) vaginal cream Place 0.5 g vaginally twice a week. 30 g 5    cyclobenzaprine (FLEXERIL) 10 MG tablet Take 1 tablet (10 mg total) by mouth nightly. 90 tablet 3    doxepin (SINEQUAN) 25 MG capsule Take 2 capsules (50 mg total) by mouth nightly. 180 capsule 3    fluconazole (DIFLUCAN) 150 MG Tab TAKE 1 TABLET (150 MG TOTAL) BY MOUTH ONCE DAILY. IF SYMPTOMS PERSIST FOR 3 DAYS REPEAT DOSE ON DAY 4 FOR 1 DAY 3 tablet 1    fluticasone propionate (FLONASE) 50 mcg/actuation nasal spray 1 spray (50 mcg total) by Each Nostril route once daily. 18.2 mL 2    hydroCHLOROthiazide (HYDRODIURIL) 25 MG tablet Take 1 tablet (25 mg total) by mouth once daily. 90 tablet 3    LUMIGAN 0.01 % Drop Place into both eyes.      metroNIDAZOLE (METROGEL) 0.75 % (37.5mg/5 gram) vaginal gel Place 1 applicator vaginally Daily. 70 g 0    montelukast (SINGULAIR) 10 mg tablet Take 1 tablet (10 mg total) by mouth once daily. 90 tablet 3    nebivoloL (BYSTOLIC) 10 MG Tab TAKE 1 TABLET BY MOUTH ONCE DAILY 90 tablet 3    nystatin (MYCOSTATIN) powder Apply topically 4 (four) times daily. 60 g 1    ondansetron (ZOFRAN) 4 MG tablet Take 1 tablet (4 mg total) by mouth every 8 (eight) hours as needed for Nausea. 30 tablet 2    oxyCODONE-acetaminophen (PERCOCET) 7.5-325 mg per tablet Take 1 tablet by mouth 3 (three) times daily as needed.      pantoprazole (PROTONIX) 40 MG tablet Take 1 tablet (40 mg total) by mouth once daily. 90 tablet 3    phenazopyridine (PYRIDIUM) 100 MG tablet Take 1 tablet (100 mg total) by mouth 3 (three) times daily as needed for Pain. 90 tablet 1    pseudoephedrine (SUDAFED) 120 mg 12 hr tablet Take 120 mg by mouth every 12 (twelve) hours.      sulfamethoxazole-trimethoprim 800-160mg (BACTRIM DS) 800-160 mg Tab Take 1 tablet by mouth 2 (two) times daily. for 14 days 28 tablet 0    aspirin (ECOTRIN) 81 MG EC tablet Take 1 tablet by mouth once daily. Pt back on asa      aspirin-acetaminophen-caffeine 250-250-65 mg  "(HEADACHE RELIEF, ASA-ACET-CAF,) 250-250-65 mg per tablet Take 1 tablet by mouth every 6 to 8 hours as needed.      levomilnacipran (FETZIMA) 20 mg Cs24 Take 1 capsule(s) every day by oral route.      ondansetron (ZOFRAN) 8 MG tablet Take 1 tablet (8 mg total) by mouth every 8 (eight) hours as needed for Nausea. 42 tablet 1    sulfamethoxazole-trimethoprim 800-160mg (BACTRIM DS) 800-160 mg Tab Take 1 tablet by mouth 2 (two) times daily. for 14 days 28 tablet 0     No current facility-administered medications for this visit.     Review of patient's allergies indicates:  No Known Allergies    Review of Systems   Musculoskeletal:  Positive for arthralgias, gait problem and joint swelling.   Skin:  Positive for color change.   All other systems reviewed and are negative.      Objective:      Vitals:    09/11/23 1027   BP: (!) 144/88   BP Location: Left arm   Patient Position: Sitting   Pulse: 80   Weight: 84.4 kg (186 lb)   Height: 5' 4" (1.626 m)     Physical Exam  Vitals and nursing note reviewed.   Constitutional:       Appearance: Normal appearance.   Cardiovascular:      Rate and Rhythm: Normal rate and regular rhythm.      Pulses:           Dorsalis pedis pulses are 1+ on the right side and 1+ on the left side.        Posterior tibial pulses are 0 on the right side and 0 on the left side.   Pulmonary:      Effort: Pulmonary effort is normal.      Breath sounds: Wheezing present.   Musculoskeletal:         General: Swelling, tenderness and deformity present.      Right lower leg: Edema present.      Right foot: Decreased range of motion. Deformity, bunion and prominent metatarsal heads present.        Feet:    Feet:      Right foot:      Protective Sensation: 2 sites tested.  2 sites sensed.      Skin integrity: Erythema and warmth present.      Left foot:      Protective Sensation: 2 sites tested.  2 sites sensed.   Skin:     Capillary Refill: Capillary refill takes more than 3 seconds.      Findings: Erythema " present.   Neurological:      General: No focal deficit present.      Mental Status: She is alert.   Psychiatric:         Mood and Affect: Mood normal.         Behavior: Behavior normal.                                                      Assessment:       1. Hypermobile joint syndrome of right foot    2. Hallux abducto valgus, right    3. Hammer toe of right foot    4. Osteoarthritis of ankle and foot, unspecified laterality    5. Hallux rigidus of right foot    6. Painful orthopaedic hardware          Plan:       Patient presents today for surgical consultation regarding severe deformity of the right foot the patient states it is very difficult for her to walk and she is in almost constant pain.  Patient had previous hammertoe surgery and her 1st MPJ fused at [facility] she states she did not have good results and continues to have severe pain at all times.  Patient states she would like to know what could be done to correct and address the deformities of the right foot.  On evaluation patient has hypermobility at the level of the 1st metatarsal medial cuneiform joint this is causing her discomfort upon evaluation of this area she does have fusion of the 1st MPJ however this is poorly aligned and it is not clear that the area is completely fused.   I did discuss with the patient that she would likely need to have the hardware removed from the 1st MPJ fusion at the base of the 1st metatarsal medial cuneiform joint to address the hypermobility this could also be utilized to address the excessive shortening of the 1st metatarsal as well as the angular deformity and increased metatarsal angle right.  Patient's 1st MPJ would have to be re-fused in a properly aligned position and she would likely need to have the 2nd digit redone properly aligning the 2nd digit she would also likely have to have the 2nd metatarsal head resected due to the severity of the deformity degenerative changes I have advised the  patient there are little options to address the 2nd MPJ was should excessively painful and there is almost no motion at the 2nd MPJ right.  Patient additionally has developed considerable inflammation and swelling of the 2nd digit right secondary to digital contracture of the digit is also externally rotated with spurring noted at the lateral proximal interphalangeal joint the spurring has subsequently caused an ulceration and skin breakdown.  Patient was placed on Bactrim as a precaution she states the toe has gone down considerably since she is been taking the Bactrim soaking the area and dressing the area I am also going to have her apply Betadine to the small opening and continue to keep a padded dressing on the area.  Patient was advised this is an extensive surgical procedure to address all of these problems she would be nonweightbearing for a minimum of 4 weeks followed by several weeks of partial weight-bearing in a fracture boot before gradual return to activity she understands the risks involved she understands the lengthy recovery especially because she is had surgery previously.  Patient has may decision to pursue surgery as discussed.  Patient presents today for preoperative history and physical in discussion all aspects of surgery were discussed with the patient no guarantees were given written or implied all potential complications including but not limited to delayed healing nonhealing postoperative pain infection recurrence were discussed with the patient in detail. All aspect of the patient's recovery time involved in recovery patient responsibility is involving recovery were also discussed in detail. Patient advised failure to comply with postoperative care will jeopardize surgical outcome.  All aspects of surgery including the use of fixation and bone graft discussed at length and in detail patient was in agreement with this she had requested an order for both crutches and a knee scooter to  maintain her nonweightbearing status right.  Patient will take her blood pressure medication the morning of surgery with a small sip of water otherwise she will be NPO.  Patient is currently under the care of pain management and has additional pain medication to take every 4 hours as needed postoperatively.  Patient was dispensed a prescription for Bactrim and Zofran today.  Patient does tolerate over-the-counter ibuprofen or Motrin I have recommended 200 mg of ibuprofen or Motrin over-the-counter in between doses of pain medication to help with the inflammation.  Preoperative lab work has been evaluated.  Patient will start taking a daily aspirin 24 hours after surgery she states she has already discontinue taking her aspirin and preparation for surgery.  Patient's surgery has been scheduled for September 15, 2023 she is been advised to contact us with any problems questions or concerns prior to surgery.  Patient was dispensed Betadine today so that she can apply this to the area of breakdown lateral 2nd digit right she will continue taking the Bactrim as directed.  Total face-to-face time including discussion evaluation treatment discussion of treatment options treatment plan surgical consultation decision for surgery preoperative history and physical as well as preoperative documentation and preoperative orders equaled 45 minutes.  This note was created using [employer] voice recognition software that occasionally misinterpreted phrases or words.

## 2023-09-11 NOTE — PATIENT INSTRUCTIONS
Nothing to eat or drink after midnight.  If you take medication for high blood pressure take this in the morning with a sip of water prior to surgery.     Arrive at out patient registration at 8:30 am

## 2023-09-11 NOTE — PROGRESS NOTES
Subjective:       Patient ID: Laxmi Chand is a 68 y.o. female.    Chief Complaint: Pre-op Exam  Patient presents today for surgical consultation due to severe deformity and pain of the right foot.  Patient is currently using a walker and states she has difficulty ambulating due to multiple conditions.    Past Medical History:   Diagnosis Date    Breast cancer in female 09/07/2022    IDC with high grade DCIS    COPD (chronic obstructive pulmonary disease)     Degeneration of lumbar intervertebral disc     Endometrial cancer     GERD (gastroesophageal reflux disease)     HTN (hypertension)     Hyperlipemia, mixed      Past Surgical History:   Procedure Laterality Date    BIOPSY OF AXILLARY NODE Right 9/7/2022    Procedure: BIOPSY, LYMPH NODE, AXILLARY;  Surgeon: Jatin Gutierrez MD;  Location: [facility] OR;  Service: General;  Laterality: Right;    BREAST BIOPSY Right 08/05/2022    BREAST MASS EXCISION Right 9/7/2022    Procedure: EXCISION, MASS, BREAST;  Surgeon: Jatin Gutierrez MD;  Location: [facility] OR;  Service: General;  Laterality: Right;  wire localized partial mastectomy right breast with margins     CARPAL TUNNEL RELEASE  1985    CHOLECYSTECTOMY  1978    HYSTERECTOMY      KNEE SURGERY Left 06/01/2020    LUMBAR DISC SURGERY  01/01/1990    plate and roland    LUMBAR FUSION  2011    TOTAL KNEE REPLACEMENT USING COMPUTER NAVIGATION Left 02/15/2021     Family History   Problem Relation Age of Onset    Hypertension Mother     Diabetes Mother     Hypertension Father     Lung cancer Maternal Grandfather     Breast cancer Neg Hx      Social History     Socioeconomic History    Marital status: Significant Other   Occupational History    Occupation: disabled   Tobacco Use    Smoking status: Former     Types: Vaping with nicotine    Smokeless tobacco: Never   Substance and Sexual Activity    Alcohol use: Yes    Drug use: Not Currently    Sexual activity: Yes     Partners: Male   Social History Narrative    [facility]  from [facility]         [facility]     Annual    urinary tract infection    yeast infection        Visit Summary    No pap per guidelines    U/A for culture    Wet prep: yeast only    Pt has a PCP    Colonoscopy up to date    Mammo @ [facility] [place]        Prescriptions:    SIG: Cipro 500 mg oral tablet, 3 days, Dispense #6 Tablet, 0 Refills    Directions: Take 1 oral tablet 2 times a day    SIG: Diflucan 100 mg oral tablet, 3 days, Dispense #3 Tablet, 0 Refills    Directions: Take 1 oral tablet once a day        Return for follow up appointment in one year or prn.     GYN Visit & [id number] [facility]/4/2018     Vulvar Cyst: Resolved        Visit Summary    Normal external gyn exam. Cyst resolved        Return for follow up appointment annual/prn.     [id number]     Chronic Vaginitis    Paratubal cysts: stable        Visit Summary    Wet prep: mostly yeast, few clue cells    Pt soaks in bath BID, stop, shower only, no douching.    u/s performed today. unchanged from last scan.        Prescriptions: Clindesse sample given    SIG: Diflucan 100 mg oral tablet, 3 days, Dispense #3 Tablet, 0 Refills    Directions: Take 1 oral tablet once a day    Recommend probiotics        Return for follow up appointment for annual or prn. MDL swab at next appt if needed.    Mammo up to date.     [id number]     Recurrent bacterial vaginosis.  Paratubal cysts.  Dysuria.    Repeat transvaginal ultrasound 6 weeks.        Visit Summary    Ordered:    Urine Culture, Routine     GYN Visit & [id number] [facility]5/12/2017     path compound melanocytic nevus...ch us...of pelvis....rtc 1y pap     GYN Visit & [id number] [facility]5/4/2017     3 moles removed from back 5 mm each...same contwainer monsels applied...    one mole removed from under each breasxt 5 mm...mnonsels applied...each one sent to path sepreatelyt..        vag dc bv...sp metrogel...no family h/o breast ca...         pelvic us...complex cyst 2.23 cm on left w possible excrescence and septation        pelvic us [place] complex [place] [facility] [place] 1[place]     GYN Visit & [id number] [facility]4/25/2017     Chronic Bacterial Vaginitis    Chronic Back Pain    Left Lower Quadrant resolved, possible ruptured ovarian cyst        Visit Summary    MDL swab done        Return for follow up appointment in 2 months for follow up pelvic u/s.     GYN Exam/[id number] 20164/6/2017     Annual    Vaginal odor, Bacterial Vaginosis    Ovarian Cyst        Visit Summary    Pap done, reports hx of cervical pre-cancer        Prescriptions:    SIG: metronidazole 500 mg tablet, 7 days, Dispense #14 Tablet, 0 Refills    Directions: Take 1 oral tablet 2 times a day. No alcohol discussed.        U/S today, reports had ovarian cyst on CT scan.    FSH today        Return for follow up appointment  pending results.       Current Outpatient Medications   Medication Sig Dispense Refill    ADVAIR -21 mcg/actuation HFAA inhaler Inhale 2 puffs into the lungs every 12 (twelve) hours. Controller 12 g 3    albuterol (PROVENTIL/VENTOLIN HFA) 90 mcg/actuation inhaler Inhale 2 puffs into the lungs every 4 (four) hours as needed for Wheezing or Shortness of Breath. 18 g 6    albuterol-ipratropium (DUO-NEB) 2.5 mg-0.5 mg/3 mL nebulizer solution INHALE CONTENTS OF 1 VIAL BY MOUTH WITH NEBULIZER EVERY 6 HOURS WHILE AWAKE AS DIRECTED 360 mL 5    anastrozole (ARIMIDEX) 1 mg Tab Take 1 tablet (1 mg total) by mouth once daily. 90 tablet 3    buPROPion (WELLBUTRIN) 100 MG tablet Take 1 tablet (100 mg total) by mouth 3 (three) times daily. 270 tablet 3    calcium carbonate-vitamin D3 (CALCIUM 600 + D,3,) 600-125 mg-unit Tab Take 2 tablets by mouth once daily. 180 tablet 3    celecoxib (CELEBREX) 200 MG capsule Take 1 capsule (200 mg total) by mouth once daily. 90 capsule 3    cetirizine (ZYRTEC) 10 MG tablet Take 1 tablet (10 mg total) by mouth once daily. 90 tablet 5    conjugated  estrogens (PREMARIN) vaginal cream Place 0.5 g vaginally twice a week. 30 g 5    cyclobenzaprine (FLEXERIL) 10 MG tablet Take 1 tablet (10 mg total) by mouth nightly. 90 tablet 3    doxepin (SINEQUAN) 25 MG capsule Take 2 capsules (50 mg total) by mouth nightly. 180 capsule 3    fluconazole (DIFLUCAN) 150 MG Tab TAKE 1 TABLET (150 MG TOTAL) BY MOUTH ONCE DAILY. IF SYMPTOMS PERSIST FOR 3 DAYS REPEAT DOSE ON DAY 4 FOR 1 DAY 3 tablet 1    fluticasone propionate (FLONASE) 50 mcg/actuation nasal spray 1 spray (50 mcg total) by Each Nostril route once daily. 18.2 mL 2    hydroCHLOROthiazide (HYDRODIURIL) 25 MG tablet Take 1 tablet (25 mg total) by mouth once daily. 90 tablet 3    LUMIGAN 0.01 % Drop Place into both eyes.      metroNIDAZOLE (METROGEL) 0.75 % (37.5mg/5 gram) vaginal gel Place 1 applicator vaginally Daily. 70 g 0    montelukast (SINGULAIR) 10 mg tablet Take 1 tablet (10 mg total) by mouth once daily. 90 tablet 3    nebivoloL (BYSTOLIC) 10 MG Tab TAKE 1 TABLET BY MOUTH ONCE DAILY 90 tablet 3    nystatin (MYCOSTATIN) powder Apply topically 4 (four) times daily. 60 g 1    ondansetron (ZOFRAN) 4 MG tablet Take 1 tablet (4 mg total) by mouth every 8 (eight) hours as needed for Nausea. 30 tablet 2    oxyCODONE-acetaminophen (PERCOCET) 7.5-325 mg per tablet Take 1 tablet by mouth 3 (three) times daily as needed.      pantoprazole (PROTONIX) 40 MG tablet Take 1 tablet (40 mg total) by mouth once daily. 90 tablet 3    phenazopyridine (PYRIDIUM) 100 MG tablet Take 1 tablet (100 mg total) by mouth 3 (three) times daily as needed for Pain. 90 tablet 1    pseudoephedrine (SUDAFED) 120 mg 12 hr tablet Take 120 mg by mouth every 12 (twelve) hours.      sulfamethoxazole-trimethoprim 800-160mg (BACTRIM DS) 800-160 mg Tab Take 1 tablet by mouth 2 (two) times daily. for 14 days 28 tablet 0    aspirin (ECOTRIN) 81 MG EC tablet Take 1 tablet by mouth once daily. Pt back on asa      aspirin-acetaminophen-caffeine 250-250-65 mg  "(HEADACHE RELIEF, ASA-ACET-CAF,) 250-250-65 mg per tablet Take 1 tablet by mouth every 6 to 8 hours as needed.      levomilnacipran (FETZIMA) 20 mg Cs24 Take 1 capsule(s) every day by oral route.      ondansetron (ZOFRAN) 8 MG tablet Take 1 tablet (8 mg total) by mouth every 8 (eight) hours as needed for Nausea. 42 tablet 1    sulfamethoxazole-trimethoprim 800-160mg (BACTRIM DS) 800-160 mg Tab Take 1 tablet by mouth 2 (two) times daily. for 14 days 28 tablet 0     No current facility-administered medications for this visit.     Review of patient's allergies indicates:  No Known Allergies    Review of Systems   Musculoskeletal:  Positive for arthralgias, gait problem and joint swelling.   Skin:  Positive for color change.   All other systems reviewed and are negative.      Objective:      Vitals:    09/11/23 1027   BP: (!) 144/88   BP Location: Left arm   Patient Position: Sitting   Pulse: 80   Weight: 84.4 kg (186 lb)   Height: 5' 4" (1.626 m)     Physical Exam  Vitals and nursing note reviewed.   Constitutional:       Appearance: Normal appearance.   Cardiovascular:      Rate and Rhythm: Normal rate and regular rhythm.      Pulses:           Dorsalis pedis pulses are 1+ on the right side and 1+ on the left side.        Posterior tibial pulses are 0 on the right side and 0 on the left side.   Pulmonary:      Effort: Pulmonary effort is normal.      Breath sounds: Wheezing present.   Musculoskeletal:         General: Swelling, tenderness and deformity present.      Right lower leg: Edema present.      Right foot: Decreased range of motion. Deformity, bunion and prominent metatarsal heads present.        Feet:    Feet:      Right foot:      Protective Sensation: 2 sites tested.  2 sites sensed.      Skin integrity: Erythema and warmth present.      Left foot:      Protective Sensation: 2 sites tested.  2 sites sensed.   Skin:     Capillary Refill: Capillary refill takes more than 3 seconds.      Findings: Erythema " present.   Neurological:      General: No focal deficit present.      Mental Status: She is alert.   Psychiatric:         Mood and Affect: Mood normal.         Behavior: Behavior normal.                                                      Assessment:       1. Hypermobile joint syndrome of right foot    2. Hallux abducto valgus, right    3. Hammer toe of right foot    4. Osteoarthritis of ankle and foot, unspecified laterality    5. Hallux rigidus of right foot    6. Painful orthopaedic hardware          Plan:       Patient presents today for surgical consultation regarding severe deformity of the right foot the patient states it is very difficult for her to walk and she is in almost constant pain.  Patient had previous hammertoe surgery and her 1st MPJ fused at [facility] she states she did not have good results and continues to have severe pain at all times.  Patient states she would like to know what could be done to correct and address the deformities of the right foot.  On evaluation patient has hypermobility at the level of the 1st metatarsal medial cuneiform joint this is causing her discomfort upon evaluation of this area she does have fusion of the 1st MPJ however this is poorly aligned and it is not clear that the area is completely fused.   I did discuss with the patient that she would likely need to have the hardware removed from the 1st MPJ fusion at the base of the 1st metatarsal medial cuneiform joint to address the hypermobility this could also be utilized to address the excessive shortening of the 1st metatarsal as well as the angular deformity and increased metatarsal angle right.  Patient's 1st MPJ would have to be re-fused in a properly aligned position and she would likely need to have the 2nd digit redone properly aligning the 2nd digit she would also likely have to have the 2nd metatarsal head resected due to the severity of the deformity degenerative changes I have advised the  patient there are little options to address the 2nd MPJ was should excessively painful and there is almost no motion at the 2nd MPJ right.  Patient additionally has developed considerable inflammation and swelling of the 2nd digit right secondary to digital contracture of the digit is also externally rotated with spurring noted at the lateral proximal interphalangeal joint the spurring has subsequently caused an ulceration and skin breakdown.  Patient was placed on Bactrim as a precaution she states the toe has gone down considerably since she is been taking the Bactrim soaking the area and dressing the area I am also going to have her apply Betadine to the small opening and continue to keep a padded dressing on the area.  Patient was advised this is an extensive surgical procedure to address all of these problems she would be nonweightbearing for a minimum of 4 weeks followed by several weeks of partial weight-bearing in a fracture boot before gradual return to activity she understands the risks involved she understands the lengthy recovery especially because she is had surgery previously.  Patient has may decision to pursue surgery as discussed.  Patient presents today for preoperative history and physical in discussion all aspects of surgery were discussed with the patient no guarantees were given written or implied all potential complications including but not limited to delayed healing nonhealing postoperative pain infection recurrence were discussed with the patient in detail. All aspect of the patient's recovery time involved in recovery patient responsibility is involving recovery were also discussed in detail. Patient advised failure to comply with postoperative care will jeopardize surgical outcome.  All aspects of surgery including the use of fixation and bone graft discussed at length and in detail patient was in agreement with this she had requested an order for both crutches and a knee scooter to  maintain her nonweightbearing status right.  Patient will take her blood pressure medication the morning of surgery with a small sip of water otherwise she will be NPO.  Patient is currently under the care of pain management and has additional pain medication to take every 4 hours as needed postoperatively.  Patient was dispensed a prescription for Bactrim and Zofran today.  Patient does tolerate over-the-counter ibuprofen or Motrin I have recommended 200 mg of ibuprofen or Motrin over-the-counter in between doses of pain medication to help with the inflammation.  Preoperative lab work has been evaluated.  Patient will start taking a daily aspirin 24 hours after surgery she states she has already discontinue taking her aspirin and preparation for surgery.  Patient's surgery has been scheduled for September 15, 2023 she is been advised to contact us with any problems questions or concerns prior to surgery.  Patient was dispensed Betadine today so that she can apply this to the area of breakdown lateral 2nd digit right she will continue taking the Bactrim as directed.  Total face-to-face time including discussion evaluation treatment discussion of treatment options treatment plan surgical consultation decision for surgery preoperative history and physical as well as preoperative documentation and preoperative orders equaled 45 minutes.  This note was created using [employer] voice recognition software that occasionally misinterpreted phrases or words.

## 2023-09-13 DIAGNOSIS — J44.9 CHRONIC OBSTRUCTIVE PULMONARY DISEASE, UNSPECIFIED COPD TYPE: ICD-10-CM

## 2023-09-13 RX ORDER — ALBUTEROL SULFATE 90 UG/1
AEROSOL, METERED RESPIRATORY (INHALATION)
Qty: 18 G | Refills: 6 | Status: SHIPPED | OUTPATIENT
Start: 2023-09-13 | End: 2023-11-08 | Stop reason: SDUPTHER

## 2023-09-14 DIAGNOSIS — K21.9 GASTROESOPHAGEAL REFLUX DISEASE, UNSPECIFIED WHETHER ESOPHAGITIS PRESENT: ICD-10-CM

## 2023-09-15 ENCOUNTER — HOSPITAL ENCOUNTER (OUTPATIENT)
Facility: HOSPITAL | Age: 68
Discharge: HOME | End: 2023-09-15
Attending: PODIATRIST | Admitting: PODIATRIST
Payer: MEDICARE

## 2023-09-15 ENCOUNTER — ANESTHESIA EVENT (OUTPATIENT)
Dept: SURGERY | Facility: HOSPITAL | Age: 68
End: 2023-09-15
Payer: MEDICARE

## 2023-09-15 ENCOUNTER — ANESTHESIA (OUTPATIENT)
Dept: SURGERY | Facility: HOSPITAL | Age: 68
End: 2023-09-15
Payer: MEDICARE

## 2023-09-15 VITALS
OXYGEN SATURATION: 96 % | RESPIRATION RATE: 13 BRPM | HEIGHT: 64 IN | TEMPERATURE: 97 F | SYSTOLIC BLOOD PRESSURE: 145 MMHG | BODY MASS INDEX: 31.41 KG/M2 | DIASTOLIC BLOOD PRESSURE: 85 MMHG | WEIGHT: 184 LBS | HEART RATE: 82 BPM

## 2023-09-15 DIAGNOSIS — Z01.818 PRE-OP EXAM: ICD-10-CM

## 2023-09-15 DIAGNOSIS — M35.7 HYPERMOBILE JOINT SYNDROME OF RIGHT FOOT: Primary | ICD-10-CM

## 2023-09-15 DIAGNOSIS — M20.41 HAMMER TOE OF RIGHT FOOT: ICD-10-CM

## 2023-09-15 DIAGNOSIS — M19.071 OSTEOARTHRITIS OF RIGHT ANKLE AND FOOT: ICD-10-CM

## 2023-09-15 DIAGNOSIS — M20.11 HALLUX ABDUCTO VALGUS, RIGHT: ICD-10-CM

## 2023-09-15 PROCEDURE — 71000039 HC RECOVERY, EACH ADD'L HOUR: Performed by: PODIATRIST

## 2023-09-15 PROCEDURE — 88300 PR  SURG PATH,GROSS,LEVEL I: ICD-10-PCS | Mod: 26,,, | Performed by: PATHOLOGY

## 2023-09-15 PROCEDURE — 28750 PR FUSION BIG TOE,MT-P JT: ICD-10-PCS | Mod: 51,RT,, | Performed by: PODIATRIST

## 2023-09-15 PROCEDURE — C1769 GUIDE WIRE: HCPCS | Performed by: PODIATRIST

## 2023-09-15 PROCEDURE — 71000033 HC RECOVERY, INTIAL HOUR: Performed by: PODIATRIST

## 2023-09-15 PROCEDURE — D9220A PRA ANESTHESIA: ICD-10-PCS | Mod: CRNA,,, | Performed by: NURSE ANESTHETIST, CERTIFIED REGISTERED

## 2023-09-15 PROCEDURE — 88300 SURGICAL PATH GROSS: CPT | Mod: 26,,, | Performed by: PATHOLOGY

## 2023-09-15 PROCEDURE — D9220A PRA ANESTHESIA: ICD-10-PCS | Mod: ANES,,, | Performed by: ANESTHESIOLOGY

## 2023-09-15 PROCEDURE — 88300 SURGICAL PATH GROSS: CPT | Performed by: PATHOLOGY

## 2023-09-15 PROCEDURE — 27800903 OPTIME MED/SURG SUP & DEVICES OTHER IMPLANTS: Performed by: PODIATRIST

## 2023-09-15 PROCEDURE — C1713 ANCHOR/SCREW BN/BN,TIS/BN: HCPCS | Performed by: PODIATRIST

## 2023-09-15 PROCEDURE — 63600175 PHARM REV CODE 636 W HCPCS: Mod: UD | Performed by: NURSE ANESTHETIST, CERTIFIED REGISTERED

## 2023-09-15 PROCEDURE — 28112 PART REMOVAL OF METATARSAL: CPT | Mod: 51,RT,, | Performed by: PODIATRIST

## 2023-09-15 PROCEDURE — 20680 REMOVAL OF IMPLANT DEEP: CPT | Mod: 51,,, | Performed by: PODIATRIST

## 2023-09-15 PROCEDURE — 28750 FUSION OF BIG TOE JOINT: CPT | Mod: 51,RT,, | Performed by: PODIATRIST

## 2023-09-15 PROCEDURE — 36000708 HC OR TIME LEV III 1ST 15 MIN: Performed by: PODIATRIST

## 2023-09-15 PROCEDURE — 28285 REPAIR OF HAMMERTOE: CPT | Mod: 59,T6,, | Performed by: PODIATRIST

## 2023-09-15 PROCEDURE — 37000009 HC ANESTHESIA EA ADD 15 MINS: Performed by: PODIATRIST

## 2023-09-15 PROCEDURE — 25000003 PHARM REV CODE 250: Performed by: ANESTHESIOLOGY

## 2023-09-15 PROCEDURE — 37000008 HC ANESTHESIA 1ST 15 MINUTES: Performed by: PODIATRIST

## 2023-09-15 PROCEDURE — 28285 PR REPAIR OF HAMMERTOE,ONE: ICD-10-PCS | Mod: 59,T6,, | Performed by: PODIATRIST

## 2023-09-15 PROCEDURE — D9220A PRA ANESTHESIA: Mod: CRNA,,, | Performed by: NURSE ANESTHETIST, CERTIFIED REGISTERED

## 2023-09-15 PROCEDURE — 25000003 PHARM REV CODE 250: Performed by: PODIATRIST

## 2023-09-15 PROCEDURE — 28740 PR FUSION FOOT BONE,MIDTARSAL,1 JT: ICD-10-PCS | Mod: RT,,, | Performed by: PODIATRIST

## 2023-09-15 PROCEDURE — 71000015 HC POSTOP RECOV 1ST HR: Performed by: PODIATRIST

## 2023-09-15 PROCEDURE — 20680 PR REMOVAL DEEP IMPLANT: ICD-10-PCS | Mod: 51,,, | Performed by: PODIATRIST

## 2023-09-15 PROCEDURE — D9220A PRA ANESTHESIA: Mod: ANES,,, | Performed by: ANESTHESIOLOGY

## 2023-09-15 PROCEDURE — 28112 PR FULL EXCIS 2,3 OR 4TH METATAR HEAD: ICD-10-PCS | Mod: 51,RT,, | Performed by: PODIATRIST

## 2023-09-15 PROCEDURE — 36000709 HC OR TIME LEV III EA ADD 15 MIN: Performed by: PODIATRIST

## 2023-09-15 PROCEDURE — 63600175 PHARM REV CODE 636 W HCPCS: Performed by: PODIATRIST

## 2023-09-15 PROCEDURE — 28740 FUSION OF FOOT BONES: CPT | Mod: RT,,, | Performed by: PODIATRIST

## 2023-09-15 PROCEDURE — 25000003 PHARM REV CODE 250: Performed by: NURSE ANESTHETIST, CERTIFIED REGISTERED

## 2023-09-15 PROCEDURE — 25000242 PHARM REV CODE 250 ALT 637 W/ HCPCS: Performed by: ANESTHESIOLOGY

## 2023-09-15 PROCEDURE — 27201423 OPTIME MED/SURG SUP & DEVICES STERILE SUPPLY: Performed by: PODIATRIST

## 2023-09-15 DEVICE — SCREW R3CON LOK PLATE 3.5X10MM: Type: IMPLANTABLE DEVICE | Site: FOOT | Status: FUNCTIONAL

## 2023-09-15 DEVICE — SCREW R3CON LOK PLATE 3.5X16MM: Type: IMPLANTABLE DEVICE | Site: FOOT | Status: FUNCTIONAL

## 2023-09-15 DEVICE — SCREW R3CON NLOK PLT 3.5X16MM: Type: IMPLANTABLE DEVICE | Site: FOOT | Status: FUNCTIONAL

## 2023-09-15 DEVICE — IMPLANTABLE DEVICE: Type: IMPLANTABLE DEVICE | Site: FOOT | Status: FUNCTIONAL

## 2023-09-15 DEVICE — SCREW R3CON LOK PLATE 3.5X14MM: Type: IMPLANTABLE DEVICE | Site: FOOT | Status: FUNCTIONAL

## 2023-09-15 RX ORDER — SUCCINYLCHOLINE CHLORIDE 20 MG/ML
INJECTION INTRAMUSCULAR; INTRAVENOUS
Status: DISCONTINUED | OUTPATIENT
Start: 2023-09-15 | End: 2023-09-15

## 2023-09-15 RX ORDER — LIDOCAINE HYDROCHLORIDE 10 MG/ML
INJECTION INFILTRATION; PERINEURAL
Status: DISCONTINUED | OUTPATIENT
Start: 2023-09-15 | End: 2023-09-15 | Stop reason: HOSPADM

## 2023-09-15 RX ORDER — FENTANYL CITRATE 50 UG/ML
INJECTION, SOLUTION INTRAMUSCULAR; INTRAVENOUS
Status: DISCONTINUED | OUTPATIENT
Start: 2023-09-15 | End: 2023-09-15

## 2023-09-15 RX ORDER — SODIUM CHLORIDE, SODIUM LACTATE, POTASSIUM CHLORIDE, CALCIUM CHLORIDE 600; 310; 30; 20 MG/100ML; MG/100ML; MG/100ML; MG/100ML
INJECTION, SOLUTION INTRAVENOUS CONTINUOUS
Status: DISCONTINUED | OUTPATIENT
Start: 2023-09-15 | End: 2023-09-15 | Stop reason: HOSPADM

## 2023-09-15 RX ORDER — ONDANSETRON HYDROCHLORIDE 2 MG/ML
INJECTION, SOLUTION INTRAMUSCULAR; INTRAVENOUS
Status: DISCONTINUED | OUTPATIENT
Start: 2023-09-15 | End: 2023-09-15

## 2023-09-15 RX ORDER — ACETAMINOPHEN 10 MG/ML
INJECTION, SOLUTION INTRAVENOUS
Status: DISCONTINUED | OUTPATIENT
Start: 2023-09-15 | End: 2023-09-15

## 2023-09-15 RX ORDER — DEXAMETHASONE SODIUM PHOSPHATE 4 MG/ML
INJECTION, SOLUTION INTRA-ARTICULAR; INTRALESIONAL; INTRAMUSCULAR; INTRAVENOUS; SOFT TISSUE
Status: DISCONTINUED | OUTPATIENT
Start: 2023-09-15 | End: 2023-09-15

## 2023-09-15 RX ORDER — FAMOTIDINE 10 MG/ML
INJECTION INTRAVENOUS
Status: DISCONTINUED
Start: 2023-09-15 | End: 2023-09-15 | Stop reason: HOSPADM

## 2023-09-15 RX ORDER — FAMOTIDINE 10 MG/ML
20 INJECTION INTRAVENOUS ONCE
Status: COMPLETED | OUTPATIENT
Start: 2023-09-15 | End: 2023-09-15

## 2023-09-15 RX ORDER — LIDOCAINE HYDROCHLORIDE 20 MG/ML
INJECTION INTRAVENOUS
Status: DISCONTINUED | OUTPATIENT
Start: 2023-09-15 | End: 2023-09-15

## 2023-09-15 RX ORDER — CEFAZOLIN SODIUM 1 G/3ML
INJECTION, POWDER, FOR SOLUTION INTRAMUSCULAR; INTRAVENOUS
Status: DISCONTINUED
Start: 2023-09-15 | End: 2023-09-15 | Stop reason: HOSPADM

## 2023-09-15 RX ORDER — IPRATROPIUM BROMIDE AND ALBUTEROL SULFATE 2.5; .5 MG/3ML; MG/3ML
SOLUTION RESPIRATORY (INHALATION)
Status: DISCONTINUED
Start: 2023-09-15 | End: 2023-09-15 | Stop reason: HOSPADM

## 2023-09-15 RX ORDER — OXYCODONE AND ACETAMINOPHEN 10; 325 MG/1; MG/1
TABLET ORAL
Status: DISCONTINUED
Start: 2023-09-15 | End: 2023-09-15 | Stop reason: HOSPADM

## 2023-09-15 RX ORDER — SODIUM CHLORIDE, SODIUM LACTATE, POTASSIUM CHLORIDE, CALCIUM CHLORIDE 600; 310; 30; 20 MG/100ML; MG/100ML; MG/100ML; MG/100ML
125 INJECTION, SOLUTION INTRAVENOUS CONTINUOUS
Status: DISCONTINUED | OUTPATIENT
Start: 2023-09-15 | End: 2023-09-15 | Stop reason: HOSPADM

## 2023-09-15 RX ORDER — KETOROLAC TROMETHAMINE 30 MG/ML
15 INJECTION, SOLUTION INTRAMUSCULAR; INTRAVENOUS ONCE
Status: DISCONTINUED | OUTPATIENT
Start: 2023-09-15 | End: 2023-09-15 | Stop reason: HOSPADM

## 2023-09-15 RX ORDER — OXYCODONE AND ACETAMINOPHEN 5; 325 MG/1; MG/1
2 TABLET ORAL ONCE
Status: DISCONTINUED | OUTPATIENT
Start: 2023-09-15 | End: 2023-09-15 | Stop reason: HOSPADM

## 2023-09-15 RX ORDER — MORPHINE SULFATE 2 MG/ML
2 INJECTION, SOLUTION INTRAMUSCULAR; INTRAVENOUS EVERY 5 MIN PRN
Status: DISCONTINUED | OUTPATIENT
Start: 2023-09-15 | End: 2023-09-15 | Stop reason: HOSPADM

## 2023-09-15 RX ORDER — MIDAZOLAM HYDROCHLORIDE 1 MG/ML
INJECTION INTRAMUSCULAR; INTRAVENOUS
Status: DISCONTINUED | OUTPATIENT
Start: 2023-09-15 | End: 2023-09-15

## 2023-09-15 RX ORDER — BUPIVACAINE HYDROCHLORIDE 5 MG/ML
INJECTION, SOLUTION PERINEURAL
Status: DISCONTINUED | OUTPATIENT
Start: 2023-09-15 | End: 2023-09-15 | Stop reason: HOSPADM

## 2023-09-15 RX ORDER — KETOROLAC TROMETHAMINE 30 MG/ML
INJECTION, SOLUTION INTRAMUSCULAR; INTRAVENOUS
Status: DISCONTINUED | OUTPATIENT
Start: 2023-09-15 | End: 2023-09-15

## 2023-09-15 RX ORDER — PROPOFOL 10 MG/ML
VIAL (ML) INTRAVENOUS
Status: DISCONTINUED | OUTPATIENT
Start: 2023-09-15 | End: 2023-09-15

## 2023-09-15 RX ORDER — ONDANSETRON 2 MG/ML
4 INJECTION INTRAMUSCULAR; INTRAVENOUS DAILY PRN
Status: DISCONTINUED | OUTPATIENT
Start: 2023-09-15 | End: 2023-09-15 | Stop reason: HOSPADM

## 2023-09-15 RX ORDER — IPRATROPIUM BROMIDE AND ALBUTEROL SULFATE 2.5; .5 MG/3ML; MG/3ML
3 SOLUTION RESPIRATORY (INHALATION) ONCE
Status: COMPLETED | OUTPATIENT
Start: 2023-09-15 | End: 2023-09-15

## 2023-09-15 RX ORDER — LIDOCAINE HYDROCHLORIDE 10 MG/ML
1 INJECTION, SOLUTION EPIDURAL; INFILTRATION; INTRACAUDAL; PERINEURAL ONCE
Status: DISCONTINUED | OUTPATIENT
Start: 2023-09-15 | End: 2023-09-15 | Stop reason: HOSPADM

## 2023-09-15 RX ORDER — IPRATROPIUM BROMIDE AND ALBUTEROL SULFATE 2.5; .5 MG/3ML; MG/3ML
3 SOLUTION RESPIRATORY (INHALATION) ONCE
Status: DISCONTINUED | OUTPATIENT
Start: 2023-09-15 | End: 2023-09-15 | Stop reason: HOSPADM

## 2023-09-15 RX ORDER — DIPHENHYDRAMINE HYDROCHLORIDE 50 MG/ML
12.5 INJECTION INTRAMUSCULAR; INTRAVENOUS
Status: DISCONTINUED | OUTPATIENT
Start: 2023-09-15 | End: 2023-09-15 | Stop reason: HOSPADM

## 2023-09-15 RX ADMIN — ACETAMINOPHEN 1000 MG: 10 INJECTION, SOLUTION INTRAVENOUS at 09:09

## 2023-09-15 RX ADMIN — LIDOCAINE HYDROCHLORIDE 80 MG: 20 INJECTION, SOLUTION INTRAVENOUS at 09:09

## 2023-09-15 RX ADMIN — MIDAZOLAM HYDROCHLORIDE 2 MG: 1 INJECTION, SOLUTION INTRAMUSCULAR; INTRAVENOUS at 08:09

## 2023-09-15 RX ADMIN — SODIUM CHLORIDE, SODIUM LACTATE, POTASSIUM CHLORIDE, AND CALCIUM CHLORIDE: .6; .31; .03; .02 INJECTION, SOLUTION INTRAVENOUS at 08:09

## 2023-09-15 RX ADMIN — SUCCINYLCHOLINE CHLORIDE 100 MG: 20 INJECTION, SOLUTION INTRAMUSCULAR; INTRAVENOUS at 09:09

## 2023-09-15 RX ADMIN — DEXAMETHASONE SODIUM PHOSPHATE 8 MG: 4 INJECTION, SOLUTION INTRAMUSCULAR; INTRAVENOUS at 09:09

## 2023-09-15 RX ADMIN — FAMOTIDINE 20 MG: 10 INJECTION, SOLUTION INTRAVENOUS at 08:09

## 2023-09-15 RX ADMIN — ONDANSETRON 8 MG: 2 INJECTION INTRAMUSCULAR; INTRAVENOUS at 11:09

## 2023-09-15 RX ADMIN — FENTANYL CITRATE 25 MCG: 50 INJECTION, SOLUTION INTRAMUSCULAR; INTRAVENOUS at 11:09

## 2023-09-15 RX ADMIN — CEFAZOLIN 2 G: 2 INJECTION, POWDER, FOR SOLUTION INTRAMUSCULAR; INTRAVENOUS at 09:09

## 2023-09-15 RX ADMIN — PROPOFOL 150 MG: 10 INJECTION, EMULSION INTRAVENOUS at 09:09

## 2023-09-15 RX ADMIN — KETOROLAC TROMETHAMINE 30 MG: 30 INJECTION, SOLUTION INTRAMUSCULAR at 12:09

## 2023-09-15 RX ADMIN — IPRATROPIUM BROMIDE AND ALBUTEROL SULFATE 3 ML: .5; 3 SOLUTION RESPIRATORY (INHALATION) at 08:09

## 2023-09-15 NOTE — ANESTHESIA PROCEDURE NOTES
Intubation    Date/Time: 9/15/2023 9:07 AM    Performed by: Paulette Reed CRNA  Authorized by: Cliff Johnson MD    Intubation:     Induction:  Intravenous    Intubated:  Postinduction    Mask Ventilation:  Easy mask    Attempts:  1    Attempted By:  GHULAM    Blade:  Mireles 3    Laryngeal View Grade: Grade I - full view of cords      Difficult Airway Encountered?: No      Complications:  None    Airway Device:  Oral endotracheal tube    Airway Device Size:  7.0    Style/Cuff Inflation:  Cuffed (inflated to minimal occlusive pressure)    Inflation Amount (mL):  5    Tube secured:  21    Secured at:  The lips    Placement Verified By:  Capnometry    Complicating Factors:  None    Findings Post-Intubation:  BS equal bilateral and atraumatic/condition of teeth unchanged

## 2023-09-15 NOTE — ANESTHESIA PREPROCEDURE EVALUATION
09/15/2023  Laxmi Chand is a 68 y.o., female.      Pre-op Assessment    I have reviewed the Patient Summary Reports.     I have reviewed the Nursing Notes. I have reviewed the NPO Status.   I have reviewed the Medications.     Review of Systems  Social:  Former Smoker    Hematology/Oncology:         -- Cancer in past history:   EENT/Dental:EENT/Dental Normal   Cardiovascular:   Hypertension    Pulmonary:   COPD    Hepatic/GI:   GERD, well controlled    Musculoskeletal:   Arthritis   Spine Disorders:    Neurological:   Chronic Pain Syndrome   Endocrine:  Endocrine Normal    Psych:   Psychiatric History          Physical Exam    Airway:  Mallampati: II   Mouth Opening: Normal  TM Distance: Normal  Tongue: Normal  Neck ROM: Normal ROM    Dental:  Dentures    Chest/Lungs:  Clear to auscultation    Heart:  Rate: Normal        Anesthesia Plan  Type of Anesthesia, risks & benefits discussed:    Anesthesia Type: Gen Supraglottic Airway  Intra-op Monitoring Plan: Standard ASA Monitors  Post Op Pain Control Plan: multimodal analgesia  Induction:  IV  Informed Consent: Informed consent signed with the Patient and all parties understand the risks and agree with anesthesia plan.  All questions answered.   ASA Score: 3  Day of Surgery Review of History & Physical: H&P Update referred to the surgeon/provider.    Ready For Surgery From Anesthesia Perspective.     .

## 2023-09-15 NOTE — TRANSFER OF CARE
"Anesthesia Transfer of Care Note    Patient: Laxmi Chand    Procedure(s) Performed: Procedure(s) (LRB):  FUSION, JOINT, MIDFOOT (Right)  CORRECTION, HAMMER TOE (Right)  REMOVAL, HARDWARE, FOOT (Right)    Patient location: PACU    Anesthesia Type: general    Transport from OR: Transported from OR on room air with adequate spontaneous ventilation    Post pain: adequate analgesia    Post assessment: no apparent anesthetic complications and tolerated procedure well    Post vital signs: stable    Level of consciousness: awake and responds to stimulation    Nausea/Vomiting: no nausea/vomiting    Complications: none    Transfer of care protocol was followed      Last vitals:   Visit Vitals  BP (!) 144/80 (BP Location: Left arm, Patient Position: Lying)   Pulse 78   Temp 36.7 °C (98 °F) (Oral)   Resp 18   Ht 5' 4" (1.626 m)   Wt 83.5 kg (184 lb)   SpO2 97%   Breastfeeding No   BMI 31.58 kg/m²     "

## 2023-09-15 NOTE — OP NOTE
[facility] - Surgery  Operative Note     SUMMARY     Surgery Date: 9/15/2023       Pre-op Diagnosis:  Hypermobile joint syndrome of right foot [M35.7]    Post-op Diagnosis:  Post-Op Diagnosis Codes:     * Hypermobile joint syndrome of right foot [M35.7]    Procedure(s) (LRB):  FUSION, JOINT, MIDFOOT (Right)  CORRECTION, HAMMER TOE (Right)  REMOVAL, HARDWARE, FOOT (Right)  Fusion base of the 1st metatarsal medial cuneiform joint right foot due to hypermobility procedure code 05915  Removal hardware including a plate and 6 screws 1st MPJ right procedure code 45029  Fusion 1st MPJ right with appropriate alignment of the joint and refixation procedure code 86191  Resection degenerative head of the 2nd metatarsal right procedure code 72402  Correction contracted 2nd digit right procedure code 91946  Surgeon(s) and Role:     * Devyn Mccullough DPM - Primary      Estimated Blood Loss:  Less than 10 cc  Hemostasis:   Ankle tourniquet placed at 250 mmHg for a period of 2-1/2 hours  Anesthesia:  LMA in conjunction with local infiltrate equaling 30 cc of 1% lidocaine plain  Injectables 30 cc of 0.5% Marcaine plain  Materials sterile irrigant 3.0 Vicryl 4.0 Vicryl 4.0 Monocryl paragon 28 right MPJ plate medium 10 degree with associated locking screws 3.5 x 10 mm 14 mm x 3 16 mm 3.5 x 16 mm nonlocking screw 3.5 x 38 mm 3.5 x 30 mm 3.0 x 20 mm headless compression screw 2.5 x 32 mm paragon 28 Dsouza bone wedge 6 mm Lapidus bone wedge 5 mm  Condition stable   complications none   Pathology hardware 6 screws 1 plate      Description of the findings of the procedure:  Hypermobile joint syndrome of right foot [M35.7]         Specimens (From admission, onward)       Start     Ordered    09/15/23 1220  Specimen to Pathology, Surgery Orthopedics  Once        Comments: Pre-op Diagnosis: Hypermobile joint syndrome of right foot [M35.7]Procedure(s):FUSION, JOINT, MIDFOOTCORRECTION, HAMMER TOEREMOVAL, HARDWARE, FOOT Number of specimens:  1Name of specimens: hardware right foot     References:    Click here for ordering Quick Tip   Question Answer Comment   Procedure Type: Orthopedics    Specimen Class: Routine/Screening    Which provider would you like to cc? OLGA DOMINGUEZ    Release to patient Immediate        09/15/23 3400                     Procedure:  Patient was taken the operating room placed in supine position on the OR table attention was then directed towards the patient's right ankle where Webril cast padding was placed around the patient's right ankle as was an ankle tourniquet.  Following this patient was locally anesthetized in a regional block of the 1st and 2nd ray including the medial midfoot right utilizing a total of 30 cc of 1% lidocaine plain.  Following this the patient's foot was prepped and draped in the usual sterile manner patient's foot was then exsanguinated utilizing an Esmarch bandage and the ankle tourniquet was raised to 250 mmHg.  Intraoperative fluoroscopy was used to plan the incision overlying the 1st ray of the patient's right foot a longitudinal linear incision was created from the dorsal distal aspect of the right hallux to just proximal to the 1st metatarsal medial cuneiform joint.  The incision was carried down to the level of the capsular layer which was also incised a longitudinal linear fashion the fixation plate overlying the fusion site from a previous surgery of the 1st MPJ was evaluated all 6 of the screws in the plate itself were removed there was significant degenerative changes and bone spurring underlying this area sagittal saw was used to resect the bone spurs and recontour this area there was a lot of pseudo arthrosis at the previous fusion site which did not appear to be completely fused a sagittal saw was used to resect a section of bone in a wedge pattern to allow for correction of the laterally deviated right hallux attention was then directed towards the base of the 1st metatarsal  medial cuneiform joint where the patient had severe hypermobility and instability at the level of this joint per manufacture guidelines a cut guide was utilized to resect the articular cartilage on the base of the 1st metatarsal medial cuneiform as well as a wedge of bone from this area to allow for correction and realignment of the 1st ray which was medially deviated once proper alignment had been achieved as confirmed under intraoperative fluoroscopy a paragon 28 bone wedge was placed after properly being trimmed to appropriate size at the fusion site to facilitate healing and fusion 2 paragon 28 headless compression screws were utilized to fuse and compress as well as maintain proper alignment of the fusion site of the base of the 1st metatarsal medial cuneiform joint following this attention was redirected towards the 1st MPJ right where proper alignment had been achieved with the osteotomy at this site once it was determined under intraoperative fluoroscopy that the joint was properly aligned a paragon 28 10 degree medium right plate was utilized to fuse and fixate the patient's right hallux in a properly aligned position with both locking and nonlocking screws.  The entire length of the incision was flushed irrigated with copious amounts of sterile saline there was quite a bit of scar tissue from the patient's previous surgery this was also debrided at the time of the procedure deep closure was achieved with 3.0 Vicryl in a continuous running fashion intermediate closure was achieved with 4.0 Vicryl in a simple interrupted fashion and skin closure was achieved with 4.0 Monocryl in a simple interrupted fashion attention was then directed overlying the dorsal aspect of the 2nd digit right a longitudinal linear incision was created this was carried down to the level of the extensor tendon which had significant scar tissue requiring debridement around it the extensor tendon was then transected reflected off of the  head region of the proximal phalanx base of the middle phalanx this had been fused in a poorly aligned position from a previous surgery a sagittal saw was used to create a new osteotomy site at both the previous proximal and distal interphalangeal joint with bone resection allowing the digit to be placed in a properly aligned reduced position bone spurring on the lateral aspect of the 2nd digit was also resected where this had started to cause the patient significant irritation once properly aligned as confirmed under intraoperative fluoroscopy a paragon 28 headless compression screw was placed in the 2nd digit thus properly aligning maintaining proper positioning and fusing both the distal and proximal interphalangeal joint in a corrected position.  The area was then flushed irrigated with copious amounts of sterile saline the incision was then carried further proximal overlying the head of the 2nd metatarsal which was grossly arthritic and degenerative in nature there was no articular cartilage left on the head of the 2nd metatarsal where the base of the 2nd digit and proximal phalanx it was deemed appropriate to resect the head of the 2nd metatarsal as there was no range of motion and severe crepitus noted at the 2nd MPJ right the 2nd metatarsal head was resected completely this was the primary area that was causing the patient the greatest amount of discomfort this was resected just beyond the neck of the 2nd metatarsal and removed from the surgical field while allowing correct positioning of the 2nd digit I then utilized 3.0 Vicryl in a simple interrupted fashion to pursestring soft tissue to create a soft tissue bumper where the metatarsal head had been resected intraoperative fluoroscopy was used throughout the procedure to ensure proper alignment and bony resection following this the extensor tendons were repaired via simple interrupted sutures of 4.0 Vicryl 4.0 Monocryl was used in a simple interrupted  fashion to close the skin layers of the surgical site.  Patient will maintain complete nonweightbearing status for the next 4 weeks.  Patient was then injected with additional 30 cc of 0.5% Marcaine plain to create a long-term postoperative anesthetic ankle tourniquet was lowered minimal bleeding noted Adaptic nonadhesive dressing sterile 4x4s multiple ABDs multiple rolls of Kerlix were used to dress the area and the patient was placed in a lower leg fiberglass cast where she will maintain nonweightbearing status.  Patient left the operating room with vital signs stable and vascular status having return to the patient's preoperative levels.This note was created using [employer] voice recognition software that occasionally misinterpreted phrases or words.

## 2023-09-15 NOTE — BRIEF OP NOTE
[facility] - Surgery  Brief Operative Note    Surgery Date: 9/15/2023     Surgeon(s) and Role:     * Olga Mccullough, DPM - Primary    Assisting Surgeon: None    Pre-op Diagnosis:  Hypermobile joint syndrome of right foot [M35.7]    Post-op Diagnosis:  Post-Op Diagnosis Codes:     * Hypermobile joint syndrome of right foot [M35.7]    Procedure(s) (LRB):  FUSION, JOINT, MIDFOOT (Right)  CORRECTION, HAMMER TOE (Right)  REMOVAL, HARDWARE, FOOT (Right)    Anesthesia: Choice    Operative Findings:     Estimated Blood Loss: * No values recorded between 9/15/2023  9:26 AM and 9/15/2023 12:23 PM *         Specimens:   Specimen (24h ago, onward)       Start     Ordered    09/15/23 1220  Specimen to Pathology, Surgery Orthopedics  Once        Comments: Pre-op Diagnosis: Hypermobile joint syndrome of right foot [M35.7]Procedure(s):FUSION, JOINT, MIDFOOTCORRECTION, HAMMER TOEREMOVAL, HARDWARE, FOOT Number of specimens: 1Name of specimens: hardware right foot     References:    Click here for ordering Quick Tip   Question Answer Comment   Procedure Type: Orthopedics    Specimen Class: Routine/Screening    Which provider would you like to cc? OLGA MCCULLOUGH    Release to patient Immediate        09/15/23 1220                      Discharge Note    OUTCOME: Patient tolerated treatment/procedure well without complication and is now ready for discharge.    DISPOSITION: Home or Self Care    FINAL DIAGNOSIS:  Hypermobile joint syndrome of right foot    FOLLOWUP: In clinic    DISCHARGE INSTRUCTIONS:    Discharge Procedure Orders   Ice to affected area     Keep surgical extremity elevated     Lifting restrictions     Weight bearing restrictions (specify):

## 2023-09-15 NOTE — ANESTHESIA POSTPROCEDURE EVALUATION
Anesthesia Post Evaluation    Patient: Laxmi Chand    Procedure(s) Performed: Procedure(s) (LRB):  FUSION, JOINT, MIDFOOT (Right)  CORRECTION, HAMMER TOE (Right)  REMOVAL, HARDWARE, FOOT (Right)    Final Anesthesia Type: general      Patient location during evaluation: PACU  Patient participation: Yes- Able to Participate  Level of consciousness: awake and alert  Post-procedure vital signs: reviewed and stable  Pain management: adequate  Airway patency: patent    PONV status at discharge: No PONV  Anesthetic complications: no      Cardiovascular status: blood pressure returned to baseline  Respiratory status: unassisted  Hydration status: euvolemic  Follow-up not needed.          Vitals Value Taken Time   /83 09/15/23 1242   Temp 36.2 °C (97.2 °F) 09/15/23 1225   Pulse 85 09/15/23 1243   Resp 22 09/15/23 1243   SpO2 94 % 09/15/23 1243   Vitals shown include unvalidated device data.      No case tracking events are documented in the log.      Pain/Estefany Score: Estefany Score: 9 (9/15/2023 12:40 PM)

## 2023-09-18 ENCOUNTER — OFFICE VISIT (OUTPATIENT)
Dept: PODIATRY | Facility: CLINIC | Age: 68
End: 2023-09-18
Payer: MEDICARE

## 2023-09-18 VITALS
BODY MASS INDEX: 31.41 KG/M2 | HEART RATE: 80 BPM | DIASTOLIC BLOOD PRESSURE: 83 MMHG | SYSTOLIC BLOOD PRESSURE: 141 MMHG | WEIGHT: 184 LBS | HEIGHT: 64 IN

## 2023-09-18 DIAGNOSIS — M20.11 HALLUX ABDUCTO VALGUS, RIGHT: ICD-10-CM

## 2023-09-18 DIAGNOSIS — M20.21 HALLUX RIGIDUS OF RIGHT FOOT: ICD-10-CM

## 2023-09-18 DIAGNOSIS — M35.7 HYPERMOBILE JOINT SYNDROME OF RIGHT FOOT: Primary | ICD-10-CM

## 2023-09-18 DIAGNOSIS — M20.41 HAMMER TOE OF RIGHT FOOT: ICD-10-CM

## 2023-09-18 DIAGNOSIS — M19.079 OSTEOARTHRITIS OF ANKLE AND FOOT, UNSPECIFIED LATERALITY: ICD-10-CM

## 2023-09-18 PROCEDURE — 99999 PR PBB SHADOW E&M-EST. PATIENT-LVL V: CPT | Mod: PBBFAC,,, | Performed by: PODIATRIST

## 2023-09-18 PROCEDURE — 99999 PR PBB SHADOW E&M-EST. PATIENT-LVL V: ICD-10-PCS | Mod: PBBFAC,,, | Performed by: PODIATRIST

## 2023-09-18 PROCEDURE — 99024 PR POST-OP FOLLOW-UP VISIT: ICD-10-PCS | Mod: POP,,, | Performed by: PODIATRIST

## 2023-09-18 PROCEDURE — 99024 POSTOP FOLLOW-UP VISIT: CPT | Mod: POP,,, | Performed by: PODIATRIST

## 2023-09-18 PROCEDURE — 99215 OFFICE O/P EST HI 40 MIN: CPT | Mod: PBBFAC | Performed by: PODIATRIST

## 2023-09-18 RX ORDER — PANTOPRAZOLE SODIUM 40 MG/1
40 TABLET, DELAYED RELEASE ORAL DAILY
Qty: 90 TABLET | Refills: 3 | Status: SHIPPED | OUTPATIENT
Start: 2023-09-18 | End: 2023-11-08 | Stop reason: SDUPTHER

## 2023-09-19 NOTE — PROGRESS NOTES
"Laxmi Chand is a 68 y.o. female patient.   1. Hypermobile joint syndrome of right foot    2. Hallux abducto valgus, right    3. Hammer toe of right foot    4. Osteoarthritis of ankle and foot, unspecified laterality    5. Hallux rigidus of right foot      Past Medical History:   Diagnosis Date    Breast cancer in female 09/07/2022    IDC with high grade DCIS    COPD (chronic obstructive pulmonary disease)     Degeneration of lumbar intervertebral disc     Endometrial cancer     GERD (gastroesophageal reflux disease)     HTN (hypertension)     Hyperlipemia, mixed      No past surgical history pertinent negatives on file.  Scheduled Meds:  Continuous Infusions:  PRN Meds:    Review of patient's allergies indicates:  No Known Allergies  There are no hospital problems to display for this patient.    Blood pressure (!) 141/83, pulse 80, height 5' 4" (1.626 m), weight 83.5 kg (184 lb).    Subjective  Objective  Assessment & Plan  Patient presents status post fusion at the base of the 1st metatarsal medial cuneiform joint removal of hardware reef fusion of the 1st MPJ right correction contracted 2nd digit right and resection of the 2nd metatarsal head due to severe degenerative arthritis right.  Patient states she is doing well her pain is being managed well she did have a fall on Saturday hitting her right foot on the end of the cast I did advised the patient it is unlikely she would have damaged any of the surgical sites because of the thickness and protection of the cast itself.  Patient is tolerating the Bactrim well she is taking her pain medication as needed she has been icing and elevating as directed she had questioned me about something for anxiety she states her daughter and asked if she could get something for anxiety like Xanax which I advised her she can not take in addition to pain medication and certainly if this was something that was needed it would have to be provided by her pain management provider.  " Patient's cast is fitting well both distally and proximally i.e. the plan will be to see the patient for follow-up in 2 weeks for cast change in x-rays she is been advised to contact us with any problems questions or concerns.  Patient reminded she needs to maintain complete nonweightbearing status.  Devyn Mccullough DPM  9/19/2023

## 2023-09-25 LAB
FINAL PATHOLOGIC DIAGNOSIS: NORMAL
GROSS: NORMAL
Lab: NORMAL

## 2023-09-27 ENCOUNTER — TELEPHONE (OUTPATIENT)
Dept: FAMILY MEDICINE | Facility: CLINIC | Age: 68
End: 2023-09-27
Payer: MEDICARE

## 2023-09-27 NOTE — TELEPHONE ENCOUNTER
----- Message from Kim Sevilla sent at 9/26/2023 10:50 AM CDT -----  Type: Need Medical Advice   Who Called:Patient  Best callback number: [phone]  Additional Information: Patient called to schedule an appointment for Friday around 10 or 11 she need her handicap paper signed, please call patient time  Please call to further assist, Thanks.

## 2023-09-27 NOTE — TELEPHONE ENCOUNTER
Spoke with patient and she just had foot surgery, she can come and  handicap form if provider will fill it out      ----- Message from Cele Gonzales sent at 9/27/2023 10:32 AM CDT -----  Contact: self  2nd call for this form called 9/26/23  Patient needs to get her handicap tag renewed and needs the renewal form by Friday, she didn't realize that is was expiring at the end of this month.  She had surgery and can't get out until Friday doesn't have anyone to get her over to you in [place].  Please call back at [phone] to advise does she have to see Candence or can she just fill out the form and they can pick it up. Thanks

## 2023-09-29 ENCOUNTER — OFFICE VISIT (OUTPATIENT)
Dept: FAMILY MEDICINE | Facility: CLINIC | Age: 68
End: 2023-09-29
Payer: MEDICARE

## 2023-09-29 VITALS
DIASTOLIC BLOOD PRESSURE: 72 MMHG | HEIGHT: 64 IN | OXYGEN SATURATION: 99 % | SYSTOLIC BLOOD PRESSURE: 162 MMHG | BODY MASS INDEX: 31.58 KG/M2 | TEMPERATURE: 98 F | HEART RATE: 82 BPM

## 2023-09-29 DIAGNOSIS — M51.36 DEGENERATION OF LUMBAR INTERVERTEBRAL DISC: ICD-10-CM

## 2023-09-29 DIAGNOSIS — J44.9 CHRONIC OBSTRUCTIVE PULMONARY DISEASE, UNSPECIFIED COPD TYPE: Primary | ICD-10-CM

## 2023-09-29 DIAGNOSIS — Z98.890 S/P FOOT SURGERY, RIGHT: ICD-10-CM

## 2023-09-29 DIAGNOSIS — I10 ESSENTIAL HYPERTENSION: ICD-10-CM

## 2023-09-29 DIAGNOSIS — B37.9 ANTIBIOTIC-INDUCED YEAST INFECTION: ICD-10-CM

## 2023-09-29 DIAGNOSIS — T36.95XA ANTIBIOTIC-INDUCED YEAST INFECTION: ICD-10-CM

## 2023-09-29 PROCEDURE — 99999 PR PBB SHADOW E&M-EST. PATIENT-LVL V: ICD-10-PCS | Mod: PBBFAC,,, | Performed by: NURSE PRACTITIONER

## 2023-09-29 PROCEDURE — 99215 OFFICE O/P EST HI 40 MIN: CPT | Mod: PBBFAC,PN | Performed by: NURSE PRACTITIONER

## 2023-09-29 PROCEDURE — 99213 PR OFFICE/OUTPT VISIT, EST, LEVL III, 20-29 MIN: ICD-10-PCS | Mod: S$PBB,,, | Performed by: NURSE PRACTITIONER

## 2023-09-29 PROCEDURE — 99999 PR PBB SHADOW E&M-EST. PATIENT-LVL V: CPT | Mod: PBBFAC,,, | Performed by: NURSE PRACTITIONER

## 2023-09-29 PROCEDURE — 99213 OFFICE O/P EST LOW 20 MIN: CPT | Mod: S$PBB,,, | Performed by: NURSE PRACTITIONER

## 2023-09-29 RX ORDER — FLUCONAZOLE 150 MG/1
TABLET ORAL
Qty: 2 TABLET | Refills: 0 | Status: SHIPPED | OUTPATIENT
Start: 2023-09-29 | End: 2023-10-16

## 2023-09-29 NOTE — PROGRESS NOTES
Subjective:       Patient ID: Laxmi Chand is a 68 y.o. female.    Chief Complaint: Follow-up    Laxmi Chand presents to the clinic today for completion of Disability Parking Application  She is also experiencing vaginal itching, burning. Currently on antibiotics secondary to recent right foot surgery with Dr. Mccullough. She has a history of recurrent vaginitis that is responsive to Diflucan.   Patient Active Problem List  Diagnosis  · Allergic rhinitis  · Chronic obstructive lung disease  · Degeneration of lumbar intervertebral disc  · Depressive disorder  · Gastroesophageal reflux disease  · History of smoking  · Hyperlipidemia  · Essential hypertension  · Status post total left knee replacement  · Chronic pain syndrome  · Pain of breast  · Ductal carcinoma in situ (DCIS) of right breast  · Senile osteopenia  · History of endometrial cancer  · Invasive ductal carcinoma of breast, female, right  · Long term (current) use of aromatase inhibitors  · Atherosclerosis of aorta  · Calcified granuloma of lung  · Urge incontinence of urine  · Ingrown nail  · Neuroma of second interspace of right foot  · Hallux abducto valgus, right  · Hammer toe of right foot  · Osteoarthritis of ankle and foot  · Hypermobile joint syndrome of right foot  · Hallux rigidus of right foot  · Painful orthopaedic hardware        Review of Systems   Respiratory:  Negative for chest tightness and shortness of breath.    Cardiovascular:  Negative for chest pain and palpitations.   Genitourinary:  Positive for vaginal discharge. Negative for difficulty urinating, frequency and pelvic pain.   Musculoskeletal:  Positive for arthralgias, back pain and gait problem.   Skin:  Positive for wound.       Patient Active Problem List   Diagnosis    Allergic rhinitis    Chronic obstructive lung disease    Degeneration of lumbar intervertebral disc    Depressive disorder    Gastroesophageal reflux disease    History of smoking    Hyperlipidemia    Essential  hypertension    Status post total left knee replacement    Chronic pain syndrome    Pain of breast    Ductal carcinoma in situ (DCIS) of right breast    Senile osteopenia    History of endometrial cancer    Invasive ductal carcinoma of breast, female, right    Long term (current) use of aromatase inhibitors    Atherosclerosis of aorta    Calcified granuloma of lung    Urge incontinence of urine    Ingrown nail    Neuroma of second interspace of right foot    Hallux abducto valgus, right    Hammer toe of right foot    Osteoarthritis of ankle and foot    Hypermobile joint syndrome of right foot    Hallux rigidus of right foot    Painful orthopaedic hardware       Objective:      Physical Exam  Constitutional:       General: She is not in acute distress.     Appearance: Normal appearance.      Comments: In wheelchair    Cardiovascular:      Rate and Rhythm: Normal rate and regular rhythm.      Heart sounds: Normal heart sounds. No murmur heard.  Pulmonary:      Effort: Pulmonary effort is normal. No respiratory distress.      Breath sounds: Normal breath sounds. No wheezing.   Feet:      Comments: Right foot in cast   Skin:     General: Skin is warm and dry.   Neurological:      Mental Status: She is alert and oriented to person, place, and time.   Psychiatric:         Mood and Affect: Mood normal.         Lab Results   Component Value Date    WBC 9.96 08/03/2023    HGB 12.6 08/03/2023    HCT 38.0 08/03/2023     08/03/2023    CHOL 203 (H) 05/10/2022    TRIG 101 05/10/2022    HDL 50 05/10/2022    ALT 17 08/03/2023    AST 13 08/03/2023     08/03/2023    K 3.6 08/03/2023     08/03/2023    CREATININE 0.9 08/03/2023    BUN 26 (H) 08/03/2023    CO2 30 (H) 08/03/2023    TSH 1.040 05/10/2022    HGBA1C 5.5 07/26/2023     The 10-year ASCVD risk score (Mihaela FLOWER, et al., 2019) is: 16.4%    Values used to calculate the score:      Age: 68 years      Sex: Female      Is Non- : No       "Diabetic: No      Tobacco smoker: No      Systolic Blood Pressure: 162 mmHg      Is BP treated: Yes      HDL Cholesterol: 50 mg/dL      Total Cholesterol: 203 mg/dL  Visit Vitals  BP (!) 162/72 (BP Location: Left arm, Patient Position: Sitting, BP Method: Medium (Manual))   Pulse 82   Temp 98 °F (36.7 °C)   Ht 5' 4" (1.626 m)   SpO2 99%   BMI 31.58 kg/m²      Assessment:       1. Chronic obstructive pulmonary disease, unspecified COPD type    2. Degeneration of lumbar intervertebral disc    3. Antibiotic-induced yeast infection    4. S/P foot surgery, right    5. Essential hypertension        Plan:       1. Chronic obstructive pulmonary disease, unspecified COPD type  Overview:  fev1/fvc = 47%  Continue smoking cessation   Continue current medication regimen     2. Degeneration of lumbar intervertebral disc  DPA completed  DME at all times   Maintain weight and physical activity   3. Antibiotic-induced yeast infection  -     fluconazole (DIFLUCAN) 150 MG Tab; TAKE 1 TABLET (150 MG TOTAL) BY MOUTH TODAY. IF SYMPTOMS PERSIST REPEAT DOSE IN 3 DAYS  Dispense: 2 tablet; Refill: 0    4. S/P foot surgery, right  Followed by Dr. Mccullough- continue post operative instructions and keep routine follow up appointments.   5. Essential hypertension  The patient was counseled on HTN education, management and recommendations. The need for weight reduction was reinforced and a BMI goal of 19 to 25 was set.  Patient was encouraged to adhere to a low sodium diet and a DASH diet was recommended. Patient was also encouraged to engage in routine exercise such as walking most days of the week greater than 30 minutes. Patient education materials were provided to the patient for home review and further reinforcement of topics discussed.            No follow-ups on file.      Future Appointments       Date Provider Specialty Appt Notes    10/2/2023 Devyn Mccullough, JUSTEN Podiatry 2 week post op cast change and xray    2/6/2024  Lab hemonc "    2/8/2024 Mell Veloz, HERBER Hematology and Oncology [facility]/6mfu  confirm [facility]    2/21/2024 Zoraida Orta, NP Family Medicine [facility]

## 2023-10-02 ENCOUNTER — HOSPITAL ENCOUNTER (OUTPATIENT)
Dept: RADIOLOGY | Facility: HOSPITAL | Age: 68
Discharge: HOME | End: 2023-10-02
Attending: PODIATRIST
Payer: MEDICARE

## 2023-10-02 ENCOUNTER — OFFICE VISIT (OUTPATIENT)
Dept: PODIATRY | Facility: CLINIC | Age: 68
End: 2023-10-02
Payer: MEDICARE

## 2023-10-02 VITALS
HEART RATE: 76 BPM | SYSTOLIC BLOOD PRESSURE: 169 MMHG | DIASTOLIC BLOOD PRESSURE: 82 MMHG | WEIGHT: 184 LBS | HEIGHT: 64 IN | BODY MASS INDEX: 31.41 KG/M2

## 2023-10-02 DIAGNOSIS — M35.7 HYPERMOBILE JOINT SYNDROME OF RIGHT FOOT: ICD-10-CM

## 2023-10-02 DIAGNOSIS — T84.84XA PAINFUL ORTHOPAEDIC HARDWARE: ICD-10-CM

## 2023-10-02 DIAGNOSIS — M20.21 HALLUX RIGIDUS OF RIGHT FOOT: ICD-10-CM

## 2023-10-02 DIAGNOSIS — M20.21 HALLUX RIGIDUS OF RIGHT FOOT: Primary | ICD-10-CM

## 2023-10-02 PROCEDURE — 99024 POSTOP FOLLOW-UP VISIT: CPT | Mod: POP,,, | Performed by: PODIATRIST

## 2023-10-02 PROCEDURE — 73630 X-RAY EXAM OF FOOT: CPT | Mod: 26,RT,, | Performed by: RADIOLOGY

## 2023-10-02 PROCEDURE — 99213 OFFICE O/P EST LOW 20 MIN: CPT | Mod: PBBFAC,25 | Performed by: PODIATRIST

## 2023-10-02 PROCEDURE — 73630 XR FOOT COMPLETE 3 VIEW RIGHT: ICD-10-PCS | Mod: 26,RT,, | Performed by: RADIOLOGY

## 2023-10-02 PROCEDURE — 99024 PR POST-OP FOLLOW-UP VISIT: ICD-10-PCS | Mod: POP,,, | Performed by: PODIATRIST

## 2023-10-02 PROCEDURE — 29405 APPL SHORT LEG CAST: CPT | Mod: 58,S$PBB,RT, | Performed by: PODIATRIST

## 2023-10-02 PROCEDURE — 73630 X-RAY EXAM OF FOOT: CPT | Mod: TC,RT

## 2023-10-02 PROCEDURE — 99999 PR PBB SHADOW E&M-EST. PATIENT-LVL III: CPT | Mod: PBBFAC,,, | Performed by: PODIATRIST

## 2023-10-02 PROCEDURE — 29405 APPL SHORT LEG CAST: CPT | Mod: PBBFAC,RT | Performed by: PODIATRIST

## 2023-10-02 PROCEDURE — 29405 PR APPLY SHORT LEG CAST: ICD-10-PCS | Mod: 58,S$PBB,RT, | Performed by: PODIATRIST

## 2023-10-02 PROCEDURE — 99999 PR PBB SHADOW E&M-EST. PATIENT-LVL III: ICD-10-PCS | Mod: PBBFAC,,, | Performed by: PODIATRIST

## 2023-10-03 ENCOUNTER — PATIENT MESSAGE (OUTPATIENT)
Dept: ADMINISTRATIVE | Facility: HOSPITAL | Age: 68
End: 2023-10-03
Payer: MEDICARE

## 2023-10-03 ENCOUNTER — TELEPHONE (OUTPATIENT)
Dept: PODIATRY | Facility: CLINIC | Age: 68
End: 2023-10-03
Payer: MEDICARE

## 2023-10-03 NOTE — TELEPHONE ENCOUNTER
Patient wanted to know if cast being loose was ok. Advised patient that when swelling goes down the cast will get looser. Also advised the patient that cast is put on to prevent patient from walking and as protection. The cast isn't holding anything together. Patient was offered to come in and have another cast put on but declined due to transportation.

## 2023-10-03 NOTE — PROGRESS NOTES
"Laxmi Chand is a 68 y.o. female patient.   1. Hallux rigidus of right foot    2. Hypermobile joint syndrome of right foot    3. Painful orthopaedic hardware      Past Medical History:   Diagnosis Date    Breast cancer in female 09/07/2022    IDC with high grade DCIS    COPD (chronic obstructive pulmonary disease)     Degeneration of lumbar intervertebral disc     Endometrial cancer     GERD (gastroesophageal reflux disease)     HTN (hypertension)     Hyperlipemia, mixed      No past surgical history pertinent negatives on file.  Scheduled Meds:  Continuous Infusions:  PRN Meds:    Review of patient's allergies indicates:  No Known Allergies  There are no hospital problems to display for this patient.    Blood pressure (!) 169/82, pulse 76, height 5' 4" (1.626 m), weight 83.5 kg (184 lb).                                          Subjective  Objective:  Vital signs (most recent): Blood pressure (!) 169/82, pulse 76, height 5' 4" (1.626 m), weight 83.5 kg (184 lb).    Assessment & Plan  Patient presents status post fusion at the base of the 1st metatarsal medial cuneiform joint removal of hardware reef fusion of the 1st MPJ right correction contracted 2nd digit right and resection of the 2nd metatarsal head due to severe degenerative arthritis right.  Patient is doing well she is not having significant pain or discomfort she is presenting today for a cast change.  Patient should have finished her Bactrim she states she is still taking it I want make sure she is taking it as directed.  Clearly the patient has been putting some degree of pressure on the plantar distal aspect of the cast per the digital pictures I have reminded the patient she must maintain nonweightbearing status to prevent disruption of the surgical site.  Patient's cast was removed the incisions look good they are well healing patient has less edema than expected for the amount of surgical procedure she had performed I do want her to continue to ice " and elevate as needed her foot was cleaned with wound cleanser a new well-padded thick protective dressing was applied and a new lower leg fiberglass cast applied x-rays reviewed with the patient everything appears to be healing well.  I do plan to follow up with the patient in 2 weeks and if everything goes according to plan everything looks good I will transition her to a fracture boot at her follow-up appointment.  Patient was in understanding and agreement with everything discussed I have reminded her to finish her antibiotics and continue to ice and elevate as necessary.  Patient reminded she needs to be very careful not to put any pressure at all on the cast and be careful not to fall.  This note was created using [employer] voice recognition software that occasionally misinterpreted phrases or words.   Devyn Mccullough DPM  10/3/2023

## 2023-10-04 ENCOUNTER — TELEPHONE (OUTPATIENT)
Dept: PODIATRY | Facility: CLINIC | Age: 68
End: 2023-10-04
Payer: MEDICARE

## 2023-10-04 NOTE — TELEPHONE ENCOUNTER
Patient says cast is too loose and uncomfortable. She would like to come in Friday since her boyfriend if off and can drive her. Patient will come before lunch Friday.

## 2023-10-06 ENCOUNTER — CLINICAL SUPPORT (OUTPATIENT)
Dept: PODIATRY | Facility: CLINIC | Age: 68
End: 2023-10-06
Payer: MEDICARE

## 2023-10-06 DIAGNOSIS — M20.21 HALLUX RIGIDUS OF RIGHT FOOT: Primary | ICD-10-CM

## 2023-10-06 NOTE — PROGRESS NOTES
Patient presented to clinic today ambulating with knee scooter with no weight bearing noted. Patient stated cast felt too loose. Old cast was cut off with no issues. Dressing underneath was clean, dry, and intact. Dressing remained on and new cast was applied. Patient assisted to knee scooter once done and escorted to waiting room area.

## 2023-10-09 DIAGNOSIS — Z79.811 LONG TERM (CURRENT) USE OF AROMATASE INHIBITORS: ICD-10-CM

## 2023-10-09 RX ORDER — ANASTROZOLE 1 MG/1
1 TABLET ORAL DAILY
Qty: 90 TABLET | Refills: 3 | Status: SHIPPED | OUTPATIENT
Start: 2023-10-09 | End: 2024-10-08

## 2023-10-09 NOTE — TELEPHONE ENCOUNTER
----- Message from Kim Sevilla sent at 10/9/2023 12:09 PM CDT -----  Refill:   Name of Caller: Patient  Best Call Back Number:  [phone]  Additional Information: Patient called for refill  Prescription Name:anastrozole (ARIMIDEX) 1 mg Tab    Pharmacy Name:   [facility] [place], [place] - [address] [place] [address]   Phone:  [phone]  Fax:  [phone]

## 2023-10-16 ENCOUNTER — OFFICE VISIT (OUTPATIENT)
Dept: PODIATRY | Facility: CLINIC | Age: 68
End: 2023-10-16
Payer: MEDICARE

## 2023-10-16 ENCOUNTER — HOSPITAL ENCOUNTER (OUTPATIENT)
Dept: RADIOLOGY | Facility: HOSPITAL | Age: 68
Discharge: HOME | End: 2023-10-16
Attending: PODIATRIST
Payer: MEDICARE

## 2023-10-16 VITALS
HEIGHT: 64 IN | SYSTOLIC BLOOD PRESSURE: 188 MMHG | BODY MASS INDEX: 31.41 KG/M2 | HEART RATE: 85 BPM | DIASTOLIC BLOOD PRESSURE: 77 MMHG | WEIGHT: 184 LBS

## 2023-10-16 DIAGNOSIS — M20.11 HALLUX ABDUCTO VALGUS, RIGHT: ICD-10-CM

## 2023-10-16 DIAGNOSIS — M20.21 HALLUX RIGIDUS OF RIGHT FOOT: ICD-10-CM

## 2023-10-16 DIAGNOSIS — M20.21 HALLUX RIGIDUS OF RIGHT FOOT: Primary | ICD-10-CM

## 2023-10-16 PROCEDURE — 99999 PR PBB SHADOW E&M-EST. PATIENT-LVL III: CPT | Mod: PBBFAC,,, | Performed by: PODIATRIST

## 2023-10-16 PROCEDURE — 73630 XR FOOT COMPLETE 3 VIEW RIGHT: ICD-10-PCS | Mod: 26,RT,, | Performed by: RADIOLOGY

## 2023-10-16 PROCEDURE — 99024 POSTOP FOLLOW-UP VISIT: CPT | Mod: POP,,, | Performed by: PODIATRIST

## 2023-10-16 PROCEDURE — 99024 PR POST-OP FOLLOW-UP VISIT: ICD-10-PCS | Mod: POP,,, | Performed by: PODIATRIST

## 2023-10-16 PROCEDURE — 99999 PR PBB SHADOW E&M-EST. PATIENT-LVL III: ICD-10-PCS | Mod: PBBFAC,,, | Performed by: PODIATRIST

## 2023-10-16 PROCEDURE — 73630 X-RAY EXAM OF FOOT: CPT | Mod: 26,RT,, | Performed by: RADIOLOGY

## 2023-10-16 PROCEDURE — 99213 OFFICE O/P EST LOW 20 MIN: CPT | Mod: PBBFAC | Performed by: PODIATRIST

## 2023-10-16 PROCEDURE — 73630 X-RAY EXAM OF FOOT: CPT | Mod: TC,RT

## 2023-10-16 RX ORDER — FLUCONAZOLE 200 MG/1
200 TABLET ORAL
Qty: 4 TABLET | Refills: 1 | Status: SHIPPED | OUTPATIENT
Start: 2023-10-16 | End: 2023-11-08 | Stop reason: SDUPTHER

## 2023-10-17 ENCOUNTER — TELEPHONE (OUTPATIENT)
Dept: PODIATRY | Facility: CLINIC | Age: 68
End: 2023-10-17
Payer: MEDICARE

## 2023-10-17 NOTE — PROGRESS NOTES
"Laxmi Chand is a 68 y.o. female patient.   1. Hallux rigidus of right foot    2. Hallux abducto valgus, right      Past Medical History:   Diagnosis Date    Breast cancer in female 09/07/2022    IDC with high grade DCIS    COPD (chronic obstructive pulmonary disease)     Degeneration of lumbar intervertebral disc     Endometrial cancer     GERD (gastroesophageal reflux disease)     HTN (hypertension)     Hyperlipemia, mixed      No past surgical history pertinent negatives on file.  Scheduled Meds:  Continuous Infusions:  PRN Meds:    Review of patient's allergies indicates:  No Known Allergies  There are no hospital problems to display for this patient.    Blood pressure (!) 188/77, pulse 85, height 5' 4" (1.626 m), weight 83.5 kg (184 lb).                                                                                        Subjective  Objective:  Vital signs (most recent): Blood pressure (!) 188/77, pulse 85, height 5' 4" (1.626 m), weight 83.5 kg (184 lb).    Assessment & Plan  Patient presents status post fusion at the base of the 1st metatarsal medial cuneiform joint removal of hardware reef fusion of the 1st MPJ right correction contracted 2nd digit right and resection of the 2nd metatarsal head due to severe degenerative arthritis right.  Patient is doing well she is not having any significant pain or discomfort she does need a prescription for yeast infection following antibiotic therapy I did send in a prescription for Diflucan.  X-rays reviewed everything appears to be healing very well patient maintains good anatomic alignment and surgical correction of the patient's right foot.  Upon removal of the cast the patient's incisions look good patient has less swelling in comparison to previous evaluation I have advised her she is going to get more swelling before she has less when she starts to bear weight I am going to allow her to transition into a fracture boot with weight-bearing she can get the area " wet however she is not to soak her foot she is not to apply any cream lotion or ointment to the areas and after she bathes she is going to apply some Betadine to the incisions to help dry them out.  Patient is currently taking Celebrex I do recommended continued ice and elevation she will remain in her boot for the next 2 weeks she is been advised to contact us with any problems questions or concerns prior to follow-up.  Patient made aware she is not to overdo it she is going to have to transition to full weight-bearing in the boot off of the scooter as tolerated.  This note was created using [employer] voice recognition software that occasionally misinterpreted phrases or words.   Devyn Mccullough DPM  10/17/2023

## 2023-10-17 NOTE — TELEPHONE ENCOUNTER
Patient noticed some yellow drainage to gauze. Advised patient that she just begun walking on foot yesterday and it is expected to have some drainage. Advised patient to keep an eye on incision and skin around incision for any redness or warm to touch. Patient verbalized understanding.

## 2023-10-30 ENCOUNTER — OFFICE VISIT (OUTPATIENT)
Dept: PODIATRY | Facility: CLINIC | Age: 68
End: 2023-10-30
Payer: MEDICARE

## 2023-10-30 ENCOUNTER — HOSPITAL ENCOUNTER (OUTPATIENT)
Dept: RADIOLOGY | Facility: HOSPITAL | Age: 68
Discharge: HOME | End: 2023-10-30
Attending: PODIATRIST
Payer: MEDICARE

## 2023-10-30 VITALS
DIASTOLIC BLOOD PRESSURE: 73 MMHG | SYSTOLIC BLOOD PRESSURE: 152 MMHG | HEIGHT: 64 IN | HEART RATE: 84 BPM | BODY MASS INDEX: 31.41 KG/M2 | WEIGHT: 184 LBS

## 2023-10-30 DIAGNOSIS — M35.7 HYPERMOBILE JOINT SYNDROME OF RIGHT FOOT: ICD-10-CM

## 2023-10-30 DIAGNOSIS — M35.7 HYPERMOBILE JOINT SYNDROME OF RIGHT FOOT: Primary | ICD-10-CM

## 2023-10-30 PROCEDURE — 99024 PR POST-OP FOLLOW-UP VISIT: ICD-10-PCS | Mod: POP,,, | Performed by: PODIATRIST

## 2023-10-30 PROCEDURE — 73630 X-RAY EXAM OF FOOT: CPT | Mod: TC,RT

## 2023-10-30 PROCEDURE — 99999 PR PBB SHADOW E&M-EST. PATIENT-LVL V: ICD-10-PCS | Mod: PBBFAC,,, | Performed by: PODIATRIST

## 2023-10-30 PROCEDURE — 99215 OFFICE O/P EST HI 40 MIN: CPT | Mod: PBBFAC | Performed by: PODIATRIST

## 2023-10-30 PROCEDURE — 99024 POSTOP FOLLOW-UP VISIT: CPT | Mod: POP,,, | Performed by: PODIATRIST

## 2023-10-30 PROCEDURE — 73630 X-RAY EXAM OF FOOT: CPT | Mod: 26,RT,, | Performed by: RADIOLOGY

## 2023-10-30 PROCEDURE — 73630 XR FOOT COMPLETE 3 VIEW RIGHT: ICD-10-PCS | Mod: 26,RT,, | Performed by: RADIOLOGY

## 2023-10-30 PROCEDURE — 99999 PR PBB SHADOW E&M-EST. PATIENT-LVL V: CPT | Mod: PBBFAC,,, | Performed by: PODIATRIST

## 2023-10-30 RX ORDER — OXYCODONE AND ACETAMINOPHEN 10; 325 MG/1; MG/1
1 TABLET ORAL
COMMUNITY
Start: 2023-10-27

## 2023-10-31 NOTE — PROGRESS NOTES
"Laxmi Chand is a 68 y.o. female patient.   1. Hypermobile joint syndrome of right foot      Past Medical History:   Diagnosis Date    Breast cancer in female 09/07/2022    IDC with high grade DCIS    COPD (chronic obstructive pulmonary disease)     Degeneration of lumbar intervertebral disc     Endometrial cancer     GERD (gastroesophageal reflux disease)     HTN (hypertension)     Hyperlipemia, mixed      No past surgical history pertinent negatives on file.  Scheduled Meds:  Continuous Infusions:  PRN Meds:    Review of patient's allergies indicates:  No Known Allergies  There are no hospital problems to display for this patient.    Blood pressure (!) 152/73, pulse 84, height 5' 4" (1.626 m), weight 83.5 kg (184 lb).                                                                                                                  Subjective  Objective:  Vital signs (most recent): Blood pressure (!) 152/73, pulse 84, height 5' 4" (1.626 m), weight 83.5 kg (184 lb).    Assessment & Plan  Patient presents status post fusion at the base of the 1st metatarsal medial cuneiform joint removal of hardware reef fusion of the 1st MPJ right correction contracted 2nd digit right and resection of the 2nd metatarsal head due to severe degenerative arthritis right.  Patient been states that she is been doing well she states her foot is doing well it feels a lot better overall it looks a lot better although the boot has caused some rubbing on her lower leg.  I am going to allow the patient to discontinue the fracture boot and she can transition into a loose fitting shoe as tolerated x-rays look good everything appears to be healing well at this time while she can wear regular shoe under no circumstances issue to be barefoot.  I will allow the patient to get the area wet however she needs to apply Betadine to the incisions immediately following she is not to apply any cream lotion or ointment to these areas this will help to " ensure that no bacteria is forming where there is superficial open areas and this will also prevent infection.  Plan follow-up will be 2 weeks patient continues to take her Celebrex she is been advised to contact us with any problems questions or concerns I have cautioned the patient against overdoing it and she understands she will need to ice and elevate but she needs to also make sure the shoe is not rubbing or irritating her foot.  Patient does have a lot less swelling in comparison to previous evaluation I advised her the sutures at the tip of the 2nd digit she a lot needs to allow those to just fall out on their own and she should not be pulling or picking at these.  This note was created using [employer] voice recognition software that occasionally misinterpreted phrases or words.   Devyn Mccullough DPM  10/31/2023

## 2023-11-02 RX ORDER — BIMATOPROST 0.1 MG/ML
1 SOLUTION/ DROPS OPHTHALMIC NIGHTLY
Status: CANCELLED | OUTPATIENT
Start: 2023-11-02

## 2023-11-02 NOTE — TELEPHONE ENCOUNTER
Incoming Fax [facility] for a refill request for Lumigan 0.01% OPHTH SOLN 5ML  Sig: INSTILL 1 DROP IN EACH EYE 1 TIME AT BEDTIME

## 2023-11-02 NOTE — TELEPHONE ENCOUNTER
I really would prefer this prescription come from optometry/ophthalmology since it is for glaucoma.   She sees Dr Wilkinson, can we call his office about this please?

## 2023-11-06 ENCOUNTER — TELEPHONE (OUTPATIENT)
Dept: FAMILY MEDICINE | Facility: CLINIC | Age: 68
End: 2023-11-06
Payer: MEDICARE

## 2023-11-06 NOTE — TELEPHONE ENCOUNTER
----- Message from Michael Yo sent at 11/6/2023 10:41 AM CST -----  Type:  Sooner Appointment Request    Caller is requesting a sooner appointment.  Caller declined first available appointment listed below.  Caller will not accept being placed on the waitlist and is requesting a message be sent to doctor.    Name of Caller:  pt   When is the first available appointment?  11/13--said she need to be seen today or tomorrow--please call and advise  Symptoms:  fell 3 times in 2 weeks  Would the patient rather a call back or a response via [facility]? call  Best Call Back Number:  [phone] (home)     Additional Information:  thank you

## 2023-11-08 ENCOUNTER — OFFICE VISIT (OUTPATIENT)
Dept: FAMILY MEDICINE | Facility: CLINIC | Age: 68
End: 2023-11-08
Payer: MEDICARE

## 2023-11-08 VITALS
HEART RATE: 82 BPM | OXYGEN SATURATION: 95 % | SYSTOLIC BLOOD PRESSURE: 158 MMHG | HEIGHT: 64 IN | DIASTOLIC BLOOD PRESSURE: 88 MMHG | BODY MASS INDEX: 31.42 KG/M2 | WEIGHT: 184.06 LBS | RESPIRATION RATE: 18 BRPM

## 2023-11-08 DIAGNOSIS — T36.95XA ANTIBIOTIC-INDUCED YEAST INFECTION: ICD-10-CM

## 2023-11-08 DIAGNOSIS — B37.9 ANTIBIOTIC-INDUCED YEAST INFECTION: ICD-10-CM

## 2023-11-08 DIAGNOSIS — R29.6 FREQUENT FALLS: Primary | ICD-10-CM

## 2023-11-08 DIAGNOSIS — K21.9 GASTROESOPHAGEAL REFLUX DISEASE, UNSPECIFIED WHETHER ESOPHAGITIS PRESENT: ICD-10-CM

## 2023-11-08 DIAGNOSIS — E66.9 OBESITY (BMI 30.0-34.9): ICD-10-CM

## 2023-11-08 DIAGNOSIS — J44.9 CHRONIC OBSTRUCTIVE PULMONARY DISEASE, UNSPECIFIED COPD TYPE: ICD-10-CM

## 2023-11-08 DIAGNOSIS — I10 HYPERTENSION, UNSPECIFIED TYPE: ICD-10-CM

## 2023-11-08 PROCEDURE — 99999 PR PBB SHADOW E&M-EST. PATIENT-LVL IV: CPT | Mod: PBBFAC,,, | Performed by: FAMILY MEDICINE

## 2023-11-08 PROCEDURE — 99214 OFFICE O/P EST MOD 30 MIN: CPT | Mod: PBBFAC,PO | Performed by: FAMILY MEDICINE

## 2023-11-08 PROCEDURE — 99214 OFFICE O/P EST MOD 30 MIN: CPT | Mod: S$PBB,,, | Performed by: FAMILY MEDICINE

## 2023-11-08 PROCEDURE — 99999 PR PBB SHADOW E&M-EST. PATIENT-LVL IV: ICD-10-PCS | Mod: PBBFAC,,, | Performed by: FAMILY MEDICINE

## 2023-11-08 PROCEDURE — 99214 PR OFFICE/OUTPT VISIT, EST, LEVL IV, 30-39 MIN: ICD-10-PCS | Mod: S$PBB,,, | Performed by: FAMILY MEDICINE

## 2023-11-08 RX ORDER — FLUTICASONE PROPIONATE AND SALMETEROL XINAFOATE 115; 21 UG/1; UG/1
2 AEROSOL, METERED RESPIRATORY (INHALATION) EVERY 12 HOURS
Qty: 12 G | Refills: 3 | Status: SHIPPED | OUTPATIENT
Start: 2023-11-08 | End: 2024-02-05

## 2023-11-08 RX ORDER — FLUCONAZOLE 200 MG/1
200 TABLET ORAL
Qty: 4 EACH | Refills: 1 | Status: SHIPPED | OUTPATIENT
Start: 2023-11-08 | End: 2023-11-30 | Stop reason: SDUPTHER

## 2023-11-08 RX ORDER — MONTELUKAST SODIUM 10 MG/1
10 TABLET ORAL DAILY
Qty: 90 TABLET | Refills: 3 | Status: SHIPPED | OUTPATIENT
Start: 2023-11-08 | End: 2024-02-21 | Stop reason: SDUPTHER

## 2023-11-08 RX ORDER — HYDROCHLOROTHIAZIDE 25 MG/1
25 TABLET ORAL DAILY
Qty: 90 TABLET | Refills: 3 | Status: SHIPPED | OUTPATIENT
Start: 2023-11-08 | End: 2024-02-21 | Stop reason: SDUPTHER

## 2023-11-08 RX ORDER — PANTOPRAZOLE SODIUM 40 MG/1
40 TABLET, DELAYED RELEASE ORAL DAILY
Qty: 90 TABLET | Refills: 3 | Status: SHIPPED | OUTPATIENT
Start: 2023-11-08

## 2023-11-08 RX ORDER — ALBUTEROL SULFATE 90 UG/1
AEROSOL, METERED RESPIRATORY (INHALATION)
Qty: 18 G | Refills: 6 | Status: SHIPPED | OUTPATIENT
Start: 2023-11-08 | End: 2023-12-26

## 2023-11-08 NOTE — PROGRESS NOTES
Subjective:       Patient ID: Laxmi Chand is a 68 y.o. female.    Chief Complaint: Fall    Laxmi Chand presents to the clinic today with complaints of frequent falls. Patient reports three falls at home in th last month. Patient reports one time she tropped on a toy in her home, one time was a slip in the bath tub, but patient does not recall how she fell the other time. Patient states she has noticed when she turns she gets a little dizzy and thinks this may contribute to the falls. Patient states she has not hit her head or had significant injury. Patient states she did not do physical therapy after recent foot surgery as she does not want anyone coming to her home and does not want to drive into town all the time for physical therapy in office. Patient states she ca do her own physical therapy at home.   Patient is requesting refills on medications today. Patient states she recently finished antibiotics and is still having some yeast infection symptoms.   Patient educated on plan of care, verbalized understanding.       Fall  The accident occurred More than 1 week ago. The fall occurred while walking. Pertinent negatives include no abdominal pain, fever, nausea or vomiting.     Review of Systems   Constitutional:  Negative for activity change, appetite change, chills, diaphoresis and fever.   HENT:  Negative for congestion, ear pain, postnasal drip, sinus pressure, sneezing and sore throat.    Eyes:  Negative for pain, discharge, redness and itching.   Respiratory:  Negative for apnea, cough, chest tightness, shortness of breath and wheezing.    Cardiovascular:  Negative for chest pain and leg swelling.   Gastrointestinal:  Negative for abdominal distention, abdominal pain, constipation, diarrhea, nausea and vomiting.   Genitourinary:  Negative for difficulty urinating, dysuria, flank pain and frequency.   Skin:  Negative for color change, rash and wound.   Neurological:  Positive for dizziness.        Patient Active Problem List   Diagnosis    Allergic rhinitis    Chronic obstructive lung disease    Degeneration of lumbar intervertebral disc    Depressive disorder    Gastroesophageal reflux disease    History of smoking    Hyperlipidemia    Essential hypertension    Status post total left knee replacement    Chronic pain syndrome    Pain of breast    Ductal carcinoma in situ (DCIS) of right breast    Senile osteopenia    History of endometrial cancer    Invasive ductal carcinoma of breast, female, right    Long term (current) use of aromatase inhibitors    Atherosclerosis of aorta    Calcified granuloma of lung    Urge incontinence of urine    Ingrown nail    Neuroma of second interspace of right foot    Hallux abducto valgus, right    Hammer toe of right foot    Osteoarthritis of ankle and foot    Hypermobile joint syndrome of right foot    Hallux rigidus of right foot    Painful orthopaedic hardware       Objective:      Physical Exam  Vitals reviewed.   Constitutional:       General: She is not in acute distress.     Appearance: Normal appearance. She is well-developed.   HENT:      Head: Normocephalic.      Nose: Nose normal.   Eyes:      Conjunctiva/sclera: Conjunctivae normal.      Pupils: Pupils are equal, round, and reactive to light.   Cardiovascular:      Rate and Rhythm: Normal rate and regular rhythm.      Heart sounds: Normal heart sounds.   Pulmonary:      Effort: Pulmonary effort is normal. No respiratory distress.      Breath sounds: Normal breath sounds.   Musculoskeletal:      Cervical back: Normal range of motion and neck supple.   Skin:     General: Skin is warm and dry.      Findings: No rash.          Neurological:      Mental Status: She is alert and oriented to person, place, and time.      Gait: Gait abnormal.   Psychiatric:         Behavior: Behavior normal.         Lab Results   Component Value Date    WBC 9.96 08/03/2023    HGB 12.6 08/03/2023    HCT 38.0 08/03/2023      "08/03/2023    CHOL 203 (H) 05/10/2022    TRIG 101 05/10/2022    HDL 50 05/10/2022    ALT 17 08/03/2023    AST 13 08/03/2023     08/03/2023    K 3.6 08/03/2023     08/03/2023    CREATININE 0.9 08/03/2023    BUN 26 (H) 08/03/2023    CO2 30 (H) 08/03/2023    TSH 1.040 05/10/2022    HGBA1C 5.5 07/26/2023     The 10-year ASCVD risk score (Mihaela FLOWER, et al., 2019) is: 15.7%    Values used to calculate the score:      Age: 68 years      Sex: Female      Is Non- : No      Diabetic: No      Tobacco smoker: No      Systolic Blood Pressure: 158 mmHg      Is BP treated: Yes      HDL Cholesterol: 50 mg/dL      Total Cholesterol: 203 mg/dL  Visit Vitals  BP (!) 158/88 (BP Location: Right arm, Patient Position: Sitting, BP Method: Medium (Manual))   Pulse 82   Resp 18   Ht 5' 4" (1.626 m)   Wt 83.5 kg (184 lb 1.4 oz)   SpO2 95%   BMI 31.60 kg/m²      Assessment:       1. Frequent falls    2. Chronic obstructive pulmonary disease, unspecified COPD type    3. Gastroesophageal reflux disease, unspecified whether esophagitis present    4. Hypertension, unspecified type    5. Antibiotic-induced yeast infection    6. Obesity (BMI 30.0-34.9)        Plan:       Laxmi was seen today for fall.    Diagnoses and all orders for this visit:    Frequent falls  Discussed home PT and lifting feet high enough  Discussed slow down and not rush  Discussed walker for home use  Continue medications as prescribed.  Follow up with PCP     Chronic obstructive pulmonary disease, unspecified COPD type  -     ADVAIR -21 mcg/actuation HFAA inhaler; Inhale 2 puffs into the lungs every 12 (twelve) hours. Controller  -     albuterol (PROVENTIL/VENTOLIN HFA) 90 mcg/actuation inhaler; INHALE 2 PUFFS BY MOUTH INTO THE LUNGS EVERY 4 HOURS AS NEEDED FOR WHEEZING OR SHORTNESS OF BREATH  -     montelukast (SINGULAIR) 10 mg tablet; Take 1 tablet (10 mg total) by mouth once daily.  Stable  Continue medications as " prescribed.  Follow up with PCP     Gastroesophageal reflux disease, unspecified whether esophagitis present  -     pantoprazole (PROTONIX) 40 MG tablet; Take 1 tablet (40 mg total) by mouth once daily.  Stable  Continue medications as prescribed.  Follow up with PCP     Hypertension, unspecified type  -     hydroCHLOROthiazide (HYDRODIURIL) 25 MG tablet; Take 1 tablet (25 mg total) by mouth once daily.  Stable  Continue medications as prescribed.  Follow up with PCP     Antibiotic-induced yeast infection  -     fluconazole (DIFLUCAN) 200 MG Tab; Take 1 tablet (200 mg total) by mouth every 72 hours. for 24 days    Obesity (BMI 30.0-34.9)  Body mass index is 31.6 kg/m².  Continue healthy diet and regular exercise as tolerated.  Continue medications as prescribed.  Follow up with PCP      Follow up if symptoms worsen or fail to improve.      Future Appointments       Date Provider Specialty Appt Notes    11/13/2023 Devyn Mccullough DPM Podiatry post op    2/6/2024  Lab hemonc    2/8/2024 Mell Veloz NP Hematology and Oncology [facility]/6mfu  confirm vch    2/21/2024 Zoraida Orta NP Family Medicine [facility]             All of your core healthy metrics are met.

## 2023-11-13 ENCOUNTER — HOSPITAL ENCOUNTER (OUTPATIENT)
Dept: RADIOLOGY | Facility: HOSPITAL | Age: 68
Discharge: HOME | End: 2023-11-13
Attending: PODIATRIST
Payer: MEDICARE

## 2023-11-13 ENCOUNTER — OFFICE VISIT (OUTPATIENT)
Dept: PODIATRY | Facility: CLINIC | Age: 68
End: 2023-11-13
Payer: MEDICARE

## 2023-11-13 VITALS
BODY MASS INDEX: 31.41 KG/M2 | DIASTOLIC BLOOD PRESSURE: 94 MMHG | SYSTOLIC BLOOD PRESSURE: 176 MMHG | HEART RATE: 80 BPM | HEIGHT: 64 IN | WEIGHT: 184 LBS

## 2023-11-13 DIAGNOSIS — M51.36 DEGENERATION OF LUMBAR INTERVERTEBRAL DISC: ICD-10-CM

## 2023-11-13 DIAGNOSIS — T84.84XA PAINFUL ORTHOPAEDIC HARDWARE: ICD-10-CM

## 2023-11-13 DIAGNOSIS — M35.7 HYPERMOBILE JOINT SYNDROME OF RIGHT FOOT: ICD-10-CM

## 2023-11-13 DIAGNOSIS — M20.41 HAMMER TOE OF RIGHT FOOT: ICD-10-CM

## 2023-11-13 DIAGNOSIS — M35.7 HYPERMOBILE JOINT SYNDROME OF RIGHT FOOT: Primary | ICD-10-CM

## 2023-11-13 PROCEDURE — 73630 X-RAY EXAM OF FOOT: CPT | Mod: TC,RT

## 2023-11-13 PROCEDURE — 73630 XR FOOT COMPLETE 3 VIEW RIGHT: ICD-10-PCS | Mod: 26,RT,, | Performed by: RADIOLOGY

## 2023-11-13 PROCEDURE — 99999 PR PBB SHADOW E&M-EST. PATIENT-LVL V: CPT | Mod: PBBFAC,,, | Performed by: PODIATRIST

## 2023-11-13 PROCEDURE — 99024 PR POST-OP FOLLOW-UP VISIT: ICD-10-PCS | Mod: POP,,, | Performed by: PODIATRIST

## 2023-11-13 PROCEDURE — 99215 OFFICE O/P EST HI 40 MIN: CPT | Mod: PBBFAC | Performed by: PODIATRIST

## 2023-11-13 PROCEDURE — 99999 PR PBB SHADOW E&M-EST. PATIENT-LVL V: ICD-10-PCS | Mod: PBBFAC,,, | Performed by: PODIATRIST

## 2023-11-13 PROCEDURE — 73630 X-RAY EXAM OF FOOT: CPT | Mod: 26,RT,, | Performed by: RADIOLOGY

## 2023-11-13 PROCEDURE — 99024 POSTOP FOLLOW-UP VISIT: CPT | Mod: POP,,, | Performed by: PODIATRIST

## 2023-11-13 RX ORDER — CELECOXIB 200 MG/1
200 CAPSULE ORAL 2 TIMES DAILY
Qty: 60 CAPSULE | Refills: 3 | Status: SHIPPED | OUTPATIENT
Start: 2023-11-13 | End: 2024-02-29

## 2023-11-14 NOTE — PROGRESS NOTES
"Laxmi Chand is a 68 y.o. female patient.   1. Hypermobile joint syndrome of right foot    2. Hammer toe of right foot    3. Painful orthopaedic hardware    4. Degeneration of lumbar intervertebral disc      Past Medical History:   Diagnosis Date    Breast cancer in female 09/07/2022    IDC with high grade DCIS    COPD (chronic obstructive pulmonary disease)     Degeneration of lumbar intervertebral disc     Endometrial cancer     GERD (gastroesophageal reflux disease)     HTN (hypertension)     Hyperlipemia, mixed      No past surgical history pertinent negatives on file.  Scheduled Meds:  Continuous Infusions:  PRN Meds:    Review of patient's allergies indicates:  No Known Allergies  There are no hospital problems to display for this patient.    Blood pressure (!) 176/94, pulse 80, height 5' 4" (1.626 m), weight 83.5 kg (184 lb).                        Subjective  Objective:  Vital signs (most recent): Blood pressure (!) 176/94, pulse 80, height 5' 4" (1.626 m), weight 83.5 kg (184 lb).    Assessment & Plan  Patient presents status post fusion at the base of the 1st metatarsal medial cuneiform joint removal of hardware reef fusion of the 1st MPJ right correction contracted 2nd digit right and resection of the 2nd metatarsal head due to severe degenerative arthritis right.  Patient states she is doing a lot better she is definitely doing a lot more activity why she is on her feet a lot more she states she just can not stay still this surgical sites look better they are still small openings present that she is going to continue to apply Betadine to but there is less fibrous tissue than previously noted and ultimately they are smaller.  Patient still has some degree of edema but this is continuing to improve I have recommended she increase her Celebrex to 200 mg twice a day instead of once a day to further help with the swelling and inflammation x-rays reviewed everything appears well healing and in good anatomic " alignment patient is improving well considering the procedure performed I will plan to follow up with her in about 3 weeks unless she has any problems questions or concerns sooner.  This note was created using [employer] voice recognition software that occasionally misinterpreted phrases or words.   Devyn Mccullough DPM  11/14/2023

## 2023-11-28 ENCOUNTER — TELEPHONE (OUTPATIENT)
Dept: FAMILY MEDICINE | Facility: CLINIC | Age: 68
End: 2023-11-28
Payer: MEDICARE

## 2023-11-28 NOTE — TELEPHONE ENCOUNTER
Spoke to pt states her BP has been running high since after her foot surgery in September and she had medication changes. Pt believes her celebrex is causing her BP to be elevated. Scheduled pt appt

## 2023-11-28 NOTE — TELEPHONE ENCOUNTER
----- Message from Ángel Josue sent at 11/28/2023  8:26 AM CST -----  Regarding: Sooner Appt  Type:  Sooner Appointment Request    Caller is requesting a sooner appointment.  Caller declined first available appointment listed below.  Caller will not accept being placed on the waitlist and is requesting a message be sent to doctor.    Name of Caller:Pt    When is the first available appointment?none    Symptoms:BP running high    Would the patient rather a call back or a response via [facility]? Call back    Best Call Back Number:[phone]      Additional Information: Sts her BP has been running high and believes it it due to an RX that was given by another DR.   Please advise -- Thank you

## 2023-11-30 ENCOUNTER — TELEPHONE (OUTPATIENT)
Dept: FAMILY MEDICINE | Facility: CLINIC | Age: 68
End: 2023-11-30

## 2023-11-30 ENCOUNTER — OFFICE VISIT (OUTPATIENT)
Dept: FAMILY MEDICINE | Facility: CLINIC | Age: 68
End: 2023-11-30
Payer: MEDICARE

## 2023-11-30 VITALS
HEIGHT: 64 IN | DIASTOLIC BLOOD PRESSURE: 80 MMHG | BODY MASS INDEX: 31.69 KG/M2 | WEIGHT: 185.63 LBS | HEART RATE: 73 BPM | SYSTOLIC BLOOD PRESSURE: 142 MMHG | OXYGEN SATURATION: 96 % | TEMPERATURE: 98 F

## 2023-11-30 DIAGNOSIS — J44.9 CHRONIC OBSTRUCTIVE PULMONARY DISEASE, UNSPECIFIED COPD TYPE: ICD-10-CM

## 2023-11-30 DIAGNOSIS — T36.95XA ANTIBIOTIC-INDUCED YEAST INFECTION: ICD-10-CM

## 2023-11-30 DIAGNOSIS — B37.9 ANTIBIOTIC-INDUCED YEAST INFECTION: ICD-10-CM

## 2023-11-30 DIAGNOSIS — E66.9 OBESITY (BMI 30.0-34.9): ICD-10-CM

## 2023-11-30 DIAGNOSIS — I10 ESSENTIAL HYPERTENSION: Primary | ICD-10-CM

## 2023-11-30 PROCEDURE — 99213 OFFICE O/P EST LOW 20 MIN: CPT | Mod: PBBFAC,PO | Performed by: FAMILY MEDICINE

## 2023-11-30 PROCEDURE — 99214 PR OFFICE/OUTPT VISIT, EST, LEVL IV, 30-39 MIN: ICD-10-PCS | Mod: S$PBB,,, | Performed by: FAMILY MEDICINE

## 2023-11-30 PROCEDURE — 99999 PR PBB SHADOW E&M-EST. PATIENT-LVL III: ICD-10-PCS | Mod: PBBFAC,,, | Performed by: FAMILY MEDICINE

## 2023-11-30 PROCEDURE — 99999 PR PBB SHADOW E&M-EST. PATIENT-LVL III: CPT | Mod: PBBFAC,,, | Performed by: FAMILY MEDICINE

## 2023-11-30 PROCEDURE — 99214 OFFICE O/P EST MOD 30 MIN: CPT | Mod: S$PBB,,, | Performed by: FAMILY MEDICINE

## 2023-11-30 RX ORDER — FLUCONAZOLE 200 MG/1
200 TABLET ORAL
Qty: 4 TABLET | Refills: 1 | Status: SHIPPED | OUTPATIENT
Start: 2023-11-30 | End: 2024-02-21 | Stop reason: SDUPTHER

## 2023-11-30 RX ORDER — METOPROLOL SUCCINATE 200 MG/1
200 TABLET, EXTENDED RELEASE ORAL DAILY
Qty: 90 TABLET | Refills: 3 | Status: SHIPPED | OUTPATIENT
Start: 2023-11-30 | End: 2024-01-16 | Stop reason: SDUPTHER

## 2023-11-30 RX ORDER — CLONIDINE HYDROCHLORIDE 0.1 MG/1
0.1 TABLET ORAL EVERY 6 HOURS PRN
Qty: 60 TABLET | Refills: 11 | Status: SHIPPED | OUTPATIENT
Start: 2023-11-30 | End: 2024-01-16

## 2023-11-30 RX ORDER — NYSTATIN 100000 U/G
CREAM TOPICAL 2 TIMES DAILY
Qty: 30 G | Refills: 0 | Status: SHIPPED | OUTPATIENT
Start: 2023-11-30

## 2023-11-30 RX ORDER — FLUCONAZOLE 200 MG/1
200 TABLET ORAL
Qty: 4 EACH | Refills: 1 | Status: SHIPPED | OUTPATIENT
Start: 2023-11-30 | End: 2023-11-30 | Stop reason: SDUPTHER

## 2023-11-30 NOTE — TELEPHONE ENCOUNTER
Spoke to pharmacist who wanted to confirm clonidine since it is a new RX for pt. Provided pharmacy with   cloNIDine (CATAPRES) 0.1 MG tablet 60 tablet 11 11/30/2023 11/29/2024 No   Sig - Route: Take 1 tablet (0.1 mg total) by mouth every 6 (six) hours as needed (blood pressure above 140/80). - Oral   Pharmacist filling clonidine for pt.      Pharmacist also requesting clarification on diflucan quantity.   Disp Refills Start End ROSE   fluconazole (DIFLUCAN) 200 MG Tab 4 each 1 11/30/2023 12/24/2023 No   Sig - Route: Take 1 tablet (200 mg total) by mouth every 72 hours. for 24 days - Oral     Please clarify how long pt should be taking diflucan and send new RX to pharmacy. Pharmacist req clarification on quantity and length of treatment.

## 2023-11-30 NOTE — PATIENT INSTRUCTIONS
"Hi Laxmi,     If you are due for any health screening(s) below please notify me so we can arrange them to be ordered and scheduled to maintain your health. Most healthy patients complete it. Don't lose out on improving your health.     All of your core healthy metrics are met.         Colon Cancer Screening    Of cancers affecting both men and women, colorectal cancer is the third leading cancer killer in the [place]. But it doesnt have to be. Screening can prevent colorectal cancer or find it at an early stage when treatment often leads to a cure.    A colonoscopy is the preferred test for detecting colon cancer. It is needed only once every 10 years if results are negative. While sedated, a flexible, lighted tube with a tiny camera is inserted into the rectum and advanced through the colon to look for cancers. An alternative screening test that is used at home and returned to the lab may also be used. It detects hidden blood in bowel movements which could indicate cancer in the colon. If results are positive, you will need a colonoscopy to determine if the blood is a sign of cancer. This type of follow up (diagnostic) colonoscopy usually requires additional copays as required by your insurance provider. Please contact your PCP if you have any questions.                  Patient Education       Checking Your Blood Pressure at Home   The Basics   Written by the doctors and editors at [facility]   How is blood pressure measured? -- Blood pressure is usually measured with a device that goes around your upper arm. This is often done in a doctor's office. But some people also check their blood pressure themselves, at home or at work.  Blood pressure is explained with 2 numbers. For instance, your blood pressure might be "140 over 90." The first (top) number is the pressure inside your arteries when your heart is gary. The second (bottom) number is the pressure inside your arteries when your heart is relaxed. " "The table shows how doctors and nurses define high and normal blood pressure (table 1).  If your blood pressure gets too high, it puts you at risk for heart attack, stroke, and kidney disease. High blood pressure does not usually cause symptoms. But it can be serious.  What is a home blood pressure meter? -- A home blood pressure meter (or "monitor") is a device you can use to check your blood pressure yourself. It has a cuff that goes around your upper arm (figure 1). Some devices have a cuff that goes around your wrist instead. But doctors aren't sure if these work as well. The meter also has a small screen, or dial, that shows your blood pressure numbers.  There are also special meters you can wear for a day or 2. These are different because they automatically check your blood pressure throughout the day and night, even while you are sleeping. If your doctor thinks you should use one of these devices, they will talk to you about how to wear it.  Why do I need to check my blood pressure at home? -- If your doctor knows or suspects that you have high blood pressure, they might want you to check it at home. There are a few reasons for this. Your doctor might want to look at:  Whether your blood pressure measures the same at home as it did in the doctor's office  How well your blood pressure medicines are working  Changes in your blood pressure, for example, if it goes up and down  People who check their own blood pressure at home usually do better at keeping it low.  How do I choose a home blood pressure meter? -- When choosing a home blood pressure meter, you will probably want to think about:  Cost - Some devices cost more than others. You should also check to see if your insurance will help pay for your device.  Size - It's important to make sure the cuff fits your arm comfortably. Your doctor or nurse can help you with this.  How easy it is to use - You should make sure you understand how to use the device. You " also need to be able to read the numbers on the screen.  You do not need a prescription to buy a home blood pressure meter. You can buy them at most pharmacies or over the internet. Your doctor or nurse can help you choose the right device for you.  How do I check my blood pressure at home? -- Once you have a home blood pressure meter, your doctor or nurse should check it to make sure it fits you and works correctly.  When it's time to check your blood pressure:  Go to the bathroom and empty your bladder first. Having a full bladder can temporarily increase your blood pressure, making the results inaccurate.  Sit in a chair with your feet flat on the ground.  Try to breathe normally and stay calm.  Attach the cuff to your arm. Place the cuff directly on your skin, not over your clothing. The cuff should be tight enough to not slip down, but not uncomfortably tight.  Sit and relax for about 3 to 5 minutes with the cuff on.  Follow the directions that came with your device to start measuring your blood pressure. This might involve squeezing the bulb at the end of the tube to inflate the cuff (fill it with air). With some monitors, you just need to press a button to inflate the cuff. When the cuff fills with air, it feels like someone is squeezing your arm, but it should not hurt. Then you will slowly deflate the cuff (let the air out of it), or it will deflate by itself. The screen or dial will show your blood pressure numbers.  Stay seated and relax for 1 minute, then measure your blood pressure again.  How often should I check my blood pressure? -- It depends. Different people need to follow different schedules. Your doctor or nurse will tell you how often to check your blood pressure, and when. Some people need to check their blood pressure twice a day, in the morning and evening.  Your doctor or nurse will probably tell you to keep track of your blood pressure for at least a few days (table 2). Then they will look  "at the numbers. The reason for this is that it's normal for your blood pressure to change a bit from day to day. For example, the numbers might change depending on whether you recently had caffeine, just exercised, or feel stressed. Checking your blood pressure over several days - or longer - will give your doctor or nurse a better idea of what is average for you.  How should I keep track of my blood pressure? -- Some blood pressure meters will record your numbers for you, or send them to your computer or smartphone. If yours does not do this, you will need to write them down. Your doctor or nurse can help you figure out the best way to keep track of the numbers.  What if my blood pressure is high? -- Your doctor or nurse will tell you what to do if your blood pressure is high when you check it at home. If you get a number that is higher than normal, measure it again to see if it is still high. If it is very high (above a certain number, which your doctor or nurse will tell you to watch out for), you should call your doctor right away.  If your blood pressure is only a little high, your doctor or nurse might tell you to keep checking it for a few more days or weeks, and then call if it does not go back down. Then they can help you decide what to do next.  All topics are updated as new evidence becomes available and our peer review process is complete.  This topic retrieved from [employer] on: Sep 21, 2021.  Topic 429831 Version 4.0  Release: 29.4.2 - C29.263  © 2021 UpToDate, Inc. and/or its affiliates. All rights reserved.  table 1: Definition of normal and high blood pressure  Level  Top number  Bottom number    High 130 or above 80 or above   Elevated 120 to 129 79 or below   Normal 119 or below 79 or below   These definitions are from the American College of Cardiology/American Heart Association. Other expert groups might use slightly different definitions.  "Elevated blood pressure" is a term doctor or nurses use " as a warning. It means you do not yet have high blood pressure, but your blood pressure is not as low as it should be for good health.  Graphic 91674 Version 6.0  figure 1: Using a home blood pressure meter     This is an example of a person using a home blood pressure meter.  Graphic 459530 Version 1.0    table 2: 7-day diary for checking blood pressure at home  Day 1  Day 2  Day 3  Day 4  Day 5  Day 6  Day 7    Morning  1st read Morning  1st read Morning  1st read Morning  1st read Morning  1st read Morning  1st read Morning  1st read   Systolic: __________ Systolic: __________ Systolic: __________ Systolic: __________ Systolic: __________ Systolic: __________ Systolic: __________   Diastolic: __________ Diastolic: __________ Diastolic: __________ Diastolic: __________ Diastolic: __________ Diastolic: __________ Diastolic: __________   Pulse: __________ Pulse: __________ Pulse: __________ Pulse: __________ Pulse: __________ Pulse: __________ Pulse: __________   Morning  2nd read Morning  2nd read Morning  2nd read Morning  2nd read Morning  2nd read Morning  2nd read Morning  2nd read   Systolic: __________ Systolic: __________ Systolic: __________ Systolic: __________ Systolic: __________ Systolic: __________ Systolic: __________   Diastolic: __________ Diastolic: __________ Diastolic: __________ Diastolic: __________ Diastolic: __________ Diastolic: __________ Diastolic: __________   Pulse: __________ Pulse: __________ Pulse: __________ Pulse: __________ Pulse: __________ Pulse: __________ Pulse: __________   Evening  1st read Evening  1st read Evening  1st read Evening  1st read Evening  1st read Evening  1st read Evening  1st read   Systolic: __________ Systolic: __________ Systolic: __________ Systolic: __________ Systolic: __________ Systolic: __________ Systolic: __________   Diastolic: __________ Diastolic: __________ Diastolic: __________ Diastolic: __________ Diastolic: __________ Diastolic: __________  Diastolic: __________   Pulse: __________ Pulse: __________ Pulse: __________ Pulse: __________ Pulse: __________ Pulse: __________ Pulse: __________   Evening  2nd read Evening  2nd read Evening  2nd read Evening  2nd read Evening  2nd read Evening  2nd read Evening  2nd read   Systolic: __________ Systolic: __________ Systolic: __________ Systolic: __________ Systolic: __________ Systolic: __________ Systolic: __________   Diastolic: __________ Diastolic: __________ Diastolic: __________ Diastolic: __________ Diastolic: __________ Diastolic: __________ Diastolic: __________   Pulse: __________ Pulse: __________ Pulse: __________ Pulse: __________ Pulse: __________ Pulse: __________ Pulse: __________   Notes    Notes    Notes    Notes    Notes    Notes    Notes      ____________________ ____________________ ____________________ ____________________ ____________________ ____________________ ____________________   ____________________ ____________________ ____________________ ____________________ ____________________ ____________________ ____________________   ____________________ ____________________ ____________________ ____________________ ____________________ ____________________ ____________________   Patient name: ______________________________     Patient ID: ________________________________    Primary care provider: _______________________    Average BP: _______________________________    Graphic 761163 Version 1.0  Consumer Information Use and Disclaimer   This information is not specific medical advice and does not replace information you receive from your health care provider. This is only a brief summary of general information. It does NOT include all information about conditions, illnesses, injuries, tests, procedures, treatments, therapies, discharge instructions or life-style choices that may apply to you. You must talk with your health care provider for complete information about your health and  treatment options. This information should not be used to decide whether or not to accept your health care provider's advice, instructions or recommendations. Only your health care provider has the knowledge and training to provide advice that is right for you. The use of this information is governed by the [employer] End User License Agreement, available at [email or web address]/[email or web address]/about/dora.The use of [employer] content is governed by the [employer] Terms of Use. ©2021 UpToDate, Inc. All rights reserved.  Copyright   © 2021 UpToDate, Inc. and/or its affiliates. All rights reserved.      Thank you for allowing me to be part of your healthcare team at [facility]. It is a pleasure to care for you today.   Please take all of your medications as instructed and follow all new instructions from your visit today.  If you received labs or medical tests today you should hear information about results or scheduling either by phone or mychart within approximately a week.   If you have any questions or concerns please do not hesitate to call. Have a blessed day and I hope to see you again soon.  Zoraida Orta

## 2023-11-30 NOTE — TELEPHONE ENCOUNTER
Patient was on long term antibiotics and diflucan so signature was still from previous prescription but has been corrected.

## 2023-11-30 NOTE — TELEPHONE ENCOUNTER
----- Message from Saba Geller sent at 11/30/2023 10:21 AM CST -----  Contact: [facility]  Type:  Pharmacy Calling to Clarify an RX      Pharm[facility] Name:[facility]    Prescription Name:cloNIDine (CATAPRES) 0.1 MG tablet    What do they need to clarify?:directions    Best Call Back Number:    Additional Information: please call to advise  Thanks

## 2023-11-30 NOTE — PROGRESS NOTES
Subjective:       Patient ID: Laxmi Chand is a 68 y.o. female.    Chief Complaint: Hypertension (Yeast infection)    Laxmi Chand presents to the clinic today with complaints of elevated blood pressure. Patient states Dr Mccullough put her on cymbalta twice daily and she noticed her blood pressure was elevated. Patient states she decreased to taking this once daily and her blood pressure has come down. Patient also reports she got a letter reporting insurance will no longer cover her Bystolic blood pressure medication and is requesting this be changed.   Patient also reports recurrent yeast infection. Patient states she took the diflucan but still had an itch.   Patient educated on plan of care, verbalized understanding.         Review of Systems   Constitutional:  Negative for activity change, appetite change, chills, diaphoresis and fever.   HENT:  Negative for congestion, ear pain, postnasal drip, sinus pressure, sneezing and sore throat.    Eyes:  Negative for pain, discharge, redness and itching.   Respiratory:  Negative for apnea, cough, chest tightness, shortness of breath and wheezing.    Cardiovascular:  Negative for chest pain and leg swelling.   Gastrointestinal:  Negative for abdominal distention, abdominal pain, constipation, diarrhea, nausea and vomiting.   Genitourinary:  Positive for vaginal discharge. Negative for difficulty urinating, dysuria, flank pain and frequency.   Skin:  Negative for color change, rash and wound.   Neurological:  Negative for dizziness.       Patient Active Problem List   Diagnosis    Allergic rhinitis    Chronic obstructive lung disease    Degeneration of lumbar intervertebral disc    Depressive disorder    Gastroesophageal reflux disease    History of smoking    Hyperlipidemia    Essential hypertension    Status post total left knee replacement    Chronic pain syndrome    Pain of breast    Ductal carcinoma in situ (DCIS) of right breast    Senile osteopenia    History of  endometrial cancer    Invasive ductal carcinoma of breast, female, right    Long term (current) use of aromatase inhibitors    Atherosclerosis of aorta    Calcified granuloma of lung    Urge incontinence of urine    Ingrown nail    Neuroma of second interspace of right foot    Hallux abducto valgus, right    Hammer toe of right foot    Osteoarthritis of ankle and foot    Hypermobile joint syndrome of right foot    Hallux rigidus of right foot    Painful orthopaedic hardware       Objective:      Physical Exam  Vitals reviewed.   Constitutional:       General: She is not in acute distress.     Appearance: Normal appearance. She is well-developed.   HENT:      Head: Normocephalic.      Nose: Nose normal.   Eyes:      Conjunctiva/sclera: Conjunctivae normal.      Pupils: Pupils are equal, round, and reactive to light.   Cardiovascular:      Rate and Rhythm: Normal rate and regular rhythm.      Heart sounds: Normal heart sounds.   Pulmonary:      Effort: Pulmonary effort is normal. No respiratory distress.      Breath sounds: Normal breath sounds.   Musculoskeletal:      Cervical back: Normal range of motion and neck supple.   Skin:     General: Skin is warm and dry.      Findings: No rash.   Neurological:      Mental Status: She is alert and oriented to person, place, and time.   Psychiatric:         Mood and Affect: Mood normal.         Behavior: Behavior normal.         Lab Results   Component Value Date    WBC 9.96 08/03/2023    HGB 12.6 08/03/2023    HCT 38.0 08/03/2023     08/03/2023    CHOL 203 (H) 05/10/2022    TRIG 101 05/10/2022    HDL 50 05/10/2022    ALT 17 08/03/2023    AST 13 08/03/2023     08/03/2023    K 3.6 08/03/2023     08/03/2023    CREATININE 0.9 08/03/2023    BUN 26 (H) 08/03/2023    CO2 30 (H) 08/03/2023    TSH 1.040 05/10/2022    HGBA1C 5.5 07/26/2023     The 10-year ASCVD risk score (Mihaela FLOWER, et al., 2019) is: 12.8%    Values used to calculate the score:      Age: 68 years    "   Sex: Female      Is Non- : No      Diabetic: No      Tobacco smoker: No      Systolic Blood Pressure: 142 mmHg      Is BP treated: Yes      HDL Cholesterol: 50 mg/dL      Total Cholesterol: 203 mg/dL  Visit Vitals  BP (!) 142/80 (BP Location: Left arm, Patient Position: Sitting, BP Method: Small (Manual))   Pulse 73   Temp 97.9 °F (36.6 °C)   Ht 5' 4" (1.626 m)   Wt 84.2 kg (185 lb 10 oz)   SpO2 96%   BMI 31.86 kg/m²      Assessment:       1. Essential hypertension    2. Antibiotic-induced yeast infection    3. Obesity (BMI 30.0-34.9)    4. Chronic obstructive pulmonary disease, unspecified COPD type        Plan:       Laxmi was seen today for hypertension.    Diagnoses and all orders for this visit:    Essential hypertension  -     metoprolol succinate (TOPROL-XL) 200 MG 24 hr tablet; Take 1 tablet (200 mg total) by mouth once daily.  -     cloNIDine (CATAPRES) 0.1 MG tablet; Take 1 tablet (0.1 mg total) by mouth every 6 (six) hours as needed (blood pressure above 140/80).  Discussed continued monitor at home  Continue medications as prescribed.  Follow up with PCP     Antibiotic-induced yeast infection  -     fluconazole (DIFLUCAN) 200 MG Tab; Take 1 tablet (200 mg total) by mouth every 72 hours. for 24 days  -     nystatin (MYCOSTATIN) cream; Apply topically 2 (two) times daily.    Obesity (BMI 30.0-34.9)  Body mass index is 31.86 kg/m².  Continue healthy diet and regular exercise as tolerated.  Continue medications as prescribed.  Follow up with PCP     Chronic obstructive pulmonary disease, unspecified COPD type  Stable  Continue medications as prescribed.  Follow up with PCP      Follow up if symptoms worsen or fail to improve, for scheduled appt.      Future Appointments       Date Provider Specialty Appt Notes    12/4/2023 Devyn Mccullough DPM Podiatry post op    2/6/2024  Lab hemonc    2/8/2024 Mell Veloz NP Hematology and Oncology [facility]abs/6mfu  confirm vch    " 2/21/2024 Zoraida Orta, NP Family Medicine [facility]             All of your core healthy metrics are met.

## 2023-12-04 ENCOUNTER — HOSPITAL ENCOUNTER (OUTPATIENT)
Dept: RADIOLOGY | Facility: HOSPITAL | Age: 68
Discharge: HOME | End: 2023-12-04
Attending: PODIATRIST
Payer: MEDICAID

## 2023-12-04 ENCOUNTER — OFFICE VISIT (OUTPATIENT)
Dept: PODIATRY | Facility: CLINIC | Age: 68
End: 2023-12-04
Payer: MEDICARE

## 2023-12-04 VITALS
DIASTOLIC BLOOD PRESSURE: 83 MMHG | HEART RATE: 78 BPM | SYSTOLIC BLOOD PRESSURE: 177 MMHG | BODY MASS INDEX: 31.69 KG/M2 | WEIGHT: 185.63 LBS | HEIGHT: 64 IN

## 2023-12-04 DIAGNOSIS — L60.0 INGROWN NAIL: Primary | ICD-10-CM

## 2023-12-04 DIAGNOSIS — M35.7 HYPERMOBILE JOINT SYNDROME OF RIGHT FOOT: ICD-10-CM

## 2023-12-04 PROCEDURE — 99213 OFFICE O/P EST LOW 20 MIN: CPT | Mod: PBBFAC | Performed by: PODIATRIST

## 2023-12-04 PROCEDURE — 73630 XR FOOT COMPLETE 3 VIEW RIGHT: ICD-10-PCS | Mod: 26,RT,, | Performed by: RADIOLOGY

## 2023-12-04 PROCEDURE — 99212 OFFICE O/P EST SF 10 MIN: CPT | Mod: 24,S$PBB,, | Performed by: PODIATRIST

## 2023-12-04 PROCEDURE — 99999 PR PBB SHADOW E&M-EST. PATIENT-LVL III: CPT | Mod: PBBFAC,,, | Performed by: PODIATRIST

## 2023-12-04 PROCEDURE — 73630 X-RAY EXAM OF FOOT: CPT | Mod: 26,RT,, | Performed by: RADIOLOGY

## 2023-12-04 PROCEDURE — 73630 X-RAY EXAM OF FOOT: CPT | Mod: TC,RT

## 2023-12-04 PROCEDURE — 99212 PR OFFICE/OUTPT VISIT, EST, LEVL II, 10-19 MIN: ICD-10-PCS | Mod: 24,S$PBB,, | Performed by: PODIATRIST

## 2023-12-04 PROCEDURE — 99999 PR PBB SHADOW E&M-EST. PATIENT-LVL III: ICD-10-PCS | Mod: PBBFAC,,, | Performed by: PODIATRIST

## 2023-12-05 NOTE — PROGRESS NOTES
"Laxmi Chand is a 68 y.o. female patient.   1. Ingrown nail    2. Hypermobile joint syndrome of right foot      Past Medical History:   Diagnosis Date    Breast cancer in female 09/07/2022    IDC with high grade DCIS    COPD (chronic obstructive pulmonary disease)     Degeneration of lumbar intervertebral disc     Endometrial cancer     GERD (gastroesophageal reflux disease)     HTN (hypertension)     Hyperlipemia, mixed      No past surgical history pertinent negatives on file.  Scheduled Meds:  Continuous Infusions:  PRN Meds:    Review of patient's allergies indicates:  No Known Allergies  There are no hospital problems to display for this patient.    Blood pressure (!) 177/83, pulse 78, height 5' 4" (1.626 m), weight 84.2 kg (185 lb 10 oz).                                      Subjective  Objective:  Vital signs (most recent): Blood pressure (!) 177/83, pulse 78, height 5' 4" (1.626 m), weight 84.2 kg (185 lb 10 oz).    Assessment & Plan  Patient presents status post fusion at the base of the 1st metatarsal medial cuneiform joint removal of hardware reef fusion of the 1st MPJ right correction contracted 2nd digit right and resection of the 2nd metatarsal head due to severe degenerative arthritis right.  Patient continues to improve she is got less swelling the areas that were open and moist now appear completely closed she states she did have to stop taking the Celebrex twice a day because it caused her blood pressure to increase so she is back to just taking it once a day.  X-rays reviewed patient appears to be healing well everything appears in good anatomic alignment and surgical correction.  I am going to release the patient to full activity she is been advised to contact us with any problems questions or concerns unrelated evaluation and management was performed today as the patient had an ingrown toenail 3rd digit right this required removal of the ingrowing nail but did not require a nail avulsion at " this time patient noted immediate relief bacitracin ointment and a light dressing was applied to the area patient advised to monitor this area closely any signs of discomfort redness swelling drainage to contact us immediately otherwise I will see the patient as needed for follow-up.  This note was created using [employer] voice recognition software that occasionally misinterpreted phrases or words.   Devyn Mccullough DPM  12/5/2023

## 2023-12-24 DIAGNOSIS — J44.9 CHRONIC OBSTRUCTIVE PULMONARY DISEASE, UNSPECIFIED COPD TYPE: ICD-10-CM

## 2023-12-26 RX ORDER — ALBUTEROL SULFATE 90 UG/1
AEROSOL, METERED RESPIRATORY (INHALATION)
Qty: 18 G | Refills: 6 | Status: SHIPPED | OUTPATIENT
Start: 2023-12-26 | End: 2024-02-21 | Stop reason: SDUPTHER

## 2024-01-03 ENCOUNTER — TELEPHONE (OUTPATIENT)
Dept: FAMILY MEDICINE | Facility: CLINIC | Age: 69
End: 2024-01-03

## 2024-01-03 NOTE — TELEPHONE ENCOUNTER
----- Message from Devyn Medina sent at 1/3/2024 11:15 AM CST -----  Contact: Self  Type:  Same Day Appointment Request    Caller is requesting a same day appointment.  Caller declined first available appointment listed below.      Name of Caller:  Patient  When is the first available appointment?  1/5  Symptoms:  Arm pain  Best Call Back Number:  [phone]   Additional Information:   Symptom: Arm Pain - Not From Injury  Outcome: Schedule an appointment to be seen within 24 hours.  Reason: Caller denied all higher acuity questions

## 2024-01-05 ENCOUNTER — OFFICE VISIT (OUTPATIENT)
Dept: FAMILY MEDICINE | Facility: CLINIC | Age: 69
End: 2024-01-05
Payer: MEDICARE

## 2024-01-05 VITALS
OXYGEN SATURATION: 95 % | SYSTOLIC BLOOD PRESSURE: 158 MMHG | TEMPERATURE: 97 F | WEIGHT: 183.88 LBS | BODY MASS INDEX: 31.39 KG/M2 | HEART RATE: 86 BPM | DIASTOLIC BLOOD PRESSURE: 82 MMHG | HEIGHT: 64 IN

## 2024-01-05 DIAGNOSIS — E66.9 OBESITY, CLASS I, BMI 30-34.9: ICD-10-CM

## 2024-01-05 DIAGNOSIS — G89.4 CHRONIC PAIN SYNDROME: ICD-10-CM

## 2024-01-05 DIAGNOSIS — G56.02 LEFT CARPAL TUNNEL SYNDROME: Primary | ICD-10-CM

## 2024-01-05 DIAGNOSIS — I10 ESSENTIAL HYPERTENSION: ICD-10-CM

## 2024-01-05 DIAGNOSIS — G56.01 CARPAL TUNNEL SYNDROME ON RIGHT: Primary | ICD-10-CM

## 2024-01-05 PROCEDURE — 99999PBSHW PR PBB SHADOW TECHNICAL ONLY FILED TO HB: Mod: PBBFAC,,,

## 2024-01-05 PROCEDURE — 96372 THER/PROPH/DIAG INJ SC/IM: CPT | Mod: PBBFAC,PO

## 2024-01-05 PROCEDURE — 99215 OFFICE O/P EST HI 40 MIN: CPT | Mod: PBBFAC,PO | Performed by: FAMILY MEDICINE

## 2024-01-05 PROCEDURE — 99214 OFFICE O/P EST MOD 30 MIN: CPT | Mod: S$PBB,,, | Performed by: FAMILY MEDICINE

## 2024-01-05 PROCEDURE — 99999 PR PBB SHADOW E&M-EST. PATIENT-LVL V: CPT | Mod: PBBFAC,,, | Performed by: FAMILY MEDICINE

## 2024-01-05 RX ORDER — KETOROLAC TROMETHAMINE 30 MG/ML
30 INJECTION, SOLUTION INTRAMUSCULAR; INTRAVENOUS
Status: COMPLETED | OUTPATIENT
Start: 2024-01-05 | End: 2024-01-05

## 2024-01-05 RX ORDER — GABAPENTIN 300 MG/1
300 CAPSULE ORAL 2 TIMES DAILY
Qty: 60 CAPSULE | Refills: 2 | Status: SHIPPED | OUTPATIENT
Start: 2024-01-05 | End: 2024-04-04

## 2024-01-05 RX ORDER — DEXAMETHASONE SODIUM PHOSPHATE 4 MG/ML
4 INJECTION, SOLUTION INTRA-ARTICULAR; INTRALESIONAL; INTRAMUSCULAR; INTRAVENOUS; SOFT TISSUE
Status: COMPLETED | OUTPATIENT
Start: 2024-01-05 | End: 2024-01-05

## 2024-01-05 RX ADMIN — DEXAMETHASONE SODIUM PHOSPHATE 4 MG: 4 INJECTION INTRA-ARTICULAR; INTRALESIONAL; INTRAMUSCULAR; INTRAVENOUS; SOFT TISSUE at 01:01

## 2024-01-05 RX ADMIN — KETOROLAC TROMETHAMINE 30 MG: 30 INJECTION, SOLUTION INTRAMUSCULAR; INTRAVENOUS at 01:01

## 2024-01-05 NOTE — PROGRESS NOTES
Subjective:       Patient ID: Laxmi Chand is a 68 y.o. female.    Chief Complaint: No chief complaint on file.    Laxmi Chand presents to the clinic today with complaints of right arm pain. Patient states she previously was told she has carpal tunnel but never got the surgery due to other things going on at that time. Patient states she is in sever pain and waking up in the night in pain. Patient states she sees pain management but even those medications are not helping.   Patient educated on plan of care, verbalized understanding.         Review of Systems   Constitutional:  Negative for activity change, appetite change, chills, diaphoresis and fever.   HENT:  Negative for congestion, ear pain, postnasal drip, sinus pressure, sneezing and sore throat.    Eyes:  Negative for pain, discharge, redness and itching.   Respiratory:  Negative for apnea, cough, chest tightness, shortness of breath and wheezing.    Cardiovascular:  Negative for chest pain and leg swelling.   Gastrointestinal:  Negative for abdominal distention, abdominal pain, constipation, diarrhea, nausea and vomiting.   Genitourinary:  Negative for difficulty urinating, dysuria, flank pain and frequency.   Musculoskeletal:  Positive for arthralgias.   Skin:  Negative for color change, rash and wound.   Neurological:  Negative for dizziness.       Patient Active Problem List   Diagnosis    Allergic rhinitis    Chronic obstructive lung disease    Degeneration of lumbar intervertebral disc    Depressive disorder    Gastroesophageal reflux disease    History of smoking    Hyperlipidemia    Essential hypertension    Status post total left knee replacement    Chronic pain syndrome    Pain of breast    Ductal carcinoma in situ (DCIS) of right breast    Senile osteopenia    History of endometrial cancer    Invasive ductal carcinoma of breast, female, right    Long term (current) use of aromatase inhibitors    Atherosclerosis of aorta    Calcified granuloma  of lung    Urge incontinence of urine    Ingrown nail    Neuroma of second interspace of right foot    Hallux abducto valgus, right    Hammer toe of right foot    Osteoarthritis of ankle and foot    Hypermobile joint syndrome of right foot    Hallux rigidus of right foot    Painful orthopaedic hardware       Objective:      Physical Exam  Vitals reviewed.   Constitutional:       General: She is not in acute distress.     Appearance: Normal appearance. She is well-developed.   HENT:      Head: Normocephalic.      Nose: Nose normal.   Eyes:      Conjunctiva/sclera: Conjunctivae normal.      Pupils: Pupils are equal, round, and reactive to light.   Cardiovascular:      Rate and Rhythm: Normal rate.   Pulmonary:      Effort: Pulmonary effort is normal. No respiratory distress.   Musculoskeletal:      Cervical back: Normal range of motion and neck supple.   Skin:     General: Skin is warm and dry.      Findings: No rash.   Neurological:      Mental Status: She is alert and oriented to person, place, and time.   Psychiatric:         Mood and Affect: Mood normal.         Behavior: Behavior normal.         Lab Results   Component Value Date    WBC 9.96 08/03/2023    HGB 12.6 08/03/2023    HCT 38.0 08/03/2023     08/03/2023    CHOL 203 (H) 05/10/2022    TRIG 101 05/10/2022    HDL 50 05/10/2022    ALT 17 08/03/2023    AST 13 08/03/2023     08/03/2023    K 3.6 08/03/2023     08/03/2023    CREATININE 0.9 08/03/2023    BUN 26 (H) 08/03/2023    CO2 30 (H) 08/03/2023    TSH 1.040 05/10/2022    HGBA1C 5.5 07/26/2023     The 10-year ASCVD risk score (Mihaela FLOWER, et al., 2019) is: 15.7%    Values used to calculate the score:      Age: 68 years      Sex: Female      Is Non- : No      Diabetic: No      Tobacco smoker: No      Systolic Blood Pressure: 158 mmHg      Is BP treated: Yes      HDL Cholesterol: 50 mg/dL      Total Cholesterol: 203 mg/dL  Visit Vitals  BP (!) 158/82 (BP Location: Left  "arm, Patient Position: Sitting, BP Method: Medium (Manual))   Pulse 86   Temp 97.2 °F (36.2 °C) (Oral)   Ht 5' 4" (1.626 m)   Wt 83.4 kg (183 lb 13.8 oz)   SpO2 95%   BMI 31.56 kg/m²      Assessment:       1. Carpal tunnel syndrome on right    2. Obesity, Class I, BMI 30-34.9    3. Essential hypertension    4. Chronic pain syndrome        Plan:       Diagnoses and all orders for this visit:    Carpal tunnel syndrome on right  -     Ambulatory referral/consult to Orthopedics; Future  -     dexAMETHasone injection 4 mg  -     ketorolac injection 30 mg  -     WRIST BRACE FOR HOME USE  -     gabapentin (NEURONTIN) 300 MG capsule; Take 1 capsule (300 mg total) by mouth 2 (two) times daily.    Obesity, Class I, BMI 30-34.9  Body mass index is 31.56 kg/m².  Continue healthy diet and regular exercise as tolerated.  Continue medications as prescribed.  Follow up with PCP     Essential hypertension  Stable  Continue medications as prescribed.  Follow up with PCP     Chronic pain syndrome  Stable  Continue medications as prescribed.  Follow up with PCP and pain management     Follow up if symptoms worsen or fail to improve.      Future Appointments       Date Provider Specialty Appt Notes    2/5/2024  Lab [facility]    2/5/2024 Mell Veloz NP Hematology and Oncology [facility]    2/21/2024 Zoraida Orta NP Family Medicine [facility]             Tests to Keep You Healthy    Mammogram: Met on 8/18/2023  Colon Cancer Screening: DUE  Last Blood Pressure <= 139/89 (1/5/2024): NO       "

## 2024-01-09 ENCOUNTER — HOSPITAL ENCOUNTER (OUTPATIENT)
Dept: RADIOLOGY | Facility: HOSPITAL | Age: 69
Discharge: HOME | End: 2024-01-09
Attending: FAMILY MEDICINE
Payer: MEDICARE

## 2024-01-09 ENCOUNTER — OFFICE VISIT (OUTPATIENT)
Dept: ORTHOPEDICS | Facility: CLINIC | Age: 69
End: 2024-01-09
Payer: MEDICARE

## 2024-01-09 VITALS — RESPIRATION RATE: 18 BRPM | BODY MASS INDEX: 31.39 KG/M2 | WEIGHT: 183.88 LBS | HEIGHT: 64 IN

## 2024-01-09 DIAGNOSIS — M67.431 GANGLION CYST OF WRIST, RIGHT: ICD-10-CM

## 2024-01-09 DIAGNOSIS — G56.02 LEFT CARPAL TUNNEL SYNDROME: ICD-10-CM

## 2024-01-09 DIAGNOSIS — G56.01 CARPAL TUNNEL SYNDROME ON RIGHT: Primary | ICD-10-CM

## 2024-01-09 PROCEDURE — 99999 PR PBB SHADOW E&M-EST. PATIENT-LVL III: CPT | Mod: PBBFAC,,, | Performed by: FAMILY MEDICINE

## 2024-01-09 PROCEDURE — 99999PBSHW PR PBB SHADOW TECHNICAL ONLY FILED TO HB: Mod: PBBFAC,,,

## 2024-01-09 PROCEDURE — 99213 OFFICE O/P EST LOW 20 MIN: CPT | Mod: PBBFAC,PO | Performed by: FAMILY MEDICINE

## 2024-01-09 PROCEDURE — 76942 ECHO GUIDE FOR BIOPSY: CPT | Mod: PBBFAC,PO | Performed by: FAMILY MEDICINE

## 2024-01-09 PROCEDURE — 73130 X-RAY EXAM OF HAND: CPT | Mod: 26,RT,, | Performed by: RADIOLOGY

## 2024-01-09 PROCEDURE — 73130 X-RAY EXAM OF HAND: CPT | Mod: TC,PO,RT

## 2024-01-09 PROCEDURE — 99203 OFFICE O/P NEW LOW 30 MIN: CPT | Mod: 25,S$PBB,, | Performed by: FAMILY MEDICINE

## 2024-01-09 PROCEDURE — 20526 THER INJECTION CARP TUNNEL: CPT | Mod: S$PBB,RT,, | Performed by: FAMILY MEDICINE

## 2024-01-09 RX ORDER — TRIAMCINOLONE ACETONIDE 40 MG/ML
40 INJECTION, SUSPENSION INTRA-ARTICULAR; INTRAMUSCULAR
Status: DISCONTINUED | OUTPATIENT
Start: 2024-01-09 | End: 2024-01-09 | Stop reason: HOSPADM

## 2024-01-09 RX ADMIN — TRIAMCINOLONE ACETONIDE 40 MG: 40 INJECTION, SUSPENSION INTRA-ARTICULAR; INTRAMUSCULAR at 10:01

## 2024-01-09 NOTE — PROGRESS NOTES
Subjective:     Patient ID: Laxmi Chand is a 68 y.o. female.    Chief Complaint: Pain of the Right Hand    68-year-old female here today with complaints of right hand and wrist pain.  She notes the pain has been present on and off for the last two months.  Has had previous issues with that wrist before.  Notes that previously [place] she was diagnosed with carpal tunnel syndrome and had an EMG done.  Was told she had severe carpal tunnel syndrome and will require carpal tunnel release surgery.  She never had the surgery done.  He is now having significant pain in the area of her wrist near the thenar eminence as well as on the volar side of the wrist and notes a swollen cyst-like object that was consistent with a ganglion cyst.  Is having numbness and tingling in her thumb and 1st three fingers only sparing her pinky.  Notes the pain can become severe and will keep her awake and sometimes wake her up at night.  She has not tried any previous injections or bracing or other medications for pain.  She was seen by Marj Albright who did give her a series of IM injections and told her to wear a brace.  They provided only temporary relief.  Notes that she has had a history of carpal tunnel syndrome in the left wrist with a carpal tunnel release surgery done many years ago.    Pain  Pertinent negatives include no chest pain, chills, congestion, coughing, headaches, rash or sore throat.       Review of Systems   Constitutional: Negative for chills and decreased appetite.   HENT:  Negative for congestion and sore throat.    Eyes:  Negative for blurred vision.   Cardiovascular:  Negative for chest pain, dyspnea on exertion and palpitations.   Respiratory:  Negative for cough and shortness of breath.    Skin:  Negative for rash.   Neurological:  Negative for difficulty with concentration, disturbances in coordination and headaches.   Psychiatric/Behavioral:  Negative for altered mental status, depression,  hallucinations, memory loss and suicidal ideas.        Objective:       General    Nursing note and vitals reviewed.  Constitutional: She is oriented to person, place, and time. She appears well-developed and well-nourished.   HENT:   Nose: Nose normal.   Eyes: EOM are normal. Pupils are equal, round, and reactive to light.   Neck: Neck supple.   Cardiovascular:  Normal rate.            Pulmonary/Chest: Effort normal.   Abdominal: Soft.   Neurological: She is alert and oriented to person, place, and time. She has normal reflexes.   Psychiatric: She has a normal mood and affect. Her behavior is normal. Judgment and thought content normal.             Right Hand/Wrist Exam     Inspection   Effusion: Wrist - absent   Deformity: Wrist - deformity     Pain   Wrist - The patient exhibits pain of the flexor/pronator group and CMC.    Tenderness   The patient is tender to palpation of the radial area and angel area.    Range of Motion     Wrist   Extension:  normal   Flexion:  normal   Pronation:  normal   Supination:  normal     Tests   Phalens sign: positive  Tinel's sign (median nerve): positive  Carpal Tunnel Compression Test: positive  Cubital Tunnel Compression Test: negative    Atrophy   Thenar:  positive    Other     Neuorologic Exam    Median Distribution: abnormal  Ulnar Distribution: normal  Radial Distribution: normal    Comments:  Ganglion cyst noted on radial aspect of volar wrist.      Left Hand/Wrist Exam     Inspection   Scars: Wrist - present   Effusion: Wrist - absent   Deformity: Wrist - absent     Range of Motion     Wrist   Extension:  normal   Flexion:  normal   Pronation:  normal   Supination:  normal     Tests   Phalens Sign: negative  Tinel's sign (median nerve): negative  Carpal Tunnel Compression Test: negative  Cubital Tunnel Compression Test: negative    Atrophy  Thenar:  Negative    Other     Sensory Exam  Median Distribution: normal  Ulnar Distribution: normal  Radial Distribution:  normal          Muscle Strength   Right Upper Extremity   Wrist extension: 5/5   Wrist flexion: 5/5   : 5/5   Index Finger: 5/5  Middle Finger: 5/5  Ring Finger: 5/5  Little Finger: 5/5  Thumb - APB: 5/5  Thumb - FPL: 5/5  Pinch Mechanism: 5/5  Left Upper Extremity  Wrist extension: 5/5   Wrist flexion: 5/5   :  5/5   Index Finger: 5/5  Middle Finger: 5/5  Ring Finger: 5/5  Little Finger: 5/5  Thumb - APB: 5/5  Thumb - FPL: 5/5  Pinch Mechanism: 5/5    Vascular Exam       Capillary Refill  Right Hand: normal capillary refill  Left Hand: normal capillary refill          Physical Exam  Vitals and nursing note reviewed.   Constitutional:       Appearance: She is well-developed and well-nourished.   HENT:      Nose: Nose normal.   Eyes:      Extraocular Movements: EOM normal.      Pupils: Pupils are equal, round, and reactive to light.   Cardiovascular:      Rate and Rhythm: Normal rate.   Pulmonary:      Effort: Pulmonary effort is normal.   Abdominal:      Palpations: Abdomen is soft.   Musculoskeletal:      Right wrist: Deformity present. No effusion.      Left wrist: No deformity or effusion.      Right hand: Decreased sensation of the median distribution. Normal sensation of the ulnar distribution and radial distribution. Normal capillary refill.      Left hand: Decreased sensation. Normal sensation of the ulnar distribution, median distribution and radial distribution. Normal capillary refill.      Cervical back: Normal range of motion and neck supple.   Neurological:      Mental Status: She is alert and oriented to person, place, and time.      Deep Tendon Reflexes: Reflexes are normal and symmetric.   Psychiatric:         Mood and Affect: Mood and affect normal.         Behavior: Behavior normal.         Thought Content: Thought content normal.         Judgment: Judgment normal.       X-ray images ordered obtained interpreted by me.  They show scattered degenerative changes throughout the wrist and  carpal bones with some significant osteophyte formations.  There is some soft tissue swelling noted over the area of the ganglion cyst.  No acute fractures are present.      Assessment:     Encounter Diagnoses   Name Primary?    Carpal tunnel syndrome on right Yes    Ganglion cyst of wrist, right        Plan:      Laxmi was seen today for pain.    Diagnoses and all orders for this visit:    Carpal tunnel syndrome on right  -     Ambulatory referral/consult to Orthopedics  -     Carpal Tunnel    Ganglion cyst of wrist, right      She has multiple issues with her hand and wrist that can be identified today.  I will give her an ultrasound-guided carpal tunnel injection for more acute pain relief.  I would highly recommend she follow up with Hand surgery as she will likely need surgical intervention in the future.    Carpal Tunnel    Date/Time: 1/9/2024 10:40 AM    Performed by: Clifford Olivas MD  Authorized by: Clifford Olivas MD    Consent Done?:  Yes (Verbal)  Indications:  Pain and diagnostic evaluation  Site marked: the procedure site was marked    Timeout: prior to procedure the correct patient, procedure, and site was verified    Prep: patient was prepped and draped in usual sterile fashion      Local anesthesia used?: Yes    Local anesthetic:  Topical anesthetic  Location:  Wrist  Site:  R carpal tunnel  Ultrasonic Guidance for Needle Placement?: Yes    Needle size:  25 G  Approach:  Volar  Medications:  40 mg triamcinolone acetonide 40 mg/mL  Patient tolerance:  Patient tolerated the procedure well with no immediate complications     Additional Comments: Ultrasound guidance was used to confirm proper needle placement.  Images of proper needle placement were saved and stored to the machine and uploaded to the patient's chart.

## 2024-01-16 ENCOUNTER — OFFICE VISIT (OUTPATIENT)
Dept: FAMILY MEDICINE | Facility: CLINIC | Age: 69
End: 2024-01-16
Payer: MEDICARE

## 2024-01-16 VITALS
RESPIRATION RATE: 18 BRPM | DIASTOLIC BLOOD PRESSURE: 88 MMHG | SYSTOLIC BLOOD PRESSURE: 162 MMHG | HEIGHT: 64 IN | BODY MASS INDEX: 30.56 KG/M2 | OXYGEN SATURATION: 98 % | HEART RATE: 75 BPM | WEIGHT: 179 LBS

## 2024-01-16 DIAGNOSIS — I10 ESSENTIAL HYPERTENSION: ICD-10-CM

## 2024-01-16 PROCEDURE — 99213 OFFICE O/P EST LOW 20 MIN: CPT | Mod: PBBFAC,PO | Performed by: STUDENT IN AN ORGANIZED HEALTH CARE EDUCATION/TRAINING PROGRAM

## 2024-01-16 PROCEDURE — 99999 PR PBB SHADOW E&M-EST. PATIENT-LVL III: CPT | Mod: PBBFAC,,, | Performed by: STUDENT IN AN ORGANIZED HEALTH CARE EDUCATION/TRAINING PROGRAM

## 2024-01-16 PROCEDURE — 99213 OFFICE O/P EST LOW 20 MIN: CPT | Mod: S$PBB,,, | Performed by: STUDENT IN AN ORGANIZED HEALTH CARE EDUCATION/TRAINING PROGRAM

## 2024-01-16 RX ORDER — HYDRALAZINE HYDROCHLORIDE 25 MG/1
25 TABLET, FILM COATED ORAL 3 TIMES DAILY
Qty: 90 TABLET | Refills: 11 | Status: SHIPPED | OUTPATIENT
Start: 2024-01-16 | End: 2024-01-31

## 2024-01-16 RX ORDER — VALSARTAN 80 MG/1
80 TABLET ORAL DAILY
Qty: 30 TABLET | Refills: 3 | Status: SHIPPED | OUTPATIENT
Start: 2024-01-16 | End: 2024-05-17 | Stop reason: SDUPTHER

## 2024-01-16 RX ORDER — HYDRALAZINE HYDROCHLORIDE 25 MG/1
25 TABLET, FILM COATED ORAL 3 TIMES DAILY
COMMUNITY
Start: 2024-01-14 | End: 2024-01-16 | Stop reason: SDUPTHER

## 2024-01-16 RX ORDER — METOPROLOL SUCCINATE 200 MG/1
200 TABLET, EXTENDED RELEASE ORAL DAILY
Qty: 30 TABLET | Refills: 3 | Status: SHIPPED | OUTPATIENT
Start: 2024-01-16 | End: 2025-01-15

## 2024-01-16 RX ORDER — HYDROXYZINE HYDROCHLORIDE 25 MG/1
50 TABLET, FILM COATED ORAL
COMMUNITY
Start: 2024-01-14 | End: 2024-01-19

## 2024-01-16 NOTE — PROGRESS NOTES
[facility]   OFFICE VISIT NOTE    Patient Name: Laxmi Chand  YOB: 1955    PRESENTING HISTORY     History of Present Illness:  Ms. Laxmi Chand is a 68 y.o. female with history of degeneration of lumbar intervertebral disc, chronic pain syndrome, neuroma of second interspace of right foot, depressive disorder, COPD, calcified granuloma of lung, HLD, HTN, history of endometrial cancer, history of breast cancer coming in for blood pressure.    Of note, patient had ED visit on 1/13/2024 for headache and elevated blood pressure -- in the ED, blood pressure was 158/87. Patient was started on hydralazine 25 mg TID by the ED providers     Today, her /88     In terms of blood pressure medication, patient has been taking hydralazine 25 mg TID which was prescribed by ED providers, HCTZ 25 mg daily.  She was switched to metoprolol 200 mg daily but she is still taking nebivolol 10 mg daily instead.  She has been holding clonidine PRN.  Patient's blood pressure at home has been quite elevated with readings such as 197/91, 199/82, 181/83, 176/86.  Patient continues to have persistent headache without any vision changes.  Denies any nausea or vomiting.      Of note, patient had controlled blood pressure back in August 2023, however, since then, her blood pressure has not been controlled.  This is based on my chart review of the previous vital signs     Denies any chest discomfort or shortness of breath today     Review of Systems   Constitutional:  Negative for chills, diaphoresis, fatigue and fever.   Respiratory:  Negative for cough, shortness of breath and wheezing.    Cardiovascular:  Negative for chest pain and palpitations.   Gastrointestinal:  Negative for constipation, diarrhea, nausea and vomiting.   Genitourinary:  Negative for bladder incontinence.   Neurological:  Positive for headaches. Negative for dizziness, facial asymmetry, light-headedness and coordination  "difficulties.   Psychiatric/Behavioral:  Negative for behavioral problems.           OBJECTIVE:   Vital Signs:  Vitals:    01/16/24 1110 01/16/24 1147   BP: (!) 182/88 (!) 162/88   Pulse: 75    Resp: 18    SpO2: 98%    Weight: 81.2 kg (179 lb 0.2 oz)    Height: 5' 4" (1.626 m)            Physical Exam  Constitutional:       General: She is not in acute distress.     Appearance: She is not ill-appearing or toxic-appearing.   HENT:      Head: Normocephalic and atraumatic.      Mouth/Throat:      Mouth: Mucous membranes are moist.      Pharynx: Uvula midline. No pharyngeal swelling.   Eyes:      General: Lids are normal.      Extraocular Movements: Extraocular movements intact.   Cardiovascular:      Rate and Rhythm: Normal rate and regular rhythm.   Pulmonary:      Effort: Pulmonary effort is normal. No tachypnea, bradypnea, accessory muscle usage, prolonged expiration or respiratory distress.      Breath sounds: Normal breath sounds. No stridor. No wheezing, rhonchi or rales.   Neurological:      General: No focal deficit present.      Mental Status: She is alert.      Cranial Nerves: No cranial nerve deficit, dysarthria or facial asymmetry.   Psychiatric:         Mood and Affect: Mood normal.         Behavior: Behavior normal.         ASSESSMENT & PLAN:     Essential hypertension  -     hydrALAZINE (APRESOLINE) 25 MG tablet; Take 1 tablet (25 mg total) by mouth 3 (three) times daily.  Dispense: 90 tablet; Refill: 11  -     metoprolol succinate (TOPROL-XL) 200 MG 24 hr tablet; Take 1 tablet (200 mg total) by mouth once daily.  Dispense: 30 tablet; Refill: 3  -     valsartan (DIOVAN) 80 MG tablet; Take 1 tablet (80 mg total) by mouth once daily.  Dispense: 30 tablet; Refill: 3    Her HTN is not controlled.   Advised the patient to stop nebivolol and start taking metoprolol 200 mg daily  Continue with hydralazine 25 mg TID and HCTZ 25 mg daily   Will add valsartan 80 mg daily   Gave ED precautions for worsening " headache, chest discomfort, dyspnea, or worsening BP   Will have a close follow up in 2 weeks. Will further adjust valsartan dose if indicated. Hydralazine dose can also be increased if needed.       Beata Lai MD  Family Medicine  [facility]     This note was created using [employer] voice recognition software that occasionally misinterprets phrases or words

## 2024-01-16 NOTE — PATIENT INSTRUCTIONS
Please take these medications below:    - HCTZ 25 mg daily  - Hydralazine 25 mg three times daily  - Valsartan 80 mg daily once daily  - Metoprolol 200 mg once daily    Follow up in 2 weeks    Please go to the emergency room if you have worsening headache, confusion, one sided weakness of the body, chest discomfort.             
Home

## 2024-01-30 NOTE — PROGRESS NOTES
[facility]   OFF[facility] VISIT NOTE    Patient Name: Laxmi Chand  YOB: 1955    PRESENTING HISTORY     History of Present Illness:  Ms. Laxmi Chand is a 68 y.o. female here for HTN f/u    Patient has been compliant with hydralazine 25 mg TID, metoprolol succinate 200 mg daily, HCTZ 25 mg daily, as well as valsartan 80 mg daily.  Headache associated with high BP is resolved.   Her BP is running slightly low in our office today in the 98/62 range.  At home, it runs around 120/70     She also reports having productive cough with green sputum, subjective chills, having increased work of breathing in the last 2 days. Patient has been compliant with her COPD medications.       Review of Systems   Constitutional:  Positive for chills. Negative for diaphoresis, fatigue and fever.   Respiratory:  Positive for cough and shortness of breath. Negative for wheezing.    Cardiovascular:  Negative for chest pain and palpitations.   Gastrointestinal:  Negative for constipation, diarrhea, nausea and vomiting.   Genitourinary:  Negative for bladder incontinence.   Neurological:  Negative for headaches and coordination difficulties.   Psychiatric/Behavioral:  Negative for behavioral problems.           OBJECTIVE:   Vital Signs:  Vitals:    01/31/24 1128 01/31/24 1153   BP: 104/60 98/62   Pulse: 79    Resp: 18    SpO2: 97%    Weight: 79.5 kg (175 lb 4.3 oz)    Height: 5' 4" (1.626 m)            Physical Exam  Constitutional:       General: She is not in acute distress.     Appearance: She is not ill-appearing or toxic-appearing.   HENT:      Head: Normocephalic and atraumatic.      Mouth/Throat:      Mouth: Mucous membranes are moist.      Pharynx: Uvula midline. No pharyngeal swelling.   Cardiovascular:      Rate and Rhythm: Normal rate and regular rhythm.   Pulmonary:      Effort: Pulmonary effort is normal. No tachypnea, bradypnea, accessory muscle usage, prolonged expiration or respiratory " distress.      Breath sounds: Decreased air movement present. Decreased breath sounds present. No rhonchi or rales.   Musculoskeletal:      Comments: Ambulates with rolling walker    Neurological:      General: No focal deficit present.      Mental Status: She is alert.   Psychiatric:         Mood and Affect: Mood normal.         Behavior: Behavior normal.         ASSESSMENT & PLAN:     Productive cough  -     POCT Influenza A/B Molecular  -     POCT COVID-19 Rapid Screening  Flu and covid were both negative     COPD exacerbation  -     doxycycline (VIBRAMYCIN) 100 MG Cap; Take 1 capsule (100 mg total) by mouth every 12 (twelve) hours. for 7 days  Dispense: 14 capsule; Refill: 0  -     methylPREDNISolone (MEDROL DOSEPACK) 4 mg tablet; use as directed  Dispense: 21 each; Refill: 0    Essential hypertension  -     hydrALAZINE (APRESOLINE) 25 MG tablet; Take 0.5 tablets (12.5 mg total) by mouth 3 (three) times daily.  Dispense: 45 tablet; Refill: 11  Continue metoprolol succinate 200 mg daily, HCTZ 25 mg daily, and Valsartan 80 mg daily.  Due to her low BP, I will adjust hydralazine to 12.5 mg TID   Advised the patient to let us know if her blood pressure is consistently lower than 100 for SBP and/or lower than 60 for DBP OR consistently higher than 140 for SBP and/or higher than 90 for DBP    Patient has follow up with her PCP in 3-4 weeks          Beata Lai MD  Family Medicine  [facility]     This note was created using [employer] voice recognition software that occasionally misinterprets phrases or words

## 2024-01-31 ENCOUNTER — OFFICE VISIT (OUTPATIENT)
Dept: FAMILY MEDICINE | Facility: CLINIC | Age: 69
End: 2024-01-31
Payer: MEDICARE

## 2024-01-31 VITALS
BODY MASS INDEX: 29.92 KG/M2 | RESPIRATION RATE: 18 BRPM | HEIGHT: 64 IN | OXYGEN SATURATION: 97 % | DIASTOLIC BLOOD PRESSURE: 62 MMHG | WEIGHT: 175.25 LBS | SYSTOLIC BLOOD PRESSURE: 98 MMHG | HEART RATE: 79 BPM

## 2024-01-31 DIAGNOSIS — J44.1 COPD EXACERBATION: ICD-10-CM

## 2024-01-31 DIAGNOSIS — I10 ESSENTIAL HYPERTENSION: ICD-10-CM

## 2024-01-31 DIAGNOSIS — R05.8 PRODUCTIVE COUGH: Primary | ICD-10-CM

## 2024-01-31 LAB
CTP QC/QA: YES
CTP QC/QA: YES
POC MOLECULAR INFLUENZA A AGN: NEGATIVE
POC MOLECULAR INFLUENZA B AGN: NEGATIVE
SARS-COV-2 RDRP RESP QL NAA+PROBE: NEGATIVE

## 2024-01-31 PROCEDURE — 87502 INFLUENZA DNA AMP PROBE: CPT | Mod: PBBFAC,PO | Performed by: STUDENT IN AN ORGANIZED HEALTH CARE EDUCATION/TRAINING PROGRAM

## 2024-01-31 PROCEDURE — 87635 SARS-COV-2 COVID-19 AMP PRB: CPT | Mod: PBBFAC,PO | Performed by: STUDENT IN AN ORGANIZED HEALTH CARE EDUCATION/TRAINING PROGRAM

## 2024-01-31 PROCEDURE — 99215 OFFICE O/P EST HI 40 MIN: CPT | Mod: PBBFAC,PO | Performed by: STUDENT IN AN ORGANIZED HEALTH CARE EDUCATION/TRAINING PROGRAM

## 2024-01-31 PROCEDURE — 99999 PR PBB SHADOW E&M-EST. PATIENT-LVL V: CPT | Mod: PBBFAC,,, | Performed by: STUDENT IN AN ORGANIZED HEALTH CARE EDUCATION/TRAINING PROGRAM

## 2024-01-31 PROCEDURE — 99214 OFFICE O/P EST MOD 30 MIN: CPT | Mod: S$PBB,,, | Performed by: STUDENT IN AN ORGANIZED HEALTH CARE EDUCATION/TRAINING PROGRAM

## 2024-01-31 PROCEDURE — 99999PBSHW POCT INFLUENZA A/B MOLECULAR: Mod: PBBFAC,,,

## 2024-01-31 PROCEDURE — 99999PBSHW: Mod: PBBFAC,,,

## 2024-01-31 RX ORDER — METHYLPREDNISOLONE 4 MG/1
TABLET ORAL
Qty: 21 EACH | Refills: 0 | Status: SHIPPED | OUTPATIENT
Start: 2024-01-31 | End: 2024-02-21

## 2024-01-31 RX ORDER — HYDRALAZINE HYDROCHLORIDE 25 MG/1
12.5 TABLET, FILM COATED ORAL 3 TIMES DAILY
Qty: 45 TABLET | Refills: 11
Start: 2024-01-31 | End: 2024-02-21

## 2024-01-31 RX ORDER — ONDANSETRON HYDROCHLORIDE 8 MG/1
8 TABLET, FILM COATED ORAL 3 TIMES DAILY
COMMUNITY
Start: 2024-01-21 | End: 2024-05-25 | Stop reason: SDUPTHER

## 2024-01-31 RX ORDER — DOXYCYCLINE 100 MG/1
100 CAPSULE ORAL EVERY 12 HOURS
Qty: 14 CAPSULE | Refills: 0 | Status: SHIPPED | OUTPATIENT
Start: 2024-01-31 | End: 2024-02-07

## 2024-01-31 NOTE — PATIENT INSTRUCTIONS
Please let us know if your blood pressure is   Either   Less than 100/60   OR   Greater than 140/90     Please continue with  - Valsartan 80 mg daily  - Metoprolol 200 mg daily   - HCTZ 25 mg daily   - Hydralazine 12.5 mg three times daily as needed

## 2024-02-04 DIAGNOSIS — J44.9 CHRONIC OBSTRUCTIVE PULMONARY DISEASE, UNSPECIFIED COPD TYPE: ICD-10-CM

## 2024-02-04 DIAGNOSIS — F33.42 RECURRENT MAJOR DEPRESSIVE DISORDER, IN FULL REMISSION: ICD-10-CM

## 2024-02-05 ENCOUNTER — OFFICE VISIT (OUTPATIENT)
Dept: HEMATOLOGY/ONCOLOGY | Facility: CLINIC | Age: 69
End: 2024-02-05
Payer: MEDICARE

## 2024-02-05 ENCOUNTER — LAB VISIT (OUTPATIENT)
Dept: LAB | Facility: HOSPITAL | Age: 69
End: 2024-02-05
Attending: INTERNAL MEDICINE
Payer: MEDICARE

## 2024-02-05 VITALS
DIASTOLIC BLOOD PRESSURE: 82 MMHG | BODY MASS INDEX: 30.21 KG/M2 | SYSTOLIC BLOOD PRESSURE: 142 MMHG | WEIGHT: 176 LBS | OXYGEN SATURATION: 96 % | HEART RATE: 72 BPM | RESPIRATION RATE: 20 BRPM

## 2024-02-05 DIAGNOSIS — C50.011 MALIGNANT NEOPLASM OF NIPPLE OF RIGHT BREAST IN FEMALE, UNSPECIFIED ESTROGEN RECEPTOR STATUS: ICD-10-CM

## 2024-02-05 DIAGNOSIS — I70.0 ATHEROSCLEROSIS OF AORTA: ICD-10-CM

## 2024-02-05 LAB
ALBUMIN SERPL BCP-MCNC: 3.6 G/DL (ref 3.5–5.2)
ALP SERPL-CCNC: 85 U/L (ref 55–135)
ALT SERPL W/O P-5'-P-CCNC: 63 U/L (ref 10–44)
ANION GAP SERPL CALC-SCNC: 13 MMOL/L (ref 8–16)
AST SERPL-CCNC: 28 U/L (ref 10–40)
BASOPHILS # BLD AUTO: 0.02 K/UL (ref 0–0.2)
BASOPHILS NFR BLD: 0.2 % (ref 0–1.9)
BILIRUB SERPL-MCNC: 0.2 MG/DL (ref 0.1–1)
BUN SERPL-MCNC: 31 MG/DL (ref 8–23)
CALCIUM SERPL-MCNC: 9.5 MG/DL (ref 8.7–10.5)
CHLORIDE SERPL-SCNC: 100 MMOL/L (ref 95–110)
CO2 SERPL-SCNC: 26 MMOL/L (ref 23–29)
CREAT SERPL-MCNC: 1.7 MG/DL (ref 0.5–1.4)
DIFFERENTIAL METHOD BLD: ABNORMAL
EOSINOPHIL # BLD AUTO: 0 K/UL (ref 0–0.5)
EOSINOPHIL NFR BLD: 0.4 % (ref 0–8)
ERYTHROCYTE [DISTWIDTH] IN BLOOD BY AUTOMATED COUNT: 13.2 % (ref 11.5–14.5)
EST. GFR  (NO RACE VARIABLE): 32.5 ML/MIN/1.73 M^2
GLUCOSE SERPL-MCNC: 109 MG/DL (ref 70–110)
HCT VFR BLD AUTO: 41.3 % (ref 37–48.5)
HGB BLD-MCNC: 13.4 G/DL (ref 12–16)
IMM GRANULOCYTES # BLD AUTO: 0.06 K/UL (ref 0–0.04)
IMM GRANULOCYTES NFR BLD AUTO: 0.6 % (ref 0–0.5)
LYMPHOCYTES # BLD AUTO: 2.1 K/UL (ref 1–4.8)
LYMPHOCYTES NFR BLD: 21.8 % (ref 18–48)
MCH RBC QN AUTO: 28.7 PG (ref 27–31)
MCHC RBC AUTO-ENTMCNC: 32.4 G/DL (ref 32–36)
MCV RBC AUTO: 88 FL (ref 82–98)
MONOCYTES # BLD AUTO: 0.6 K/UL (ref 0.3–1)
MONOCYTES NFR BLD: 6 % (ref 4–15)
NEUTROPHILS # BLD AUTO: 6.8 K/UL (ref 1.8–7.7)
NEUTROPHILS NFR BLD: 71 % (ref 38–73)
NRBC BLD-RTO: 0 /100 WBC
PLATELET # BLD AUTO: 393 K/UL (ref 150–450)
PMV BLD AUTO: 8.8 FL (ref 9.2–12.9)
POTASSIUM SERPL-SCNC: 4.5 MMOL/L (ref 3.5–5.1)
PROT SERPL-MCNC: 6.7 G/DL (ref 6–8.4)
RBC # BLD AUTO: 4.67 M/UL (ref 4–5.4)
SODIUM SERPL-SCNC: 139 MMOL/L (ref 136–145)
WBC # BLD AUTO: 9.51 K/UL (ref 3.9–12.7)

## 2024-02-05 PROCEDURE — 99215 OFFICE O/P EST HI 40 MIN: CPT | Mod: PBBFAC | Performed by: NURSE PRACTITIONER

## 2024-02-05 PROCEDURE — 80053 COMPREHEN METABOLIC PANEL: CPT | Performed by: NURSE PRACTITIONER

## 2024-02-05 PROCEDURE — 99999 PR PBB SHADOW E&M-EST. PATIENT-LVL V: CPT | Mod: PBBFAC,,, | Performed by: NURSE PRACTITIONER

## 2024-02-05 PROCEDURE — 99214 OFFICE O/P EST MOD 30 MIN: CPT | Mod: S$PBB,,, | Performed by: NURSE PRACTITIONER

## 2024-02-05 PROCEDURE — 85025 COMPLETE CBC W/AUTO DIFF WBC: CPT | Performed by: NURSE PRACTITIONER

## 2024-02-05 PROCEDURE — 36415 COLL VENOUS BLD VENIPUNCTURE: CPT | Performed by: NURSE PRACTITIONER

## 2024-02-05 RX ORDER — FLUTICASONE PROPIONATE AND SALMETEROL XINAFOATE 115; 21 UG/1; UG/1
2 AEROSOL, METERED RESPIRATORY (INHALATION) EVERY 12 HOURS
Qty: 12 G | Refills: 3 | Status: SHIPPED | OUTPATIENT
Start: 2024-02-05 | End: 2024-02-21 | Stop reason: SDUPTHER

## 2024-02-05 RX ORDER — BUPROPION HYDROCHLORIDE 100 MG/1
TABLET ORAL
Qty: 270 TABLET | Refills: 3 | Status: SHIPPED | OUTPATIENT
Start: 2024-02-05

## 2024-02-05 NOTE — TELEPHONE ENCOUNTER
LOV: 1/31/24 Shin    NOV: 2/21/24 You     Preffered Pharmacy:[id number] - [place], [place] - [address] AT [facility] [address]

## 2024-02-05 NOTE — PROGRESS NOTES
Service Date:  2/5/24    Chief Complaint: No chief complaint on file.    Laxmi Chand is a 68 y.o. female with right breast invasive ductal carcinoma only 2 mm in size and right breast DCIS.  S/p lumpectomy on 9/7/22.  Currently on aromatase inhibitor therapy.  Tolerating it okay.  For follow-up.    8/7/2023:  She returns today for follow-up.  Patient has no new symptoms and no new complaints.  She continues to have tenderness at surgical site    2/5/2024:  She returns today follow-up    Review of Systems   Constitutional:  Positive for fatigue.   HENT: Negative.     Eyes: Negative.    Respiratory: Negative.     Cardiovascular: Negative.    Gastrointestinal: Negative.    Endocrine: Negative.    Genitourinary: Negative.    Musculoskeletal: Negative.    Integumentary:  Negative.   Neurological: Negative.    Hematological: Negative.    Psychiatric/Behavioral: Negative.        Current Outpatient Medications   Medication Instructions    ADVAIR -21 mcg/actuation HFAA inhaler 2 puffs, Inhalation, Every 12 hours    albuterol (PROVENTIL/VENTOLIN HFA) 90 mcg/actuation inhaler INHALE 2 PUFFS BY MOUTH EVERY 4 HOURS AS NEEDED FOR WHEEZING OR SHORTNESS OF BREATH    albuterol-ipratropium (DUO-NEB) 2.5 mg-0.5 mg/3 mL nebulizer solution INHALE CONTENTS OF 1 VIAL BY MOUTH WITH NEBULIZER EVERY 6 HOURS WHILE AWAKE AS DIRECTED    anastrozole (ARIMIDEX) 1 mg, Oral, Daily    aspirin (ECOTRIN) 81 MG EC tablet 1 tablet, Oral, Daily, Pt back on asa    aspirin-acetaminophen-caffeine 250-250-65 mg (HEADACHE RELIEF, ASA-ACET-CAF,) 250-250-65 mg per tablet 1 tablet, Oral, Every 6-8 hours PRN    buPROPion (WELLBUTRIN) 100 MG tablet TAKE 1 TABLET(100 MG) BY MOUTH THREE TIMES DAILY    calcium carbonate-vitamin D3 (CALCIUM 600 + D,3,) 600-125 mg-unit Tab 2 tablets, Oral, Daily    celecoxib (CELEBREX) 200 mg, Oral, 2 times daily    cetirizine (ZYRTEC) 10 mg, Oral, Daily    conjugated estrogens (PREMARIN) 0.5 g, Vaginal, Twice weekly     cyclobenzaprine (FLEXERIL) 10 mg, Oral, Nightly    doxepin (SINEQUAN) 50 mg, Oral, Nightly    doxycycline (VIBRAMYCIN) 100 mg, Oral, Every 12 hours    fluconazole (DIFLUCAN) 200 mg, Oral, Every 72 hours    fluticasone propionate (FLONASE) 50 mcg, Each Nostril, Daily    gabapentin (NEURONTIN) 300 mg, Oral, 2 times daily    hydrALAZINE (APRESOLINE) 12.5 mg, Oral, 3 times daily    hydroCHLOROthiazide (HYDRODIURIL) 25 mg, Oral, Daily    levomilnacipran (FETZIMA) 20 mg Cs24 Take 1 capsule(s) every day by oral route.    LUMIGAN 0.01 % Drop Both Eyes    methylPREDNISolone (MEDROL DOSEPACK) 4 mg tablet use as directed    metoprolol succinate (TOPROL-XL) 200 mg, Oral, Daily    metroNIDAZOLE (METROGEL) 0.75 % (37.5mg/5 gram) vaginal gel 1 applicator, Vaginal, Daily    montelukast (SINGULAIR) 10 mg, Oral, Daily    nystatin (MYCOSTATIN) cream Topical (Top), 2 times daily    nystatin (MYCOSTATIN) powder Topical (Top), 4 times daily    ondansetron (ZOFRAN) 4 mg, Oral, Every 8 hours PRN    ondansetron (ZOFRAN) 8 mg, Oral, 3 times daily    oxyCODONE-acetaminophen (PERCOCET)  mg per tablet 1 tablet, Oral    pantoprazole (PROTONIX) 40 mg, Oral, Daily    pseudoephedrine (SUDAFED) 120 mg, Oral, Every 12 hours (non-standard times)    valsartan (DIOVAN) 80 mg, Oral, Daily        Past Medical History:   Diagnosis Date    Breast cancer in female 09/07/2022    IDC with high grade DCIS    COPD (chronic obstructive pulmonary disease)     Degeneration of lumbar intervertebral disc     Endometrial cancer     GERD (gastroesophageal reflux disease)     HTN (hypertension)     Hyperlipemia, mixed         Past Surgical History:   Procedure Laterality Date    BIOPSY OF AXILLARY NODE Right 9/7/2022    Procedure: BIOPSY, LYMPH NODE, AXILLARY;  Surgeon: Jatin Gutierrez MD;  Location: [facility];  Service: General;  Laterality: Right;    BREAST BIOPSY Right 08/05/2022    BREAST MASS EXCISION Right 9/7/2022    Procedure: EXCISION, MASS, BREAST;   Surgeon: Jatin Gutierrez MD;  Location: [facility] OR;  Service: General;  Laterality: Right;  wire localized partial mastectomy right breast with margins     CARPAL TUNNEL RELEASE  1985    CHOLECYSTECTOMY  1978    CORRECTION OF HAMMER TOE Right 9/15/2023    Procedure: CORRECTION, HAMMER TOE;  Surgeon: Devyn Mccullough DPM;  Location: [facility] OR;  Service: Podiatry;  Laterality: Right;    FOOT HARDWARE REMOVAL Right 9/15/2023    Procedure: REMOVAL, HARDWARE, FOOT;  Surgeon: Devyn Mccullough DPM;  Location: [facility] OR;  Service: Podiatry;  Laterality: Right;    HYSTERECTOMY      KNEE SURGERY Left 06/01/2020    LUMBAR DISC SURGERY  01/01/1990    plate and roland    LUMBAR FUSION  2011    MIDFOOT ARTHRODESIS Right 9/15/2023    Procedure: FUSION, JOINT, MIDFOOT;  Surgeon: Devyn Mccullough DPM;  Location: [facility] OR;  Service: Podiatry;  Laterality: Right;  Chelsea 28 1st MPJ fusion plates screws bone grafts as well as cut guide.    TOTAL KNEE REPLACEMENT USING COMPUTER NAVIGATION Left 02/15/2021        Family History   Problem Relation Age of Onset    Hypertension Mother     Diabetes Mother     Hypertension Father     Lung cancer Maternal Grandfather     Breast cancer Neg Hx        Social History     Tobacco Use    Smoking status: Every Day     Types: Vaping with nicotine     Passive exposure: Never    Smokeless tobacco: Never   Substance Use Topics    Alcohol use: Yes    Drug use: Not Currently         There were no vitals filed for this visit.       Physical Exam:  There were no vitals taken for this visit.    Physical Exam  Constitutional:       Appearance: Normal appearance.   HENT:      Head: Normocephalic and atraumatic.      Nose: Nose normal.      Mouth/Throat:      Mouth: Mucous membranes are moist.      Pharynx: Oropharynx is clear.   Eyes:      Conjunctiva/sclera: Conjunctivae normal.   Cardiovascular:      Rate and Rhythm: Normal rate and regular rhythm.      Heart sounds: Normal heart sounds.   Pulmonary:       Effort: Pulmonary effort is normal.      Breath sounds: Normal breath sounds.   Abdominal:      General: Abdomen is flat. Bowel sounds are normal.      Palpations: Abdomen is soft.   Musculoskeletal:         General: Normal range of motion.      Cervical back: Normal range of motion and neck supple.   Skin:     General: Skin is warm and dry.   Neurological:      General: No focal deficit present.      Mental Status: She is alert and oriented to person, place, and time. Mental status is at baseline.   Psychiatric:         Mood and Affect: Mood normal.        Labs:  Lab Results   Component Value Date    WBC 9.51 02/05/2024    RBC 4.67 02/05/2024    HGB 13.4 02/05/2024    HCT 41.3 02/05/2024    MCV 88 02/05/2024    MCH 28.7 02/05/2024    MCHC 32.4 02/05/2024    RDW 13.2 02/05/2024     02/05/2024    MPV 8.8 (L) 02/05/2024    GRAN 6.8 02/05/2024    GRAN 71.0 02/05/2024    LYMPH 2.1 02/05/2024    LYMPH 21.8 02/05/2024    MONO 0.6 02/05/2024    MONO 6.0 02/05/2024    EOS 0.0 02/05/2024    BASO 0.02 02/05/2024    EOSINOPHIL 0.4 02/05/2024    BASOPHIL 0.2 02/05/2024     Sodium   Date Value Ref Range Status   02/05/2024 139 136 - 145 mmol/L Final     Potassium   Date Value Ref Range Status   02/05/2024 4.5 3.5 - 5.1 mmol/L Final     Chloride   Date Value Ref Range Status   02/05/2024 100 95 - 110 mmol/L Final     CO2   Date Value Ref Range Status   02/05/2024 26 23 - 29 mmol/L Final     Glucose   Date Value Ref Range Status   02/05/2024 109 70 - 110 mg/dL Final     BUN   Date Value Ref Range Status   02/05/2024 31 (H) 8 - 23 mg/dL Final     Creatinine   Date Value Ref Range Status   02/05/2024 1.7 (H) 0.5 - 1.4 mg/dL Final     Calcium   Date Value Ref Range Status   02/05/2024 9.5 8.7 - 10.5 mg/dL Final     Total Protein   Date Value Ref Range Status   02/05/2024 6.7 6.0 - 8.4 g/dL Final     Albumin   Date Value Ref Range Status   02/05/2024 3.6 3.5 - 5.2 g/dL Final     Total Bilirubin   Date Value Ref Range  Status   02/05/2024 0.2 0.1 - 1.0 mg/dL Final     Comment:     For infants and newborns, interpretation of results should be based  on gestational age, weight and in agreement with clinical  observations.    Premature Infant recommended reference ranges:  Up to 24 hours.............<8.0 mg/dL  Up to 48 hours............<12.0 mg/dL  3-5 days..................<15.0 mg/dL  6-29 days.................<15.0 mg/dL       Alkaline Phosphatase   Date Value Ref Range Status   02/05/2024 85 55 - 135 U/L Final     AST   Date Value Ref Range Status   02/05/2024 28 10 - 40 U/L Final     ALT   Date Value Ref Range Status   02/05/2024 63 (H) 10 - 44 U/L Final     Anion Gap   Date Value Ref Range Status   02/05/2024 13 8 - 16 mmol/L Final     eGFR if    Date Value Ref Range Status   05/10/2022 >60 >60 mL/min/1.73 m^2 Final     eGFR if non    Date Value Ref Range Status   05/10/2022 >60 >60 mL/min/1.73 m^2 Final     Comment:     Calculation used to obtain the estimated glomerular filtration  rate (eGFR) is the CKD-EPI equation.          A/P:    R breast DCIS  R breast IDCA [id number]  - DCIS was strongly hormone positive so patient is on aromatase inhibitor  -invasive component was only 2 mm, so even though it was HER2 positive, it was not treated with any adjuvant chemotherapy.    -completed radiation therapy to the breast  -on anastrozole currently, started 10/11/22  - mammogram done 2/3/23, results showing probably benign with recommend repeat in 6 months   -repeat bilateral mammogram    Osteopenia  - DEXA done 5/24/22  -resume 6 month prolia    Arthrosclerosis of the aorta:   -follow-up with the PCP  -may need cardiology evaluation

## 2024-02-15 ENCOUNTER — HOSPITAL ENCOUNTER (OUTPATIENT)
Dept: RADIOLOGY | Facility: HOSPITAL | Age: 69
Discharge: HOME | End: 2024-02-15
Attending: NURSE PRACTITIONER
Payer: MEDICARE

## 2024-02-15 DIAGNOSIS — C50.011 MALIGNANT NEOPLASM OF NIPPLE OF RIGHT BREAST IN FEMALE, UNSPECIFIED ESTROGEN RECEPTOR STATUS: ICD-10-CM

## 2024-02-15 PROCEDURE — 77066 DX MAMMO INCL CAD BI: CPT | Mod: 26,,, | Performed by: RADIOLOGY

## 2024-02-15 PROCEDURE — 76642 ULTRASOUND BREAST LIMITED: CPT | Mod: 26,RT,, | Performed by: RADIOLOGY

## 2024-02-15 PROCEDURE — 77066 DX MAMMO INCL CAD BI: CPT | Mod: TC

## 2024-02-15 PROCEDURE — 76642 ULTRASOUND BREAST LIMITED: CPT | Mod: TC,RT

## 2024-02-15 PROCEDURE — 77062 BREAST TOMOSYNTHESIS BI: CPT | Mod: 26,,, | Performed by: RADIOLOGY

## 2024-02-20 ENCOUNTER — TELEPHONE (OUTPATIENT)
Dept: ADMINISTRATIVE | Facility: CLINIC | Age: 69
End: 2024-02-20
Payer: MEDICARE

## 2024-02-21 ENCOUNTER — TELEPHONE (OUTPATIENT)
Dept: FAMILY MEDICINE | Facility: CLINIC | Age: 69
End: 2024-02-21
Payer: MEDICARE

## 2024-02-21 ENCOUNTER — OFFICE VISIT (OUTPATIENT)
Dept: FAMILY MEDICINE | Facility: CLINIC | Age: 69
End: 2024-02-21
Payer: MEDICARE

## 2024-02-21 VITALS
WEIGHT: 181.69 LBS | OXYGEN SATURATION: 94 % | SYSTOLIC BLOOD PRESSURE: 142 MMHG | BODY MASS INDEX: 31.02 KG/M2 | HEART RATE: 63 BPM | RESPIRATION RATE: 18 BRPM | DIASTOLIC BLOOD PRESSURE: 68 MMHG | HEIGHT: 64 IN

## 2024-02-21 DIAGNOSIS — I10 HYPERTENSION, UNSPECIFIED TYPE: Primary | ICD-10-CM

## 2024-02-21 DIAGNOSIS — J44.9 CHRONIC OBSTRUCTIVE PULMONARY DISEASE, UNSPECIFIED COPD TYPE: ICD-10-CM

## 2024-02-21 DIAGNOSIS — T36.95XA ANTIBIOTIC-INDUCED YEAST INFECTION: ICD-10-CM

## 2024-02-21 DIAGNOSIS — C50.011 MALIGNANT NEOPLASM OF NIPPLE OF RIGHT BREAST IN FEMALE, UNSPECIFIED ESTROGEN RECEPTOR STATUS: Primary | ICD-10-CM

## 2024-02-21 DIAGNOSIS — E66.9 OBESITY, CLASS I, BMI 30-34.9: ICD-10-CM

## 2024-02-21 DIAGNOSIS — M85.80 SENILE OSTEOPENIA: ICD-10-CM

## 2024-02-21 DIAGNOSIS — Z99.89 WALKER AS AMBULATION AID: ICD-10-CM

## 2024-02-21 DIAGNOSIS — J84.10 CALCIFIED GRANULOMA OF LUNG: ICD-10-CM

## 2024-02-21 DIAGNOSIS — J44.1 CHRONIC OBSTRUCTIVE PULMONARY DISEASE WITH ACUTE EXACERBATION: ICD-10-CM

## 2024-02-21 DIAGNOSIS — Z00.00 ENCOUNTER FOR PREVENTIVE HEALTH EXAMINATION: Primary | ICD-10-CM

## 2024-02-21 DIAGNOSIS — N39.41 URGE INCONTINENCE OF URINE: ICD-10-CM

## 2024-02-21 DIAGNOSIS — E78.5 HYPERLIPIDEMIA, UNSPECIFIED HYPERLIPIDEMIA TYPE: ICD-10-CM

## 2024-02-21 DIAGNOSIS — R26.9 ABNORMALITY OF GAIT AND MOBILITY: ICD-10-CM

## 2024-02-21 DIAGNOSIS — F33.42 RECURRENT MAJOR DEPRESSIVE DISORDER, IN FULL REMISSION: ICD-10-CM

## 2024-02-21 DIAGNOSIS — G89.4 CHRONIC PAIN SYNDROME: ICD-10-CM

## 2024-02-21 DIAGNOSIS — I70.0 ATHEROSCLEROSIS OF AORTA: ICD-10-CM

## 2024-02-21 DIAGNOSIS — B37.9 ANTIBIOTIC-INDUCED YEAST INFECTION: ICD-10-CM

## 2024-02-21 DIAGNOSIS — Z85.42 HISTORY OF ENDOMETRIAL CANCER: ICD-10-CM

## 2024-02-21 DIAGNOSIS — I10 ESSENTIAL HYPERTENSION: ICD-10-CM

## 2024-02-21 PROCEDURE — G0439 PPPS, SUBSEQ VISIT: HCPCS | Mod: ,,, | Performed by: FAMILY MEDICINE

## 2024-02-21 PROCEDURE — 99999 PR PBB SHADOW E&M-EST. PATIENT-LVL V: CPT | Mod: PBBFAC,,, | Performed by: FAMILY MEDICINE

## 2024-02-21 PROCEDURE — 99215 OFFICE O/P EST HI 40 MIN: CPT | Mod: PBBFAC,PO | Performed by: FAMILY MEDICINE

## 2024-02-21 RX ORDER — FLUTICASONE PROPIONATE AND SALMETEROL XINAFOATE 115; 21 UG/1; UG/1
2 AEROSOL, METERED RESPIRATORY (INHALATION) EVERY 12 HOURS
Qty: 12 G | Refills: 3 | Status: SHIPPED | OUTPATIENT
Start: 2024-02-21

## 2024-02-21 RX ORDER — HYDROCHLOROTHIAZIDE 25 MG/1
25 TABLET ORAL DAILY
Qty: 90 TABLET | Refills: 3 | Status: CANCELLED | OUTPATIENT
Start: 2024-02-21

## 2024-02-21 RX ORDER — FLUTICASONE PROPIONATE 50 MCG
1 SPRAY, SUSPENSION (ML) NASAL DAILY
Qty: 18.2 ML | Refills: 2 | Status: SHIPPED | OUTPATIENT
Start: 2024-02-21

## 2024-02-21 RX ORDER — MONTELUKAST SODIUM 10 MG/1
10 TABLET ORAL DAILY
Qty: 90 TABLET | Refills: 3 | Status: SHIPPED | OUTPATIENT
Start: 2024-02-21

## 2024-02-21 RX ORDER — ALBUTEROL SULFATE 90 UG/1
AEROSOL, METERED RESPIRATORY (INHALATION)
Qty: 18 G | Refills: 6 | Status: CANCELLED | OUTPATIENT
Start: 2024-02-21

## 2024-02-21 RX ORDER — ALBUTEROL SULFATE 90 UG/1
AEROSOL, METERED RESPIRATORY (INHALATION)
Qty: 18 G | Refills: 6 | Status: SHIPPED | OUTPATIENT
Start: 2024-02-21 | End: 2024-06-12

## 2024-02-21 RX ORDER — FLUCONAZOLE 200 MG/1
200 TABLET ORAL
Qty: 3 TABLET | Refills: 1 | Status: SHIPPED | OUTPATIENT
Start: 2024-02-21 | End: 2024-06-05

## 2024-02-21 RX ORDER — HYDRALAZINE HYDROCHLORIDE 10 MG/1
10 TABLET, FILM COATED ORAL 3 TIMES DAILY
Qty: 90 TABLET | Refills: 11 | Status: SHIPPED | OUTPATIENT
Start: 2024-02-21 | End: 2025-02-20

## 2024-02-21 RX ORDER — MONTELUKAST SODIUM 10 MG/1
10 TABLET ORAL DAILY
Qty: 90 TABLET | Refills: 3 | Status: CANCELLED | OUTPATIENT
Start: 2024-02-21

## 2024-02-21 RX ORDER — HYDROCHLOROTHIAZIDE 25 MG/1
25 TABLET ORAL DAILY
Qty: 90 TABLET | Refills: 3 | Status: SHIPPED | OUTPATIENT
Start: 2024-02-21

## 2024-02-21 RX ORDER — FLUTICASONE PROPIONATE AND SALMETEROL XINAFOATE 115; 21 UG/1; UG/1
2 AEROSOL, METERED RESPIRATORY (INHALATION) EVERY 12 HOURS
Qty: 12 G | Refills: 3 | Status: CANCELLED | OUTPATIENT
Start: 2024-02-21

## 2024-02-21 NOTE — PROGRESS NOTES
"  Laxmi Chand presented for a follow-up Medicare AW today. The following components were reviewed and updated:    Medical history  Family History  Social history  Allergies and Current Medications  Health Risk Assessment  Health Maintenance  Care Team    **See Completed Assessments for Annual Wellness visit with in the encounter summary    The following assessments were completed:  Depression Screening  Cognitive function Screening  Timed Get Up Test  Whisper Test        Opioid documentation:      Patient does have a current opioid prescription.      Patient accepted further discussion regarding opioid medication use.      Patient is currently taking oxycodone narcotic for back pain.        Pain level today is 3/10.       In addition to narcotic pain medications, patient is also using NSAIDs, topical analgesic, and gabapentin for pain control.       Patient is followed by a specialist currently for their pain and will not be referred today.               Non-opioid treatment options have been discussed today and added to the patient's after visit summary.          Vitals:    02/21/24 0834   BP: (!) 142/68   BP Location: Left arm   Patient Position: Sitting   BP Method: Medium (Manual)   Pulse: 63   Resp: 18   SpO2: (!) 94%   Weight: 82.4 kg (181 lb 10.5 oz)   Height: 5' 4" (1.626 m)     Body mass index is 31.18 kg/m².       Physical Exam  Vitals reviewed.   Constitutional:       Appearance: Normal appearance. She is normal weight.   HENT:      Head: Normocephalic.      Nose: Nose normal.   Eyes:      Extraocular Movements: Extraocular movements intact.      Conjunctiva/sclera: Conjunctivae normal.      Pupils: Pupils are equal, round, and reactive to light.   Pulmonary:      Effort: Pulmonary effort is normal.   Musculoskeletal:      Cervical back: Normal range of motion.   Skin:     General: Skin is warm and dry.   Neurological:      General: No focal deficit present.      Mental Status: She is alert and oriented " to person, place, and time.   Psychiatric:         Mood and Affect: Mood normal.         Behavior: Behavior normal.           Diagnoses and health risks identified today and associated recommendations/orders:  1. Encounter for preventive health examination    2. Obesity, Class I, BMI 30-34.9  Body mass index is 31.18 kg/m².  Continue healthy diet and regular exercise as tolerated.  Continue medications as prescribed.  Follow up with PCP, Beata Lai MD     3. Recurrent major depressive disorder, in full remission  Stable  Continue medications as prescribed.  Follow up with PCP     4. Calcified granuloma of lung  Stable  Continue medications as prescribed.  Follow up with PCP     5. Essential hypertension  Stable  Continue medications as prescribed.  Follow up with PCP     6. Atherosclerosis of aorta  Stable  .ctgocn     7. Hyperlipidemia, unspecified hyperlipidemia type  Stable  Continue medications as prescribed.  Follow up with PCP    8. Urge incontinence of urine  Stable  Continue medications as prescribed.  Follow up with PCP    9. History of endometrial cancer  Stable  Continue medications as prescribed.  Follow up with PCP and hem/onc, Dr Khoobehi    10. Senile osteopenia  Stable  Continue medications as prescribed.  Follow up with PCP     11. Chronic obstructive pulmonary disease, unspecified COPD type  Stable  Continue medications as prescribed.  Follow up with PCP and pulmonology     12. Chronic pain syndrome  Stable  Continue medications as prescribed.  Follow up with PCP and pain management    13. Abnormality of gait and mobility  Stable with rollator    14. Walker as ambulation aid  Stable with rollator      Provided Laxmi with a 5-10 year written screening schedule and personal prevention plan. Recommendations were developed using the USPSTF age appropriate recommendations. Education, counseling, and referrals were provided as needed.  After Visit Summary printed and given to patient which includes a  list of additional screenings\tests needed.    Follow up if symptoms worsen or fail to improve, for 1 year for AWV, scheduled appt.      Zoraida Orta NP            Future Appointments   Date Time Provider Department Center   3/4/2024  2:00 PM [facility] RAD ONC, PHYSICIAN SCHED [facility] RAD ONC [facility] [place]   8/5/2024 12:00 PM [facility], LABORATORY [facility] LAB [facility]   8/5/2024  3:00 PM Mell Veloz, NP [facility] HEMON1 [facility]     I offered to discuss advanced care planning, including how to pick a person who would make decisions for you if you were unable to make them for yourself, called a health care power of , and what kind of decisions you might make such as use of life sustaining treatments such as ventilators and tube feeding when faced with a life limiting illness recorded on a living will that they will need to know. (How you want to be cared for as you near the end of your natural life)     X  Patient is unwilling to engage in a discussion regarding advance directives at this time.

## 2024-02-21 NOTE — PATIENT INSTRUCTIONS
Counseling and Referral of Other Preventative  (Italic type indicates deductible and co-insurance are waived)    Patient Name: Laxmi Chand  Today's Date: 2/21/2024    Health Maintenance       Date Due Completion Date    Pneumococcal Vaccines (Age 65+) (1 of 2 - PCV) Never done ---    TETANUS VACCINE Never done ---    Shingles Vaccine (1 of 2) Never done ---    High Dose Statin Never done ---    Colorectal Cancer Screening Never done ---    RSV Vaccine (Age 60+ and Pregnant patients) (1 - 1-dose 60+ series) Never done ---    Influenza Vaccine (1) 06/30/2024 (Originally 9/1/2023) 12/5/2022 (Declined)    Override on 12/5/2022: Declined    Mammogram 02/15/2025 2/15/2024    DEXA Scan 05/24/2025 5/24/2022    Hemoglobin A1c (Diabetic Prevention Screening) 07/26/2026 7/26/2023    Lipid Panel 05/10/2027 5/10/2022        No orders of the defined types were placed in this encounter.    The following information is provided to all patients.  This information is to help you find resources for any of the problems found today that may be affecting your health:                  Living healthy guide: ms.gov    Understanding Diabetes: www.diabetes.org      Eating healthy: www.cdc.gov/healthyweight      CDC home safety checklist: www.cdc.gov/steadi/patient.html      Agency on Aging: ms.gov    Alcoholics anonymous (AA): www.aa.org      Physical Activity: www.radha.nih.gov/yy2emqg      Tobacco use: ms.gov

## 2024-02-29 DIAGNOSIS — M51.36 DEGENERATION OF LUMBAR INTERVERTEBRAL DISC: ICD-10-CM

## 2024-02-29 RX ORDER — CELECOXIB 200 MG/1
200 CAPSULE ORAL 2 TIMES DAILY
Qty: 60 CAPSULE | Refills: 3 | Status: SHIPPED | OUTPATIENT
Start: 2024-02-29 | End: 2024-06-28

## 2024-02-29 NOTE — TELEPHONE ENCOUNTER
Refill request celebrex to [facility].

## 2024-03-04 ENCOUNTER — OFFICE VISIT (OUTPATIENT)
Dept: RADIATION ONCOLOGY | Facility: CLINIC | Age: 69
End: 2024-03-04
Payer: MEDICARE

## 2024-03-04 VITALS
BODY MASS INDEX: 30.4 KG/M2 | OXYGEN SATURATION: 98 % | WEIGHT: 177.13 LBS | HEART RATE: 62 BPM | SYSTOLIC BLOOD PRESSURE: 153 MMHG | DIASTOLIC BLOOD PRESSURE: 72 MMHG | RESPIRATION RATE: 16 BRPM

## 2024-03-04 DIAGNOSIS — C50.911 INVASIVE DUCTAL CARCINOMA OF BREAST, FEMALE, RIGHT: Primary | ICD-10-CM

## 2024-03-04 PROCEDURE — 99214 OFFICE O/P EST MOD 30 MIN: CPT | Mod: S$GLB,,, | Performed by: RADIOLOGY

## 2024-03-04 NOTE — PROGRESS NOTES
Laxmi Chand  [id number]  1955  3/4/2024  No referring provider defined for this encounter.    DIAGNOSIS:  Cancer Staging   Invasive ductal carcinoma of breast, female, right  Staging form: Breast, AJCC 8th Edition  - Pathologic: Stage IA (pT1a, pN0, cM0, G1, ER+, WV-, HER2+) - Signed by Mark Ramos Jr., MD on 10/5/2022    REASON FOR VISIT: Routine scheduled follow-up.    HISTORY OF PRESENT ILLNESS:   Laxmi Chand is a 68 y.o. postmenopausal  female with (-)FHx of BCa and past medical history of endometrial ca s/p hysterectomy 40yrs ago who presented with abnormal screening mammogram noting new calcifications in the upper outer quadrant of the right breast confirmed on diagnostic mammogram.  Core needle biopsy returned high-grade DCIS, solid pattern; ER+ %, WV(-).      She underwent lumpectomy and ALND with Dr. Gutierrez, 2022:              - 2 mm grade 1 (5/9) invasive ductal carcinoma; LVSI (-); ER+ 95%, WV(-), HER2 3+              - 1.5 cm grade 3 DCIS, solid pattern without necrosis              - invasive disease margin (-) 9 mm deep; DCIS margin 2 mm anterior              - poor mapping; ALND: 0/10 LNs     BRCA1,2 (-)    She then completed adj WBRT @ 4050 cGy in 15 fxns to the right breast followed by 1000 cGy boost to lumpectomy cavity on 2022     On AI.    INTERVAL HISTORY:   Patient endorses soreness in the right axilla with decreased range of motion.  Denies swelling of the right upper extremity.  Has some discomfort in the lateral right breast without nipple discharge.  No skin changes.  Denies fever or chills.  Denies hot flashes, weight gain or arthralgias.    Review of systems otherwise negative unless indicated in HPI/interval history.    Past Medical History:   Diagnosis Date    Breast cancer in female 2022    IDC with high grade DCIS    COPD (chronic obstructive pulmonary disease)     Degeneration of lumbar intervertebral disc     Endometrial cancer      GERD (gastroesophageal reflux disease)     HTN (hypertension)     Hyperlipemia, mixed      Past Surgical History:   Procedure Laterality Date    BIOPSY OF AXILLARY NODE Right 9/7/2022    Procedure: BIOPSY, LYMPH NODE, AXILLARY;  Surgeon: Jatin Gutierrez MD;  Location: [facility];  Service: General;  Laterality: Right;    BREAST BIOPSY Right 08/05/2022    BREAST MASS EXCISION Right 9/7/2022    Procedure: EXCISION, MASS, BREAST;  Surgeon: Jatin Gutierrez MD;  Location: [facility];  Service: General;  Laterality: Right;  wire localized partial mastectomy right breast with margins     CARPAL TUNNEL RELEASE  1985    CHOLECYSTECTOMY  1978    CORRECTION OF HAMMER TOE Right 9/15/2023    Procedure: CORRECTION, HAMMER TOE;  Surgeon: Devyn Mccullough DPM;  Location: [facility] OR;  Service: Podiatry;  Laterality: Right;    FOOT HARDWARE REMOVAL Right 9/15/2023    Procedure: REMOVAL, HARDWARE, FOOT;  Surgeon: Devyn Mccullough DPM;  Location: [facility] OR;  Service: Podiatry;  Laterality: Right;    HYSTERECTOMY      KNEE SURGERY Left 06/01/2020    LUMBAR DISC SURGERY  01/01/1990    plate and roland    LUMBAR FUSION  2011    MIDFOOT ARTHRODESIS Right 9/15/2023    Procedure: FUSION, JOINT, MIDFOOT;  Surgeon: Devyn Mccullough DPM;  Location: [facility];  Service: Podiatry;  Laterality: Right;  [place] 28 1st MPJ fusion plates screws bone grafts as well as cut guide.    TOTAL KNEE REPLACEMENT USING COMPUTER NAVIGATION Left 02/15/2021     Social History     Socioeconomic History    Marital status: Significant Other   Occupational History    Occupation: disabled   Tobacco Use    Smoking status: Every Day     Types: Vaping with nicotine     Passive exposure: Never    Smokeless tobacco: Never   Substance and Sexual Activity    Alcohol use: Yes    Drug use: Not Currently    Sexual activity: Yes     Partners: Male   Social History Narrative    [facility] from [facility]         [facility]     Annual     urinary tract infection    yeast infection        Visit Summary    No pap per guidelines    U/A for culture    Wet prep: yeast only    Pt has a PCP    Colonoscopy up to date    [place] [facility] [place]        Prescriptions:    SIG: Cipro 500 mg oral tablet, 3 days, Dispense #6 Tablet, 0 Refills    Directions: Take 1 oral tablet 2 times a day    SIG: Diflucan 100 mg oral tablet, 3 days, Dispense #3 Tablet, 0 Refills    Directions: Take 1 oral tablet once a day        Return for follow up appointment in one year or prn.     [id number]     Vulvar Cyst: Resolved        Visit Summary    Normal external gyn exam. Cyst resolved        Return for follow up appointment annual/prn.     [id number]     Chronic Vaginitis    Paratubal cysts: stable        Visit Summary    Wet prep: mostly yeast, few clue cells    Pt soaks in bath BID, stop, shower only, no douching.    u/s performed today. unchanged from last scan.        Prescriptions: Clindesse sample given    SIG: Diflucan 100 mg oral tablet, 3 days, Dispense #3 Tablet, 0 Refills    Directions: Take 1 oral tablet once a day    Recommend probiotics        Return for follow up appointment for annual or prn. MDL swab at next appt if needed.    Mammo up to date.     [id number]     Recurrent bacterial vaginosis.  Paratubal cysts.  Dysuria.    Repeat transvaginal ultrasound 6 weeks.        Visit Summary    Ordered:    Urine Culture, Routine     [id number]     path compound melanocytic nevus...ch us...of pelvis....rtc 1y pap     [id number]     3 moles removed from back 5 mm each...same contwainer monsels applied...    one mole removed from under each breasxt 5 mm...mnonsels applied...each one sent to path sepreatelyt..        vag dc bv...sp metrogel...no family h/o breast ca...        pelvic us...complex cyst 2.23 cm on left w possible excrescence and septation         pelvic us for complex cyst [place] [facility] [place]rtc 1wk     [id number]     Chronic Bacterial Vaginitis    Chronic Back Pain    Left Lower Quadrant resolved, possible ruptured ovarian cyst        Visit Summary    MDL swab done        Return for follow up appointment in 2 months for follow up pelvic u/s.     GYN Exam/[id number] 20164/6/2017     Annual    Vaginal odor, Bacterial Vaginosis    Ovarian Cyst        Visit Summary    Pap done, reports hx of cervical pre-cancer        Prescriptions:    SIG: metronidazole 500 mg tablet, 7 days, Dispense #14 Tablet, 0 Refills    Directions: Take 1 oral tablet 2 times a day. No alcohol discussed.        U/S today, reports had ovarian cyst on CT scan.    FSH today        Return for follow up appointment  pending results.     Social Determinants of Health     Financial Resource Strain: High Risk (1/29/2024)    Overall Financial Resource Strain (CARDIA)     Difficulty of Paying Living Expenses: Hard   Food Insecurity: Food Insecurity Present (1/29/2024)    Hunger Vital Sign     Worried About Running Out of Food in the Last Year: Sometimes true     Ran Out of Food in the Last Year: Sometimes true   Transportation Needs: No Transportation Needs (1/29/2024)    PRAPARE - Transportation     Lack of Transportation (Medical): No     Lack of Transportation (Non-Medical): No   Physical Activity: Insufficiently Active (1/29/2024)    Exercise Vital Sign     Days of Exercise per Week: 1 day     Minutes of Exercise per Session: 10 min   Stress: No Stress Concern Present (1/29/2024)    [employer] [place] [employer] - Occupational Stress Questionnaire     Feeling of Stress : Only a little   Social Connections: Unknown (1/29/2024)    Social Connection and Isolation Panel [NHANES]     Frequency of Communication with Friends and Family: More than three times a week     Frequency of Social Gatherings with Friends and Family: Twice a week     Active Member of Clubs or  Organizations: No     Attends Club or Organization Meetings: Never     Marital Status:    Housing Stability: Low Risk  (1/29/2024)    Housing Stability Vital Sign     Unable to Pay for Housing in the Last Year: No     Number of Places Lived in the Last Year: 1     Unstable Housing in the Last Year: No     Family History   Problem Relation Age of Onset    Hypertension Mother     Diabetes Mother     Hypertension Father     Lung cancer Maternal Grandfather     Breast cancer Neg Hx      Medication List with Changes/Refills   Current Medications    ADVAIR -21 MCG/ACTUATION HFAA INHALER    Inhale 2 puffs into the lungs every 12 (twelve) hours.    ALBUTEROL (PROVENTIL/VENTOLIN HFA) 90 MCG/ACTUATION INHALER    INHALE 2 PUFFS BY MOUTH EVERY 4 HOURS AS NEEDED FOR WHEEZING OR SHORTNESS OF BREATH    ALBUTEROL-IPRATROPIUM (DUO-NEB) 2.5 MG-0.5 MG/3 ML NEBULIZER SOLUTION    INHALE CONTENTS OF 1 VIAL BY MOUTH WITH NEBULIZER EVERY 6 HOURS WHILE AWAKE AS DIRECTED    ANASTROZOLE (ARIMIDEX) 1 MG TAB    Take 1 tablet (1 mg total) by mouth once daily.    ASPIRIN (ECOTRIN) 81 MG EC TABLET    Take 1 tablet by mouth once daily. Pt back on asa    ASPIRIN-ACETAMINOPHEN-CAFFEINE 250-250-65 MG (HEADACHE RELIEF, ASA-ACET-CAF,) 250-250-65 MG PER TABLET    Take 1 tablet by mouth every 6 to 8 hours as needed.    BUPROPION (WELLBUTRIN) 100 MG TABLET    TAKE 1 TABLET(100 MG) BY MOUTH THREE TIMES DAILY    CALCIUM CARBONATE-VITAMIN D3 (CALCIUM 600 + D,3,) 600-125 MG-UNIT TAB    Take 2 tablets by mouth once daily.    CELECOXIB (CELEBREX) 200 MG CAPSULE    TAKE 1 CAPSULE (200 MG TOTAL) BY MOUTH 2 (TWO) TIMES DAILY.    CETIRIZINE (ZYRTEC) 10 MG TABLET    Take 1 tablet (10 mg total) by mouth once daily.    CONJUGATED ESTROGENS (PREMARIN) VAGINAL CREAM    Place 0.5 g vaginally twice a week.    CYCLOBENZAPRINE (FLEXERIL) 10 MG TABLET    Take 1 tablet (10 mg total) by mouth nightly.    DOXEPIN (SINEQUAN) 25 MG CAPSULE    Take 2 capsules (50 mg  total) by mouth nightly.    FLUCONAZOLE (DIFLUCAN) 200 MG TAB    Take 1 tablet (200 mg total) by mouth every 72 hours.    FLUTICASONE PROPIONATE (FLONASE) 50 MCG/ACTUATION NASAL SPRAY    1 spray (50 mcg total) by Each Nostril route once daily.    GABAPENTIN (NEURONTIN) 300 MG CAPSULE    Take 1 capsule (300 mg total) by mouth 2 (two) times daily.    HYDRALAZINE (APRESOLINE) 10 MG TABLET    Take 1 tablet (10 mg total) by mouth 3 (three) times daily.    HYDROCHLOROTHIAZIDE (HYDRODIURIL) 25 MG TABLET    Take 1 tablet (25 mg total) by mouth once daily.    LEVOMILNACIPRAN (FETZIMA) 20 MG CS24    Take 1 capsule(s) every day by oral route.    LUMIGAN 0.01 % DROP    Place into both eyes.    METOPROLOL SUCCINATE (TOPROL-XL) 200 MG 24 HR TABLET    Take 1 tablet (200 mg total) by mouth once daily.    METRONIDAZOLE (METROGEL) 0.75 % (37.5MG/5 GRAM) VAGINAL GEL    Place 1 applicator vaginally Daily.    MONTELUKAST (SINGULAIR) 10 MG TABLET    Take 1 tablet (10 mg total) by mouth once daily.    NYSTATIN (MYCOSTATIN) CREAM    Apply topically 2 (two) times daily.    NYSTATIN (MYCOSTATIN) POWDER    Apply topically 4 (four) times daily.    ONDANSETRON (ZOFRAN) 4 MG TABLET    Take 1 tablet (4 mg total) by mouth every 8 (eight) hours as needed for Nausea.    ONDANSETRON (ZOFRAN) 8 MG TABLET    Take 8 mg by mouth 3 (three) times daily.    OXYCODONE-ACETAMINOPHEN (PERCOCET)  MG PER TABLET    Take 1 tablet by mouth.    PANTOPRAZOLE (PROTONIX) 40 MG TABLET    Take 1 tablet (40 mg total) by mouth once daily.    PSEUDOEPHEDRINE (SUDAFED) 120 MG 12 HR TABLET    Take 120 mg by mouth every 12 (twelve) hours.    VALSARTAN (DIOVAN) 80 MG TABLET    Take 1 tablet (80 mg total) by mouth once daily.     Review of patient's allergies indicates:  No Known Allergies    QUALITY OF LIFE: 80%- Normal Activity with Effort: Some Symptoms of Disease    Vitals:    03/04/24 1403   BP: (!) 153/72   Pulse: 62   Resp: 16   SpO2: 98%   Weight: 80.3 kg (177 lb  1.6 oz)   PainSc: 0-No pain     Body mass index is 30.4 kg/m².    PHYSICAL EXAM:   GENERAL: alert; in no apparent distress.   HEAD: normocephalic, atraumatic.  EYES: pupils are equal, round, reactive to light and accommodation. Sclera anicteric. Conjunctiva not injected.   NOSE/THROAT: no nasal erythema or rhinorrhea. Oropharynx pink, without erythema, ulcerations or thrush.   NECK: no cervical motion rigidity; supple with no masses.  CHEST: Patient is speaking comfortably on room air with normal work of breathing without using accessory muscles of respiration.  CARDIOVASCULAR: regular rate and rhythm  ABDOMEN: soft, nontender, nondistended.   MUSCULOSKELETAL: no tenderness to palpation along the spine or scapulae. Normal range of motion.  NEUROLOGIC: cranial nerves II-XII intact bilaterally. Strength 5/5 in bilateral upper and lower extremities. No sensory deficits appreciated. Normal gait.  LYMPHATIC: no right axillary adenopathy appreciated.   EXTREMITIES: no clubbing, cyanosis, edema.  SKIN: no erythema, rashes, ulcerations noted.   BREAST:  Pendulous breath with small lateral palpable seroma without nodularity, cellulitis or fluctuance.  Minimal residual hyperpigmentation.  Moderate fibrosis concentrated to right lateral incision.  Tightness with mild TTP and right axilla and lateral breast    ANCILLARY DATA:   2/15/24 MMG  Impression:  Right  Post-Surgical Finding: Right breast post-surgical finding at the outer central position. Assessment: 3 - Probably benign. Short Interval Follow-Up in 6 Months is recommended.   Left  Mammo Digital Diagnostic Bilat with [place]  There is no mammographic evidence of malignancy in the left breast.  BI-RADS Category:   Overall: 3 - Probably Benign  Recommendation:  Short interval follow-up is recommended in 6 months.    ASSESSMENT: Laxmi Chand is a female with stage IA, [id number] g1 IDC UOQ R breast, ER+/PA(-)/HER2  PLAN:     - Recommend continue to massage right breast with  range of motion exercises for right upper extremity.  Vitamin-E with massages to scar site.  Offered referral to lymphedema/PT, patient refusing at this time.  - LAKESHA   - Studies: MMG 8/24 ordered  - Follow up with Dr. Khoobehi; HERBER Veloz; on AI with good tolerance.  - Return to clinic 6mos.    All questions answered and contact information provided. Patient understands free to call us anytime with any questions or concerns regarding radiation therapy.    I have personally seen and evaluated this patient with a moderate to high complexity diagnosis.      Greater than 30 minutes were dedicated to reviewing/interpreting pertinent laboratory/imaging/pathology as well as follow-up with concurrent consultants; reviewing and performing history and physical; counseling patient on continuing oncologic recommendations; documentation in the electronic medical record including ordering of additional tests and/or radiation treatment protocol; and coordination of care with physicians with referrals placed as appropriate.    PHYSICIAN: Mark Ramos Jr, MD

## 2024-03-27 ENCOUNTER — TELEPHONE (OUTPATIENT)
Dept: HEMATOLOGY/ONCOLOGY | Facility: CLINIC | Age: 69
End: 2024-03-27
Payer: MEDICARE

## 2024-03-27 DIAGNOSIS — C50.811 MALIGNANT NEOPLASM OF OVERLAPPING SITES OF RIGHT BREAST IN FEMALE, ESTROGEN RECEPTOR POSITIVE: Primary | ICD-10-CM

## 2024-03-27 DIAGNOSIS — Z17.0 MALIGNANT NEOPLASM OF OVERLAPPING SITES OF RIGHT BREAST IN FEMALE, ESTROGEN RECEPTOR POSITIVE: Primary | ICD-10-CM

## 2024-03-27 PROBLEM — M85.89 OSTEOPENIA OF MULTIPLE SITES: Status: ACTIVE | Noted: 2022-08-23

## 2024-04-01 ENCOUNTER — LAB VISIT (OUTPATIENT)
Dept: LAB | Facility: HOSPITAL | Age: 69
End: 2024-04-01
Attending: NURSE PRACTITIONER
Payer: MEDICARE

## 2024-04-01 DIAGNOSIS — C50.011 MALIGNANT NEOPLASM OF NIPPLE OF RIGHT BREAST IN FEMALE, UNSPECIFIED ESTROGEN RECEPTOR STATUS: ICD-10-CM

## 2024-04-01 DIAGNOSIS — Z17.0 MALIGNANT NEOPLASM OF OVERLAPPING SITES OF RIGHT BREAST IN FEMALE, ESTROGEN RECEPTOR POSITIVE: ICD-10-CM

## 2024-04-01 DIAGNOSIS — C50.811 MALIGNANT NEOPLASM OF OVERLAPPING SITES OF RIGHT BREAST IN FEMALE, ESTROGEN RECEPTOR POSITIVE: ICD-10-CM

## 2024-04-01 LAB
ALBUMIN SERPL BCP-MCNC: 3.7 G/DL (ref 3.5–5.2)
ALP SERPL-CCNC: 82 U/L (ref 55–135)
ALT SERPL W/O P-5'-P-CCNC: 17 U/L (ref 10–44)
ANION GAP SERPL CALC-SCNC: 10 MMOL/L (ref 8–16)
AST SERPL-CCNC: 13 U/L (ref 10–40)
BASOPHILS # BLD AUTO: 0.04 K/UL (ref 0–0.2)
BASOPHILS NFR BLD: 0.7 % (ref 0–1.9)
BILIRUB SERPL-MCNC: 0.4 MG/DL (ref 0.1–1)
BUN SERPL-MCNC: 23 MG/DL (ref 8–23)
CALCIUM SERPL-MCNC: 9.6 MG/DL (ref 8.7–10.5)
CHLORIDE SERPL-SCNC: 102 MMOL/L (ref 95–110)
CO2 SERPL-SCNC: 27 MMOL/L (ref 23–29)
CREAT SERPL-MCNC: 1.2 MG/DL (ref 0.5–1.4)
DIFFERENTIAL METHOD BLD: ABNORMAL
EOSINOPHIL # BLD AUTO: 0.2 K/UL (ref 0–0.5)
EOSINOPHIL NFR BLD: 3 % (ref 0–8)
ERYTHROCYTE [DISTWIDTH] IN BLOOD BY AUTOMATED COUNT: 14.2 % (ref 11.5–14.5)
EST. GFR  (NO RACE VARIABLE): 49.3 ML/MIN/1.73 M^2
GLUCOSE SERPL-MCNC: 89 MG/DL (ref 70–110)
HCT VFR BLD AUTO: 37.9 % (ref 37–48.5)
HGB BLD-MCNC: 12.3 G/DL (ref 12–16)
IMM GRANULOCYTES # BLD AUTO: 0.03 K/UL (ref 0–0.04)
IMM GRANULOCYTES NFR BLD AUTO: 0.5 % (ref 0–0.5)
LYMPHOCYTES # BLD AUTO: 1.7 K/UL (ref 1–4.8)
LYMPHOCYTES NFR BLD: 30.8 % (ref 18–48)
MCH RBC QN AUTO: 29.4 PG (ref 27–31)
MCHC RBC AUTO-ENTMCNC: 32.5 G/DL (ref 32–36)
MCV RBC AUTO: 91 FL (ref 82–98)
MONOCYTES # BLD AUTO: 0.6 K/UL (ref 0.3–1)
MONOCYTES NFR BLD: 9.7 % (ref 4–15)
NEUTROPHILS # BLD AUTO: 3.1 K/UL (ref 1.8–7.7)
NEUTROPHILS NFR BLD: 55.3 % (ref 38–73)
NRBC BLD-RTO: 0 /100 WBC
PLATELET # BLD AUTO: 358 K/UL (ref 150–450)
PMV BLD AUTO: 8.8 FL (ref 9.2–12.9)
POTASSIUM SERPL-SCNC: 4.2 MMOL/L (ref 3.5–5.1)
PROT SERPL-MCNC: 6.5 G/DL (ref 6–8.4)
RBC # BLD AUTO: 4.19 M/UL (ref 4–5.4)
SODIUM SERPL-SCNC: 139 MMOL/L (ref 136–145)
WBC # BLD AUTO: 5.65 K/UL (ref 3.9–12.7)

## 2024-04-01 PROCEDURE — 85025 COMPLETE CBC W/AUTO DIFF WBC: CPT | Performed by: NURSE PRACTITIONER

## 2024-04-01 PROCEDURE — 36415 COLL VENOUS BLD VENIPUNCTURE: CPT | Performed by: NURSE PRACTITIONER

## 2024-04-01 PROCEDURE — 80053 COMPREHEN METABOLIC PANEL: CPT | Performed by: NURSE PRACTITIONER

## 2024-04-08 ENCOUNTER — INFUSION (OUTPATIENT)
Dept: INFUSION THERAPY | Facility: HOSPITAL | Age: 69
End: 2024-04-08
Attending: NURSE PRACTITIONER
Payer: MEDICARE

## 2024-04-08 VITALS
SYSTOLIC BLOOD PRESSURE: 149 MMHG | OXYGEN SATURATION: 98 % | DIASTOLIC BLOOD PRESSURE: 78 MMHG | WEIGHT: 181.38 LBS | BODY MASS INDEX: 30.96 KG/M2 | HEART RATE: 71 BPM | RESPIRATION RATE: 18 BRPM | HEIGHT: 64 IN | TEMPERATURE: 98 F

## 2024-04-08 DIAGNOSIS — M85.89 OSTEOPENIA OF MULTIPLE SITES: ICD-10-CM

## 2024-04-08 DIAGNOSIS — Z79.811 LONG TERM (CURRENT) USE OF AROMATASE INHIBITORS: Primary | ICD-10-CM

## 2024-04-08 DIAGNOSIS — C50.811 MALIGNANT NEOPLASM OF OVERLAPPING SITES OF RIGHT BREAST IN FEMALE, ESTROGEN RECEPTOR POSITIVE: ICD-10-CM

## 2024-04-08 DIAGNOSIS — Z17.0 MALIGNANT NEOPLASM OF OVERLAPPING SITES OF RIGHT BREAST IN FEMALE, ESTROGEN RECEPTOR POSITIVE: ICD-10-CM

## 2024-04-08 PROCEDURE — 63600175 PHARM REV CODE 636 W HCPCS: Mod: JZ,JG,UD | Performed by: NURSE PRACTITIONER

## 2024-04-08 PROCEDURE — 96372 THER/PROPH/DIAG INJ SC/IM: CPT

## 2024-04-08 RX ADMIN — DENOSUMAB 60 MG: 60 INJECTION SUBCUTANEOUS at 09:04

## 2024-05-02 DIAGNOSIS — Z79.811 LONG TERM (CURRENT) USE OF AROMATASE INHIBITORS: ICD-10-CM

## 2024-05-02 DIAGNOSIS — F33.42 RECURRENT MAJOR DEPRESSIVE DISORDER, IN FULL REMISSION: ICD-10-CM

## 2024-05-02 RX ORDER — DOXEPIN HYDROCHLORIDE 25 MG/1
50 CAPSULE ORAL NIGHTLY
Qty: 180 CAPSULE | Refills: 3 | Status: SHIPPED | OUTPATIENT
Start: 2024-05-02

## 2024-05-02 RX ORDER — ANASTROZOLE 1 MG/1
1 TABLET ORAL DAILY
Qty: 90 TABLET | Refills: 3 | Status: SHIPPED | OUTPATIENT
Start: 2024-05-02 | End: 2025-05-02

## 2024-05-02 NOTE — TELEPHONE ENCOUNTER
LOV: 2/21/24 You    NOV: none    Preffered Pharmacy:[facility]  [place], [place] - [address]

## 2024-05-17 DIAGNOSIS — I10 ESSENTIAL HYPERTENSION: ICD-10-CM

## 2024-05-17 RX ORDER — VALSARTAN 80 MG/1
80 TABLET ORAL DAILY
Qty: 90 TABLET | Refills: 3 | Status: SHIPPED | OUTPATIENT
Start: 2024-05-17

## 2024-05-20 ENCOUNTER — OFFICE VISIT (OUTPATIENT)
Dept: HEMATOLOGY/ONCOLOGY | Facility: CLINIC | Age: 69
End: 2024-05-20
Payer: MEDICARE

## 2024-05-20 ENCOUNTER — TELEPHONE (OUTPATIENT)
Dept: HEMATOLOGY/ONCOLOGY | Facility: CLINIC | Age: 69
End: 2024-05-20
Payer: MEDICARE

## 2024-05-20 ENCOUNTER — LAB VISIT (OUTPATIENT)
Dept: LAB | Facility: HOSPITAL | Age: 69
End: 2024-05-20
Attending: NURSE PRACTITIONER
Payer: MEDICARE

## 2024-05-20 VITALS
SYSTOLIC BLOOD PRESSURE: 113 MMHG | DIASTOLIC BLOOD PRESSURE: 53 MMHG | WEIGHT: 184.13 LBS | OXYGEN SATURATION: 97 % | BODY MASS INDEX: 31.6 KG/M2 | HEART RATE: 72 BPM | RESPIRATION RATE: 18 BRPM

## 2024-05-20 DIAGNOSIS — C50.011 MALIGNANT NEOPLASM OF NIPPLE OF RIGHT BREAST IN FEMALE, UNSPECIFIED ESTROGEN RECEPTOR STATUS: Primary | ICD-10-CM

## 2024-05-20 DIAGNOSIS — C50.011 MALIGNANT NEOPLASM OF NIPPLE OF RIGHT BREAST IN FEMALE, UNSPECIFIED ESTROGEN RECEPTOR STATUS: ICD-10-CM

## 2024-05-20 LAB
ALBUMIN SERPL BCP-MCNC: 3.8 G/DL (ref 3.5–5.2)
ALP SERPL-CCNC: 76 U/L (ref 55–135)
ALT SERPL W/O P-5'-P-CCNC: 19 U/L (ref 10–44)
ANION GAP SERPL CALC-SCNC: 10 MMOL/L (ref 8–16)
AST SERPL-CCNC: 18 U/L (ref 10–40)
BASOPHILS # BLD AUTO: 0.03 K/UL (ref 0–0.2)
BASOPHILS NFR BLD: 0.5 % (ref 0–1.9)
BILIRUB SERPL-MCNC: 0.4 MG/DL (ref 0.1–1)
BUN SERPL-MCNC: 23 MG/DL (ref 8–23)
CALCIUM SERPL-MCNC: 9.5 MG/DL (ref 8.7–10.5)
CHLORIDE SERPL-SCNC: 101 MMOL/L (ref 95–110)
CO2 SERPL-SCNC: 27 MMOL/L (ref 23–29)
CREAT SERPL-MCNC: 1.3 MG/DL (ref 0.5–1.4)
DIFFERENTIAL METHOD BLD: ABNORMAL
EOSINOPHIL # BLD AUTO: 0.3 K/UL (ref 0–0.5)
EOSINOPHIL NFR BLD: 3.9 % (ref 0–8)
ERYTHROCYTE [DISTWIDTH] IN BLOOD BY AUTOMATED COUNT: 12.5 % (ref 11.5–14.5)
EST. GFR  (NO RACE VARIABLE): 44.8 ML/MIN/1.73 M^2
GLUCOSE SERPL-MCNC: 91 MG/DL (ref 70–110)
HCT VFR BLD AUTO: 38.9 % (ref 37–48.5)
HGB BLD-MCNC: 12.5 G/DL (ref 12–16)
IMM GRANULOCYTES # BLD AUTO: 0.03 K/UL (ref 0–0.04)
IMM GRANULOCYTES NFR BLD AUTO: 0.5 % (ref 0–0.5)
LYMPHOCYTES # BLD AUTO: 2 K/UL (ref 1–4.8)
LYMPHOCYTES NFR BLD: 31.1 % (ref 18–48)
MCH RBC QN AUTO: 29.6 PG (ref 27–31)
MCHC RBC AUTO-ENTMCNC: 32.1 G/DL (ref 32–36)
MCV RBC AUTO: 92 FL (ref 82–98)
MONOCYTES # BLD AUTO: 0.6 K/UL (ref 0.3–1)
MONOCYTES NFR BLD: 10.1 % (ref 4–15)
NEUTROPHILS # BLD AUTO: 3.4 K/UL (ref 1.8–7.7)
NEUTROPHILS NFR BLD: 53.9 % (ref 38–73)
NRBC BLD-RTO: 0 /100 WBC
PLATELET # BLD AUTO: 329 K/UL (ref 150–450)
PMV BLD AUTO: 8.7 FL (ref 9.2–12.9)
POTASSIUM SERPL-SCNC: 3.8 MMOL/L (ref 3.5–5.1)
PROT SERPL-MCNC: 6.4 G/DL (ref 6–8.4)
RBC # BLD AUTO: 4.22 M/UL (ref 4–5.4)
SODIUM SERPL-SCNC: 138 MMOL/L (ref 136–145)
WBC # BLD AUTO: 6.34 K/UL (ref 3.9–12.7)

## 2024-05-20 PROCEDURE — 85025 COMPLETE CBC W/AUTO DIFF WBC: CPT | Performed by: NURSE PRACTITIONER

## 2024-05-20 PROCEDURE — 99214 OFFICE O/P EST MOD 30 MIN: CPT | Mod: S$PBB,,, | Performed by: NURSE PRACTITIONER

## 2024-05-20 PROCEDURE — 99999 PR PBB SHADOW E&M-EST. PATIENT-LVL V: CPT | Mod: PBBFAC,,, | Performed by: NURSE PRACTITIONER

## 2024-05-20 PROCEDURE — 99215 OFFICE O/P EST HI 40 MIN: CPT | Mod: PBBFAC | Performed by: NURSE PRACTITIONER

## 2024-05-20 PROCEDURE — 80053 COMPREHEN METABOLIC PANEL: CPT | Performed by: NURSE PRACTITIONER

## 2024-05-20 NOTE — TELEPHONE ENCOUNTER
----- Message from Adele Vazquez sent at 5/20/2024  8:40 AM CDT -----  Contact: PT  Type: Needs Medical Advice    Who Called: PT  Best Call Back Number: [phone]  Additional  Information: PT asking for a call back to schedule appt for knot under right breast . Would like to be seen any day this week except Friday, no time preference.  Please Advise- Thank you

## 2024-05-20 NOTE — PROGRESS NOTES
Service Date:  5/20/24    Chief Complaint: No chief complaint on file.    Laxmi Chand is a 68 y.o. female with right breast invasive ductal carcinoma only 2 mm in size and right breast DCIS.  S/p lumpectomy on 9/7/22.  Currently on aromatase inhibitor therapy.  Tolerating it okay.  For follow-up.    8/7/2023:  She returns today for follow-up.  Patient has no new symptoms and no new complaints.  She continues to have tenderness at surgical site    5/20/2024:  She returns today sooner than scheduled with complaints of mass in her right breast.    Review of Systems   Constitutional:  Positive for fatigue.   HENT: Negative.     Eyes: Negative.    Respiratory: Negative.     Cardiovascular: Negative.    Gastrointestinal: Negative.    Endocrine: Negative.    Genitourinary: Negative.    Musculoskeletal: Negative.    Integumentary:  Positive for breast mass.   Neurological: Negative.    Hematological: Negative.    Psychiatric/Behavioral: Negative.     Breast: Positive for mass.     Current Outpatient Medications   Medication Instructions    ADVAIR -21 mcg/actuation HFAA inhaler 2 puffs, Inhalation, Every 12 hours    albuterol (PROVENTIL/VENTOLIN HFA) 90 mcg/actuation inhaler INHALE 2 PUFFS BY MOUTH EVERY 4 HOURS AS NEEDED FOR WHEEZING OR SHORTNESS OF BREATH    albuterol-ipratropium (DUO-NEB) 2.5 mg-0.5 mg/3 mL nebulizer solution INHALE CONTENTS OF 1 VIAL BY MOUTH WITH NEBULIZER EVERY 6 HOURS WHILE AWAKE AS DIRECTED    anastrozole (ARIMIDEX) 1 mg, Oral, Daily    aspirin (ECOTRIN) 81 MG EC tablet 1 tablet, Oral, Daily, Pt back on asa    aspirin-acetaminophen-caffeine 250-250-65 mg (HEADACHE RELIEF, ASA-ACET-CAF,) 250-250-65 mg per tablet 1 tablet, Oral, Every 6-8 hours PRN    buPROPion (WELLBUTRIN) 100 MG tablet TAKE 1 TABLET(100 MG) BY MOUTH THREE TIMES DAILY    calcium carbonate-vitamin D3 (CALCIUM 600 + D,3,) 600-125 mg-unit Tab 2 tablets, Oral, Daily    celecoxib (CELEBREX) 200 mg, Oral, 2 times daily     cetirizine (ZYRTEC) 10 mg, Oral, Daily    conjugated estrogens (PREMARIN) 0.5 g, Vaginal, Twice weekly    cyclobenzaprine (FLEXERIL) 10 mg, Oral, Nightly    doxepin (SINEQUAN) 50 mg, Oral, Nightly    fluconazole (DIFLUCAN) 200 mg, Oral, Every 72 hours    fluticasone propionate (FLONASE) 50 mcg, Each Nostril, Daily    gabapentin (NEURONTIN) 300 mg, Oral, 2 times daily    hydrALAZINE (APRESOLINE) 10 mg, Oral, 3 times daily    hydroCHLOROthiazide (HYDRODIURIL) 25 mg, Oral, Daily    levomilnacipran (FETZIMA) 20 mg Cs24 Take 1 capsule(s) every day by oral route.    LUMIGAN 0.01 % Drop Both Eyes    metoprolol succinate (TOPROL-XL) 200 mg, Oral, Daily    metroNIDAZOLE (METROGEL) 0.75 % (37.5mg/5 gram) vaginal gel 1 applicator, Vaginal, Daily    montelukast (SINGULAIR) 10 mg, Oral, Daily    nystatin (MYCOSTATIN) cream Topical (Top), 2 times daily    nystatin (MYCOSTATIN) powder Topical (Top), 4 times daily    ondansetron (ZOFRAN) 4 mg, Oral, Every 8 hours PRN    ondansetron (ZOFRAN) 8 mg, Oral, 3 times daily    oxyCODONE-acetaminophen (PERCOCET)  mg per tablet 1 tablet, Oral    pantoprazole (PROTONIX) 40 mg, Oral, Daily    pseudoephedrine (SUDAFED) 120 mg, Oral, Every 12 hours (non-standard times)    valsartan (DIOVAN) 80 mg, Oral, Daily        Past Medical History:   Diagnosis Date    Breast cancer in female 09/07/2022    IDC with high grade DCIS    COPD (chronic obstructive pulmonary disease)     Degeneration of lumbar intervertebral disc     Endometrial cancer     GERD (gastroesophageal reflux disease)     HTN (hypertension)     Hyperlipemia, mixed         Past Surgical History:   Procedure Laterality Date    BIOPSY OF AXILLARY NODE Right 9/7/2022    Procedure: BIOPSY, LYMPH NODE, AXILLARY;  Surgeon: Jatin Gutierrez MD;  Location: [facility];  Service: General;  Laterality: Right;    BREAST BIOPSY Right 08/05/2022    BREAST MASS EXCISION Right 9/7/2022    Procedure: EXCISION, MASS, BREAST;  Surgeon: Jatin GONZALES  MD Matt;  Location: [facility] OR;  Service: General;  Laterality: Right;  wire localized partial mastectomy right breast with margins     CARPAL TUNNEL RELEASE  1985    CHOLECYSTECTOMY  1978    CORRECTION OF HAMMER TOE Right 9/15/2023    Procedure: CORRECTION, HAMMER TOE;  Surgeon: Devyn Mccullough DPM;  Location: [facility] OR;  Service: Podiatry;  Laterality: Right;    FOOT HARDWARE REMOVAL Right 9/15/2023    Procedure: REMOVAL, HARDWARE, FOOT;  Surgeon: Devyn Mccullough DPM;  Location: [facility] OR;  Service: Podiatry;  Laterality: Right;    HYSTERECTOMY      KNEE SURGERY Left 06/01/2020    LUMBAR DISC SURGERY  01/01/1990    plate and roland    LUMBAR FUSION  2011    MIDFOOT ARTHRODESIS Right 9/15/2023    Procedure: FUSION, JOINT, MIDFOOT;  Surgeon: Devyn Mccullough DPM;  Location: [facility] OR;  Service: Podiatry;  Laterality: Right;  [place] 28 1st MPJ fusion plates screws bone grafts as well as cut guide.    TOTAL KNEE REPLACEMENT USING COMPUTER NAVIGATION Left 02/15/2021        Family History   Problem Relation Name Age of Onset    Hypertension Mother      Diabetes Mother      Hypertension Father      Lung cancer Maternal Grandfather      Breast cancer Neg Hx         Social History     Tobacco Use    Smoking status: Every Day     Types: Vaping with nicotine     Passive exposure: Never    Smokeless tobacco: Never   Substance Use Topics    Alcohol use: Yes    Drug use: Not Currently         There were no vitals filed for this visit.       Physical Exam:  There were no vitals taken for this visit.    PHYSICAL EXAM:     There were no vitals filed for this visit.    GENERAL: Comfortable looking patient. Patient is in no distress.  Awake, alert and oriented to time, person and place.  No anxiety, or agitation.      HEENT: Normal conjunctivae and eyelids. WNL.  PERRLA 3 to 4 mm. No icterus, no pallor, no congestion, and no discharge noted.     NECK:  Supple. Trachea is central.  No crepitus.  No JVD or  masses.    RESPIRATORY:  No intercostal retractions.  No dullness to percussion.  Chest is clear to auscultation.  No rales, rhonchi or wheezes.  No crepitus.  Good air entry bilaterally.    CARDIOVASCULAR:  S1 and S2 are normally heard without murmurs or gallops.  All peripheral pulses are present.    ABDOMEN:  Normal abdomen.  No hepatosplenomegaly.  No free fluid.  Bowel sounds are present.  No hernia noted. No masses.  No rebound or tenderness.  No guarding or rigidity.  Umbilicus is midline.    LYMPHATICS:  No axillary, cervical, supraclavicular, submental, or inguinal lymphadenopathy.    BREASTS:  Quarter-size palpable mass in right upper outer quadrant of breast    SKIN/MUSCULOSKELETAL:  There is no evidence of excoriation marks or ecchmosis.  No rashes.  No cyanosis.  No clubbing.  No joint or skeletal deformities noted.  Normal range of motion.    NEUROLOGIC:  Higher functions are appropriate.  No cranial nerve deficits.  Normal bassem.  Normal strength.  Motor and sensory functions are normal.  Deep tendon reflexes are normal.    GENITAL/RECTAL:  Exams are deferred.      Labs:  Lab Results   Component Value Date    WBC 5.65 04/01/2024    RBC 4.19 04/01/2024    HGB 12.3 04/01/2024    HCT 37.9 04/01/2024    MCV 91 04/01/2024    MCH 29.4 04/01/2024    MCHC 32.5 04/01/2024    RDW 14.2 04/01/2024     04/01/2024    MPV 8.8 (L) 04/01/2024    GRAN 3.1 04/01/2024    GRAN 55.3 04/01/2024    LYMPH 1.7 04/01/2024    LYMPH 30.8 04/01/2024    MONO 0.6 04/01/2024    MONO 9.7 04/01/2024    EOS 0.2 04/01/2024    BASO 0.04 04/01/2024    EOSINOPHIL 3.0 04/01/2024    BASOPHIL 0.7 04/01/2024     Sodium   Date Value Ref Range Status   04/01/2024 139 136 - 145 mmol/L Final     Potassium   Date Value Ref Range Status   04/01/2024 4.2 3.5 - 5.1 mmol/L Final     Chloride   Date Value Ref Range Status   04/01/2024 102 95 - 110 mmol/L Final     CO2   Date Value Ref Range Status   04/01/2024 27 23 - 29 mmol/L Final     Glucose    Date Value Ref Range Status   04/01/2024 89 70 - 110 mg/dL Final     BUN   Date Value Ref Range Status   04/01/2024 23 8 - 23 mg/dL Final     Creatinine   Date Value Ref Range Status   04/01/2024 1.2 0.5 - 1.4 mg/dL Final     Calcium   Date Value Ref Range Status   04/01/2024 9.6 8.7 - 10.5 mg/dL Final     Total Protein   Date Value Ref Range Status   04/01/2024 6.5 6.0 - 8.4 g/dL Final     Albumin   Date Value Ref Range Status   04/01/2024 3.7 3.5 - 5.2 g/dL Final     Total Bilirubin   Date Value Ref Range Status   04/01/2024 0.4 0.1 - 1.0 mg/dL Final     Comment:     For infants and newborns, interpretation of results should be based  on gestational age, weight and in agreement with clinical  observations.    Premature Infant recommended reference ranges:  Up to 24 hours.............<8.0 mg/dL  Up to 48 hours............<12.0 mg/dL  3-5 days..................<15.0 mg/dL  6-29 days.................<15.0 mg/dL       Alkaline Phosphatase   Date Value Ref Range Status   04/01/2024 82 55 - 135 U/L Final     AST   Date Value Ref Range Status   04/01/2024 13 10 - 40 U/L Final     ALT   Date Value Ref Range Status   04/01/2024 17 10 - 44 U/L Final     Anion Gap   Date Value Ref Range Status   04/01/2024 10 8 - 16 mmol/L Final     eGFR if    Date Value Ref Range Status   05/10/2022 >60 >60 mL/min/1.73 m^2 Final     eGFR if non    Date Value Ref Range Status   05/10/2022 >60 >60 mL/min/1.73 m^2 Final     Comment:     Calculation used to obtain the estimated glomerular filtration  rate (eGFR) is the CKD-EPI equation.          A/P:    R breast DCIS  R breast IDCA [id number]  - DCIS was strongly hormone positive so patient is on aromatase inhibitor  -invasive component was only 2 mm, so even though it was HER2 positive, it was not treated with any adjuvant chemotherapy.    -completed radiation therapy to the breast  -on anastrozole currently, started 10/11/22  - mammogram done 2/3/23, results  showing probably benign with recommend repeat in 6 months       Osteopenia  - DEXA done 5/24/22  -on every 6 month prolia    Right breast mass:   -MRI of right breast  -see MD with results  -labs today including tumor marker

## 2024-05-22 ENCOUNTER — OFFICE VISIT (OUTPATIENT)
Dept: PODIATRY | Facility: CLINIC | Age: 69
End: 2024-05-22
Payer: MEDICARE

## 2024-05-22 ENCOUNTER — HOSPITAL ENCOUNTER (OUTPATIENT)
Dept: RADIOLOGY | Facility: HOSPITAL | Age: 69
Discharge: HOME | End: 2024-05-22
Attending: PODIATRIST
Payer: MEDICARE

## 2024-05-22 VITALS
HEIGHT: 64 IN | HEART RATE: 71 BPM | WEIGHT: 184.06 LBS | BODY MASS INDEX: 31.42 KG/M2 | DIASTOLIC BLOOD PRESSURE: 75 MMHG | SYSTOLIC BLOOD PRESSURE: 138 MMHG

## 2024-05-22 DIAGNOSIS — L60.0 INGROWN NAIL: Primary | ICD-10-CM

## 2024-05-22 DIAGNOSIS — M20.41 HAMMER TOE OF RIGHT FOOT: ICD-10-CM

## 2024-05-22 LAB — CANCER AG27-29 SERPL-ACNC: 24.3 U/ML

## 2024-05-22 PROCEDURE — 99215 OFFICE O/P EST HI 40 MIN: CPT | Mod: PBBFAC,25 | Performed by: PODIATRIST

## 2024-05-22 PROCEDURE — 99214 OFFICE O/P EST MOD 30 MIN: CPT | Mod: S$PBB,,, | Performed by: PODIATRIST

## 2024-05-22 PROCEDURE — 99999 PR PBB SHADOW E&M-EST. PATIENT-LVL V: CPT | Mod: PBBFAC,,, | Performed by: PODIATRIST

## 2024-05-22 PROCEDURE — 73630 X-RAY EXAM OF FOOT: CPT | Mod: TC,RT

## 2024-05-22 PROCEDURE — 73630 X-RAY EXAM OF FOOT: CPT | Mod: 26,RT,, | Performed by: RADIOLOGY

## 2024-05-22 RX ORDER — NITROFURANTOIN 25; 75 MG/1; MG/1
100 CAPSULE ORAL 2 TIMES DAILY
COMMUNITY
Start: 2024-05-14 | End: 2024-06-03

## 2024-05-22 RX ORDER — PHENAZOPYRIDINE HYDROCHLORIDE 200 MG/1
200 TABLET, FILM COATED ORAL EVERY 6 HOURS PRN
COMMUNITY
Start: 2024-05-14 | End: 2024-06-05

## 2024-05-22 RX ORDER — NEBIVOLOL 10 MG/1
TABLET ORAL
COMMUNITY
Start: 2024-02-29 | End: 2024-06-05

## 2024-05-22 RX ORDER — METHYLPREDNISOLONE 4 MG/1
TABLET ORAL
COMMUNITY
Start: 2024-05-14 | End: 2024-06-05

## 2024-05-24 DIAGNOSIS — J44.9 CHRONIC OBSTRUCTIVE PULMONARY DISEASE, UNSPECIFIED COPD TYPE: ICD-10-CM

## 2024-05-24 RX ORDER — IPRATROPIUM BROMIDE AND ALBUTEROL SULFATE 2.5; .5 MG/3ML; MG/3ML
SOLUTION RESPIRATORY (INHALATION)
Qty: 360 ML | Refills: 5 | Status: SHIPPED | OUTPATIENT
Start: 2024-05-24

## 2024-05-25 NOTE — PROGRESS NOTES
Subjective:       Patient ID: Laxmi Chand is a 68 y.o. female.    Chief Complaint: Foot Pain (Right foot) and Ingrown Toenail (Left great toe)  Patient presents today she is complaining of an ingrown toenail on her left great toe she states she tried to trim it out but does not believe she got all of the nail out of the area it has remained tender.  Patient also has a lot of stiffness and tightness in the right foot she had previous surgery to address multiple deformity she states the 2nd toe does stays swollen.    Past Medical History:   Diagnosis Date    Breast cancer in female 09/07/2022    IDC with high grade DCIS    COPD (chronic obstructive pulmonary disease)     Degeneration of lumbar intervertebral disc     Endometrial cancer     GERD (gastroesophageal reflux disease)     HTN (hypertension)     Hyperlipemia, mixed      Past Surgical History:   Procedure Laterality Date    BIOPSY OF AXILLARY NODE Right 9/7/2022    Procedure: BIOPSY, LYMPH NODE, AXILLARY;  Surgeon: Jatin Gutierrez MD;  Location: [facility] OR;  Service: General;  Laterality: Right;    BREAST BIOPSY Right 08/05/2022    BREAST MASS EXCISION Right 9/7/2022    Procedure: EXCISION, MASS, BREAST;  Surgeon: Jatin Gutierrez MD;  Location: [facility] OR;  Service: General;  Laterality: Right;  wire localized partial mastectomy right breast with margins     CARPAL TUNNEL RELEASE  1985    CHOLECYSTECTOMY  1978    CORRECTION OF HAMMER TOE Right 9/15/2023    Procedure: CORRECTION, HAMMER TOE;  Surgeon: Devyn Mccullough DPM;  Location: [facility] OR;  Service: Podiatry;  Laterality: Right;    FOOT HARDWARE REMOVAL Right 9/15/2023    Procedure: REMOVAL, HARDWARE, FOOT;  Surgeon: Devyn Mccullough DPM;  Location: [facility] OR;  Service: Podiatry;  Laterality: Right;    HYSTERECTOMY      KNEE SURGERY Left 06/01/2020    LUMBAR DISC SURGERY  01/01/1990    plate and roland    LUMBAR FUSION  2011    MIDFOOT ARTHRODESIS Right 9/15/2023    Procedure: FUSION, JOINT,  MIDFOOT;  Surgeon: Devyn Mccullough DPM;  Location: [facility] OR;  Service: Podiatry;  Laterality: Right;  [place] 28 1st MPJ fusion plates screws bone grafts as well as cut guide.    TOTAL KNEE REPLACEMENT USING COMPUTER NAVIGATION Left 02/15/2021     Family History   Problem Relation Name Age of Onset    Hypertension Mother      Diabetes Mother      Hypertension Father      Lung cancer Maternal Grandfather      Breast cancer Neg Hx       Social History     Socioeconomic History    Marital status: Significant Other   Occupational History    Occupation: disabled   Tobacco Use    Smoking status: Every Day     Types: Vaping with nicotine     Passive exposure: Never    Smokeless tobacco: Never   Substance and Sexual Activity    Alcohol use: Yes    Drug use: Not Currently    Sexual activity: Yes     Partners: Male   Social History Narrative    [facility] from [facility]         [facility]     Annual    urinary tract infection    yeast infection        Visit Summary    No pap per guidelines    U/A for culture    Wet prep: yeast only    Pt has a PCP    Colonoscopy up to date    Mammo @ [facility] [place]        Prescriptions:    SIG: Cipro 500 mg oral tablet, 3 days, Dispense #6 Tablet, 0 Refills    Directions: Take 1 oral tablet 2 times a day    SIG: Diflucan 100 mg oral tablet, 3 days, Dispense #3 Tablet, 0 Refills    Directions: Take 1 oral tablet once a day        Return for follow up appointment in one year or prn.     [id number]     Vulvar Cyst: Resolved        Visit Summary    Normal external gyn exam. Cyst resolved        Return for follow up appointment annual/prn.     [id number]     Chronic Vaginitis    Paratubal cysts: stable        Visit Summary    Wet prep: mostly yeast, few clue cells    Pt soaks in bath BID, stop, shower only, no douching.    u/s performed today. unchanged from last scan.        Prescriptions: Clindesse sample given    SIG:  Diflucan 100 mg oral tablet, 3 days, Dispense #3 Tablet, 0 Refills    Directions: Take 1 oral tablet once a day    Recommend probiotics        Return for follow up appointment for annual or prn. MDL swab at next appt if needed.    Mammo up to date.     [id number]     Recurrent bacterial vaginosis.  Paratubal cysts.  Dysuria.    Repeat transvaginal ultrasound 6 weeks.        Visit Summary    Ordered:    Urine Culture, Routine     [id number]     path compound melanocytic nevus...ch us...of pelvis....rtc 1y pap     [id number]     3 moles removed from back 5 mm each...same contwainer monsels applied...    one mole removed from under each breasxt 5 mm...mnonsels applied...each one sent to path sepreatelyt..        vag dc bv...sp metrogel...no family h/o breast ca...        pelvic us...complex cyst 2.23 cm on left w possible excrescence and septation        pelvic us for complex cyst at [facility] [place]......rtc 1wk     [id number]     Chronic Bacterial Vaginitis    Chronic Back Pain    Left Lower Quadrant resolved, possible ruptured ovarian cyst        Visit Summary    MDL swab done        Return for follow up appointment in 2 months for follow up pelvic u/s.     GYN Exam/[id number] 20164/6/2017     Annual    Vaginal odor, Bacterial Vaginosis    Ovarian Cyst        Visit Summary    Pap done, reports hx of cervical pre-cancer        Prescriptions:    SIG: metronidazole 500 mg tablet, 7 days, Dispense #14 Tablet, 0 Refills    Directions: Take 1 oral tablet 2 times a day. No alcohol discussed.        U/S today, reports had ovarian cyst on CT scan.    FSH today        Return for follow up appointment  pending results.     Social Determinants of Health     Financial Resource Strain: High Risk (1/29/2024)    Overall Financial Resource Strain (CARDIA)     Difficulty of Paying Living Expenses: Hard   Food Insecurity: Food Insecurity  Present (1/29/2024)    Hunger Vital Sign     Worried About Running Out of Food in the Last Year: Sometimes true     Ran Out of Food in the Last Year: Sometimes true   Transportation Needs: No Transportation Needs (1/29/2024)    PRAPARE - Transportation     Lack of Transportation (Medical): No     Lack of Transportation (Non-Medical): No   Physical Activity: Insufficiently Active (1/29/2024)    Exercise Vital Sign     Days of Exercise per Week: 1 day     Minutes of Exercise per Session: 10 min   Stress: No Stress Concern Present (1/29/2024)    [facility] [employer] - Occupational Stress Questionnaire     Feeling of Stress : Only a little   Housing Stability: Low Risk  (1/29/2024)    Housing Stability Vital Sign     Unable to Pay for Housing in the Last Year: No     Number of Places Lived in the Last Year: 1     Unstable Housing in the Last Year: No       Current Outpatient Medications   Medication Sig Dispense Refill    ADVAIR -21 mcg/actuation HFAA inhaler Inhale 2 puffs into the lungs every 12 (twelve) hours. 12 g 3    albuterol (PROVENTIL/VENTOLIN HFA) 90 mcg/actuation inhaler INHALE 2 PUFFS BY MOUTH EVERY 4 HOURS AS NEEDED FOR WHEEZING OR SHORTNESS OF BREATH 18 g 6    albuterol-ipratropium (DUO-NEB) 2.5 mg-0.5 mg/3 mL nebulizer solution INHALE CONTENTS OF 1 VIAL BY MOUTH WITH NEBULIZER EVERY 6 HOURS WHILE AWAKE AS DIRECTED 360 mL 5    anastrozole (ARIMIDEX) 1 mg Tab TAKE 1 TABLET (1 MG TOTAL) BY MOUTH ONCE DAILY. 90 tablet 3    aspirin (ECOTRIN) 81 MG EC tablet Take 1 tablet by mouth once daily. Pt back on asa      aspirin-acetaminophen-caffeine 250-250-65 mg (HEADACHE RELIEF, ASA-ACET-CAF,) 250-250-65 mg per tablet Take 1 tablet by mouth every 6 to 8 hours as needed.      buPROPion (WELLBUTRIN) 100 MG tablet TAKE 1 TABLET(100 MG) BY MOUTH THREE TIMES DAILY 270 tablet 3    calcium carbonate-vitamin D3 (CALCIUM 600 + D,3,) 600-125 mg-unit Tab Take 2 tablets by mouth once daily. 180 tablet  3    celecoxib (CELEBREX) 200 MG capsule TAKE 1 CAPSULE (200 MG TOTAL) BY MOUTH 2 (TWO) TIMES DAILY. 60 capsule 3    cetirizine (ZYRTEC) 10 MG tablet Take 1 tablet (10 mg total) by mouth once daily. 90 tablet 5    conjugated estrogens (PREMARIN) vaginal cream Place 0.5 g vaginally twice a week. 30 g 5    cyclobenzaprine (FLEXERIL) 10 MG tablet Take 1 tablet (10 mg total) by mouth nightly. 90 tablet 3    doxepin (SINEQUAN) 25 MG capsule TAKE 2 CAPSULES (50 MG TOTAL) BY MOUTH NIGHTLY. 180 capsule 3    fluconazole (DIFLUCAN) 200 MG Tab Take 1 tablet (200 mg total) by mouth every 72 hours. 3 tablet 1    fluticasone propionate (FLONASE) 50 mcg/actuation nasal spray 1 spray (50 mcg total) by Each Nostril route once daily. 18.2 mL 2    gabapentin (NEURONTIN) 300 MG capsule Take 1 capsule (300 mg total) by mouth 2 (two) times daily. 60 capsule 2    hydrALAZINE (APRESOLINE) 10 MG tablet Take 1 tablet (10 mg total) by mouth 3 (three) times daily. 90 tablet 11    hydroCHLOROthiazide (HYDRODIURIL) 25 MG tablet Take 1 tablet (25 mg total) by mouth once daily. 90 tablet 3    levomilnacipran (FETZIMA) 20 mg Cs24 Take 1 capsule(s) every day by oral route.      LUMIGAN 0.01 % Drop Place into both eyes.      methylPREDNISolone (MEDROL DOSEPACK) 4 mg tablet FOLLOW PACKAGE DIRECTIONS      metoprolol succinate (TOPROL-XL) 200 MG 24 hr tablet Take 1 tablet (200 mg total) by mouth once daily. 30 tablet 3    metroNIDAZOLE (METROGEL) 0.75 % (37.5mg/5 gram) vaginal gel Place 1 applicator vaginally Daily. 70 g 0    montelukast (SINGULAIR) 10 mg tablet Take 1 tablet (10 mg total) by mouth once daily. 90 tablet 3    nebivoloL (BYSTOLIC) 10 MG Tab Take by mouth.      nitrofurantoin, macrocrystal-monohydrate, (MACROBID) 100 MG capsule Take 100 mg by mouth 2 (two) times daily.      nystatin (MYCOSTATIN) cream Apply topically 2 (two) times daily. 30 g 0    nystatin (MYCOSTATIN) powder Apply topically 4 (four) times daily. 60 g 1    ondansetron  "(ZOFRAN) 4 MG tablet Take 1 tablet (4 mg total) by mouth every 8 (eight) hours as needed for Nausea. 30 tablet 2    oxyCODONE-acetaminophen (PERCOCET)  mg per tablet Take 1 tablet by mouth.      pantoprazole (PROTONIX) 40 MG tablet Take 1 tablet (40 mg total) by mouth once daily. 90 tablet 3    phenazopyridine (PYRIDIUM) 200 MG tablet Take 200 mg by mouth every 6 (six) hours as needed.      pseudoephedrine (SUDAFED) 120 mg 12 hr tablet Take 120 mg by mouth every 12 (twelve) hours.      valsartan (DIOVAN) 80 MG tablet Take 1 tablet (80 mg total) by mouth once daily. 90 tablet 3    ondansetron (ZOFRAN) 8 MG tablet Take 8 mg by mouth 3 (three) times daily.       No current facility-administered medications for this visit.     Review of patient's allergies indicates:  No Known Allergies    Review of Systems   Musculoskeletal:  Positive for arthralgias and joint swelling.   All other systems reviewed and are negative.      Objective:      Vitals:    05/22/24 0943   BP: 138/75   BP Location: Left arm   Patient Position: Sitting   Pulse: 71   Weight: 83.5 kg (184 lb 1.4 oz)   Height: 5' 4" (1.626 m)     Physical Exam  Vitals and nursing note reviewed.   Constitutional:       Appearance: Normal appearance.   Cardiovascular:      Pulses:           Dorsalis pedis pulses are 1+ on the right side and 1+ on the left side.        Posterior tibial pulses are 0 on the right side and 0 on the left side.   Pulmonary:      Effort: Pulmonary effort is normal.   Musculoskeletal:         General: Swelling and tenderness present.      Right foot: Decreased range of motion.   Feet:      Right foot:      Protective Sensation: 2 sites tested.  2 sites sensed.      Left foot:      Protective Sensation: 2 sites tested.  2 sites sensed.      Skin integrity: Erythema and warmth present.      Toenail Condition: Left toenails are ingrown.   Skin:     Capillary Refill: Capillary refill takes more than 3 seconds.      Findings: Erythema " present.   Neurological:      General: No focal deficit present.      Mental Status: She is alert.   Psychiatric:         Mood and Affect: Mood normal.         Behavior: Behavior normal.                                            Assessment:       1. Hammer toe of right foot    2. Ingrown nail          Plan:        Patient presents today she is complaining of an ingrown toenail on her left great toe she states she tried to trim it out but does not believe she got all of the nail out of the area it has remained tender.  Patient also has a lot of stiffness and tightness in the right foot she had previous surgery to address multiple deformity she states the 2nd toe does stays swollen.  X-rays were reviewed today of the patient's right foot patient maintains good anatomic alignment and surgical correction of previously deformities involving the right foot she does likely have extensive scar tissue where the 2nd metatarsal head needed to be resected this area has filled in with scar tissue making the 2nd digit very tight not very mobile however this is consistent with the patient's previous procedure I did advised the patient this scar tissue will break down over time it can take an extended period of time for this to break down some of the scar tissues actually good because it is exiting acting as a bumper where the 2nd metatarsal head was resected.  I did advised the patient she has not wearing shoes that have good support not supporting the right foot where she has had previous surgery will certainly cause inflammation discomfort swelling I have recommended Ooofs and advised the patient she has to wear good supportive shoes at all times.  X-rays reviewed in detail.  Patient had an ingrowing toenail at the lateral border of the left hallux I was able to aggressively trim and remove the nail patient needs to start applying antibiotic ointment for the next several days and soaking as directed.  Recommended follow-up as  needed patient advised to contact us with any problems questions or concerns.  This note was created using [employer] voice recognition software that occasionally misinterpreted phrases or words.

## 2024-05-28 NOTE — TELEPHONE ENCOUNTER
----- Message from Neha Howard sent at 5/28/2024 11:42 AM CDT -----  Regarding: advise  Contact: patient  Type: Needs Medical Advice  Who Called:  patient   Symptoms (please be specific):    How long has patient had these symptoms:    Pharmacy name and phone #:    Best Call Back Number: [phone]    Additional Information: paperwork for handicap walker it broke needs a new one

## 2024-05-28 NOTE — TELEPHONE ENCOUNTER
Spoke to pt who states her rollator broke and she needs a new one to walk with.  Pt was last seen by NP, You on 2/21/24. Pt offered appt and advised documentation for insurance is needed. Pt states she does not need an appt just needs to the RX as insurance advised they would cover with just an RX pt states she will call back to schedule appt if needed. Pt req appt be sent to [facility]

## 2024-05-29 RX ORDER — CALCIUM CARBONATE 160(400)MG
1 TABLET,CHEWABLE ORAL DAILY
Qty: 1 EACH | Refills: 0 | Status: SHIPPED | OUTPATIENT
Start: 2024-05-29

## 2024-05-30 ENCOUNTER — TELEPHONE (OUTPATIENT)
Dept: FAMILY MEDICINE | Facility: CLINIC | Age: 69
End: 2024-05-30
Payer: MEDICARE

## 2024-05-30 DIAGNOSIS — R26.89 NEED FOR ASSISTANCE DUE TO UNSTEADY GAIT: ICD-10-CM

## 2024-05-30 DIAGNOSIS — Z74.09 IMPAIRED FUNCTIONAL MOBILITY, BALANCE, AND ENDURANCE: ICD-10-CM

## 2024-05-30 DIAGNOSIS — Z99.89 WALKER AS AMBULATION AID: Primary | ICD-10-CM

## 2024-05-30 DIAGNOSIS — M51.36 DEGENERATION OF LUMBAR INTERVERTEBRAL DISC: ICD-10-CM

## 2024-05-30 NOTE — TELEPHONE ENCOUNTER
Requesting a medical ICD 10 code to cover walker  Please advise        ----- Message from Nehaclay Hiltonkaron sent at 5/30/2024  4:42 PM CDT -----  Regarding: advise  Contact: [place] [facility]  Type: Needs Medical Advice  Who Called:  [place] [facility]   Symptoms (please be specific):  rolator   How long has patient had these symptoms:    Pharmacy name and phone #:      [facility] [place] [address]  Phone: [phone] Fax: [phone]      Best Call Back Number: [phone]  Additional Information: needs a dx code

## 2024-05-30 NOTE — TELEPHONE ENCOUNTER
----- Message from Lucero Page sent at 5/30/2024 10:37 AM CDT -----  Contact: pt [phone]  Type: Needs Medical Advice  Who Called:  Pt     Pharmacy name and phone #:      [facility] [place] [address]  [place] [address]  Phone: [phone] Fax: [phone]      Best Call Back Number: [phone]    Additional Information: Pt calling to f/u on her rx request for a walker. Pt stated she spoke w/ a nurse about this on 05/28 and never heard back. Pt has been with out her walker for a week now and is very upset that no one has called her back. Pls call back and advise

## 2024-05-31 ENCOUNTER — TELEPHONE (OUTPATIENT)
Dept: FAMILY MEDICINE | Facility: CLINIC | Age: 69
End: 2024-05-31
Payer: MEDICARE

## 2024-05-31 NOTE — TELEPHONE ENCOUNTER
Follow up call placed to "[facility]".  Spoke to Latricia for notification of diagnoses, and diagnoses codes related to rolator walker order.  Mrs. Rome verbalized understanding.

## 2024-05-31 NOTE — TELEPHONE ENCOUNTER
Return call placed to [facility], call answered by Marnie who states the representative handling this matter (Grazyna) is unavailable at the moment.  Writer requested for Mrs. Dye to advise Mrs. Geronimo of my call.

## 2024-05-31 NOTE — TELEPHONE ENCOUNTER
Neha Howard [facility] Staff     ----- Message from Neha Howard sent at 5/31/2024  1:16 PM CDT -----  Regarding: advise  Contact: [facility]  Type: Needs Medical Advice  Who Called:  susan co   Symptoms (please be specific):   How long has patient had these symptoms:    Pharmacy name and phone #:      [facility] [place] [place] [address]  Phone: [phone] Fax: [phone]      Best Call Back Number: [phone]    Additional Information:   [facility] returning ur call are u available MRN: [id number] LISA VELEZ

## 2024-05-31 NOTE — TELEPHONE ENCOUNTER
Impaired functional mobility, balance, and endurance [Z74.09]   Need for assistance due to unsteady gait [R26.89]   Degeneration of lumbar intervertebral disc [M51.36]   Walker as ambulation aid [Z99.89]

## 2024-05-31 NOTE — TELEPHONE ENCOUNTER
Freya Wilder [facility] Staff     ----- Message from Freya Wilder sent at 5/31/2024 10:05 AM CDT -----  Type:  Pharmacy Calling to Clarify an RX    Name of Caller:  Grazyna    Pharmacy Name:  [email or web address]    Prescription Name:  ultra light rollator    What do they need to clarify?:  needing diagnosis code    Best Call Back Number:  [phone]    Additional Information:    Please call back to advise. Thanks!

## 2024-06-03 ENCOUNTER — OFFICE VISIT (OUTPATIENT)
Dept: PODIATRY | Facility: CLINIC | Age: 69
End: 2024-06-03
Payer: MEDICARE

## 2024-06-03 ENCOUNTER — HOSPITAL ENCOUNTER (OUTPATIENT)
Dept: RADIOLOGY | Facility: HOSPITAL | Age: 69
Discharge: HOME | End: 2024-06-03
Attending: NURSE PRACTITIONER
Payer: MEDICARE

## 2024-06-03 ENCOUNTER — TELEPHONE (OUTPATIENT)
Dept: PODIATRY | Facility: CLINIC | Age: 69
End: 2024-06-03
Payer: MEDICARE

## 2024-06-03 VITALS
SYSTOLIC BLOOD PRESSURE: 132 MMHG | HEIGHT: 64 IN | DIASTOLIC BLOOD PRESSURE: 85 MMHG | BODY MASS INDEX: 31.42 KG/M2 | HEART RATE: 66 BPM | WEIGHT: 184.06 LBS

## 2024-06-03 DIAGNOSIS — J30.9 CHRONIC ALLERGIC RHINITIS: ICD-10-CM

## 2024-06-03 DIAGNOSIS — L03.032 PARONYCHIA OF GREAT TOE, LEFT: Primary | ICD-10-CM

## 2024-06-03 DIAGNOSIS — C50.011 MALIGNANT NEOPLASM OF NIPPLE OF RIGHT BREAST IN FEMALE, UNSPECIFIED ESTROGEN RECEPTOR STATUS: ICD-10-CM

## 2024-06-03 DIAGNOSIS — L60.0 INGROWN NAIL: ICD-10-CM

## 2024-06-03 PROCEDURE — 99999 PR PBB SHADOW E&M-EST. PATIENT-LVL III: CPT | Mod: PBBFAC,,, | Performed by: PODIATRIST

## 2024-06-03 PROCEDURE — 87186 SC STD MICRODIL/AGAR DIL: CPT | Performed by: PODIATRIST

## 2024-06-03 PROCEDURE — A9585 GADOBUTROL INJECTION: HCPCS | Mod: PO | Performed by: NURSE PRACTITIONER

## 2024-06-03 PROCEDURE — 99213 OFFICE O/P EST LOW 20 MIN: CPT | Mod: PBBFAC | Performed by: PODIATRIST

## 2024-06-03 PROCEDURE — 25500020 PHARM REV CODE 255: Mod: PO | Performed by: NURSE PRACTITIONER

## 2024-06-03 PROCEDURE — 77049 MRI BREAST C-+ W/CAD BI: CPT | Mod: 26,,, | Performed by: RADIOLOGY

## 2024-06-03 PROCEDURE — 77049 MRI BREAST C-+ W/CAD BI: CPT | Mod: TC,PO

## 2024-06-03 PROCEDURE — 87070 CULTURE OTHR SPECIMN AEROBIC: CPT | Performed by: PODIATRIST

## 2024-06-03 PROCEDURE — 99213 OFFICE O/P EST LOW 20 MIN: CPT | Mod: S$PBB,,, | Performed by: PODIATRIST

## 2024-06-03 PROCEDURE — 87077 CULTURE AEROBIC IDENTIFY: CPT | Performed by: PODIATRIST

## 2024-06-03 RX ORDER — CETIRIZINE HYDROCHLORIDE 10 MG/1
10 TABLET ORAL DAILY
Qty: 90 TABLET | Refills: 5 | Status: SHIPPED | OUTPATIENT
Start: 2024-06-03

## 2024-06-03 RX ORDER — SULFAMETHOXAZOLE AND TRIMETHOPRIM 800; 160 MG/1; MG/1
1 TABLET ORAL 2 TIMES DAILY
Qty: 28 TABLET | Refills: 0 | Status: SHIPPED | OUTPATIENT
Start: 2024-06-03 | End: 2024-06-17

## 2024-06-03 RX ORDER — GADOBUTROL 604.72 MG/ML
8.5 INJECTION INTRAVENOUS
Status: COMPLETED | OUTPATIENT
Start: 2024-06-03 | End: 2024-06-03

## 2024-06-03 RX ADMIN — GADOBUTROL 8.5 ML: 604.72 INJECTION INTRAVENOUS at 10:06

## 2024-06-03 NOTE — TELEPHONE ENCOUNTER
Refill Routing Note   Medication(s) are not appropriate for processing by [facility] for the following reason(s):      Non-participating provider    [facility] action(s):  Route Care Due:  None identified            Appointments  past 12m or future 3m with PCP    Date Provider   Last Visit   2/21/2024 Zoraida Orta, HERBER   Next Visit   Visit date not found Zoraida Orta NP   ED visits in past 90 days: 0        Note composed:2:05 PM 06/03/2024

## 2024-06-03 NOTE — TELEPHONE ENCOUNTER
----- Message from Freya Wilder sent at 6/3/2024 11:11 AM CDT -----  Type:  Appointment Request    Caller is requesting an appointment.      Name of Caller:  Pt    Symptoms:  got an ingrown toenail out a couple weeks ago and it has been infected and red ever since not getting better    Would the patient rather a call back or a response via [facility]?   Call back    Best Call Back Number:   [phone]    Additional Information:  Pt is trying to get in with Dr Mccullough.  Please call back to advise. Thanks!

## 2024-06-05 NOTE — PROGRESS NOTES
Subjective:       Patient ID: Laxmi Chand is a 68 y.o. female.    Chief Complaint: Ingrown Toenail (Left great toe)  Patient presents today she is complaining of an ingrown toenail on her left great toe.      Past Medical History:   Diagnosis Date    Breast cancer in female 09/07/2022    IDC with high grade DCIS    COPD (chronic obstructive pulmonary disease)     Degeneration of lumbar intervertebral disc     Endometrial cancer     GERD (gastroesophageal reflux disease)     HTN (hypertension)     Hyperlipemia, mixed      Past Surgical History:   Procedure Laterality Date    BIOPSY OF AXILLARY NODE Right 9/7/2022    Procedure: BIOPSY, LYMPH NODE, AXILLARY;  Surgeon: Jatin Gutierrez MD;  Location: [facility] OR;  Service: General;  Laterality: Right;    BREAST BIOPSY Right 08/05/2022    BREAST MASS EXCISION Right 9/7/2022    Procedure: EXCISION, MASS, BREAST;  Surgeon: Jatin Gutierrez MD;  Location: [facility] OR;  Service: General;  Laterality: Right;  wire localized partial mastectomy right breast with margins     CARPAL TUNNEL RELEASE  1985    CHOLECYSTECTOMY  1978    CORRECTION OF HAMMER TOE Right 9/15/2023    Procedure: CORRECTION, HAMMER TOE;  Surgeon: Devyn Mccullough DPM;  Location: [facility] OR;  Service: Podiatry;  Laterality: Right;    FOOT HARDWARE REMOVAL Right 9/15/2023    Procedure: REMOVAL, HARDWARE, FOOT;  Surgeon: Devyn Mccullough DPM;  Location: [facility] OR;  Service: Podiatry;  Laterality: Right;    HYSTERECTOMY      KNEE SURGERY Left 06/01/2020    LUMBAR DISC SURGERY  01/01/1990    plate and roland    LUMBAR FUSION  2011    MIDFOOT ARTHRODESIS Right 9/15/2023    Procedure: FUSION, JOINT, MIDFOOT;  Surgeon: Devyn Mccullough DPM;  Location: [facility] OR;  Service: Podiatry;  Laterality: Right;  [place] 28 1st MPJ fusion plates screws bone grafts as well as cut guide.    TOTAL KNEE REPLACEMENT USING COMPUTER NAVIGATION Left 02/15/2021     Family History   Problem Relation Name Age of Onset     Hypertension Mother      Diabetes Mother      Hypertension Father      Lung cancer Maternal Grandfather      Breast cancer Neg Hx       Social History     Socioeconomic History    Marital status: Significant Other   Occupational History    Occupation: disabled   Tobacco Use    Smoking status: Every Day     Types: Vaping with nicotine     Passive exposure: Never    Smokeless tobacco: Never   Substance and Sexual Activity    Alcohol use: Yes    Drug use: Not Currently    Sexual activity: Yes     Partners: Male   Social History Narrative    [facility] from [facility]         [facility]     Annual    urinary tract infection    yeast infection        Visit Summary    No pap per guidelines    U/A for culture    Wet prep: yeast only    Pt has a PCP    Colonoscopy up to date    [place] [facility] [place]:    SIG: Cipro 500 mg oral tablet, 3 days, Dispense #6 Tablet, 0 Refills    Directions: Take 1 oral tablet 2 times a day    SIG: Diflucan 100 mg oral tablet, 3 days, Dispense #3 Tablet, 0 Refills    Directions: Take 1 oral tablet once a day        Return for follow up appointment in one year or prn.     GYN Visit & [id number] [facility]2/4/2018     Vulvar Cyst: Resolved        Visit Summary    Normal external gyn exam. Cyst resolved        Return for follow up appointment annual/prn.     GYN Visit & [id number] [facility]5/24/2018     Chronic Vaginitis    Paratubal cysts: stable        Visit Summary    Wet prep: mostly yeast, few clue cells    Pt soaks in bath BID, stop, shower only, no douching.    u/s performed today. unchanged from last scan.        Prescriptions: Clindesse sample given    SIG: Diflucan 100 mg oral tablet, 3 days, Dispense #3 Tablet, 0 Refills    Directions: Take 1 oral tablet once a day    Recommend probiotics        Return for follow up appointment for annual or prn. MDL swab at next appt if needed.    Mammo up to date.     GYN Visit & [id number] [facility]/16/2017     Recurrent  bacterial vaginosis.  Paratubal cysts.  Dysuria.    Repeat transvaginal ultrasound 6 weeks.        Visit Summary    Ordered:    Urine Culture, Routine     GYN Visit & [id number] [facility]5/12/2017     path compound melanocytic nevus... us...of pelvis....rtc 1y pap     GYN Visit & [id number] [facility]5/4/2017     3 moles removed from back 5 mm each...same contwainer monsels applied...    one mole removed from under each breasxt 5 mm...mnonsels applied...each one sent to path sepreatelyt..        vag dc bv...sp metrogel...no family h/o breast ca...        pelvic us...complex cyst 2.23 cm on left w possible excrescence and septation        pelvic us for complex cyst at [facility] [place]rtc 1wk     GYN Visit & [id number] [facility]4/25/2017     Chronic Bacterial Vaginitis    Chronic Back Pain    Left Lower Quadrant resolved, possible ruptured ovarian cyst        Visit Summary    MDL swab done        Return for follow up appointment in 2 months for follow up pelvic u/s.     GYN Exam/[id number] 20164/6/2017     Annual    Vaginal odor, Bacterial Vaginosis    Ovarian Cyst        Visit Summary    Pap done, reports hx of cervical pre-cancer        Prescriptions:    SIG: metronidazole 500 mg tablet, 7 days, Dispense #14 Tablet, 0 Refills    Directions: Take 1 oral tablet 2 times a day. No alcohol discussed.        U/S today, reports had ovarian cyst on CT scan.    FSH today        Return for follow up appointment  pending results.     Social Determinants of Health     Financial Resource Strain: High Risk (1/29/2024)    Overall Financial Resource Strain (CARDIA)     Difficulty of Paying Living Expenses: Hard   Food Insecurity: Food Insecurity Present (1/29/2024)    Hunger Vital Sign     Worried About Running Out of Food in the Last Year: Sometimes true     Ran Out of Food in the Last Year: Sometimes true   Transportation Needs: No Transportation Needs (1/29/2024)    PRAPARE - Transportation     Lack of Transportation (Medical): No     Lack of  Transportation (Non-Medical): No   Physical Activity: Insufficiently Active (1/29/2024)    Exercise Vital Sign     Days of Exercise per Week: 1 day     Minutes of Exercise per Session: 10 min   Stress: No Stress Concern Present (1/29/2024)    [employer] - Occupational Stress Questionnaire     Feeling of Stress : Only a little   Housing Stability: Low Risk  (1/29/2024)    Housing Stability Vital Sign     Unable to Pay for Housing in the Last Year: No     Number of Places Lived in the Last Year: 1     Unstable Housing in the Last Year: No       Current Outpatient Medications   Medication Sig Dispense Refill    ADVAIR -21 mcg/actuation HFAA inhaler Inhale 2 puffs into the lungs every 12 (twelve) hours. 12 g 3    albuterol (PROVENTIL/VENTOLIN HFA) 90 mcg/actuation inhaler INHALE 2 PUFFS BY MOUTH EVERY 4 HOURS AS NEEDED FOR WHEEZING OR SHORTNESS OF BREATH 18 g 6    albuterol-ipratropium (DUO-NEB) 2.5 mg-0.5 mg/3 mL nebulizer solution INHALE CONTENTS OF 1 VIAL BY MOUTH WITH NEBULIZER EVERY 6 HOURS WHILE AWAKE AS DIRECTED 360 mL 5    anastrozole (ARIMIDEX) 1 mg Tab TAKE 1 TABLET (1 MG TOTAL) BY MOUTH ONCE DAILY. 90 tablet 3    aspirin (ECOTRIN) 81 MG EC tablet Take 1 tablet by mouth once daily. Pt back on asa      aspirin-acetaminophen-caffeine 250-250-65 mg (HEADACHE RELIEF, ASA-ACET-CAF,) 250-250-65 mg per tablet Take 1 tablet by mouth every 6 to 8 hours as needed.      buPROPion (WELLBUTRIN) 100 MG tablet TAKE 1 TABLET(100 MG) BY MOUTH THREE TIMES DAILY 270 tablet 3    calcium carbonate-vitamin D3 (CALCIUM 600 + D,3,) 600-125 mg-unit Tab Take 2 tablets by mouth once daily. 180 tablet 3    celecoxib (CELEBREX) 200 MG capsule TAKE 1 CAPSULE (200 MG TOTAL) BY MOUTH 2 (TWO) TIMES DAILY. 60 capsule 3    conjugated estrogens (PREMARIN) vaginal cream Place 0.5 g vaginally twice a week. 30 g 5    cyclobenzaprine (FLEXERIL) 10 MG tablet Take 1 tablet (10 mg total) by mouth nightly. 90 tablet  3    doxepin (SINEQUAN) 25 MG capsule TAKE 2 CAPSULES (50 MG TOTAL) BY MOUTH NIGHTLY. 180 capsule 3    fluticasone propionate (FLONASE) 50 mcg/actuation nasal spray 1 spray (50 mcg total) by Each Nostril route once daily. 18.2 mL 2    hydrALAZINE (APRESOLINE) 10 MG tablet Take 1 tablet (10 mg total) by mouth 3 (three) times daily. 90 tablet 11    hydroCHLOROthiazide (HYDRODIURIL) 25 MG tablet Take 1 tablet (25 mg total) by mouth once daily. 90 tablet 3    LUMIGAN 0.01 % Drop Place into both eyes.      metoprolol succinate (TOPROL-XL) 200 MG 24 hr tablet Take 1 tablet (200 mg total) by mouth once daily. 30 tablet 3    metroNIDAZOLE (METROGEL) 0.75 % (37.5mg/5 gram) vaginal gel Place 1 applicator vaginally Daily. 70 g 0    montelukast (SINGULAIR) 10 mg tablet Take 1 tablet (10 mg total) by mouth once daily. 90 tablet 3    nystatin (MYCOSTATIN) cream Apply topically 2 (two) times daily. 30 g 0    nystatin (MYCOSTATIN) powder Apply topically 4 (four) times daily. 60 g 1    oxyCODONE-acetaminophen (PERCOCET)  mg per tablet Take 1 tablet by mouth.      pantoprazole (PROTONIX) 40 MG tablet Take 1 tablet (40 mg total) by mouth once daily. 90 tablet 3    pseudoephedrine (SUDAFED) 120 mg 12 hr tablet Take 120 mg by mouth every 12 (twelve) hours.      valsartan (DIOVAN) 80 MG tablet Take 1 tablet (80 mg total) by mouth once daily. 90 tablet 3    walker (ULTRA-LIGHT ROLLATOR) Misc 1 Units by Misc.(Non-Drug; Combo Route) route Daily. 1 each 0    cetirizine (ZYRTEC) 10 MG tablet TAKE 1 TABLET (10 MG TOTAL) BY MOUTH ONCE DAILY. 90 tablet 5    ondansetron (ZOFRAN) 4 MG tablet Take 1 tablet (4 mg total) by mouth every 8 (eight) hours as needed for Nausea. (Patient not taking: Reported on 6/3/2024) 30 tablet 2    sulfamethoxazole-trimethoprim 800-160mg (BACTRIM DS) 800-160 mg Tab Take 1 tablet by mouth 2 (two) times daily. for 14 days 28 tablet 0     No current facility-administered medications for this visit.     Review of  "patient's allergies indicates:  No Known Allergies    Review of Systems   Skin:  Positive for color change.   All other systems reviewed and are negative.      Objective:      Vitals:    06/03/24 1400   BP: 132/85   BP Location: Right arm   Patient Position: Sitting   Pulse: 66   Weight: 83.5 kg (184 lb 1.4 oz)   Height: 5' 4" (1.626 m)     Physical Exam  Vitals and nursing note reviewed.   Constitutional:       Appearance: Normal appearance.   Cardiovascular:      Pulses:           Dorsalis pedis pulses are 1+ on the right side and 1+ on the left side.        Posterior tibial pulses are 0 on the right side and 0 on the left side.   Pulmonary:      Effort: Pulmonary effort is normal.   Musculoskeletal:         General: Swelling and tenderness present.      Right foot: Decreased range of motion.   Feet:      Right foot:      Protective Sensation: 2 sites tested.  2 sites sensed.      Left foot:      Protective Sensation: 2 sites tested.  2 sites sensed.      Skin integrity: Erythema and warmth present.      Toenail Condition: Left toenails are ingrown.   Skin:     Capillary Refill: Capillary refill takes more than 3 seconds.      Findings: Erythema present.   Neurological:      General: No focal deficit present.      Mental Status: She is alert.   Psychiatric:         Mood and Affect: Mood normal.         Behavior: Behavior normal.                                                  Assessment:       1. Paronychia of great toe, left    2. Ingrown nail          Plan:        Patient presents today she is complaining of an ingrown toenail on her left great toe.    Patient states she thought the left great toe is doing better until it started to drain about 4 days ago and became significantly more painful.  I was able to trim and remove a large portion of nail from the distal lateral border of the left hallux there was positive purulent drainage I did perform a culture and sensitivity I have started the patient on Bactrim " she is going to start soaking the area as directed and I will follow up with her in 1 week.  Patient was advised to contact us immediately with any increased redness swelling or should her symptoms not showing signs of improvement over the next few days.  Preliminary culture display signs of Staph this has not finalized I will adjust the patient's antibiotics pending culture and sensitivity.  Patient did allow me to remove a large portion of nail which should significantly allow this area to drain and the infection to start to resolve.  Patient may need a nail avulsion if this persists.  This note was created using [employer] voice recognition software that occasionally misinterpreted phrases or words.

## 2024-06-06 ENCOUNTER — PATIENT MESSAGE (OUTPATIENT)
Dept: HEMATOLOGY/ONCOLOGY | Facility: CLINIC | Age: 69
End: 2024-06-06

## 2024-06-06 ENCOUNTER — TELEPHONE (OUTPATIENT)
Dept: PODIATRY | Facility: CLINIC | Age: 69
End: 2024-06-06
Payer: MEDICARE

## 2024-06-06 ENCOUNTER — OFFICE VISIT (OUTPATIENT)
Dept: HEMATOLOGY/ONCOLOGY | Facility: CLINIC | Age: 69
End: 2024-06-06
Payer: MEDICARE

## 2024-06-06 VITALS
WEIGHT: 184.06 LBS | OXYGEN SATURATION: 97 % | TEMPERATURE: 97 F | RESPIRATION RATE: 16 BRPM | DIASTOLIC BLOOD PRESSURE: 80 MMHG | HEIGHT: 64 IN | SYSTOLIC BLOOD PRESSURE: 144 MMHG | BODY MASS INDEX: 31.42 KG/M2 | HEART RATE: 60 BPM

## 2024-06-06 DIAGNOSIS — C50.011 MALIGNANT NEOPLASM OF NIPPLE OF RIGHT BREAST IN FEMALE, UNSPECIFIED ESTROGEN RECEPTOR STATUS: ICD-10-CM

## 2024-06-06 DIAGNOSIS — N64.4 BREAST PAIN, RIGHT: Primary | ICD-10-CM

## 2024-06-06 DIAGNOSIS — D05.11 DUCTAL CARCINOMA IN SITU (DCIS) OF RIGHT BREAST: ICD-10-CM

## 2024-06-06 LAB — BACTERIA SPEC AEROBE CULT: ABNORMAL

## 2024-06-06 PROCEDURE — 99213 OFFICE O/P EST LOW 20 MIN: CPT | Mod: PBBFAC,PN | Performed by: INTERNAL MEDICINE

## 2024-06-06 PROCEDURE — G2211 COMPLEX E/M VISIT ADD ON: HCPCS | Mod: S$PBB,,, | Performed by: INTERNAL MEDICINE

## 2024-06-06 PROCEDURE — 99999 PR PBB SHADOW E&M-EST. PATIENT-LVL III: CPT | Mod: PBBFAC,,, | Performed by: INTERNAL MEDICINE

## 2024-06-06 PROCEDURE — 99214 OFFICE O/P EST MOD 30 MIN: CPT | Mod: S$PBB,,, | Performed by: INTERNAL MEDICINE

## 2024-06-06 NOTE — PROGRESS NOTES
Service Date:  6/6/24    Chief Complaint: Breast Cancer (Breast MRI result     pain in right breast )    Laxmi Chand is a 68 y.o. female with right breast invasive ductal carcinoma only 2 mm in size and right breast DCIS.  S/p lumpectomy on 9/7/22.  Currently on aromatase inhibitor therapy.  Return sooner than scheduled as she was having some pain in her right breast.  She was seen by my nurse practitioner who ordered a breast MRI.  The breast MRI thankfully came back negative.  Patient continues have pain along her right axilla.    Review of Systems   Constitutional:  Positive for fatigue. Negative for appetite change and unexpected weight change.   HENT: Negative.  Negative for mouth sores.    Eyes: Negative.  Negative for visual disturbance.   Respiratory: Negative.  Negative for cough and shortness of breath.    Cardiovascular: Negative.  Negative for chest pain.   Gastrointestinal: Negative.  Negative for abdominal pain and diarrhea.   Endocrine: Negative.    Genitourinary: Negative.  Negative for frequency.   Musculoskeletal: Negative.    Integumentary:  Negative for rash. Negative.   Neurological: Negative.    Hematological: Negative.  Negative for adenopathy.   Psychiatric/Behavioral: Negative.  The patient is not nervous/anxious.         Current Outpatient Medications   Medication Instructions    ADVAIR -21 mcg/actuation HFAA inhaler 2 puffs, Inhalation, Every 12 hours    albuterol (PROVENTIL/VENTOLIN HFA) 90 mcg/actuation inhaler INHALE 2 PUFFS BY MOUTH EVERY 4 HOURS AS NEEDED FOR WHEEZING OR SHORTNESS OF BREATH    albuterol-ipratropium (DUO-NEB) 2.5 mg-0.5 mg/3 mL nebulizer solution INHALE CONTENTS OF 1 VIAL BY MOUTH WITH NEBULIZER EVERY 6 HOURS WHILE AWAKE AS DIRECTED    anastrozole (ARIMIDEX) 1 mg, Oral, Daily    aspirin (ECOTRIN) 81 MG EC tablet 1 tablet, Oral, Daily, Pt back on asa    aspirin-acetaminophen-caffeine 250-250-65 mg (HEADACHE RELIEF, ASA-ACET-CAF,) 250-250-65 mg per tablet 1  tablet, Oral, Every 6-8 hours PRN    buPROPion (WELLBUTRIN) 100 MG tablet TAKE 1 TABLET(100 MG) BY MOUTH THREE TIMES DAILY    calcium carbonate-vitamin D3 (CALCIUM 600 + D,3,) 600-125 mg-unit Tab 2 tablets, Oral, Daily    celecoxib (CELEBREX) 200 mg, Oral, 2 times daily    cetirizine (ZYRTEC) 10 mg, Oral, Daily    conjugated estrogens (PREMARIN) 0.5 g, Vaginal, Twice weekly    cyclobenzaprine (FLEXERIL) 10 mg, Oral, Nightly    doxepin (SINEQUAN) 50 mg, Oral, Nightly    fluticasone propionate (FLONASE) 50 mcg, Each Nostril, Daily    hydrALAZINE (APRESOLINE) 10 mg, Oral, 3 times daily    hydroCHLOROthiazide (HYDRODIURIL) 25 mg, Oral, Daily    LUMIGAN 0.01 % Drop Both Eyes    metoprolol succinate (TOPROL-XL) 200 mg, Oral, Daily    metroNIDAZOLE (METROGEL) 0.75 % (37.5mg/5 gram) vaginal gel 1 applicator, Vaginal, Daily    montelukast (SINGULAIR) 10 mg, Oral, Daily    nystatin (MYCOSTATIN) cream Topical (Top), 2 times daily    nystatin (MYCOSTATIN) powder Topical (Top), 4 times daily    ondansetron (ZOFRAN) 4 mg, Oral, Every 8 hours PRN    oxyCODONE-acetaminophen (PERCOCET)  mg per tablet 1 tablet, Oral    pantoprazole (PROTONIX) 40 mg, Oral, Daily    pseudoephedrine (SUDAFED) 120 mg, Oral, Every 12 hours (non-standard times)    sulfamethoxazole-trimethoprim 800-160mg (BACTRIM DS) 800-160 mg Tab 1 tablet, Oral, 2 times daily    valsartan (DIOVAN) 80 mg, Oral, Daily    walker (ULTRA-LIGHT ROLLATOR) Misc 1 Units, Misc.(Non-Drug; Combo Route), Daily        Past Medical History:   Diagnosis Date    Breast cancer in female 09/07/2022    IDC with high grade DCIS    COPD (chronic obstructive pulmonary disease)     Degeneration of lumbar intervertebral disc     Endometrial cancer     GERD (gastroesophageal reflux disease)     HTN (hypertension)     Hyperlipemia, mixed         Past Surgical History:   Procedure Laterality Date    BIOPSY OF AXILLARY NODE Right 9/7/2022    Procedure: BIOPSY, LYMPH NODE, AXILLARY;  Surgeon:  "Jatin Gutierrez MD;  Location: [facility] OR;  Service: General;  Laterality: Right;    BREAST BIOPSY Right 08/05/2022    BREAST MASS EXCISION Right 9/7/2022    Procedure: EXCISION, MASS, BREAST;  Surgeon: Jatin Gutierrez MD;  Location: [facility] OR;  Service: General;  Laterality: Right;  wire localized partial mastectomy right breast with margins     CARPAL TUNNEL RELEASE  1985    CHOLECYSTECTOMY  1978    CORRECTION OF HAMMER TOE Right 9/15/2023    Procedure: CORRECTION, HAMMER TOE;  Surgeon: Devyn Mccullough DPM;  Location: [facility] OR;  Service: Podiatry;  Laterality: Right;    FOOT HARDWARE REMOVAL Right 9/15/2023    Procedure: REMOVAL, HARDWARE, FOOT;  Surgeon: Devyn Mccullough DPM;  Location: [facility] OR;  Service: Podiatry;  Laterality: Right;    HYSTERECTOMY      KNEE SURGERY Left 06/01/2020    LUMBAR DISC SURGERY  01/01/1990    plate and roland    LUMBAR FUSION  2011    MIDFOOT ARTHRODESIS Right 9/15/2023    Procedure: FUSION, JOINT, MIDFOOT;  Surgeon: Devyn Mccullough DPM;  Location: [facility] OR;  Service: Podiatry;  Laterality: Right;  [place] 28 1st MPJ fusion plates screws bone grafts as well as cut guide.    TOTAL KNEE REPLACEMENT USING COMPUTER NAVIGATION Left 02/15/2021        Family History   Problem Relation Name Age of Onset    Hypertension Mother      Diabetes Mother      Hypertension Father      Lung cancer Maternal Grandfather      Breast cancer Neg Hx         Social History     Tobacco Use    Smoking status: Every Day     Types: Vaping with nicotine     Passive exposure: Never    Smokeless tobacco: Never   Substance Use Topics    Alcohol use: Yes    Drug use: Not Currently         Vitals:    06/06/24 1457   BP: (!) 144/80   Pulse: 60   Resp: 16   Temp: 97.2 °F (36.2 °C)        Physical Exam:  BP (!) 144/80 (BP Location: Left arm, Patient Position: Sitting, BP Method: Small (Automatic))   Pulse 60   Temp 97.2 °F (36.2 °C) (Temporal)   Resp 16   Ht 5' 4" (1.626 m)   Wt 83.5 kg (184 lb 1.4 " oz)   SpO2 97%   BMI 31.60 kg/m²     Physical Exam  Constitutional:       Appearance: Normal appearance.   HENT:      Head: Normocephalic and atraumatic.      Nose: Nose normal.      Mouth/Throat:      Mouth: Mucous membranes are moist.      Pharynx: Oropharynx is clear.   Eyes:      Conjunctiva/sclera: Conjunctivae normal.   Cardiovascular:      Rate and Rhythm: Normal rate and regular rhythm.      Heart sounds: Normal heart sounds.   Pulmonary:      Effort: Pulmonary effort is normal.      Breath sounds: Normal breath sounds.   Abdominal:      General: Abdomen is flat. Bowel sounds are normal.      Palpations: Abdomen is soft.   Musculoskeletal:         General: Normal range of motion.      Cervical back: Normal range of motion and neck supple.   Skin:     General: Skin is warm and dry.   Neurological:      General: No focal deficit present.      Mental Status: She is alert and oriented to person, place, and time. Mental status is at baseline.   Psychiatric:         Mood and Affect: Mood normal.          Labs:  Lab Results   Component Value Date    WBC 6.34 05/20/2024    RBC 4.22 05/20/2024    HGB 12.5 05/20/2024    HCT 38.9 05/20/2024    MCV 92 05/20/2024    MCH 29.6 05/20/2024    MCHC 32.1 05/20/2024    RDW 12.5 05/20/2024     05/20/2024    MPV 8.7 (L) 05/20/2024    GRAN 3.4 05/20/2024    GRAN 53.9 05/20/2024    LYMPH 2.0 05/20/2024    LYMPH 31.1 05/20/2024    MONO 0.6 05/20/2024    MONO 10.1 05/20/2024    EOS 0.3 05/20/2024    BASO 0.03 05/20/2024    EOSINOPHIL 3.9 05/20/2024    BASOPHIL 0.5 05/20/2024     Sodium   Date Value Ref Range Status   05/20/2024 138 136 - 145 mmol/L Final     Potassium   Date Value Ref Range Status   05/20/2024 3.8 3.5 - 5.1 mmol/L Final     Chloride   Date Value Ref Range Status   05/20/2024 101 95 - 110 mmol/L Final     CO2   Date Value Ref Range Status   05/20/2024 27 23 - 29 mmol/L Final     Glucose   Date Value Ref Range Status   05/20/2024 91 70 - 110 mg/dL Final      BUN   Date Value Ref Range Status   05/20/2024 23 8 - 23 mg/dL Final     Creatinine   Date Value Ref Range Status   05/20/2024 1.3 0.5 - 1.4 mg/dL Final     Calcium   Date Value Ref Range Status   05/20/2024 9.5 8.7 - 10.5 mg/dL Final     Total Protein   Date Value Ref Range Status   05/20/2024 6.4 6.0 - 8.4 g/dL Final     Albumin   Date Value Ref Range Status   05/20/2024 3.8 3.5 - 5.2 g/dL Final     Total Bilirubin   Date Value Ref Range Status   05/20/2024 0.4 0.1 - 1.0 mg/dL Final     Comment:     For infants and newborns, interpretation of results should be based  on gestational age, weight and in agreement with clinical  observations.    Premature Infant recommended reference ranges:  Up to 24 hours.............<8.0 mg/dL  Up to 48 hours............<12.0 mg/dL  3-5 days..................<15.0 mg/dL  6-29 days.................<15.0 mg/dL       Alkaline Phosphatase   Date Value Ref Range Status   05/20/2024 76 55 - 135 U/L Final     AST   Date Value Ref Range Status   05/20/2024 18 10 - 40 U/L Final     ALT   Date Value Ref Range Status   05/20/2024 19 10 - 44 U/L Final     Anion Gap   Date Value Ref Range Status   05/20/2024 10 8 - 16 mmol/L Final     eGFR if    Date Value Ref Range Status   05/10/2022 >60 >60 mL/min/1.73 m^2 Final     eGFR if non    Date Value Ref Range Status   05/10/2022 >60 >60 mL/min/1.73 m^2 Final     Comment:     Calculation used to obtain the estimated glomerular filtration  rate (eGFR) is the CKD-EPI equation.          A/P:    R breast DCIS  R breast IDCA [id number]  - DCIS was strongly hormone positive so patient is on aromatase inhibitor  -invasive component was only 2 mm, so even though it was HER2 positive, it was not treated with any adjuvant chemotherapy.    -completed radiation therapy to the breast  -on anastrozole currently, started 10/11/22  - mammogram scheduled for August.  -MRI showed no malignancies.  Pain maybe related to postsurgical  changes.  Asked her to speak with her general surgeon.  -return to clinic in August after mammogram    Osteopenia  - DEXA done 5/24/22  -on every 6 month prolia Aurash Khoobehi, MD  Hematology and Oncology

## 2024-06-06 NOTE — TELEPHONE ENCOUNTER
----- Message from Devyn Mccullough DPM sent at 6/6/2024 12:25 PM CDT -----    Please call the patient advise her culture was positive for Staph she is to continue taking the Bactrim as directed.  ----- Message -----  From: [facility] [employer] [facility]  Sent: 6/5/2024   8:00 AM CDT  To: Devyn Mccullough DPM

## 2024-06-06 NOTE — TELEPHONE ENCOUNTER
----- Message from Devyn Mccullough DPM sent at 6/6/2024 11:15 AM CDT -----    Please call and have this culture and sensitivity un suppressed.  ----- Message -----  From: [facility] [employer] [facility]  Sent: 6/5/2024   8:00 AM CDT  To: Devyn Mccullough DPM

## 2024-06-10 ENCOUNTER — OFFICE VISIT (OUTPATIENT)
Dept: PODIATRY | Facility: CLINIC | Age: 69
End: 2024-06-10
Payer: MEDICARE

## 2024-06-10 VITALS
SYSTOLIC BLOOD PRESSURE: 129 MMHG | HEART RATE: 66 BPM | DIASTOLIC BLOOD PRESSURE: 82 MMHG | HEIGHT: 64 IN | BODY MASS INDEX: 31.42 KG/M2 | WEIGHT: 184.06 LBS

## 2024-06-10 DIAGNOSIS — L60.0 INGROWN NAIL: ICD-10-CM

## 2024-06-10 DIAGNOSIS — L03.032 PARONYCHIA OF GREAT TOE, LEFT: Primary | ICD-10-CM

## 2024-06-10 PROCEDURE — 99999 PR PBB SHADOW E&M-EST. PATIENT-LVL III: CPT | Mod: PBBFAC,,, | Performed by: PODIATRIST

## 2024-06-10 PROCEDURE — 99213 OFFICE O/P EST LOW 20 MIN: CPT | Mod: PBBFAC | Performed by: PODIATRIST

## 2024-06-10 PROCEDURE — 11730 AVULSION NAIL PLATE SIMPLE 1: CPT | Mod: PBBFAC | Performed by: PODIATRIST

## 2024-06-10 PROCEDURE — 99213 OFFICE O/P EST LOW 20 MIN: CPT | Mod: 25,S$PBB,, | Performed by: PODIATRIST

## 2024-06-10 RX ORDER — ONDANSETRON HYDROCHLORIDE 8 MG/1
8 TABLET, FILM COATED ORAL EVERY 8 HOURS PRN
Qty: 42 TABLET | Refills: 1 | Status: SHIPPED | OUTPATIENT
Start: 2024-06-10 | End: 2024-07-08

## 2024-06-12 DIAGNOSIS — J44.9 CHRONIC OBSTRUCTIVE PULMONARY DISEASE, UNSPECIFIED COPD TYPE: ICD-10-CM

## 2024-06-12 RX ORDER — ALBUTEROL SULFATE 90 UG/1
AEROSOL, METERED RESPIRATORY (INHALATION)
Qty: 18 G | Refills: 6 | OUTPATIENT
Start: 2024-06-12

## 2024-06-12 RX ORDER — ALBUTEROL SULFATE 90 UG/1
AEROSOL, METERED RESPIRATORY (INHALATION)
Qty: 18 G | Refills: 6 | Status: SHIPPED | OUTPATIENT
Start: 2024-06-12

## 2024-06-12 NOTE — TELEPHONE ENCOUNTER
----- Message from Lizzy Mccarty sent at 6/12/2024 12:42 PM CDT -----  Contact: PT  Type:  RX Refill Request    Who Called: PT   Refill or New Rx:REFILL   RX Name and Strength: albuterol (PROVENTIL/VENTOLIN HFA) 90 mcg/actuation inhaler  How is the patient currently taking it? (ex. 1XDay):AS DIRECTED   Is this a 30 day or 90 day RX:30  Preferred Pharmacy with phone number:   [id number] - [place], [place] - [address] AT [facility] & [facility] [address]  [address]  [facility] [address]  Phone: [phone] Fax: [phone]  Local or Mail Order:LOCAL  Ordering Provider:You  Would the patient rather a call back or a response via [facility]? CALL   Best Call Back Number:[phone] (home)   Additional Information: THANK YOU

## 2024-06-12 NOTE — TELEPHONE ENCOUNTER
LOV:2/21/24 You     NOV: None w/ PCP     Preffered Pharmacy:    [facility] #[id number] - [place], [place] - [address] AT [facility] [address] [facility]  [address]     Last Prescribed:

## 2024-06-12 NOTE — TELEPHONE ENCOUNTER
Lov 2/21/24  Follow up if symptoms worsen or fail to improve, for 1 year for AWV, scheduled appt.   Nov none noted at this time

## 2024-06-14 NOTE — PROGRESS NOTES
Subjective:       Patient ID: Laxmi Chand is a 68 y.o. female.    Chief Complaint: Ingrown Toenail (Left great toe)  Patient presents today she is complaining of an ingrown toenail on her left great toe.      Past Medical History:   Diagnosis Date    Breast cancer in female 09/07/2022    IDC with high grade DCIS    COPD (chronic obstructive pulmonary disease)     Degeneration of lumbar intervertebral disc     Endometrial cancer     GERD (gastroesophageal reflux disease)     HTN (hypertension)     Hyperlipemia, mixed      Past Surgical History:   Procedure Laterality Date    BIOPSY OF AXILLARY NODE Right 9/7/2022    Procedure: BIOPSY, LYMPH NODE, AXILLARY;  Surgeon: Jatin Gutierrez MD;  Location: [facility] OR;  Service: General;  Laterality: Right;    BREAST BIOPSY Right 08/05/2022    BREAST MASS EXCISION Right 9/7/2022    Procedure: EXCISION, MASS, BREAST;  Surgeon: Jatin Gutierrez MD;  Location: [facility] OR;  Service: General;  Laterality: Right;  wire localized partial mastectomy right breast with margins     CARPAL TUNNEL RELEASE  1985    CHOLECYSTECTOMY  1978    CORRECTION OF HAMMER TOE Right 9/15/2023    Procedure: CORRECTION, HAMMER TOE;  Surgeon: Devyn Mccullough DPM;  Location: [facility] OR;  Service: Podiatry;  Laterality: Right;    FOOT HARDWARE REMOVAL Right 9/15/2023    Procedure: REMOVAL, HARDWARE, FOOT;  Surgeon: Devyn Mccullough DPM;  Location: [facility] OR;  Service: Podiatry;  Laterality: Right;    HYSTERECTOMY      KNEE SURGERY Left 06/01/2020    LUMBAR DISC SURGERY  01/01/1990    plate and roland    LUMBAR FUSION  2011    MIDFOOT ARTHRODESIS Right 9/15/2023    Procedure: FUSION, JOINT, MIDFOOT;  Surgeon: Devyn Mccullough DPM;  Location: [facility] OR;  Service: Podiatry;  Laterality: Right;  [place] [address] 1st MPJ fusion plates screws bone grafts as well as cut guide.    TOTAL KNEE REPLACEMENT USING COMPUTER NAVIGATION Left 02/15/2021     Family History   Problem Relation Name Age of Onset     Hypertension Mother      Diabetes Mother      Hypertension Father      Lung cancer Maternal Grandfather      Breast cancer Neg Hx       Social History     Socioeconomic History    Marital status: Significant Other   Occupational History    Occupation: disabled   Tobacco Use    Smoking status: Every Day     Types: Vaping with nicotine     Passive exposure: Never    Smokeless tobacco: Never   Substance and Sexual Activity    Alcohol use: Yes    Drug use: Not Currently    Sexual activity: Yes     Partners: Male   Social History Narrative    Pl[facility] from [facility]         [facility]     Annual    urinary tract infection    yeast infection        Visit Summary    No pap per guidelines    U/A for culture    Wet prep: yeast only    Pt has a PCP    Colonoscopy up to date    [place] [facility]        Prescriptions:    SIG: Cipro 500 mg oral tablet, 3 days, Dispense #6 Tablet, 0 Refills    Directions: Take 1 oral tablet 2 times a day    SIG: Diflucan 100 mg oral tablet, 3 days, Dispense #3 Tablet, 0 Refills    Directions: Take 1 oral tablet once a day        Return for follow up appointment in one year or prn.     GYN Visit & [id number] [facility]     Vulvar Cyst: Resolved        Visit Summary    Normal external gyn exam. Cyst resolved        Return for follow up appointment annual/prn.     GYN Visit & [id number] [facility]     Chronic Vaginitis    Paratubal cysts: stable        Visit Summary    Wet prep: mostly yeast, few clue cells    Pt soaks in bath BID, stop, shower only, no douching.    u/s performed today. unchanged from last scan.        Prescriptions: Clindesse sample given    SIG: Diflucan 100 mg oral tablet, 3 days, Dispense #3 Tablet, 0 Refills    Directions: Take 1 oral tablet once a day    Recommend probiotics        Return for follow up appointment for annual or prn. MDL swab at next appt if needed.    Mammo up to date.     GYN Visit & [id number] [facility]     Recurrent  bacterial vaginosis.  Paratubal cysts.  Dysuria.    Repeat transvaginal ultrasound 6 weeks.        Visit Summary    Ordered:    Urine Culture, Routine     GYN Visit & [id number] [facility]5/12/2017     path compound melanocytic nevus... us...of pelvis....rtc 1y pap     GYN Visit & [id number] [facility]5/4/2017     3 moles removed from back 5 mm each...same contwainer monsels applied...    one mole removed from under each breasxt 5 mm...mnonsels applied...each one sent to path sepreatelyt..        vag dc bv...sp metrogel...no family h/o breast ca...        pelvic us...complex cyst 2.23 cm on left w possible excrescence and septation        pelvic us for complex cyst at [facility]......rtc 1wk     GYN Visit & [id number] [facility]4/25/2017     Chronic Bacterial Vaginitis    Chronic Back Pain    Left Lower Quadrant resolved, possible ruptured ovarian cyst        Visit Summary    MDL swab done        Return for follow up appointment in 2 months for follow up pelvic u/s.     GYN Exam/[id number] 20164/6/2017     Annual    Vaginal odor, Bacterial Vaginosis    Ovarian Cyst        Visit Summary    Pap done, reports hx of cervical pre-cancer        Prescriptions:    SIG: metronidazole 500 mg tablet, 7 days, Dispense #14 Tablet, 0 Refills    Directions: Take 1 oral tablet 2 times a day. No alcohol discussed.        U/S today, reports had ovarian cyst on CT scan.    FSH today        Return for follow up appointment  pending results.     Social Determinants of Health     Financial Resource Strain: High Risk (1/29/2024)    Overall Financial Resource Strain (CARDIA)     Difficulty of Paying Living Expenses: Hard   Food Insecurity: Food Insecurity Present (1/29/2024)    Hunger Vital Sign     Worried About Running Out of Food in the Last Year: Sometimes true     Ran Out of Food in the Last Year: Sometimes true   Transportation Needs: No Transportation Needs (1/29/2024)    PRAPARE - Transportation     Lack of Transportation (Medical): No     Lack of  Transportation (Non-Medical): No   Physical Activity: Insufficiently Active (1/29/2024)    Exercise Vital Sign     Days of Exercise per Week: 1 day     Minutes of Exercise per Session: 10 min   Stress: No Stress Concern Present (1/29/2024)    [employer] - Occupational Stress Questionnaire     Feeling of Stress : Only a little   Housing Stability: Low Risk  (1/29/2024)    Housing Stability Vital Sign     Unable to Pay for Housing in the Last Year: No     Number of Places Lived in the Last Year: 1     Unstable Housing in the Last Year: No       Current Outpatient Medications   Medication Sig Dispense Refill    ADVAIR -21 mcg/actuation HFAA inhaler Inhale 2 puffs into the lungs every 12 (twelve) hours. 12 g 3    albuterol-ipratropium (DUO-NEB) 2.5 mg-0.5 mg/3 mL nebulizer solution INHALE CONTENTS OF 1 VIAL BY MOUTH WITH NEBULIZER EVERY 6 HOURS WHILE AWAKE AS DIRECTED 360 mL 5    anastrozole (ARIMIDEX) 1 mg Tab TAKE 1 TABLET (1 MG TOTAL) BY MOUTH ONCE DAILY. 90 tablet 3    aspirin (ECOTRIN) 81 MG EC tablet Take 1 tablet by mouth once daily. Pt back on asa      aspirin-acetaminophen-caffeine 250-250-65 mg (HEADACHE RELIEF, ASA-ACET-CAF,) 250-250-65 mg per tablet Take 1 tablet by mouth every 6 to 8 hours as needed.      buPROPion (WELLBUTRIN) 100 MG tablet TAKE 1 TABLET(100 MG) BY MOUTH THREE TIMES DAILY 270 tablet 3    calcium carbonate-vitamin D3 (CALCIUM 600 + D,3,) 600-125 mg-unit Tab Take 2 tablets by mouth once daily. 180 tablet 3    celecoxib (CELEBREX) 200 MG capsule TAKE 1 CAPSULE (200 MG TOTAL) BY MOUTH 2 (TWO) TIMES DAILY. 60 capsule 3    cetirizine (ZYRTEC) 10 MG tablet TAKE 1 TABLET (10 MG TOTAL) BY MOUTH ONCE DAILY. 90 tablet 5    conjugated estrogens (PREMARIN) vaginal cream Place 0.5 g vaginally twice a week. 30 g 5    cyclobenzaprine (FLEXERIL) 10 MG tablet Take 1 tablet (10 mg total) by mouth nightly. 90 tablet 3    doxepin (SINEQUAN) 25 MG capsule TAKE 2 CAPSULES (50  MG TOTAL) BY MOUTH NIGHTLY. 180 capsule 3    fluticasone propionate (FLONASE) 50 mcg/actuation nasal spray 1 spray (50 mcg total) by Each Nostril route once daily. 18.2 mL 2    hydrALAZINE (APRESOLINE) 10 MG tablet Take 1 tablet (10 mg total) by mouth 3 (three) times daily. 90 tablet 11    hydroCHLOROthiazide (HYDRODIURIL) 25 MG tablet Take 1 tablet (25 mg total) by mouth once daily. 90 tablet 3    LUMIGAN 0.01 % Drop Place into both eyes.      metoprolol succinate (TOPROL-XL) 200 MG 24 hr tablet Take 1 tablet (200 mg total) by mouth once daily. 30 tablet 3    metroNIDAZOLE (METROGEL) 0.75 % (37.5mg/5 gram) vaginal gel Place 1 applicator vaginally Daily. 70 g 0    montelukast (SINGULAIR) 10 mg tablet Take 1 tablet (10 mg total) by mouth once daily. 90 tablet 3    nystatin (MYCOSTATIN) cream Apply topically 2 (two) times daily. 30 g 0    nystatin (MYCOSTATIN) powder Apply topically 4 (four) times daily. 60 g 1    oxyCODONE-acetaminophen (PERCOCET)  mg per tablet Take 1 tablet by mouth.      pantoprazole (PROTONIX) 40 MG tablet Take 1 tablet (40 mg total) by mouth once daily. 90 tablet 3    pseudoephedrine (SUDAFED) 120 mg 12 hr tablet Take 120 mg by mouth every 12 (twelve) hours.      sulfamethoxazole-trimethoprim 800-160mg (BACTRIM DS) 800-160 mg Tab Take 1 tablet by mouth 2 (two) times daily. for 14 days 28 tablet 0    valsartan (DIOVAN) 80 MG tablet Take 1 tablet (80 mg total) by mouth once daily. 90 tablet 3    walker (ULTRA-LIGHT ROLLATOR) Misc 1 Units by Misc.(Non-Drug; Combo Route) route Daily. 1 each 0    albuterol (PROVENTIL/VENTOLIN HFA) 90 mcg/actuation inhaler INHALE 2 PUFFS INTO THE LUNGS EVERY 4 HOURS AS NEEDED FOR WHEEZING OR SHORTNESS OF BREATH 18 g 6    ondansetron (ZOFRAN) 8 MG tablet Take 1 tablet (8 mg total) by mouth every 8 (eight) hours as needed for Nausea. 42 tablet 1     No current facility-administered medications for this visit.     Review of patient's allergies indicates:  No Known  "Allergies    Review of Systems   Skin:  Positive for color change.   All other systems reviewed and are negative.      Objective:      Vitals:    06/10/24 1303   BP: 129/82   BP Location: Left arm   Patient Position: Sitting   Pulse: 66   Weight: 83.5 kg (184 lb 1.4 oz)   Height: 5' 4" (1.626 m)     Physical Exam  Vitals and nursing note reviewed.   Constitutional:       Appearance: Normal appearance.   Cardiovascular:      Pulses:           Dorsalis pedis pulses are 1+ on the right side and 1+ on the left side.        Posterior tibial pulses are 0 on the right side and 0 on the left side.   Pulmonary:      Effort: Pulmonary effort is normal.   Musculoskeletal:         General: Swelling and tenderness present.      Right foot: Decreased range of motion.   Feet:      Right foot:      Protective Sensation: 2 sites tested.  2 sites sensed.      Left foot:      Protective Sensation: 2 sites tested.  2 sites sensed.      Skin integrity: Erythema and warmth present.      Toenail Condition: Left toenails are ingrown.   Skin:     Capillary Refill: Capillary refill takes more than 3 seconds.      Findings: Erythema present.   Neurological:      General: No focal deficit present.      Mental Status: She is alert.   Psychiatric:         Mood and Affect: Mood normal.         Behavior: Behavior normal.                                                            Assessment:       1. Paronychia of great toe, left    2. Ingrown nail          Plan:        Patient presents today she is complaining of an ingrown toenail on her left great toe.    Patient states she thought the left great toe is doing better until it became significantly more painful.  Patient finish taking the Bactrim as directed however I advised the patient she still clearly has significant infection involving the left hallux nail.  Patient still has a proximally 1 week of Bactrim left even though the patient has been on the antibiotics and I have previously trimmed a " large portion of nail from the lateral border of the patient's left hallux I did recommend a total nail avulsion due to the severity of the continued infection.  Patient advised that we have treated this conservatively but clearly we need to be more aggressive and the patient was in agreement with the total nail avulsion as recommended because the areas still displays significant signs of infection with positive erythema positive edema.  Patient underwent a total nail avulsion of the left hallux she tolerated the procedure well was given postoperative instructions I do plan to follow up with her in 1 week patient will finish taking the Bactrim as directed.  Separate evaluation and management for continued care of infection involving the left great toe was performed today before making the decision to pursue the procedure for the ingrown nail.  Patient was dispensed soaking instructions and has been advised to contact us with any problems questions or concerns prior to scheduled follow-up.  I did recommend a total nail avulsion today as opposed to a partial nail avulsion because of the large area of infection I felt the patient would have the best chance of the nail growing back normally if the entire nail plate was removed and allowed to grow back completely.  Patient consented to this procedure as discussed.  This note was created using [employer] voice recognition software that occasionally misinterpreted phrases or words.

## 2024-06-14 NOTE — PROCEDURES
Nail Removal    Date/Time: 6/10/2024 1:00 PM    Performed by: Devyn Mccullough DPM  Authorized by: Devyn Mccullough DPM    Consent Done?:  Yes (Written)  Time out: Immediately prior to the procedure a time out was called    Prep: patient was prepped and draped in usual sterile fashion    Location:     Location:  Left foot    Location detail:  Left big toe  Anesthesia:     Anesthesia:  Local infiltration    Local anesthetic:  Lidocaine 1% without epinephrine and bupivacaine 0.5% without epinephrine    Anesthetic total (ml):  10  Procedure Details:     Preparation:  Skin prepped with alcohol    Amount removed:  Complete    Wedge excision of skin of nail fold: No      Nail bed sutured?: No      Nail matrix removed:  None    Removed nail replaced and anchored: No      Dressing applied:  4x4, antibiotic ointment and gauze roll    Patient tolerance:  Patient tolerated the procedure well with no immediate complications

## 2024-06-19 ENCOUNTER — OFFICE VISIT (OUTPATIENT)
Dept: PODIATRY | Facility: CLINIC | Age: 69
End: 2024-06-19
Payer: MEDICARE

## 2024-06-19 VITALS
SYSTOLIC BLOOD PRESSURE: 134 MMHG | BODY MASS INDEX: 31.42 KG/M2 | HEART RATE: 75 BPM | WEIGHT: 184.06 LBS | DIASTOLIC BLOOD PRESSURE: 87 MMHG | HEIGHT: 64 IN

## 2024-06-19 DIAGNOSIS — L60.0 INGROWN NAIL: Primary | ICD-10-CM

## 2024-06-19 PROCEDURE — 99999 PR PBB SHADOW E&M-EST. PATIENT-LVL III: CPT | Mod: PBBFAC,,, | Performed by: PODIATRIST

## 2024-06-19 PROCEDURE — 99212 OFFICE O/P EST SF 10 MIN: CPT | Mod: S$PBB,,, | Performed by: PODIATRIST

## 2024-06-19 PROCEDURE — 99213 OFFICE O/P EST LOW 20 MIN: CPT | Mod: PBBFAC | Performed by: PODIATRIST

## 2024-06-19 NOTE — PROGRESS NOTES
Subjective:       Patient ID: Laxmi Chand is a 68 y.o. female.    Chief Complaint: Ingrown Toenail and Follow-up     Patient presents for follow-up of an infected ingrown toenail left hallux.  Past Medical History:   Diagnosis Date    Breast cancer in female 09/07/2022    IDC with high grade DCIS    COPD (chronic obstructive pulmonary disease)     Degeneration of lumbar intervertebral disc     Endometrial cancer     GERD (gastroesophageal reflux disease)     HTN (hypertension)     Hyperlipemia, mixed      Past Surgical History:   Procedure Laterality Date    BIOPSY OF AXILLARY NODE Right 9/7/2022    Procedure: BIOPSY, LYMPH NODE, AXILLARY;  Surgeon: Jatin Gutierrez MD;  Location: [facility] OR;  Service: General;  Laterality: Right;    BREAST BIOPSY Right 08/05/2022    BREAST MASS EXCISION Right 9/7/2022    Procedure: EXCISION, MASS, BREAST;  Surgeon: Jatin Gutierrez MD;  Location: [facility] OR;  Service: General;  Laterality: Right;  wire localized partial mastectomy right breast with margins     CARPAL TUNNEL RELEASE  1985    CHOLECYSTECTOMY  1978    CORRECTION OF HAMMER TOE Right 9/15/2023    Procedure: CORRECTION, HAMMER TOE;  Surgeon: Devyn Mccullough DPM;  Location: [facility] OR;  Service: Podiatry;  Laterality: Right;    FOOT HARDWARE REMOVAL Right 9/15/2023    Procedure: REMOVAL, HARDWARE, FOOT;  Surgeon: Devyn Mccullough DPM;  Location: [facility] OR;  Service: Podiatry;  Laterality: Right;    HYSTERECTOMY      KNEE SURGERY Left 06/01/2020    LUMBAR DISC SURGERY  01/01/1990    plate and roland    LUMBAR FUSION  2011    MIDFOOT ARTHRODESIS Right 9/15/2023    Procedure: FUSION, JOINT, MIDFOOT;  Surgeon: Devyn Mccullough DPM;  Location: [facility] OR;  Service: Podiatry;  Laterality: Right;  [place] 28 1st MPJ fusion plates screws bone grafts as well as cut guide.    TOTAL KNEE REPLACEMENT USING COMPUTER NAVIGATION Left 02/15/2021     Family History   Problem Relation Name Age of Onset    Hypertension Mother       Diabetes Mother      Hypertension Father      Lung cancer Maternal Grandfather      Breast cancer Neg Hx       Social History     Socioeconomic History    Marital status: Significant Other   Occupational History    Occupation: disabled   Tobacco Use    Smoking status: Every Day     Types: Vaping with nicotine     Passive exposure: Never    Smokeless tobacco: Never   Substance and Sexual Activity    Alcohol use: Yes    Drug use: Not Currently    Sexual activity: Yes     Partners: Male   Social History Narrative    [facility] from [facility]         [facility]     Annual    urinary tract infection    yeast infection        Visit Summary    No pap per guidelines    U/A for culture    Wet prep: yeast only    Pt has a PCP    Colonoscopy up to date    [place] [facility] [place]        Prescriptions:    SIG: Cipro 500 mg oral tablet, 3 days, Dispense #6 Tablet, 0 Refills    Directions: Take 1 oral tablet 2 times a day    SIG: Diflucan 100 mg oral tablet, 3 days, Dispense #3 Tablet, 0 Refills    Directions: Take 1 oral tablet once a day        Return for follow up appointment in one year or prn.     GYN Visit & [id number] [facility]2/4/2018     Vulvar Cyst: Resolved        Visit Summary    Normal external gyn exam. Cyst resolved        Return for follow up appointment annual/prn.     GYN Visit & [id number] [facility]5/24/2018     Chronic Vaginitis    Paratubal cysts: stable        Visit Summary    Wet prep: mostly yeast, few clue cells    Pt soaks in bath BID, stop, shower only, no douching.    u/s performed today. unchanged from last scan.        Prescriptions: Clindesse sample given    SIG: Diflucan 100 mg oral tablet, 3 days, Dispense #3 Tablet, 0 Refills    Directions: Take 1 oral tablet once a day    Recommend probiotics        Return for follow up appointment for annual or prn. MDL swab at next appt if needed.    Mammo up to date.     GYN Visit & [id number] [facility]/16[facility]2017     Recurrent bacterial vaginosis.   Paratubal cysts.  Dysuria.    Repeat transvaginal ultrasound 6 weeks.        Visit Summary    Ordered:    Urine Culture, Routine     GYN Visit & [id number] [facility]5/12/2017     path compound melanocytic nevus...ch us...of pelvis....rtc 1y pap     GYN Visit & [id number] [facility]5/4/2017     3 moles removed from back 5 mm each...same contwainer monsels applied...    one mole removed from under each breasxt 5 mm...mnonsels applied...each one sent to path sepreatelyt..        vag dc bv...sp metrogel...no family h/o breast ca...        pelvic us...complex cyst 2.23 cm on left w possible excrescence and septation        pelvic us for complex cyst at [facility] [place]rtc 1wk     [id number]     Chronic Bacterial Vaginitis    Chronic Back Pain    Left Lower Quadrant resolved, possible ruptured ovarian cyst        Visit Summary    MDL swab done        Return for follow up appointment in 2 months for follow up pelvic u/s.     GYN Exam/[id number] 20164/6/2017     Annual    Vaginal odor, Bacterial Vaginosis    Ovarian Cyst        Visit Summary    Pap done, reports hx of cervical pre-cancer        Prescriptions:    SIG: metronidazole 500 mg tablet, 7 days, Dispense #14 Tablet, 0 Refills    Directions: Take 1 oral tablet 2 times a day. No alcohol discussed.        U/S today, reports had ovarian cyst on CT scan.    FSH today        Return for follow up appointment  pending results.     Social Determinants of Health     Financial Resource Strain: High Risk (1/29/2024)    Overall Financial Resource Strain (CARDIA)     Difficulty of Paying Living Expenses: Hard   Food Insecurity: Food Insecurity Present (1/29/2024)    Hunger Vital Sign     Worried About Running Out of Food in the Last Year: Sometimes true     Ran Out of Food in the Last Year: Sometimes true   Transportation Needs: No Transportation Needs (1/29/2024)    PRAPARE - Transportation     Lack of Transportation (Medical): No     Lack of Transportation  (Non-Medical): No   Physical Activity: Insufficiently Active (1/29/2024)    Exercise Vital Sign     Days of Exercise per Week: 1 day     Minutes of Exercise per Session: 10 min   Stress: No Stress Concern Present (1/29/2024)    [employer] - Occupational Stress Questionnaire     Feeling of Stress : Only a little   Housing Stability: Low Risk  (1/29/2024)    Housing Stability Vital Sign     Unable to Pay for Housing in the Last Year: No     Number of Places Lived in the Last Year: 1     Unstable Housing in the Last Year: No       Current Outpatient Medications   Medication Sig Dispense Refill    ADVAIR -21 mcg/actuation HFAA inhaler Inhale 2 puffs into the lungs every 12 (twelve) hours. 12 g 3    albuterol (PROVENTIL/VENTOLIN HFA) 90 mcg/actuation inhaler INHALE 2 PUFFS INTO THE LUNGS EVERY 4 HOURS AS NEEDED FOR WHEEZING OR SHORTNESS OF BREATH 18 g 6    albuterol-ipratropium (DUO-NEB) 2.5 mg-0.5 mg/3 mL nebulizer solution INHALE CONTENTS OF 1 VIAL BY MOUTH WITH NEBULIZER EVERY 6 HOURS WHILE AWAKE AS DIRECTED 360 mL 5    anastrozole (ARIMIDEX) 1 mg Tab TAKE 1 TABLET (1 MG TOTAL) BY MOUTH ONCE DAILY. 90 tablet 3    aspirin (ECOTRIN) 81 MG EC tablet Take 1 tablet by mouth once daily. Pt back on asa      aspirin-acetaminophen-caffeine 250-250-65 mg (HEADACHE RELIEF, ASA-ACET-CAF,) 250-250-65 mg per tablet Take 1 tablet by mouth every 6 to 8 hours as needed.      buPROPion (WELLBUTRIN) 100 MG tablet TAKE 1 TABLET(100 MG) BY MOUTH THREE TIMES DAILY 270 tablet 3    calcium carbonate-vitamin D3 (CALCIUM 600 + D,3,) 600-125 mg-unit Tab Take 2 tablets by mouth once daily. 180 tablet 3    celecoxib (CELEBREX) 200 MG capsule TAKE 1 CAPSULE (200 MG TOTAL) BY MOUTH 2 (TWO) TIMES DAILY. 60 capsule 3    cetirizine (ZYRTEC) 10 MG tablet TAKE 1 TABLET (10 MG TOTAL) BY MOUTH ONCE DAILY. 90 tablet 5    conjugated estrogens (PREMARIN) vaginal cream Place 0.5 g vaginally twice a week. 30 g 5     cyclobenzaprine (FLEXERIL) 10 MG tablet Take 1 tablet (10 mg total) by mouth nightly. 90 tablet 3    doxepin (SINEQUAN) 25 MG capsule TAKE 2 CAPSULES (50 MG TOTAL) BY MOUTH NIGHTLY. 180 capsule 3    fluticasone propionate (FLONASE) 50 mcg/actuation nasal spray 1 spray (50 mcg total) by Each Nostril route once daily. 18.2 mL 2    hydrALAZINE (APRESOLINE) 10 MG tablet Take 1 tablet (10 mg total) by mouth 3 (three) times daily. 90 tablet 11    hydroCHLOROthiazide (HYDRODIURIL) 25 MG tablet Take 1 tablet (25 mg total) by mouth once daily. 90 tablet 3    LUMIGAN 0.01 % Drop Place into both eyes.      metoprolol succinate (TOPROL-XL) 200 MG 24 hr tablet Take 1 tablet (200 mg total) by mouth once daily. 30 tablet 3    metroNIDAZOLE (METROGEL) 0.75 % (37.5mg/5 gram) vaginal gel Place 1 applicator vaginally Daily. 70 g 0    montelukast (SINGULAIR) 10 mg tablet Take 1 tablet (10 mg total) by mouth once daily. 90 tablet 3    nystatin (MYCOSTATIN) cream Apply topically 2 (two) times daily. 30 g 0    nystatin (MYCOSTATIN) powder Apply topically 4 (four) times daily. 60 g 1    ondansetron (ZOFRAN) 8 MG tablet Take 1 tablet (8 mg total) by mouth every 8 (eight) hours as needed for Nausea. 42 tablet 1    oxyCODONE-acetaminophen (PERCOCET)  mg per tablet Take 1 tablet by mouth.      pantoprazole (PROTONIX) 40 MG tablet Take 1 tablet (40 mg total) by mouth once daily. 90 tablet 3    pseudoephedrine (SUDAFED) 120 mg 12 hr tablet Take 120 mg by mouth every 12 (twelve) hours.      valsartan (DIOVAN) 80 MG tablet Take 1 tablet (80 mg total) by mouth once daily. 90 tablet 3    walker (ULTRA-LIGHT ROLLATOR) Misc 1 Units by Misc.(Non-Drug; Combo Route) route Daily. 1 each 0     No current facility-administered medications for this visit.     Review of patient's allergies indicates:  No Known Allergies    Review of Systems   Skin:  Positive for color change.   All other systems reviewed and are negative.      Objective:      Vitals:     "06/19/24 0919   BP: 134/87   BP Location: Right arm   Patient Position: Sitting   Pulse: 75   Weight: 83.5 kg (184 lb 1.4 oz)   Height: 5' 4" (1.626 m)     Physical Exam  Vitals and nursing note reviewed.   Constitutional:       Appearance: Normal appearance.   Cardiovascular:      Pulses:           Dorsalis pedis pulses are 1+ on the right side and 1+ on the left side.        Posterior tibial pulses are 0 on the right side and 0 on the left side.   Pulmonary:      Effort: Pulmonary effort is normal.   Musculoskeletal:         General: Normal range of motion.   Feet:      Right foot:      Protective Sensation: 2 sites tested.  2 sites sensed.      Left foot:      Protective Sensation: 2 sites tested.  2 sites sensed.   Skin:     General: Skin is warm.      Capillary Refill: Capillary refill takes more than 3 seconds.   Neurological:      General: No focal deficit present.      Mental Status: She is alert.   Psychiatric:         Mood and Affect: Mood normal.         Behavior: Behavior normal.                            Assessment:       1. Ingrown nail          Plan:        Patient presents for follow-up of an infected ingrown toenail left hallux.  Patient states her left great toe is doing a lot better she has continued to take her Bactrim as directed.  On evaluation the previously noted infection appears well resolved the patient's nail bed is healthy in appearance there has no active drainage no bleeding noted the infection appears completely resolve patient will finish taking her Bactrim as directed.  I did advised the patient she needs to try to air her foot out as much as possible and soak as needed.  Patient advised she will need to monitor this area as the nail grows out over the next 4-6 months any discoloration ingrowing nail area of concerns she is to contact us immediately otherwise I will plan to see her as needed for follow-up.  This note was created using [employer] voice recognition software that " occasionally misinterpreted phrases or words.

## 2024-06-21 ENCOUNTER — HOSPITAL ENCOUNTER (EMERGENCY)
Facility: HOSPITAL | Age: 69
Discharge: HOME | End: 2024-06-21
Attending: STUDENT IN AN ORGANIZED HEALTH CARE EDUCATION/TRAINING PROGRAM
Payer: MEDICARE

## 2024-06-21 VITALS
DIASTOLIC BLOOD PRESSURE: 78 MMHG | WEIGHT: 184 LBS | RESPIRATION RATE: 16 BRPM | BODY MASS INDEX: 31.58 KG/M2 | OXYGEN SATURATION: 98 % | HEART RATE: 86 BPM | TEMPERATURE: 98 F | SYSTOLIC BLOOD PRESSURE: 136 MMHG

## 2024-06-21 DIAGNOSIS — K59.00 CONSTIPATION, UNSPECIFIED CONSTIPATION TYPE: Primary | ICD-10-CM

## 2024-06-21 DIAGNOSIS — R10.9 ABDOMINAL PAIN: ICD-10-CM

## 2024-06-21 LAB
BILIRUB UR QL STRIP: NEGATIVE
CLARITY UR: CLEAR
COLOR UR: YELLOW
GLUCOSE UR QL STRIP: NEGATIVE
HGB UR QL STRIP: ABNORMAL
KETONES UR QL STRIP: NEGATIVE
LEUKOCYTE ESTERASE UR QL STRIP: NEGATIVE
NITRITE UR QL STRIP: NEGATIVE
PH UR STRIP: 6 [PH] (ref 5–8)
PROT UR QL STRIP: NEGATIVE
SP GR UR STRIP: 1.02 (ref 1–1.03)
URN SPEC COLLECT METH UR: ABNORMAL
UROBILINOGEN UR STRIP-ACNC: NEGATIVE EU/DL

## 2024-06-21 PROCEDURE — 74018 RADEX ABDOMEN 1 VIEW: CPT | Mod: TC

## 2024-06-21 PROCEDURE — 81003 URINALYSIS AUTO W/O SCOPE: CPT | Performed by: STUDENT IN AN ORGANIZED HEALTH CARE EDUCATION/TRAINING PROGRAM

## 2024-06-21 PROCEDURE — 25000003 PHARM REV CODE 250: Performed by: STUDENT IN AN ORGANIZED HEALTH CARE EDUCATION/TRAINING PROGRAM

## 2024-06-21 PROCEDURE — 74018 RADEX ABDOMEN 1 VIEW: CPT | Mod: 26,,, | Performed by: RADIOLOGY

## 2024-06-21 PROCEDURE — 99283 EMERGENCY DEPT VISIT LOW MDM: CPT | Mod: 25

## 2024-06-21 RX ORDER — SYRING-NEEDL,DISP,INSUL,0.3 ML 29 G X1/2"
296 SYRINGE, EMPTY DISPOSABLE MISCELLANEOUS
Status: COMPLETED | OUTPATIENT
Start: 2024-06-21 | End: 2024-06-21

## 2024-06-21 RX ADMIN — MAGNESIUM CITRATE 296 ML: 1.75 LIQUID ORAL at 11:06

## 2024-06-21 NOTE — ED PROVIDER NOTES
Encounter Date: 6/21/2024       History     Chief Complaint   Patient presents with    Abdominal Pain     Patient reports constipation for one week with only small hard stool intermittently.  Patient reports pain has been progressive for one week, increasing yesterday afternoon.  Denies taking temperature at home, but has been feeling cold.  Patient reports no nausea.  No vomiting.  Denies UTI symptoms.     68-year-old female with a history of hyperlipidemia, hypertension, GERD, endometrial cancer, COPD, breast cancer, constipation. She presents to ED with chief complaint of abdominal pain and constipation. Reports last bowel movement about a week ago was very hard and lot of output. She was passing flatus. Denies associated nausea, or vomiting, recent changes in urinary habits.  She takes over-the-counter laxatives p.r.n. without much help.    The history is provided by the patient. No  was used.     Review of patient's allergies indicates:  No Known Allergies  Past Medical History:   Diagnosis Date    Breast cancer in female 09/07/2022    IDC with high grade DCIS    COPD (chronic obstructive pulmonary disease)     Degeneration of lumbar intervertebral disc     Endometrial cancer     GERD (gastroesophageal reflux disease)     HTN (hypertension)     Hyperlipemia, mixed      Past Surgical History:   Procedure Laterality Date    BIOPSY OF AXILLARY NODE Right 9/7/2022    Procedure: BIOPSY, LYMPH NODE, AXILLARY;  Surgeon: Jatin Gutierrez MD;  Location: [facility] OR;  Service: General;  Laterality: Right;    BREAST BIOPSY Right 08/05/2022    BREAST MASS EXCISION Right 9/7/2022    Procedure: EXCISION, MASS, BREAST;  Surgeon: Jatin Gutierrez MD;  Location: [facility] OR;  Service: General;  Laterality: Right;  wire localized partial mastectomy right breast with margins     CARPAL TUNNEL RELEASE  1985    CHOLECYSTECTOMY  1978    CORRECTION OF HAMMER TOE Right 9/15/2023    Procedure: CORRECTION, HAMMER  TOE;  Surgeon: Devyn Mccullough DPM;  Location: [facility] OR;  Service: Podiatry;  Laterality: Right;    FOOT HARDWARE REMOVAL Right 9/15/2023    Procedure: REMOVAL, HARDWARE, FOOT;  Surgeon: Devyn Mccullough DPM;  Location: [facility] OR;  Service: Podiatry;  Laterality: Right;    HYSTERECTOMY      KNEE SURGERY Left 06/01/2020    LUMBAR DISC SURGERY  01/01/1990    plate and roland    LUMBAR FUSION  2011    MIDFOOT ARTHRODESIS Right 9/15/2023    Procedure: FUSION, JOINT, MIDFOOT;  Surgeon: Devyn Mccullough DPM;  Location: [facility] OR;  Service: Podiatry;  Laterality: Right;  [place] 28 1st MPJ fusion plates screws bone grafts as well as cut guide.    TOTAL KNEE REPLACEMENT USING COMPUTER NAVIGATION Left 02/15/2021     Family History   Problem Relation Name Age of Onset    Hypertension Mother      Diabetes Mother      Hypertension Father      Lung cancer Maternal Grandfather      Breast cancer Neg Hx       Social History     Tobacco Use    Smoking status: Every Day     Types: Vaping with nicotine     Passive exposure: Never    Smokeless tobacco: Never   Substance Use Topics    Alcohol use: Yes    Drug use: Not Currently     Review of Systems   Constitutional: Negative.    HENT: Negative.     Eyes: Negative.    Respiratory: Negative.     Cardiovascular: Negative.    Gastrointestinal:  Positive for constipation.   Genitourinary: Negative.    Musculoskeletal: Negative.    Skin: Negative.    Neurological: Negative.    Hematological: Negative.    Psychiatric/Behavioral: Negative.     All other systems reviewed and are negative.      Physical Exam     Initial Vitals [06/21/24 0940]   BP Pulse Resp Temp SpO2   (!) 143/65 95 18 97.7 °F (36.5 °C) 96 %      MAP       --         Physical Exam    Nursing note and vitals reviewed.  Constitutional: She appears well-developed and well-nourished.   HENT:   Head: Normocephalic.   Eyes: Pupils are equal, round, and reactive to light.   Neck:   Normal range of motion.  Cardiovascular:   Normal rate.           Pulmonary/Chest: Breath sounds normal.   Abdominal: Abdomen is soft. Bowel sounds are normal. There is abdominal tenderness (Bilateral lower quadrant).   Musculoskeletal:         General: Normal range of motion.      Cervical back: Normal range of motion.     Neurological: She is alert and oriented to person, place, and time. GCS score is 15. GCS eye subscore is 4. GCS verbal subscore is 5. GCS motor subscore is 6.   Skin: Skin is warm. Capillary refill takes less than 2 seconds.   Psychiatric: She has a normal mood and affect.         ED Course   Procedures  Labs Reviewed   URINALYSIS, REFLEX TO URINE CULTURE - Abnormal; Notable for the following components:       Result Value    Occult Blood UA Trace (*)     All other components within normal limits    Narrative:     Preferred Collection Type->Urine, Clean Catch  Specimen Source->Urine          Imaging Results              X-Ray Abdomen AP 1 View (KUB) (Final result)  Result time 06/21/24 10:38:16      Final result by Mell Thomas MD (06/21/24 10:38:16)                   Impression:      Bowel-gas pattern compatible with constipation.  Extensive degenerative changes in the spine and pelvis.      Electronically signed by: Mell Thomas  Date:    06/21/2024  Time:    10:38               Narrative:    EXAMINATION:  XR ABDOMEN AP 1 VIEW    CLINICAL HISTORY:  Unspecified abdominal pain    TECHNIQUE:  AP View(s) of the abdomen was performed.    COMPARISON:  None    FINDINGS:  The bowel gas pattern is nonobstructive.  There is stool throughout the colon.  Scoliosis is noted in the lumbar spine with extensive degenerative changes and ligamentous ossification.  There is hardware in the lumbosacral spine.  There are degenerative changes at the hips and sacroiliac joints.  The lung bases are clear.  There are surgical clips in the right upper quadrant.                                       Medications   magnesium citrate solution 296 mL  (has no administration in time range)     Medical Decision Making  68-year-old female with a history of constipation presenting with constipation, bilateral abdominal pain.  Ddx constipation, bowel obstruction, mass, others  Story and history classic for constipation.  KUB shows bowel gas pattern suggestive of constipation.  Treat with Mag citrate, Rx GoLYTELY p.r.n.  Patient was seen and reevaluated. Patient's symptoms seem to be stable. I discussed the patient's diagnosis, treatment plan, and plan for discharge with the patient. Patient was instructed to follow up with PCP and was given strict return precautions to the ED. The patient voiced understanding and agreed with the plan      Amount and/or Complexity of Data Reviewed  Radiology: ordered.    Risk  OTC drugs.  Prescription drug management.                                      Clinical Impression:  Final diagnoses:  [R10.9] Abdominal pain  [K59.00] Constipation, unspecified constipation type (Primary)                 Chino Diaz MD  06/21/24 [id number]

## 2024-06-21 NOTE — DISCHARGE INSTRUCTIONS
Increase fluid intake  May take GoLYTELY p.r.n. for constipation  May return to ED at any time if any new or worsening symptoms.

## 2024-07-10 ENCOUNTER — TELEPHONE (OUTPATIENT)
Dept: FAMILY MEDICINE | Facility: CLINIC | Age: 69
End: 2024-07-10
Payer: MEDICARE

## 2024-07-10 ENCOUNTER — OFFICE VISIT (OUTPATIENT)
Dept: FAMILY MEDICINE | Facility: CLINIC | Age: 69
End: 2024-07-10
Payer: MEDICARE

## 2024-07-10 VITALS
HEART RATE: 64 BPM | BODY MASS INDEX: 31.24 KG/M2 | OXYGEN SATURATION: 99 % | SYSTOLIC BLOOD PRESSURE: 140 MMHG | HEIGHT: 64 IN | TEMPERATURE: 98 F | WEIGHT: 183 LBS | DIASTOLIC BLOOD PRESSURE: 80 MMHG

## 2024-07-10 DIAGNOSIS — T36.95XA ANTIBIOTIC-INDUCED YEAST INFECTION: Primary | ICD-10-CM

## 2024-07-10 DIAGNOSIS — J44.9 CHRONIC OBSTRUCTIVE PULMONARY DISEASE, UNSPECIFIED COPD TYPE: ICD-10-CM

## 2024-07-10 DIAGNOSIS — E66.9 OBESITY, CLASS I, BMI 30-34.9: ICD-10-CM

## 2024-07-10 DIAGNOSIS — B37.9 ANTIBIOTIC-INDUCED YEAST INFECTION: Primary | ICD-10-CM

## 2024-07-10 DIAGNOSIS — J44.1 CHRONIC OBSTRUCTIVE PULMONARY DISEASE WITH ACUTE EXACERBATION: ICD-10-CM

## 2024-07-10 DIAGNOSIS — I10 ESSENTIAL HYPERTENSION: ICD-10-CM

## 2024-07-10 PROCEDURE — 99213 OFFICE O/P EST LOW 20 MIN: CPT | Mod: PBBFAC,PO | Performed by: FAMILY MEDICINE

## 2024-07-10 PROCEDURE — 99214 OFFICE O/P EST MOD 30 MIN: CPT | Mod: S$PBB,,, | Performed by: FAMILY MEDICINE

## 2024-07-10 PROCEDURE — 99999 PR PBB SHADOW E&M-EST. PATIENT-LVL III: CPT | Mod: PBBFAC,,, | Performed by: FAMILY MEDICINE

## 2024-07-10 RX ORDER — DOXYCYCLINE HYCLATE 100 MG
100 TABLET ORAL 2 TIMES DAILY
Qty: 20 EACH | Refills: 0 | Status: SHIPPED | OUTPATIENT
Start: 2024-07-10 | End: 2024-07-20

## 2024-07-10 RX ORDER — FLUTICASONE PROPIONATE AND SALMETEROL XINAFOATE 115; 21 UG/1; UG/1
2 AEROSOL, METERED RESPIRATORY (INHALATION) EVERY 12 HOURS
Qty: 12 G | Refills: 11 | Status: SHIPPED | OUTPATIENT
Start: 2024-07-10 | End: 2025-07-10

## 2024-07-10 RX ORDER — FLUTICASONE PROPIONATE AND SALMETEROL XINAFOATE 115; 21 UG/1; UG/1
2 AEROSOL, METERED RESPIRATORY (INHALATION) EVERY 12 HOURS
Qty: 36 EACH | Refills: 3 | Status: SHIPPED | OUTPATIENT
Start: 2024-07-10 | End: 2024-07-10

## 2024-07-10 RX ORDER — NITROFURANTOIN 25; 75 MG/1; MG/1
100 CAPSULE ORAL 2 TIMES DAILY
Qty: 7 CAPSULE | Refills: 0 | Status: CANCELLED | OUTPATIENT
Start: 2024-07-10

## 2024-07-10 RX ORDER — FLUCONAZOLE 150 MG/1
150 TABLET ORAL
Qty: 3 TABLET | Refills: 0 | Status: SHIPPED | OUTPATIENT
Start: 2024-07-10 | End: 2024-07-17

## 2024-07-10 RX ORDER — NYSTATIN 100000 U/G
CREAM TOPICAL 2 TIMES DAILY
Qty: 30 G | Refills: 1 | Status: SHIPPED | OUTPATIENT
Start: 2024-07-10

## 2024-07-10 NOTE — PROGRESS NOTES
Subjective:       Patient ID: Laxmi Chand is a 68 y.o. female.    Chief Complaint: yeast infection and Cough    Laxmi Chand presents to the clinic today with complaints of cough and a yeast infection. Patient states she was recently on antibiotics for her toe and now has a yeast infection. Patient states she tried over the counter therapy and it has not improved in the last 2 months.   Patient also reports a cough for the last month or more. Patient states it was in her chest and had improved but returned. Patient states she has not had her Advair due to insurance not covering this and feels this made her worsen.   Patient educated on plan of care, verbalized understanding.       Cough  This is a chronic problem. The current episode started 1 to 4 weeks ago. The problem has been gradually worsening. The cough is Productive of sputum. Pertinent negatives include no chest pain, chills, ear pain, eye redness, fever, postnasal drip, rash, sore throat, shortness of breath or wheezing. She has tried rest and steroid inhaler for the symptoms. Her past medical history is significant for asthma and COPD.     Review of Systems   Constitutional:  Negative for activity change, appetite change, chills, diaphoresis and fever.   HENT:  Negative for congestion, ear pain, postnasal drip, sinus pressure, sneezing and sore throat.    Eyes:  Negative for pain, discharge, redness and itching.   Respiratory:  Positive for cough. Negative for apnea, chest tightness, shortness of breath and wheezing.    Cardiovascular:  Negative for chest pain and leg swelling.   Gastrointestinal:  Negative for abdominal distention, abdominal pain, constipation, diarrhea, nausea and vomiting.   Genitourinary:  Positive for vaginal discharge. Negative for difficulty urinating, dysuria, flank pain and frequency.   Skin:  Negative for color change, rash and wound.   Neurological:  Negative for dizziness.       Patient Active Problem List   Diagnosis     Allergic rhinitis    Chronic obstructive lung disease    Degeneration of lumbar intervertebral disc    Depressive disorder    Gastroesophageal reflux disease    History of smoking    Hyperlipidemia    Essential hypertension    Status post total left knee replacement    Chronic pain syndrome    Pain of breast    Ductal carcinoma in situ (DCIS) of right breast    Osteopenia of multiple sites    History of endometrial cancer    Invasive ductal carcinoma of breast, female, right    Long term (current) use of aromatase inhibitors    Atherosclerosis of aorta    Calcified granuloma of lung    Urge incontinence of urine    Ingrown nail    Neuroma of second interspace of right foot    Hallux abducto valgus, right    Hammer toe of right foot    Osteoarthritis of ankle and foot    Hypermobile joint syndrome of right foot    Hallux rigidus of right foot    Painful orthopaedic hardware    Recurrent major depressive disorder, in full remission    Walker as ambulation aid    Malignant neoplasm of overlapping sites of right breast in female, estrogen receptor positive    Paronychia of great toe, left       Objective:      Physical Exam  Vitals reviewed.   Constitutional:       General: She is not in acute distress.     Appearance: Normal appearance. She is well-developed.   HENT:      Head: Normocephalic and atraumatic.      Nose: Nose normal.   Eyes:      Conjunctiva/sclera: Conjunctivae normal.      Pupils: Pupils are equal, round, and reactive to light.   Cardiovascular:      Rate and Rhythm: Normal rate and regular rhythm.      Heart sounds: Normal heart sounds.   Pulmonary:      Effort: Pulmonary effort is normal. No respiratory distress.      Breath sounds: Normal breath sounds.   Musculoskeletal:      Cervical back: Normal range of motion and neck supple.   Skin:     General: Skin is warm and dry.      Findings: No rash.   Neurological:      General: No focal deficit present.      Mental Status: She is alert and oriented to  "person, place, and time.   Psychiatric:         Mood and Affect: Mood normal.         Behavior: Behavior normal.         Lab Results   Component Value Date    WBC 6.34 05/20/2024    HGB 12.5 05/20/2024    HCT 38.9 05/20/2024     05/20/2024    CHOL 203 (H) 05/10/2022    TRIG 101 05/10/2022    HDL 50 05/10/2022    ALT 19 05/20/2024    AST 18 05/20/2024     05/20/2024    K 3.8 05/20/2024     05/20/2024    CREATININE 1.3 05/20/2024    BUN 23 05/20/2024    CO2 27 05/20/2024    TSH 1.040 05/10/2022    HGBA1C 5.5 07/26/2023     The 10-year ASCVD risk score (Mihaela FLOWER, et al., 2019) is: 20.6%    Values used to calculate the score:      Age: 68 years      Sex: Female      Is Non- : No      Diabetic: No      Tobacco smoker: Yes      Systolic Blood Pressure: 140 mmHg      Is BP treated: Yes      HDL Cholesterol: 50 mg/dL      Total Cholesterol: 203 mg/dL  Visit Vitals  BP (!) 140/80 (BP Location: Right arm, Patient Position: Sitting, BP Method: Medium (Manual))   Pulse 64   Temp 97.5 °F (36.4 °C) (Oral)   Ht 5' 4" (1.626 m)   Wt 83 kg (182 lb 15.7 oz)   SpO2 99%   BMI 31.41 kg/m²      Assessment:       1. Antibiotic-induced yeast infection    2. Essential hypertension    3. Chronic obstructive pulmonary disease with acute exacerbation    4. Obesity, Class I, BMI 30-34.9    5. Chronic obstructive pulmonary disease, unspecified COPD type        Plan:       Laxmi Yu" was seen today for yeast infection and cough.    Diagnoses and all orders for this visit:    Antibiotic-induced yeast infection  -     fluconazole (DIFLUCAN) 150 MG Tab; Take 1 tablet (150 mg total) by mouth every 72 hours. for 3 doses  -     nystatin (MYCOSTATIN) cream; Apply topically 2 (two) times daily.  Continue medications as prescribed.  Follow up with PCP     Essential hypertension  Stable  Continue medications as prescribed.  Follow up with PCP    Chronic obstructive pulmonary disease with acute exacerbation / " Chronic obstructive pulmonary disease, unspecified COPD type  -     ADVAIR -21 mcg/actuation HFAA inhaler; Inhale 2 puffs into the lungs every 12 (twelve) hours.  -     doxycycline (VIBRA-TABS) 100 MG tablet; Take 1 tablet (100 mg total) by mouth 2 (two) times daily. for 10 days  Continue medications as prescribed.  Follow up with PCP     Obesity, Class I, BMI 30-34.9  Body mass index is 31.41 kg/m².  Continue healthy diet and regular exercise as tolerated.  Continue medications as prescribed.  Follow up with PCP         Follow up if symptoms worsen or fail to improve.      Future Appointments       Date Provider Specialty Appt Notes    8/1/2024  Radiology     8/1/2024  Radiology     8/5/2024 Khoobehi, Aurash, MD Hematology and Oncology [facility]/F/no labs/mammo results    9/30/2024  Lab hemonc    9/30/2024 Mell Veloz NP Hematology and Oncology [facility]/Labs/[facility]             All of your core healthy metrics are met.

## 2024-07-10 NOTE — TELEPHONE ENCOUNTER
Appointment scheduled with incorrect appointment time of Enhanced Wellness Visit with appointment length of 60 minutes.  Writer corrected appointment type to established patient with 30 minute appointment length.        Neha Howard [facility] Staff     ----- Message from Neha Howard sent at 7/10/2024  9:17 AM CDT -----  Regarding: advise  Contact: pt  Type: Needs Medical Advice  Who Called:  patient  Symptoms (please be specific):    How long has patient had these symptoms:    Pharmacy name and phone #:    Best Call Back Number: [phone]    Additional Information: I scheduled pt under the wrong category before canceling it I want to know if you will still see her due to a UTI

## 2024-07-22 ENCOUNTER — OFFICE VISIT (OUTPATIENT)
Dept: FAMILY MEDICINE | Facility: CLINIC | Age: 69
End: 2024-07-22
Payer: MEDICARE

## 2024-07-22 VITALS
WEIGHT: 182.13 LBS | SYSTOLIC BLOOD PRESSURE: 126 MMHG | RESPIRATION RATE: 16 BRPM | HEIGHT: 64 IN | TEMPERATURE: 98 F | DIASTOLIC BLOOD PRESSURE: 72 MMHG | BODY MASS INDEX: 31.09 KG/M2

## 2024-07-22 DIAGNOSIS — H92.02 LEFT EAR PAIN: Primary | ICD-10-CM

## 2024-07-22 PROCEDURE — 99999 PR PBB SHADOW E&M-EST. PATIENT-LVL V: CPT | Mod: PBBFAC,,,

## 2024-07-22 PROCEDURE — 99213 OFFICE O/P EST LOW 20 MIN: CPT | Mod: S$PBB,,,

## 2024-07-22 PROCEDURE — 99215 OFFICE O/P EST HI 40 MIN: CPT | Mod: PBBFAC,PO

## 2024-07-22 RX ORDER — MECLIZINE HCL 12.5 MG 12.5 MG/1
12.5 TABLET ORAL 3 TIMES DAILY PRN
Qty: 30 TABLET | Refills: 0 | Status: SHIPPED | OUTPATIENT
Start: 2024-07-22

## 2024-07-22 RX ORDER — CIPROFLOXACIN AND DEXAMETHASONE 3; 1 MG/ML; MG/ML
4 SUSPENSION/ DROPS AURICULAR (OTIC) 2 TIMES DAILY
Qty: 7.5 ML | Refills: 0 | Status: SHIPPED | OUTPATIENT
Start: 2024-07-22

## 2024-07-22 NOTE — PROGRESS NOTES
Subjective:       Patient ID:  [id number]     Chief Complaint: Otalgia    Laxmi Avalos a 68 y.o. female who presents to the clinic for for left ear pain.      Patient reports that she has been experiencing dull achy left ear pain for the last 3 days.  Patient reports symptoms are intermittent and described as a throbbing sensation.  Patient reports associated symptoms include mild dizziness.  She denies associated pain to touch or changes in hearing.  Patient reports that she takes Zyrtec and Sudafed daily for her COPD.  She denies fever, chills, and body aches.  Patient has no other concerns today.    Past Medical History:   Diagnosis Date    Breast cancer in female 09/07/2022    IDC with high grade DCIS    COPD (chronic obstructive pulmonary disease)     Degeneration of lumbar intervertebral disc     Endometrial cancer     GERD (gastroesophageal reflux disease)     HTN (hypertension)     Hyperlipemia, mixed       Active Problem List with Overview Notes    Diagnosis Date Noted    Paronychia of great toe, left 06/03/2024    Malignant neoplasm of overlapping sites of right breast in female, estrogen receptor positive 03/27/2024    Recurrent major depressive disorder, in full remission 02/21/2024    Walker as ambulation aid 02/21/2024    Hallux rigidus of right foot 09/10/2023    Painful orthopaedic hardware 09/10/2023    Hammer toe of right foot 08/16/2023    Osteoarthritis of ankle and foot 08/16/2023    Hypermobile joint syndrome of right foot 08/16/2023    Neuroma of second interspace of right foot 08/05/2023    Hallux abducto valgus, right 08/05/2023    Ingrown nail 03/20/2023    Urge incontinence of urine 02/15/2023    Atherosclerosis of aorta 02/13/2023    Calcified granuloma of lung 02/13/2023    Long term (current) use of aromatase inhibitors 10/11/2022    Invasive ductal carcinoma of breast, female, right 10/05/2022    Ductal carcinoma in situ (DCIS) of right breast 08/23/2022    Osteopenia of multiple  sites 08/23/2022    History of endometrial cancer 08/23/2022    Status post total left knee replacement 03/03/2021    Chronic pain syndrome 03/03/2021    Pain of breast 03/03/2021    Allergic rhinitis 01/05/2021    Depressive disorder 01/05/2021    Degeneration of lumbar intervertebral disc 10/01/2018    Chronic obstructive lung disease 05/15/2018     fev1/fvc = 47%      Hyperlipidemia 05/15/2018    History of smoking 06/02/2017    Gastroesophageal reflux disease 05/02/2017     Dr. Correa      Essential hypertension 02/28/2017      Review of patient's allergies indicates:  No Known Allergies      Current Outpatient Medications:     ADVAIR -21 mcg/actuation HFAA inhaler, Inhale 2 puffs into the lungs every 12 (twelve) hours., Disp: 12 g, Rfl: 11    albuterol (PROVENTIL/VENTOLIN HFA) 90 mcg/actuation inhaler, INHALE 2 PUFFS INTO THE LUNGS EVERY 4 HOURS AS NEEDED FOR WHEEZING OR SHORTNESS OF BREATH, Disp: 18 g, Rfl: 6    albuterol-ipratropium (DUO-NEB) 2.5 mg-0.5 mg/3 mL nebulizer solution, INHALE CONTENTS OF 1 VIAL BY MOUTH WITH NEBULIZER EVERY 6 HOURS WHILE AWAKE AS DIRECTED, Disp: 360 mL, Rfl: 5    anastrozole (ARIMIDEX) 1 mg Tab, TAKE 1 TABLET (1 MG TOTAL) BY MOUTH ONCE DAILY., Disp: 90 tablet, Rfl: 3    aspirin (ECOTRIN) 81 MG EC tablet, Take 1 tablet by mouth once daily. Pt back on asa, Disp: , Rfl:     aspirin-acetaminophen-caffeine 250-250-65 mg (HEADACHE RELIEF, ASA-ACET-CAF,) 250-250-65 mg per tablet, Take 1 tablet by mouth every 6 to 8 hours as needed., Disp: , Rfl:     buPROPion (WELLBUTRIN) 100 MG tablet, TAKE 1 TABLET(100 MG) BY MOUTH THREE TIMES DAILY, Disp: 270 tablet, Rfl: 3    calcium carbonate-vitamin D3 (CALCIUM 600 + D,3,) 600-125 mg-unit Tab, Take 2 tablets by mouth once daily., Disp: 180 tablet, Rfl: 3    cetirizine (ZYRTEC) 10 MG tablet, TAKE 1 TABLET (10 MG TOTAL) BY MOUTH ONCE DAILY., Disp: 90 tablet, Rfl: 5    cyclobenzaprine (FLEXERIL) 10 MG tablet, Take 1 tablet (10 mg total) by  mouth nightly., Disp: 90 tablet, Rfl: 3    doxepin (SINEQUAN) 25 MG capsule, TAKE 2 CAPSULES (50 MG TOTAL) BY MOUTH NIGHTLY., Disp: 180 capsule, Rfl: 3    fluticasone propionate (FLONASE) 50 mcg/actuation nasal spray, 1 spray (50 mcg total) by Each Nostril route once daily., Disp: 18.2 mL, Rfl: 2    hydrALAZINE (APRESOLINE) 10 MG tablet, Take 1 tablet (10 mg total) by mouth 3 (three) times daily., Disp: 90 tablet, Rfl: 11    hydroCHLOROthiazide (HYDRODIURIL) 25 MG tablet, Take 1 tablet (25 mg total) by mouth once daily., Disp: 90 tablet, Rfl: 3    LUMIGAN 0.01 % Drop, Place into both eyes., Disp: , Rfl:     metoprolol succinate (TOPROL-XL) 200 MG 24 hr tablet, Take 1 tablet (200 mg total) by mouth once daily., Disp: 30 tablet, Rfl: 3    metroNIDAZOLE (METROGEL) 0.75 % (37.5mg/5 gram) vaginal gel, Place 1 applicator vaginally Daily., Disp: 70 g, Rfl: 0    montelukast (SINGULAIR) 10 mg tablet, Take 1 tablet (10 mg total) by mouth once daily., Disp: 90 tablet, Rfl: 3    nystatin (MYCOSTATIN) cream, Apply topically 2 (two) times daily., Disp: 30 g, Rfl: 1    nystatin (MYCOSTATIN) powder, Apply topically 4 (four) times daily., Disp: 60 g, Rfl: 1    oxyCODONE-acetaminophen (PERCOCET)  mg per tablet, Take 1 tablet by mouth., Disp: , Rfl:     pantoprazole (PROTONIX) 40 MG tablet, Take 1 tablet (40 mg total) by mouth once daily., Disp: 90 tablet, Rfl: 3    pseudoephedrine (SUDAFED) 120 mg 12 hr tablet, Take 120 mg by mouth every 12 (twelve) hours., Disp: , Rfl:     valsartan (DIOVAN) 80 MG tablet, Take 1 tablet (80 mg total) by mouth once daily., Disp: 90 tablet, Rfl: 3    ciprofloxacin-dexAMETHasone 0.3-0.1% (CIPRODEX) 0.3-0.1 % DrpS, Place 4 drops into the left ear 2 (two) times daily., Disp: 7.5 mL, Rfl: 0    conjugated estrogens (PREMARIN) vaginal cream, Place 0.5 g vaginally twice a week., Disp: 30 g, Rfl: 5    meclizine (ANTIVERT) 12.5 mg tablet, Take 1 tablet (12.5 mg total) by mouth 3 (three) times daily as  needed for Dizziness., Disp: 30 tablet, Rfl: 0    walker (ULTRA-LIGHT ROLLATOR) Misc, 1 Units by Misc.(Non-Drug; Combo Route) route Daily. (Patient not taking: Reported on 7/22/2024), Disp: 1 each, Rfl: 0    Lab Results   Component Value Date    WBC 6.34 05/20/2024    HGB 12.5 05/20/2024    HCT 38.9 05/20/2024     05/20/2024    CHOL 203 (H) 05/10/2022    TRIG 101 05/10/2022    HDL 50 05/10/2022    ALT 19 05/20/2024    AST 18 05/20/2024     05/20/2024    K 3.8 05/20/2024     05/20/2024    CREATININE 1.3 05/20/2024    BUN 23 05/20/2024    CO2 27 05/20/2024    TSH 1.040 05/10/2022    HGBA1C 5.5 07/26/2023       Review of Systems   Constitutional:  Negative for fatigue and fever.   HENT:  Positive for ear pain. Negative for congestion, sneezing and sore throat.    Eyes: Negative.    Respiratory:  Negative for cough, shortness of breath and wheezing.    Cardiovascular:  Negative for chest pain, palpitations and leg swelling.   Gastrointestinal:  Negative for abdominal pain, nausea and vomiting.   Genitourinary: Negative.    Musculoskeletal: Negative.  Negative for arthralgias.   Skin: Negative.  Negative for rash.   Neurological:  Positive for dizziness. Negative for weakness, light-headedness, numbness and headaches.   Hematological: Negative.    Psychiatric/Behavioral: Negative.     All other systems reviewed and are negative.      Objective:      Physical Exam  Constitutional:       Appearance: Normal appearance.   HENT:      Head: Normocephalic and atraumatic.      Right Ear: Tympanic membrane normal.      Left Ear: Tympanic membrane normal.      Ears:      Comments: Mild ear canal swelling noted in left ear.     Nose: Nose normal.   Eyes:      Extraocular Movements: Extraocular movements intact.   Cardiovascular:      Rate and Rhythm: Normal rate and regular rhythm.   Pulmonary:      Effort: Pulmonary effort is normal.      Breath sounds: Normal breath sounds.   Musculoskeletal:         General:  "Normal range of motion.      Cervical back: Normal range of motion.   Skin:     General: Skin is warm and dry.   Neurological:      General: No focal deficit present.      Mental Status: She is alert and oriented to person, place, and time.   Psychiatric:         Mood and Affect: Mood normal.         Assessment:       1. Left ear pain        Plan:       Laxmi Yu" was seen today for otalgia.    Diagnoses and all orders for this visit:    Left ear pain  - possible mild otitis externa.  Advised patient to avoid Q-tips and water in her ear.  -     meclizine (ANTIVERT) 12.5 mg tablet; Take 1 tablet (12.5 mg total) by mouth 3 (three) times daily as needed for Dizziness.  -     ciprofloxacin-dexAMETHasone 0.3-0.1% (CIPRODEX) 0.3-0.1 % DrpS; Place 4 drops into the left ear 2 (two) times daily.  - if symptoms persist please return for follow-up.     Advised patient to follow-up with primary care provider within the next 3 months for overall wellness visit.       Portions of this note were dictated using voice recognition software and may contain dictation related errors in spelling / grammar / syntax not discovered on text review.     Jasmyn Culp PA-C    "

## 2024-07-29 ENCOUNTER — TELEPHONE (OUTPATIENT)
Dept: FAMILY MEDICINE | Facility: CLINIC | Age: 69
End: 2024-07-29
Payer: MEDICARE

## 2024-07-29 DIAGNOSIS — J44.1 COPD EXACERBATION: Primary | ICD-10-CM

## 2024-07-29 RX ORDER — AMOXICILLIN AND CLAVULANATE POTASSIUM 875; 125 MG/1; MG/1
1 TABLET, FILM COATED ORAL 2 TIMES DAILY
Qty: 10 TABLET | Refills: 0 | Status: SHIPPED | OUTPATIENT
Start: 2024-07-29 | End: 2024-08-03

## 2024-07-29 NOTE — TELEPHONE ENCOUNTER
Hi, I will send in an oral antibiotic in hopes of it improving her symptoms.  However, if her symptoms do not improve with this antibiotic I recommend she follow up in clinic.   Thanks

## 2024-07-29 NOTE — TELEPHONE ENCOUNTER
----- Message from Luciana Tompkins sent at 7/29/2024 10:18 AM CDT -----  Type: Needs Medical Advice  Who Called:  pt   Symptoms (please be specific):  ongoing concerns / symptoms   Pharmacy name and phone #:    [id number] - [place], [place] - [address] [facility] [address]  [address]  [place] [address]  Phone: [phone] Fax: [phone]  Best Call Back Number: [phone]  Additional Information: pt stated she was advised to call if pts concerns/ symptoms continue pt stated rx has yet to help pt and asking to get alternate rx sent for pt please ensure to call pt back to advise asap thanks!

## 2024-07-29 NOTE — TELEPHONE ENCOUNTER
Spoke to pt who states she was seen on 7/22 by Remi BRAVO. Pt states she has pain in left ear and in between jar and ear. Pt states the jaw pain is not constant, but getting worse. Ear pain was dull but has increased as well. Pt states she was given ear drops and meclizine. She feels like her symptoms are getting worse. Pt states she did have mild congestion when she came to office but mucous was clear. Pt states she now has a yellow/greenish mucous when coughing (pt states cough is from COPD). Pt states she was advised to call back if symptoms did not get better. Please advise

## 2024-08-01 ENCOUNTER — HOSPITAL ENCOUNTER (OUTPATIENT)
Dept: RADIOLOGY | Facility: HOSPITAL | Age: 69
Discharge: HOME | End: 2024-08-01
Attending: NURSE PRACTITIONER
Payer: MEDICARE

## 2024-08-01 DIAGNOSIS — C50.011 MALIGNANT NEOPLASM OF NIPPLE OF RIGHT BREAST IN FEMALE, UNSPECIFIED ESTROGEN RECEPTOR STATUS: ICD-10-CM

## 2024-08-01 PROCEDURE — 76642 ULTRASOUND BREAST LIMITED: CPT | Mod: TC,RT

## 2024-08-01 PROCEDURE — 77065 DX MAMMO INCL CAD UNI: CPT | Mod: 26,RT,, | Performed by: RADIOLOGY

## 2024-08-01 PROCEDURE — 77061 BREAST TOMOSYNTHESIS UNI: CPT | Mod: 26,RT,, | Performed by: RADIOLOGY

## 2024-08-01 PROCEDURE — 76642 ULTRASOUND BREAST LIMITED: CPT | Mod: 26,RT,, | Performed by: RADIOLOGY

## 2024-08-01 PROCEDURE — 77061 BREAST TOMOSYNTHESIS UNI: CPT | Mod: TC,RT

## 2024-08-05 ENCOUNTER — OFFICE VISIT (OUTPATIENT)
Dept: HEMATOLOGY/ONCOLOGY | Facility: CLINIC | Age: 69
End: 2024-08-05
Payer: MEDICARE

## 2024-08-05 VITALS
HEIGHT: 64 IN | HEART RATE: 76 BPM | DIASTOLIC BLOOD PRESSURE: 67 MMHG | WEIGHT: 185.44 LBS | BODY MASS INDEX: 31.66 KG/M2 | TEMPERATURE: 97 F | OXYGEN SATURATION: 95 % | RESPIRATION RATE: 16 BRPM | SYSTOLIC BLOOD PRESSURE: 154 MMHG

## 2024-08-05 DIAGNOSIS — C50.011 MALIGNANT NEOPLASM OF NIPPLE OF RIGHT BREAST IN FEMALE, UNSPECIFIED ESTROGEN RECEPTOR STATUS: Primary | ICD-10-CM

## 2024-08-05 DIAGNOSIS — M85.80 SENILE OSTEOPENIA: ICD-10-CM

## 2024-08-05 DIAGNOSIS — M81.8 OSTEOPOROSIS DUE TO AROMATASE INHIBITOR: ICD-10-CM

## 2024-08-05 DIAGNOSIS — D05.11 DUCTAL CARCINOMA IN SITU (DCIS) OF RIGHT BREAST: ICD-10-CM

## 2024-08-05 DIAGNOSIS — Z12.31 ENCOUNTER FOR SCREENING MAMMOGRAM FOR MALIGNANT NEOPLASM OF BREAST: ICD-10-CM

## 2024-08-05 DIAGNOSIS — T38.6X5A OSTEOPOROSIS DUE TO AROMATASE INHIBITOR: ICD-10-CM

## 2024-08-05 PROCEDURE — 99999 PR PBB SHADOW E&M-EST. PATIENT-LVL III: CPT | Mod: PBBFAC,,, | Performed by: INTERNAL MEDICINE

## 2024-08-05 PROCEDURE — 99214 OFFICE O/P EST MOD 30 MIN: CPT | Mod: S$PBB,,, | Performed by: INTERNAL MEDICINE

## 2024-08-05 PROCEDURE — 99213 OFFICE O/P EST LOW 20 MIN: CPT | Mod: PBBFAC,PN | Performed by: INTERNAL MEDICINE

## 2024-08-12 ENCOUNTER — HOSPITAL ENCOUNTER (OUTPATIENT)
Dept: RADIOLOGY | Facility: HOSPITAL | Age: 69
Discharge: HOME | End: 2024-08-12
Attending: INTERNAL MEDICINE
Payer: MEDICARE

## 2024-08-12 DIAGNOSIS — M85.80 SENILE OSTEOPENIA: ICD-10-CM

## 2024-08-12 DIAGNOSIS — T38.6X5A OSTEOPOROSIS DUE TO AROMATASE INHIBITOR: ICD-10-CM

## 2024-08-12 DIAGNOSIS — M81.8 OSTEOPOROSIS DUE TO AROMATASE INHIBITOR: ICD-10-CM

## 2024-08-12 PROCEDURE — 77080 DXA BONE DENSITY AXIAL: CPT | Mod: 26,,, | Performed by: RADIOLOGY

## 2024-08-12 PROCEDURE — 77080 DXA BONE DENSITY AXIAL: CPT | Mod: TC

## 2024-08-23 DIAGNOSIS — M51.36 DEGENERATION OF LUMBAR INTERVERTEBRAL DISC: ICD-10-CM

## 2024-08-23 RX ORDER — CELECOXIB 200 MG/1
200 CAPSULE ORAL 2 TIMES DAILY
Qty: 60 CAPSULE | Refills: 3 | Status: SHIPPED | OUTPATIENT
Start: 2024-08-23 | End: 2024-12-21

## 2024-08-23 NOTE — TELEPHONE ENCOUNTER
Patient is requesting a refill of Celebrex 200 mg capsules sent to [facility] in [place], [place].     Last office visit was 06/19/2024.    Thank you,   ERIKA Smith 08/23/2024

## 2024-09-04 ENCOUNTER — OFFICE VISIT (OUTPATIENT)
Dept: RADIATION ONCOLOGY | Facility: CLINIC | Age: 69
End: 2024-09-04
Payer: MEDICARE

## 2024-09-04 VITALS
SYSTOLIC BLOOD PRESSURE: 168 MMHG | OXYGEN SATURATION: 100 % | DIASTOLIC BLOOD PRESSURE: 75 MMHG | WEIGHT: 185.19 LBS | RESPIRATION RATE: 18 BRPM | HEART RATE: 69 BPM | BODY MASS INDEX: 31.79 KG/M2

## 2024-09-04 DIAGNOSIS — C50.911 INVASIVE DUCTAL CARCINOMA OF BREAST, FEMALE, RIGHT: Primary | ICD-10-CM

## 2024-09-04 DIAGNOSIS — Z17.0 MALIGNANT NEOPLASM OF UPPER-OUTER QUADRANT OF RIGHT BREAST IN FEMALE, ESTROGEN RECEPTOR POSITIVE: ICD-10-CM

## 2024-09-04 DIAGNOSIS — C50.411 MALIGNANT NEOPLASM OF UPPER-OUTER QUADRANT OF RIGHT BREAST IN FEMALE, ESTROGEN RECEPTOR POSITIVE: ICD-10-CM

## 2024-09-04 PROCEDURE — 99214 OFFICE O/P EST MOD 30 MIN: CPT | Mod: S$GLB,,, | Performed by: RADIOLOGY

## 2024-09-04 PROCEDURE — G2211 COMPLEX E/M VISIT ADD ON: HCPCS | Mod: S$GLB,,, | Performed by: RADIOLOGY

## 2024-09-04 NOTE — PROGRESS NOTES
Laxmi Chand  [id number]  1955    DIAGNOSIS: Stage IA, [id number] g1 IDC UOQ R breast, ER+/AL(-)/HER2     REASON FOR VISIT: Routine scheduled follow-up.    HISTORY OF PRESENT ILLNESS:   Laxmi Chand is a 69 y.o. postmenopausal  female with (-)FHx of BCa and past medical history of endometrial ca s/p hysterectomy 40yrs ago who presented with abnormal screening mammogram noting new calcifications in the upper outer quadrant of the right breast confirmed on diagnostic mammogram.  Core needle biopsy returned high-grade DCIS, solid pattern; ER+ %, AL(-).      She underwent lumpectomy and ALND with Dr. Gutierrez, 2022:              - 2 mm grade 1 (5/9) invasive ductal carcinoma; LVSI (-); ER+ 95%, AL(-), HER2 3+              - 1.5 cm grade 3 DCIS, solid pattern without necrosis              - invasive disease margin (-) 9 mm deep; DCIS margin 2 mm anterior              - poor mapping; ALND: 0/10 LNs     BRCA1,2 (-)     She then completed adj WBRT @ 4050 cGy in 15 fxns to the right breast followed by 1000 cGy boost to lumpectomy cavity on 2022      On AI.     INTERVAL HISTORY:   Patient endorses soreness in the right axilla with decreased range of motion.  Denies swelling of the right upper extremity.  Has some discomfort in the lateral right breast without nipple discharge.  No skin changes.  Denies fever or chills.  Denies hot flashes, weight gain or arthralgias.      R dx MMG+US (24):      Review of systems otherwise negative unless indicated in HPI/interval history.    Past Medical History:   Diagnosis Date    Breast cancer in female 2022    IDC with high grade DCIS    COPD (chronic obstructive pulmonary disease)     Degeneration of lumbar intervertebral disc     GERD (gastroesophageal reflux disease)     HTN (hypertension)     Hyperlipemia, mixed      Past Surgical History:   Procedure Laterality Date    BIOPSY OF AXILLARY NODE Right 2022    Procedure: BIOPSY,  LYMPH NODE, AXILLARY;  Surgeon: Jatin Gutierrez MD;  Location: [facility];  Service: General;  Laterality: Right;    BREAST BIOPSY Right 08/05/2022    BREAST MASS EXCISION Right 9/7/2022    Procedure: EXCISION, MASS, BREAST;  Surgeon: Jatin Gutierrez MD;  Location: [facility];  Service: General;  Laterality: Right;  wire localized partial mastectomy right breast with margins     CARPAL TUNNEL RELEASE  1985    CHOLECYSTECTOMY  1978    CORRECTION OF HAMMER TOE Right 9/15/2023    Procedure: CORRECTION, HAMMER TOE;  Surgeon: Devyn Mccullough DPM;  Location: [facility] OR;  Service: Podiatry;  Laterality: Right;    FOOT HARDWARE REMOVAL Right 9/15/2023    Procedure: REMOVAL, HARDWARE, FOOT;  Surgeon: Devyn Mccullough DPM;  Location: [facility];  Service: Podiatry;  Laterality: Right;    HYSTERECTOMY      KNEE SURGERY Left 06/01/2020    LUMBAR DISC SURGERY  01/01/1990    plate and roland    LUMBAR FUSION  2011    MIDFOOT ARTHRODESIS Right 9/15/2023    Procedure: FUSION, JOINT, MIDFOOT;  Surgeon: Devyn Mccullough DPM;  Location: [facility];  Service: Podiatry;  Laterality: Right;  [place] 28 1st MPJ fusion plates screws bone grafts as well as cut guide.    TOTAL KNEE REPLACEMENT USING COMPUTER NAVIGATION Left 02/15/2021     Social History     Socioeconomic History    Marital status: Significant Other   Occupational History    Occupation: disabled   Tobacco Use    Smoking status: Every Day     Types: Vaping with nicotine     Passive exposure: Never    Smokeless tobacco: Never   Substance and Sexual Activity    Alcohol use: Yes    Drug use: Not Currently    Sexual activity: Yes     Partners: Male   Social History Narrative    Plans from [facility]         Gyn Exam / Hysterectomy 10 Charu1/28/2020     Annual    urinary tract infection    yeast infection        Visit Summary    No pap per guidelines    U/A for culture    Wet prep: yeast only    Pt has a PCP    Colonoscopy up to date    Mammo @ [facility]        Prescriptions:     SIG: Cipro 500 mg oral tablet, 3 days, Dispense #6 Tablet, 0 Refills    Directions: Take 1 oral tablet 2 times a day    SIG: Diflucan 100 mg oral tablet, 3 days, Dispense #3 Tablet, 0 Refills    Directions: Take 1 oral tablet once a day        Return for follow up appointment in one year or prn.     GYN Visit & [id number] [facility]2/4/2018     Vulvar Cyst: Resolved        Visit Summary    Normal external gyn exam. Cyst resolved        Return for follow up appointment annual/prn.     GYN Visit & [id number] [facility]5/24/2018     Chronic Vaginitis    Paratubal cysts: stable        Visit Summary    Wet prep: mostly yeast, few clue cells    Pt soaks in bath BID, stop, shower only, no douching.    u/s performed today. unchanged from last scan.        Prescriptions: Clindesse sample given    SIG: Diflucan 100 mg oral tablet, 3 days, Dispense #3 Tablet, 0 Refills    Directions: Take 1 oral tablet once a day    Recommend probiotics        Return for follow up appointment for annual or prn. MDL swab at next appt if needed.    Mammo up to date.     GYN Visit & [id number] [facility]1/16/2017     Recurrent bacterial vaginosis.  Paratubal cysts.  Dysuria.    Repeat transvaginal ultrasound 6 weeks.        Visit Summary    Ordered:    Urine Culture, Routine     GYN Visit & [id number] [facility]5/12/2017     path compound melanocytic nevus... us...of pelvis....rtc 1y pap     GYN Visit & [id number] [facility]5/4/2017     3 moles removed from back 5 mm each...same contwainer monsels applied...    one mole removed from under each breasxt 5 mm...mnonsels applied...each one sent to path sepreatelyt..        vag dc bv...sp metrogel...no family h/o breast ca...        pelvic us...complex cyst 2.23 cm on left w possible excrescence and septation        pelvic us for complex cyst at [facility]......rtc 1wk     GYN Visit & [id number] [facility]4/25/2017     Chronic Bacterial Vaginitis    Chronic Back Pain    Left Lower Quadrant resolved, possible ruptured ovarian cyst         Visit Summary    MDL swab done        Return for follow up appointment in 2 months for follow up pelvic u/s.     GYN Exam/[id number] 20164/6/2017     Annual    Vaginal odor, Bacterial Vaginosis    Ovarian Cyst        Visit Summary    Pap done, reports hx of cervical pre-cancer        Prescriptions:    SIG: metronidazole 500 mg tablet, 7 days, Dispense #14 Tablet, 0 Refills    Directions: Take 1 oral tablet 2 times a day. No alcohol discussed.        U/S today, reports had ovarian cyst on CT scan.    FSH today        Return for follow up appointment  pending results.     Social Determinants of Health     Financial Resource Strain: High Risk (1/29/2024)    Overall Financial Resource Strain (CARDIA)     Difficulty of Paying Living Expenses: Hard   Food Insecurity: Food Insecurity Present (1/29/2024)    Hunger Vital Sign     Worried About Running Out of Food in the Last Year: Sometimes true     Ran Out of Food in the Last Year: Sometimes true   Transportation Needs: No Transportation Needs (1/29/2024)    PRAPARE - Transportation     Lack of Transportation (Medical): No     Lack of Transportation (Non-Medical): No   Physical Activity: Insufficiently Active (1/29/2024)    Exercise Vital Sign     Days of Exercise per Week: 1 day     Minutes of Exercise per Session: 10 min   Stress: No Stress Concern Present (1/29/2024)    [place] citizen [place] [employer] - Occupational Stress Questionnaire     Feeling of Stress : Only a little   Housing Stability: Low Risk  (1/29/2024)    Housing Stability Vital Sign     Unable to Pay for Housing in the Last Year: No     Number of Places Lived in the Last Year: 1     Unstable Housing in the Last Year: No     Family History   Problem Relation Name Age of Onset    Hypertension Mother      Diabetes Mother      Hypertension Father      Lung cancer Maternal Grandfather      Breast cancer Neg Hx       Medication List with Changes/Refills   Current Medications    ADVAIR -21  MCG/ACTUATION HFAA INHALER    Inhale 2 puffs into the lungs every 12 (twelve) hours.    ALBUTEROL (PROVENTIL/VENTOLIN HFA) 90 MCG/ACTUATION INHALER    INHALE 2 PUFFS INTO THE LUNGS EVERY 4 HOURS AS NEEDED FOR WHEEZING OR SHORTNESS OF BREATH    ALBUTEROL-IPRATROPIUM (DUO-NEB) 2.5 MG-0.5 MG/3 ML NEBULIZER SOLUTION    INHALE CONTENTS OF 1 VIAL BY MOUTH WITH NEBULIZER EVERY 6 HOURS WHILE AWAKE AS DIRECTED    ANASTROZOLE (ARIMIDEX) 1 MG TAB    TAKE 1 TABLET (1 MG TOTAL) BY MOUTH ONCE DAILY.    ASPIRIN (ECOTRIN) 81 MG EC TABLET    Take 1 tablet by mouth once daily. Pt back on asa    ASPIRIN-ACETAMINOPHEN-CAFFEINE 250-250-65 MG (HEADACHE RELIEF, ASA-ACET-CAF,) 250-250-65 MG PER TABLET    Take 1 tablet by mouth every 6 to 8 hours as needed.    BUPROPION (WELLBUTRIN) 100 MG TABLET    TAKE 1 TABLET(100 MG) BY MOUTH THREE TIMES DAILY    CALCIUM CARBONATE-VITAMIN D3 (CALCIUM 600 + D,3,) 600-125 MG-UNIT TAB    Take 2 tablets by mouth once daily.    CELECOXIB (CELEBREX) 200 MG CAPSULE    TAKE 1 CAPSULE (200 MG TOTAL) BY MOUTH 2 (TWO) TIMES DAILY.    CETIRIZINE (ZYRTEC) 10 MG TABLET    TAKE 1 TABLET (10 MG TOTAL) BY MOUTH ONCE DAILY.    CIPROFLOXACIN-DEXAMETHASONE 0.3-0.1% (CIPRODEX) 0.3-0.1 % DRPS    Place 4 drops into the left ear 2 (two) times daily.    CONJUGATED ESTROGENS (PREMARIN) VAGINAL CREAM    Place 0.5 g vaginally twice a week.    CYCLOBENZAPRINE (FLEXERIL) 10 MG TABLET    Take 1 tablet (10 mg total) by mouth nightly.    DOXEPIN (SINEQUAN) 25 MG CAPSULE    TAKE 2 CAPSULES (50 MG TOTAL) BY MOUTH NIGHTLY.    FLUTICASONE PROPIONATE (FLONASE) 50 MCG/ACTUATION NASAL SPRAY    1 spray (50 mcg total) by Each Nostril route once daily.    HYDRALAZINE (APRESOLINE) 10 MG TABLET    Take 1 tablet (10 mg total) by mouth 3 (three) times daily.    HYDROCHLOROTHIAZIDE (HYDRODIURIL) 25 MG TABLET    Take 1 tablet (25 mg total) by mouth once daily.    LUMIGAN 0.01 % DROP    Place into both eyes.    MECLIZINE (ANTIVERT) 12.5 MG TABLET     Take 1 tablet (12.5 mg total) by mouth 3 (three) times daily as needed for Dizziness.    METOPROLOL SUCCINATE (TOPROL-XL) 200 MG 24 HR TABLET    Take 1 tablet (200 mg total) by mouth once daily.    METRONIDAZOLE (METROGEL) 0.75 % (37.5MG/5 GRAM) VAGINAL GEL    Place 1 applicator vaginally Daily.    MONTELUKAST (SINGULAIR) 10 MG TABLET    Take 1 tablet (10 mg total) by mouth once daily.    NYSTATIN (MYCOSTATIN) CREAM    Apply topically 2 (two) times daily.    NYSTATIN (MYCOSTATIN) POWDER    Apply topically 4 (four) times daily.    OXYCODONE-ACETAMINOPHEN (PERCOCET)  MG PER TABLET    Take 1 tablet by mouth.    PANTOPRAZOLE (PROTONIX) 40 MG TABLET    Take 1 tablet (40 mg total) by mouth once daily.    PSEUDOEPHEDRINE (SUDAFED) 120 MG 12 HR TABLET    Take 120 mg by mouth every 12 (twelve) hours.    VALSARTAN (DIOVAN) 80 MG TABLET    Take 1 tablet (80 mg total) by mouth once daily.    WALKER (ULTRA-LIGHT ROLLATOR) MISC    1 Units by Misc.(Non-Drug; Combo Route) route Daily.     Review of patient's allergies indicates:  No Known Allergies    QUALITY OF LIFE: 90%- Able to Carry on Normal Activity: Minor Symptoms of Disease    There were no vitals filed for this visit.  There is no height or weight on file to calculate BMI.    PHYSICAL EXAM:  GENERAL: alert; in no apparent distress.   HEAD: normocephalic, atraumatic.  EYES: pupils are equal, round, reactive to light and accommodation. Sclera anicteric. Conjunctiva not injected.   NOSE/THROAT: no nasal erythema or rhinorrhea. Oropharynx pink, without erythema, ulcerations or thrush.   NECK: no cervical motion rigidity; supple with no masses.  CHEST: Patient is speaking comfortably on room air with normal work of breathing without using accessory muscles of respiration.  CARDIOVASCULAR: regular rate and rhythm  ABDOMEN: soft, nontender, nondistended.   MUSCULOSKELETAL: no tenderness to palpation along the spine or scapulae. Normal range of motion.  NEUROLOGIC: cranial  nerves II-XII intact bilaterally. Strength 5/5 in bilateral upper and lower extremities. No sensory deficits appreciated. Normal gait.  LYMPHATIC: no cervical, supraclavicular or axillary adenopathy appreciated.   EXTREMITIES: no clubbing, cyanosis, edema.  SKIN: no erythema, rashes, ulcerations noted.   BREAST:  The bilateral breasts were examined in the upright and supine position. The breasts were grossly symmetrical in terms of size and configuration. The right breast lumpectomy and axillary incisions appeared well healed w/o inflammation or significant seroma with minimal fibrosis / post treatment effect. Palpation revealed soft, pliable tissue of both breasts without any discrete lesions or masses. The right and left axillae did not exhibit any evidence of lymphadenopathy. No nipple retraction/inversion or discharge appreciated.      ASSESSMENT: Laxmi Chand is a female with h/o stage IA, [id number] g1 IDC UOQ R breast, ER+/KS(-)/HER2     PLAN:   - LAKESHA per exam and MMG+US  - due for bilateral dx MMG in Feb 2025  - RTC in 1yr  - f/u w/ [facility] as directed    All questions answered and contact information provided. Patient understands free to call us anytime with any questions or concerns regarding radiation therapy.    I have personally seen and evaluated this patient with a moderate to high complexity diagnosis.     PHYSICIAN: Nguyễn Hsu III, MD

## 2024-09-05 DIAGNOSIS — Z17.0 MALIGNANT NEOPLASM OF UPPER-OUTER QUADRANT OF RIGHT BREAST IN FEMALE, ESTROGEN RECEPTOR POSITIVE: Primary | ICD-10-CM

## 2024-09-05 DIAGNOSIS — C50.411 MALIGNANT NEOPLASM OF UPPER-OUTER QUADRANT OF RIGHT BREAST IN FEMALE, ESTROGEN RECEPTOR POSITIVE: Primary | ICD-10-CM

## 2024-09-22 DIAGNOSIS — J44.9 CHRONIC OBSTRUCTIVE PULMONARY DISEASE, UNSPECIFIED COPD TYPE: ICD-10-CM

## 2024-09-29 RX ORDER — ALBUTEROL SULFATE 90 UG/1
INHALANT RESPIRATORY (INHALATION)
Qty: 18 G | Refills: 6 | Status: SHIPPED | OUTPATIENT
Start: 2024-09-29

## 2024-09-30 ENCOUNTER — LAB VISIT (OUTPATIENT)
Dept: LAB | Facility: HOSPITAL | Age: 69
End: 2024-09-30
Attending: NURSE PRACTITIONER
Payer: MEDICARE

## 2024-09-30 ENCOUNTER — OFFICE VISIT (OUTPATIENT)
Dept: HEMATOLOGY/ONCOLOGY | Facility: CLINIC | Age: 69
End: 2024-09-30
Payer: MEDICARE

## 2024-09-30 VITALS
BODY MASS INDEX: 32.1 KG/M2 | RESPIRATION RATE: 18 BRPM | HEART RATE: 72 BPM | WEIGHT: 187 LBS | OXYGEN SATURATION: 94 % | DIASTOLIC BLOOD PRESSURE: 58 MMHG | SYSTOLIC BLOOD PRESSURE: 158 MMHG

## 2024-09-30 DIAGNOSIS — C50.011 MALIGNANT NEOPLASM OF NIPPLE OF RIGHT BREAST IN FEMALE, UNSPECIFIED ESTROGEN RECEPTOR STATUS: ICD-10-CM

## 2024-09-30 DIAGNOSIS — E53.8 B12 DEFICIENCY: Primary | ICD-10-CM

## 2024-09-30 LAB
ALBUMIN SERPL BCP-MCNC: 3.3 G/DL (ref 3.5–5.2)
ALP SERPL-CCNC: 60 U/L (ref 55–135)
ALT SERPL W/O P-5'-P-CCNC: 14 U/L (ref 10–44)
ANION GAP SERPL CALC-SCNC: 10 MMOL/L (ref 8–16)
AST SERPL-CCNC: 11 U/L (ref 10–40)
BASOPHILS # BLD AUTO: 0.03 K/UL (ref 0–0.2)
BASOPHILS NFR BLD: 0.3 % (ref 0–1.9)
BILIRUB SERPL-MCNC: 0.3 MG/DL (ref 0.1–1)
BUN SERPL-MCNC: 25 MG/DL (ref 8–23)
CALCIUM SERPL-MCNC: 9.3 MG/DL (ref 8.7–10.5)
CHLORIDE SERPL-SCNC: 103 MMOL/L (ref 95–110)
CO2 SERPL-SCNC: 28 MMOL/L (ref 23–29)
CREAT SERPL-MCNC: 1 MG/DL (ref 0.5–1.4)
DIFFERENTIAL METHOD BLD: ABNORMAL
EOSINOPHIL # BLD AUTO: 0.3 K/UL (ref 0–0.5)
EOSINOPHIL NFR BLD: 3.2 % (ref 0–8)
ERYTHROCYTE [DISTWIDTH] IN BLOOD BY AUTOMATED COUNT: 13.5 % (ref 11.5–14.5)
EST. GFR  (NO RACE VARIABLE): >60 ML/MIN/1.73 M^2
GLUCOSE SERPL-MCNC: 100 MG/DL (ref 70–110)
HCT VFR BLD AUTO: 35.9 % (ref 37–48.5)
HGB BLD-MCNC: 11.4 G/DL (ref 12–16)
IMM GRANULOCYTES # BLD AUTO: 0.1 K/UL (ref 0–0.04)
IMM GRANULOCYTES NFR BLD AUTO: 1.1 % (ref 0–0.5)
LYMPHOCYTES # BLD AUTO: 2.3 K/UL (ref 1–4.8)
LYMPHOCYTES NFR BLD: 25.9 % (ref 18–48)
MCH RBC QN AUTO: 29.1 PG (ref 27–31)
MCHC RBC AUTO-ENTMCNC: 31.8 G/DL (ref 32–36)
MCV RBC AUTO: 92 FL (ref 82–98)
MONOCYTES # BLD AUTO: 0.7 K/UL (ref 0.3–1)
MONOCYTES NFR BLD: 7.9 % (ref 4–15)
NEUTROPHILS # BLD AUTO: 5.4 K/UL (ref 1.8–7.7)
NEUTROPHILS NFR BLD: 61.6 % (ref 38–73)
NRBC BLD-RTO: 0 /100 WBC
PLATELET # BLD AUTO: 399 K/UL (ref 150–450)
PMV BLD AUTO: 8.6 FL (ref 9.2–12.9)
POTASSIUM SERPL-SCNC: 3.6 MMOL/L (ref 3.5–5.1)
PROT SERPL-MCNC: 5.9 G/DL (ref 6–8.4)
RBC # BLD AUTO: 3.92 M/UL (ref 4–5.4)
SODIUM SERPL-SCNC: 141 MMOL/L (ref 136–145)
WBC # BLD AUTO: 8.84 K/UL (ref 3.9–12.7)

## 2024-09-30 PROCEDURE — 36415 COLL VENOUS BLD VENIPUNCTURE: CPT | Performed by: NURSE PRACTITIONER

## 2024-09-30 PROCEDURE — 99999 PR PBB SHADOW E&M-EST. PATIENT-LVL III: CPT | Mod: PBBFAC,,, | Performed by: NURSE PRACTITIONER

## 2024-09-30 PROCEDURE — 85025 COMPLETE CBC W/AUTO DIFF WBC: CPT | Performed by: NURSE PRACTITIONER

## 2024-09-30 PROCEDURE — 80053 COMPREHEN METABOLIC PANEL: CPT | Performed by: NURSE PRACTITIONER

## 2024-09-30 PROCEDURE — 99213 OFFICE O/P EST LOW 20 MIN: CPT | Mod: PBBFAC | Performed by: NURSE PRACTITIONER

## 2024-09-30 NOTE — PROGRESS NOTES
Service Date:  9/30/24    Chief Complaint: No chief complaint on file.    Laxmi Chand is a 69 y.o. female with right breast invasive ductal carcinoma only 2 mm in size and right breast DCIS.  S/p lumpectomy on 9/7/22.  Currently on aromatase inhibitor therapy.      Doing well.  No complaints me.    9/30/2024:  She returns today follow-up and is without complaint    Review of Systems   Constitutional:  Positive for fatigue. Negative for appetite change and unexpected weight change.   HENT: Negative.  Negative for mouth sores.    Eyes: Negative.  Negative for visual disturbance.   Respiratory: Negative.  Negative for cough and shortness of breath.    Cardiovascular: Negative.  Negative for chest pain.   Gastrointestinal: Negative.  Negative for abdominal pain and diarrhea.   Endocrine: Negative.    Genitourinary: Negative.  Negative for frequency.   Musculoskeletal: Negative.    Integumentary:  Negative for rash. Negative.   Neurological: Negative.    Hematological: Negative.  Negative for adenopathy.   Psychiatric/Behavioral: Negative.  The patient is not nervous/anxious.         Current Outpatient Medications   Medication Instructions    ADVAIR -21 mcg/actuation HFAA inhaler 2 puffs, Inhalation, Every 12 hours    albuterol (PROVENTIL/VENTOLIN HFA) 90 mcg/actuation inhaler INHALE 2 PUFFS BY MOUTH EVERY 4 HOURS AS NEEDED FOR WHEEZING OR SHORTNESS OF BREATH    albuterol-ipratropium (DUO-NEB) 2.5 mg-0.5 mg/3 mL nebulizer solution INHALE CONTENTS OF 1 VIAL BY MOUTH WITH NEBULIZER EVERY 6 HOURS WHILE AWAKE AS DIRECTED    anastrozole (ARIMIDEX) 1 mg, Oral, Daily    aspirin (ECOTRIN) 81 MG EC tablet 1 tablet, Oral, Daily, Pt back on asa    aspirin-acetaminophen-caffeine 250-250-65 mg (HEADACHE RELIEF, ASA-ACET-CAF,) 250-250-65 mg per tablet 1 tablet, Oral, Every 6-8 hours PRN    buPROPion (WELLBUTRIN) 100 MG tablet TAKE 1 TABLET(100 MG) BY MOUTH THREE TIMES DAILY    calcium carbonate-vitamin D3 (CALCIUM 600 +  D,3,) 600-125 mg-unit Tab 2 tablets, Oral, Daily    celecoxib (CELEBREX) 200 mg, Oral, 2 times daily    cetirizine (ZYRTEC) 10 mg, Oral, Daily    ciprofloxacin-dexAMETHasone 0.3-0.1% (CIPRODEX) 0.3-0.1 % DrpS 4 drops, Left Ear, 2 times daily    conjugated estrogens (PREMARIN) 0.5 g, Vaginal, Twice weekly    cyclobenzaprine (FLEXERIL) 10 mg, Oral, Nightly    doxepin (SINEQUAN) 50 mg, Oral, Nightly    fluticasone propionate (FLONASE) 50 mcg, Each Nostril, Daily    hydrALAZINE (APRESOLINE) 10 mg, Oral, 3 times daily    hydroCHLOROthiazide (HYDRODIURIL) 25 mg, Oral, Daily    LUMIGAN 0.01 % Drop Both Eyes    meclizine (ANTIVERT) 12.5 mg, Oral, 3 times daily PRN    metoprolol succinate (TOPROL-XL) 200 mg, Oral, Daily    metroNIDAZOLE (METROGEL) 0.75 % (37.5mg/5 gram) vaginal gel 1 applicator, Vaginal, Daily    montelukast (SINGULAIR) 10 mg, Oral, Daily    nystatin (MYCOSTATIN) cream Topical (Top), 2 times daily    nystatin (MYCOSTATIN) powder Topical (Top), 4 times daily    oxyCODONE-acetaminophen (PERCOCET)  mg per tablet 1 tablet, Oral    pantoprazole (PROTONIX) 40 mg, Oral, Daily    pseudoephedrine (SUDAFED) 120 mg, Oral, Every 12 hours (non-standard times)    valsartan (DIOVAN) 80 mg, Oral, Daily    walker (ULTRA-LIGHT ROLLATOR) Misc 1 Units, Misc.(Non-Drug; Combo Route), Daily        Past Medical History:   Diagnosis Date    Breast cancer in female 09/07/2022    IDC with high grade DCIS    COPD (chronic obstructive pulmonary disease)     Degeneration of lumbar intervertebral disc     GERD (gastroesophageal reflux disease)     HTN (hypertension)     Hyperlipemia, mixed         Past Surgical History:   Procedure Laterality Date    BIOPSY OF AXILLARY NODE Right 9/7/2022    Procedure: BIOPSY, LYMPH NODE, AXILLARY;  Surgeon: Jatin Gutierrez MD;  Location: [facility];  Service: General;  Laterality: Right;    BREAST BIOPSY Right 08/05/2022    BREAST MASS EXCISION Right 9/7/2022    Procedure: EXCISION, MASS,  BREAST;  Surgeon: Jatin Gutierrez MD;  Location: [facility];  Service: General;  Laterality: Right;  wire localized partial mastectomy right breast with margins     CARPAL TUNNEL RELEASE  1985    CHOLECYSTECTOMY  1978    CORRECTION OF HAMMER TOE Right 9/15/2023    Procedure: CORRECTION, HAMMER TOE;  Surgeon: Devyn Mccullough DPM;  Location: [facility] OR;  Service: Podiatry;  Laterality: Right;    FOOT HARDWARE REMOVAL Right 9/15/2023    Procedure: REMOVAL, HARDWARE, FOOT;  Surgeon: Devyn Mccullough DPM;  Location: [facility] OR;  Service: Podiatry;  Laterality: Right;    HYSTERECTOMY      KNEE SURGERY Left 06/01/2020    LUMBAR DISC SURGERY  01/01/1990    plate and roland    LUMBAR FUSION  2011    MIDFOOT ARTHRODESIS Right 9/15/2023    Procedure: FUSION, JOINT, MIDFOOT;  Surgeon: Devyn Mccullough DPM;  Location: [facility] OR;  Service: Podiatry;  Laterality: Right;  [place] 28 1st MPJ fusion plates screws bone grafts as well as cut guide.    TOTAL KNEE REPLACEMENT USING COMPUTER NAVIGATION Left 02/15/2021        Family History   Problem Relation Name Age of Onset    Hypertension Mother      Diabetes Mother      Hypertension Father      Lung cancer Maternal Grandfather      Breast cancer Neg Hx         Social History     Tobacco Use    Smoking status: Every Day     Types: Vaping with nicotine     Passive exposure: Never    Smokeless tobacco: Never    Tobacco comments:     quit   Substance Use Topics    Alcohol use: Yes    Drug use: Not Currently         There were no vitals filed for this visit.       Physical Exam:  There were no vitals taken for this visit.    Physical Exam  Constitutional:       Appearance: Normal appearance.   HENT:      Head: Normocephalic and atraumatic.      Nose: Nose normal.      Mouth/Throat:      Mouth: Mucous membranes are moist.      Pharynx: Oropharynx is clear.   Eyes:      Conjunctiva/sclera: Conjunctivae normal.   Cardiovascular:      Rate and Rhythm: Normal rate and regular rhythm.       Heart sounds: Normal heart sounds.   Pulmonary:      Effort: Pulmonary effort is normal.      Breath sounds: Normal breath sounds.   Abdominal:      General: Abdomen is flat. Bowel sounds are normal.      Palpations: Abdomen is soft.   Musculoskeletal:         General: Normal range of motion.      Cervical back: Normal range of motion and neck supple.   Skin:     General: Skin is warm and dry.   Neurological:      General: No focal deficit present.      Mental Status: She is alert and oriented to person, place, and time. Mental status is at baseline.   Psychiatric:         Mood and Affect: Mood normal.        Labs:  Lab Results   Component Value Date    WBC 8.84 09/30/2024    RBC 3.92 (L) 09/30/2024    HGB 11.4 (L) 09/30/2024    HCT 35.9 (L) 09/30/2024    MCV 92 09/30/2024    MCH 29.1 09/30/2024    MCHC 31.8 (L) 09/30/2024    RDW 13.5 09/30/2024     09/30/2024    MPV 8.6 (L) 09/30/2024    GRAN 5.4 09/30/2024    GRAN 61.6 09/30/2024    LYMPH 2.3 09/30/2024    LYMPH 25.9 09/30/2024    MONO 0.7 09/30/2024    MONO 7.9 09/30/2024    EOS 0.3 09/30/2024    BASO 0.03 09/30/2024    EOSINOPHIL 3.2 09/30/2024    BASOPHIL 0.3 09/30/2024     Sodium   Date Value Ref Range Status   09/30/2024 141 136 - 145 mmol/L Final     Potassium   Date Value Ref Range Status   09/30/2024 3.6 3.5 - 5.1 mmol/L Final     Chloride   Date Value Ref Range Status   09/30/2024 103 95 - 110 mmol/L Final     CO2   Date Value Ref Range Status   09/30/2024 28 23 - 29 mmol/L Final     Glucose   Date Value Ref Range Status   09/30/2024 100 70 - 110 mg/dL Final     BUN   Date Value Ref Range Status   09/30/2024 25 (H) 8 - 23 mg/dL Final     Creatinine   Date Value Ref Range Status   09/30/2024 1.0 0.5 - 1.4 mg/dL Final     Calcium   Date Value Ref Range Status   09/30/2024 9.3 8.7 - 10.5 mg/dL Final     Total Protein   Date Value Ref Range Status   09/30/2024 5.9 (L) 6.0 - 8.4 g/dL Final     Albumin   Date Value Ref Range Status   09/30/2024 3.3 (L)  3.5 - 5.2 g/dL Final     Total Bilirubin   Date Value Ref Range Status   09/30/2024 0.3 0.1 - 1.0 mg/dL Final     Comment:     For infants and newborns, interpretation of results should be based  on gestational age, weight and in agreement with clinical  observations.    Premature Infant recommended reference ranges:  Up to 24 hours.............<8.0 mg/dL  Up to 48 hours............<12.0 mg/dL  3-5 days..................<15.0 mg/dL  6-29 days.................<15.0 mg/dL       Alkaline Phosphatase   Date Value Ref Range Status   09/30/2024 60 55 - 135 U/L Final     AST   Date Value Ref Range Status   09/30/2024 11 10 - 40 U/L Final     ALT   Date Value Ref Range Status   09/30/2024 14 10 - 44 U/L Final     Anion Gap   Date Value Ref Range Status   09/30/2024 10 8 - 16 mmol/L Final     eGFR if    Date Value Ref Range Status   05/10/2022 >60 >60 mL/min/1.73 m^2 Final     eGFR if non    Date Value Ref Range Status   05/10/2022 >60 >60 mL/min/1.73 m^2 Final     Comment:     Calculation used to obtain the estimated glomerular filtration  rate (eGFR) is the CKD-EPI equation.          A/P:    R breast DCIS  R breast IDCA [id number]  - DCIS was strongly hormone positive so patient is on aromatase inhibitor  -invasive component was only 2 mm, so even though it was HER2 positive, it was not treated with any adjuvant chemotherapy.    -completed radiation therapy to the breast  -on anastrozole currently, started 10/11/22  - mammogram scheduled for August.  -MRI showed no malignancies.  Most recent diagnostic mammogram looks good.  Recommend to repeat screening mammogram in 6 months  -continue Arimidex  -return to clinic in 6 months with labs     Osteopenia/long-term current AI use  - DEXA done 9/2024  -continue Prolia every 6 month  -proceed with next dose of Prolia

## 2024-10-01 ENCOUNTER — OFFICE VISIT (OUTPATIENT)
Dept: FAMILY MEDICINE | Facility: CLINIC | Age: 69
End: 2024-10-01
Payer: MEDICARE

## 2024-10-01 ENCOUNTER — HOSPITAL ENCOUNTER (OUTPATIENT)
Dept: RADIOLOGY | Facility: CLINIC | Age: 69
Discharge: HOME | End: 2024-10-01
Attending: STUDENT IN AN ORGANIZED HEALTH CARE EDUCATION/TRAINING PROGRAM
Payer: MEDICARE

## 2024-10-01 VITALS
RESPIRATION RATE: 18 BRPM | OXYGEN SATURATION: 97 % | HEIGHT: 64 IN | BODY MASS INDEX: 31.91 KG/M2 | HEART RATE: 64 BPM | WEIGHT: 186.94 LBS | SYSTOLIC BLOOD PRESSURE: 118 MMHG | DIASTOLIC BLOOD PRESSURE: 62 MMHG

## 2024-10-01 DIAGNOSIS — R05.2 SUBACUTE COUGH: ICD-10-CM

## 2024-10-01 DIAGNOSIS — K21.9 GASTROESOPHAGEAL REFLUX DISEASE, UNSPECIFIED WHETHER ESOPHAGITIS PRESENT: ICD-10-CM

## 2024-10-01 DIAGNOSIS — J44.9 CHRONIC OBSTRUCTIVE PULMONARY DISEASE, UNSPECIFIED COPD TYPE: ICD-10-CM

## 2024-10-01 DIAGNOSIS — I10 ESSENTIAL HYPERTENSION: Primary | ICD-10-CM

## 2024-10-01 PROCEDURE — 71046 X-RAY EXAM CHEST 2 VIEWS: CPT | Mod: 26,,, | Performed by: RADIOLOGY

## 2024-10-01 PROCEDURE — 99999 PR PBB SHADOW E&M-EST. PATIENT-LVL III: CPT | Mod: PBBFAC,,, | Performed by: STUDENT IN AN ORGANIZED HEALTH CARE EDUCATION/TRAINING PROGRAM

## 2024-10-01 PROCEDURE — G2211 COMPLEX E/M VISIT ADD ON: HCPCS | Mod: S$PBB,,, | Performed by: STUDENT IN AN ORGANIZED HEALTH CARE EDUCATION/TRAINING PROGRAM

## 2024-10-01 PROCEDURE — 71046 X-RAY EXAM CHEST 2 VIEWS: CPT | Mod: TC,FY,PO

## 2024-10-01 PROCEDURE — 99213 OFFICE O/P EST LOW 20 MIN: CPT | Mod: PBBFAC,25,PO | Performed by: STUDENT IN AN ORGANIZED HEALTH CARE EDUCATION/TRAINING PROGRAM

## 2024-10-01 PROCEDURE — 99214 OFFICE O/P EST MOD 30 MIN: CPT | Mod: S$PBB,,, | Performed by: STUDENT IN AN ORGANIZED HEALTH CARE EDUCATION/TRAINING PROGRAM

## 2024-10-01 RX ORDER — FLUCONAZOLE 200 MG/1
200 TABLET ORAL
COMMUNITY
Start: 2024-09-09 | End: 2024-10-07

## 2024-10-01 RX ORDER — AZITHROMYCIN 250 MG/1
250 TABLET, FILM COATED ORAL
COMMUNITY
Start: 2024-09-24 | End: 2024-10-07

## 2024-10-01 RX ORDER — AMOXICILLIN AND CLAVULANATE POTASSIUM 875; 125 MG/1; MG/1
1 TABLET, FILM COATED ORAL 2 TIMES DAILY
Qty: 14 TABLET | Refills: 0 | Status: SHIPPED | OUTPATIENT
Start: 2024-10-01 | End: 2024-10-08

## 2024-10-01 RX ORDER — METHYLPREDNISOLONE 4 MG/1
TABLET ORAL
COMMUNITY
Start: 2024-09-24

## 2024-10-01 RX ORDER — PHENAZOPYRIDINE HYDROCHLORIDE 200 MG/1
200 TABLET, FILM COATED ORAL EVERY 6 HOURS PRN
COMMUNITY
Start: 2024-09-09

## 2024-10-01 RX ORDER — CEFDINIR 300 MG/1
600 CAPSULE ORAL
COMMUNITY
Start: 2024-09-10 | End: 2024-10-01

## 2024-10-01 RX ORDER — GUAIFENESIN 600 MG/1
1200 TABLET, EXTENDED RELEASE ORAL 2 TIMES DAILY
Qty: 20 TABLET | Refills: 0 | Status: SHIPPED | OUTPATIENT
Start: 2024-10-01

## 2024-10-01 RX ORDER — DOXYCYCLINE 100 MG/1
100 CAPSULE ORAL EVERY 12 HOURS
Qty: 14 CAPSULE | Refills: 0 | Status: SHIPPED | OUTPATIENT
Start: 2024-10-01 | End: 2024-10-08

## 2024-10-01 NOTE — PROGRESS NOTES
"[facility]   OFFICE VISIT NOTE    Patient Name: Laxmi Chand  YOB: 1955    PRESENTING HISTORY     History of Present Illness:  Ms. Laxmi Chand is a 69 y.o. female here for UC f/u   UC for cough and congestion -- she went e times in the past month   She was on 3 different antibiotics over the last 3 visits. First, she was on macrobid for UTI. Then she was on Cefdinir. Lastly, she was on azithromycin which she finished about 5 days ago     She is still having yellow, thick sputum   Denies having fever or chills     Review of Systems   Constitutional:  Negative for chills and fever.   Respiratory:  Positive for cough. Negative for chest tightness, shortness of breath and wheezing.    Cardiovascular:  Negative for chest pain.   Gastrointestinal:  Negative for nausea and vomiting.          OBJECTIVE:   Vital Signs:  Vitals:    10/01/24 1357   BP: 118/62   Pulse: 64   Resp: 18   SpO2: 97%   Weight: 84.8 kg (186 lb 15.2 oz)   Height: 5' 4" (1.626 m)           Physical Exam  Constitutional:       General: She is not in acute distress.     Appearance: She is not ill-appearing or toxic-appearing.   HENT:      Head: Normocephalic and atraumatic.      Mouth/Throat:      Mouth: Mucous membranes are moist.      Pharynx: Uvula midline. No pharyngeal swelling.   Cardiovascular:      Rate and Rhythm: Normal rate and regular rhythm.   Pulmonary:      Effort: Pulmonary effort is normal. No tachypnea, bradypnea, accessory muscle usage, prolonged expiration or respiratory distress.      Breath sounds: No stridor. Decreased breath sounds present. No wheezing, rhonchi or rales.   Neurological:      General: No focal deficit present.      Mental Status: She is alert.   Psychiatric:         Mood and Affect: Mood normal.         Behavior: Behavior normal.         ASSESSMENT & PLAN:     Essential hypertension  Current regimen: valsartan 80 mg daily, metoprolol succinate 200 mg daily, HCTZ 25 mg daily, " hydralazine 10 mg TID  Patient's BP is very well controlled -- previously, it was elevated  Patient interested in adjusting her regimen so she can take less   May consider increasing Valsartan and reducing hydralazine dosing     Subacute cough  -     X-Ray Chest PA And Lateral; Future; Expected date: 10/01/2024  -     doxycycline (VIBRAMYCIN) 100 MG Cap; Take 1 capsule (100 mg total) by mouth every 12 (twelve) hours. for 7 days  Dispense: 14 capsule; Refill: 0  -     amoxicillin-clavulanate 875-125mg (AUGMENTIN) 875-125 mg per tablet; Take 1 tablet by mouth 2 (two) times daily. for 7 days  Dispense: 14 tablet; Refill: 0  -     guaiFENesin (MUCINEX) 600 mg 12 hr tablet; Take 2 tablets (1,200 mg total) by mouth 2 (two) times daily.  Dispense: 20 tablet; Refill: 0  Diffusely reduced lung sounds   Still symptomatic with cough and mucus production   CXR shows possible pneumonia   Will try doxy and augmentin together   Emphasized the importance of COPD treatment as well     Chronic obstructive pulmonary disease, unspecified COPD type  -     umeclidinium-vilanteroL (ANORO ELLIPTA) 62.5-25 mcg/actuation DsDv; Inhale 1 puff into the lungs once daily. Controller  Dispense: 60 each; Refill: 1  Patient is not taking maintenance therapy   Will put her on Anoro     Gastroesophageal reflux disease, unspecified whether esophagitis present  Continue with pantoprazole          Beata Lai MD  Family Medicine  [facility]     This note was created using Ahead voice recognition software that occasionally misinterprets phrases or words

## 2024-10-09 ENCOUNTER — INFUSION (OUTPATIENT)
Dept: INFUSION THERAPY | Facility: HOSPITAL | Age: 69
End: 2024-10-09
Attending: NURSE PRACTITIONER
Payer: MEDICARE

## 2024-10-09 VITALS
TEMPERATURE: 98 F | WEIGHT: 190 LBS | SYSTOLIC BLOOD PRESSURE: 164 MMHG | RESPIRATION RATE: 17 BRPM | BODY MASS INDEX: 32.44 KG/M2 | DIASTOLIC BLOOD PRESSURE: 73 MMHG | HEIGHT: 64 IN | OXYGEN SATURATION: 97 % | HEART RATE: 75 BPM

## 2024-10-09 DIAGNOSIS — M85.89 OSTEOPENIA OF MULTIPLE SITES: ICD-10-CM

## 2024-10-09 DIAGNOSIS — Z17.0 MALIGNANT NEOPLASM OF OVERLAPPING SITES OF RIGHT BREAST IN FEMALE, ESTROGEN RECEPTOR POSITIVE: ICD-10-CM

## 2024-10-09 DIAGNOSIS — C50.811 MALIGNANT NEOPLASM OF OVERLAPPING SITES OF RIGHT BREAST IN FEMALE, ESTROGEN RECEPTOR POSITIVE: ICD-10-CM

## 2024-10-09 DIAGNOSIS — Z79.811 LONG TERM (CURRENT) USE OF AROMATASE INHIBITORS: Primary | ICD-10-CM

## 2024-10-09 PROCEDURE — 63600175 PHARM REV CODE 636 W HCPCS: Mod: JZ,JG,UD | Performed by: NURSE PRACTITIONER

## 2024-10-09 PROCEDURE — 96372 THER/PROPH/DIAG INJ SC/IM: CPT

## 2024-10-09 RX ADMIN — DENOSUMAB 60 MG: 60 INJECTION SUBCUTANEOUS at 11:10

## 2024-10-09 NOTE — PLAN OF CARE
Problem: Fall Injury Risk  Goal: Absence of Fall and Fall-Related Injury  Outcome: Progressing  Intervention: Identify and Manage Contributors  Flowsheets (Taken 10/9/2024 1110)  Self-Care Promotion:   independence encouraged   BADL personal objects within reach   adaptive equipment use encouraged  Medication Review/Management: medications reviewed  Intervention: Promote Injury-Free Environment  Flowsheets (Taken 10/9/2024 1110)  Safety Promotion/Fall Prevention: medications reviewed

## 2024-10-10 ENCOUNTER — TELEPHONE (OUTPATIENT)
Dept: FAMILY MEDICINE | Facility: CLINIC | Age: 69
End: 2024-10-10
Payer: MEDICARE

## 2024-10-10 NOTE — TELEPHONE ENCOUNTER
----- Message from Adele sent at 10/10/2024  9:50 AM CDT -----  Contact: PT  Type: Needs Medical Advice  Who Called:  pt  Symptoms (please be specific):  chest congestion, thick yellow mucus  How long has patient had these symptoms:  2 weeks  Pharmacy name and phone #:      [id number] - [place], [place] - [address] AT [facility] [address]  [address]  [facility] [address]  Phone: [phone] Fax: [phone]      Best Call Back Number: [phone]  Additional Information: PT asking to get stronger antibiotics called into pharm have taken all meds  prescribed from [facility] on 10/1.

## 2024-10-10 NOTE — TELEPHONE ENCOUNTER
Spoke to pt who states she was seen on 10/1/24. Pt states she is still experiencing chest congestion. Pt states she has yellow mucous. Pt completed antibiotics on Monday morning as well as the mucinex. Pt states she started taking an otc mucinex and feels like it is not working as well. Pt is concerned with the weekend coming that she is just getting worse and wants to avoid an ED visit over the weekend. Pt denies fever. States she is just experiencing chest congestion and when she gets hot it gets worse. Coughing up yellow mucous. Please advise

## 2024-10-16 ENCOUNTER — OFFICE VISIT (OUTPATIENT)
Dept: FAMILY MEDICINE | Facility: CLINIC | Age: 69
End: 2024-10-16
Payer: MEDICARE

## 2024-10-16 ENCOUNTER — HOSPITAL ENCOUNTER (OUTPATIENT)
Dept: RADIOLOGY | Facility: CLINIC | Age: 69
Discharge: HOME | End: 2024-10-16
Payer: MEDICARE

## 2024-10-16 VITALS
BODY MASS INDEX: 30.86 KG/M2 | OXYGEN SATURATION: 96 % | SYSTOLIC BLOOD PRESSURE: 160 MMHG | WEIGHT: 180.75 LBS | DIASTOLIC BLOOD PRESSURE: 74 MMHG | RESPIRATION RATE: 16 BRPM | TEMPERATURE: 98 F | HEART RATE: 66 BPM | HEIGHT: 64 IN

## 2024-10-16 DIAGNOSIS — R05.2 SUBACUTE COUGH: ICD-10-CM

## 2024-10-16 DIAGNOSIS — I10 HYPERTENSION, UNSPECIFIED TYPE: ICD-10-CM

## 2024-10-16 DIAGNOSIS — J44.1 CHRONIC OBSTRUCTIVE PULMONARY DISEASE WITH ACUTE EXACERBATION: Primary | ICD-10-CM

## 2024-10-16 DIAGNOSIS — J44.1 CHRONIC OBSTRUCTIVE PULMONARY DISEASE WITH ACUTE EXACERBATION: ICD-10-CM

## 2024-10-16 PROCEDURE — 71046 X-RAY EXAM CHEST 2 VIEWS: CPT | Mod: 26,,, | Performed by: RADIOLOGY

## 2024-10-16 PROCEDURE — 99999 PR PBB SHADOW E&M-EST. PATIENT-LVL V: CPT | Mod: PBBFAC,,,

## 2024-10-16 PROCEDURE — 99215 OFFICE O/P EST HI 40 MIN: CPT | Mod: PBBFAC,PO

## 2024-10-16 PROCEDURE — 99214 OFFICE O/P EST MOD 30 MIN: CPT | Mod: S$PBB,,,

## 2024-10-16 PROCEDURE — 71046 X-RAY EXAM CHEST 2 VIEWS: CPT | Mod: TC,FY,PO

## 2024-10-16 RX ORDER — PREDNISONE 20 MG/1
40 TABLET ORAL DAILY
Qty: 10 TABLET | Refills: 0 | Status: SHIPPED | OUTPATIENT
Start: 2024-10-16 | End: 2024-10-21

## 2024-10-16 RX ORDER — ALBUTEROL SULFATE 90 UG/1
INHALANT RESPIRATORY (INHALATION)
Qty: 18 G | Refills: 6 | Status: SHIPPED | OUTPATIENT
Start: 2024-10-16

## 2024-10-16 RX ORDER — VALSARTAN 160 MG/1
160 TABLET ORAL DAILY
Qty: 90 TABLET | Refills: 3 | Status: SHIPPED | OUTPATIENT
Start: 2024-10-16 | End: 2025-10-16

## 2024-10-16 RX ORDER — GUAIFENESIN 600 MG/1
1200 TABLET, EXTENDED RELEASE ORAL 2 TIMES DAILY
Qty: 20 TABLET | Refills: 0 | Status: SHIPPED | OUTPATIENT
Start: 2024-10-16

## 2024-10-16 NOTE — PROGRESS NOTES
Subjective:       Patient ID:  [id number]     Chief Complaint: Follow-up      Laxmi Avalos a 69 y.o. female who presents to the clinic for follow up.    Patient was seen by Dr. Lai on 10/1/2024 for cough and congestion.  Her chest x-ray was suggestive of possible pneumonia.  She was started on Augmentin and doxycycline.  She has a history of COPD and was not consistent with maintenance inhaler at that time.    Today, she reports that she has not had much improvement in her symptoms.  She reports that she continues to have an excessive amount of phlegm and cough.  She reports the guaifenesin was somewhat helpful with her cough.  She reports that she continues to have increased fatigue.  However, denies fever and chills.  She reports that she has been using her Anoro Ellipta once daily.  Although, she does not find it very helpful.  She notes that she was previously on Trelegy, which was not helpful either.  She reports that she was using her albuterol inhaler roughly every 3 hours for symptoms.  Patient denies orthopnea and PND.  No other concerns today.     Past Medical History:   Diagnosis Date    Breast cancer in female 09/07/2022    IDC with high grade DCIS    COPD (chronic obstructive pulmonary disease)     Degeneration of lumbar intervertebral disc     GERD (gastroesophageal reflux disease)     HTN (hypertension)     Hyperlipemia, mixed       Active Problem List with Overview Notes    Diagnosis Date Noted    Paronychia of great toe, left 06/03/2024    Malignant neoplasm of overlapping sites of right breast in female, estrogen receptor positive 03/27/2024    Recurrent major depressive disorder, in full remission 02/21/2024    Walker as ambulation aid 02/21/2024    Hallux rigidus of right foot 09/10/2023    Painful orthopaedic hardware 09/10/2023    Hammer toe of right foot 08/16/2023    Osteoarthritis of ankle and foot 08/16/2023    Hypermobile joint syndrome of right foot 08/16/2023    Neuroma of second  interspace of right foot 08/05/2023    Hallux abducto valgus, right 08/05/2023    Ingrown nail 03/20/2023    Urge incontinence of urine 02/15/2023    Atherosclerosis of aorta 02/13/2023    Calcified granuloma of lung 02/13/2023    Long term (current) use of aromatase inhibitors 10/11/2022    Invasive ductal carcinoma of breast, female, right 10/05/2022    Ductal carcinoma in situ (DCIS) of right breast 08/23/2022    Osteopenia of multiple sites 08/23/2022    History of endometrial cancer 08/23/2022    Status post total left knee replacement 03/03/2021    Chronic pain syndrome 03/03/2021    Pain of breast 03/03/2021    Allergic rhinitis 01/05/2021    Depressive disorder 01/05/2021    Degeneration of lumbar intervertebral disc 10/01/2018    Chronic obstructive lung disease 05/15/2018     fev1/fvc = 47%      Hyperlipidemia 05/15/2018    History of smoking 06/02/2017    Gastroesophageal reflux disease 05/02/2017     Dr. Correa      Essential hypertension 02/28/2017      Review of patient's allergies indicates:  No Known Allergies      Current Outpatient Medications:     albuterol-ipratropium (DUO-NEB) 2.5 mg-0.5 mg/3 mL nebulizer solution, INHALE CONTENTS OF 1 VIAL BY MOUTH WITH NEBULIZER EVERY 6 HOURS WHILE AWAKE AS DIRECTED, Disp: 360 mL, Rfl: 5    anastrozole (ARIMIDEX) 1 mg Tab, TAKE 1 TABLET (1 MG TOTAL) BY MOUTH ONCE DAILY., Disp: 90 tablet, Rfl: 3    aspirin (ECOTRIN) 81 MG EC tablet, Take 1 tablet by mouth once daily. Pt back on asa, Disp: , Rfl:     aspirin-acetaminophen-caffeine 250-250-65 mg (HEADACHE RELIEF, ASA-ACET-CAF,) 250-250-65 mg per tablet, Take 1 tablet by mouth every 6 to 8 hours as needed., Disp: , Rfl:     buPROPion (WELLBUTRIN) 100 MG tablet, TAKE 1 TABLET(100 MG) BY MOUTH THREE TIMES DAILY, Disp: 270 tablet, Rfl: 3    calcium carbonate-vitamin D3 (CALCIUM 600 + D,3,) 600-125 mg-unit Tab, Take 2 tablets by mouth once daily., Disp: 180 tablet, Rfl: 3    celecoxib (CELEBREX) 200 MG capsule, TAKE  1 CAPSULE (200 MG TOTAL) BY MOUTH 2 (TWO) TIMES DAILY., Disp: 60 capsule, Rfl: 3    cetirizine (ZYRTEC) 10 MG tablet, TAKE 1 TABLET (10 MG TOTAL) BY MOUTH ONCE DAILY., Disp: 90 tablet, Rfl: 5    cyclobenzaprine (FLEXERIL) 10 MG tablet, Take 1 tablet (10 mg total) by mouth nightly., Disp: 90 tablet, Rfl: 3    doxepin (SINEQUAN) 25 MG capsule, TAKE 2 CAPSULES (50 MG TOTAL) BY MOUTH NIGHTLY., Disp: 180 capsule, Rfl: 3    fluticasone propionate (FLONASE) 50 mcg/actuation nasal spray, 1 spray (50 mcg total) by Each Nostril route once daily., Disp: 18.2 mL, Rfl: 2    hydrALAZINE (APRESOLINE) 10 MG tablet, Take 1 tablet (10 mg total) by mouth 3 (three) times daily., Disp: 90 tablet, Rfl: 11    hydroCHLOROthiazide (HYDRODIURIL) 25 MG tablet, Take 1 tablet (25 mg total) by mouth once daily., Disp: 90 tablet, Rfl: 3    LUMIGAN 0.01 % Drop, Place into both eyes., Disp: , Rfl:     metoprolol succinate (TOPROL-XL) 200 MG 24 hr tablet, Take 1 tablet (200 mg total) by mouth once daily., Disp: 30 tablet, Rfl: 3    metroNIDAZOLE (METROGEL) 0.75 % (37.5mg/5 gram) vaginal gel, Place 1 applicator vaginally Daily., Disp: 70 g, Rfl: 0    montelukast (SINGULAIR) 10 mg tablet, Take 1 tablet (10 mg total) by mouth once daily., Disp: 90 tablet, Rfl: 3    nystatin (MYCOSTATIN) cream, Apply topically 2 (two) times daily., Disp: 30 g, Rfl: 1    oxyCODONE-acetaminophen (PERCOCET)  mg per tablet, Take 1 tablet by mouth., Disp: , Rfl:     pantoprazole (PROTONIX) 40 MG tablet, Take 1 tablet (40 mg total) by mouth once daily., Disp: 90 tablet, Rfl: 3    pseudoephedrine (SUDAFED) 120 mg 12 hr tablet, Take 120 mg by mouth every 12 (twelve) hours., Disp: , Rfl:     umeclidinium-vilanteroL (ANORO ELLIPTA) 62.5-25 mcg/actuation DsDv, Inhale 1 puff into the lungs once daily. Controller, Disp: 60 each, Rfl: 1    walker (ULTRA-LIGHT ROLLATOR) Misc, 1 Units by Misc.(Non-Drug; Combo Route) route Daily., Disp: 1 each, Rfl: 0    albuterol  (PROVENTIL/VENTOLIN HFA) 90 mcg/actuation inhaler, INHALE 2 PUFFS BY MOUTH EVERY 4 HOURS AS NEEDED FOR WHEEZING OR SHORTNESS OF BREATH, Disp: 18 g, Rfl: 6    conjugated estrogens (PREMARIN) vaginal cream, Place 0.5 g vaginally twice a week., Disp: 30 g, Rfl: 5    guaiFENesin (MUCINEX) 600 mg 12 hr tablet, Take 2 tablets (1,200 mg total) by mouth 2 (two) times daily., Disp: 20 tablet, Rfl: 0    nystatin (MYCOSTATIN) powder, Apply topically 4 (four) times daily. (Patient not taking: Reported on 10/16/2024), Disp: 60 g, Rfl: 1    predniSONE (DELTASONE) 20 MG tablet, Take 2 tablets (40 mg total) by mouth once daily. for 5 days, Disp: 10 tablet, Rfl: 0    valsartan (DIOVAN) 160 MG tablet, Take 1 tablet (160 mg total) by mouth once daily., Disp: 90 tablet, Rfl: 3    Lab Results   Component Value Date    WBC 8.84 09/30/2024    HGB 11.4 (L) 09/30/2024    HCT 35.9 (L) 09/30/2024     09/30/2024    CHOL 203 (H) 05/10/2022    TRIG 101 05/10/2022    HDL 50 05/10/2022    ALT 14 09/30/2024    AST 11 09/30/2024     09/30/2024    K 3.6 09/30/2024     09/30/2024    CREATININE 1.0 09/30/2024    BUN 25 (H) 09/30/2024    CO2 28 09/30/2024    TSH 1.040 05/10/2022    HGBA1C 5.5 07/26/2023       Review of Systems   Constitutional:  Negative for fatigue and fever.   HENT: Negative.  Negative for congestion, sneezing and sore throat.    Eyes: Negative.    Respiratory:  Positive for cough and shortness of breath. Negative for wheezing.    Cardiovascular:  Negative for chest pain, palpitations and leg swelling.   Gastrointestinal:  Negative for abdominal pain, nausea and vomiting.   Genitourinary: Negative.    Musculoskeletal: Negative.  Negative for arthralgias.   Skin: Negative.  Negative for rash.   Neurological:  Negative for dizziness, weakness, light-headedness, numbness and headaches.   Hematological: Negative.    Psychiatric/Behavioral: Negative.     All other systems reviewed and are negative.      Objective:     "  Physical Exam  Constitutional:       Appearance: Normal appearance.   HENT:      Head: Normocephalic and atraumatic.      Nose: Nose normal.   Eyes:      Extraocular Movements: Extraocular movements intact.   Cardiovascular:      Rate and Rhythm: Normal rate and regular rhythm.   Pulmonary:      Effort: Pulmonary effort is normal.      Breath sounds: Examination of the left-upper field reveals rhonchi. Rhonchi present.   Musculoskeletal:         General: Normal range of motion.      Cervical back: Normal range of motion.   Skin:     General: Skin is warm and dry.   Neurological:      General: No focal deficit present.      Mental Status: She is alert and oriented to person, place, and time.   Psychiatric:         Mood and Affect: Mood normal.         Assessment:       1. Chronic obstructive pulmonary disease with acute exacerbation    2. Subacute cough    3. Hypertension, unspecified type        Plan:       Laxmi Yu" was seen today for follow-up.    Diagnoses and all orders for this visit:    Chronic obstructive pulmonary disease with acute exacerbation  - patient reports minimal improvement.  We will update chest x-ray to see if there is any improvement.  Continue current inhaler regimen.  At this point, recommend referral to pulmonology for further assessment.  We will provide high-dose steroids to see if that helps with patient's symptoms.  Plan to update echocardiogram to rule out underlying pulmonary hypertension or cardiac etiology.  -     Ambulatory referral/consult to Pulmonology; Future  -     predniSONE (DELTASONE) 20 MG tablet; Take 2 tablets (40 mg total) by mouth once daily. for 5 days  -     X-Ray Chest PA And Lateral; Future  -     albuterol (PROVENTIL/VENTOLIN HFA) 90 mcg/actuation inhaler; INHALE 2 PUFFS BY MOUTH EVERY 4 HOURS AS NEEDED FOR WHEEZING OR SHORTNESS OF BREATH    Subacute cough  -     guaiFENesin (MUCINEX) 600 mg 12 hr tablet; Take 2 tablets (1,200 mg total) by mouth 2 (two) times " daily.  -     Ambulatory referral/consult to Pulmonology; Future  -     Echo; Future    Hypertension, unspecified type  -     Echo; Future  -   increased:  valsartan (DIOVAN) 160 MG tablet; Take 1 tablet (160 mg total) by mouth once daily.  - continue other medications   - discussed dash diet, exercise/weight loss, and increased cardiovascular exercise   - monitor BP at home w/ goal <130/80           Future Appointments       Date Provider Specialty Appt Notes    10/16/2024  Radiology .    12/4/2024 Stephan Song MD Pulmonology cough    1/6/2025 Jasmyn Culp PA-C Family Medicine 3 mo fu    2/5/2025  Radiology hemonc    3/31/2025  Lab hemonc    3/31/2025 Mell Veloz NP Hematology and Oncology [facility]    4/1/2025 Beata Lai MD Family Medicine 6 mo fu               Portions of this note were dictated using voice recognition software and may contain dictation related errors in spelling / grammar / syntax not discovered on text review.     Jasmyn Culp PA-C

## 2024-10-22 ENCOUNTER — HOSPITAL ENCOUNTER (OUTPATIENT)
Dept: CARDIOLOGY | Facility: HOSPITAL | Age: 69
Discharge: HOME | End: 2024-10-22
Payer: MEDICARE

## 2024-10-22 VITALS — BODY MASS INDEX: 30.73 KG/M2 | HEIGHT: 64 IN | WEIGHT: 180 LBS

## 2024-10-22 DIAGNOSIS — R05.2 SUBACUTE COUGH: ICD-10-CM

## 2024-10-22 DIAGNOSIS — I10 HYPERTENSION, UNSPECIFIED TYPE: ICD-10-CM

## 2024-10-22 LAB
AORTIC ROOT ANNULUS: 3.7 CM
AORTIC VALVE CUSP SEPERATION: 2.2 CM
APICAL FOUR CHAMBER EJECTION FRACTION: 62 %
APICAL TWO CHAMBER EJECTION FRACTION: 73 %
AV INDEX (PROSTH): 0.75
AV MEAN GRADIENT: 2 MMHG
AV PEAK GRADIENT: 4 MMHG
AV VALVE AREA BY VELOCITY RATIO: 2.4 CM²
AV VALVE AREA: 2.6 CM²
AV VELOCITY RATIO: 0.7
BSA FOR ECHO PROCEDURE: 1.92 M2
CV ECHO LV RWT: 0.59 CM
DOP CALC AO PEAK VEL: 1 M/S
DOP CALC AO VTI: 20 CM
DOP CALC LVOT AREA: 3.5 CM2
DOP CALC LVOT DIAMETER: 2.1 CM
DOP CALC LVOT PEAK VEL: 0.7 M/S
DOP CALC LVOT STROKE VOLUME: 51.9 CM3
DOP CALCLVOT PEAK VEL VTI: 15 CM
E WAVE DECELERATION TIME: 215 MSEC
E/A RATIO: 0.36
E/E' RATIO: 4.43 M/S
ECHO LV POSTERIOR WALL: 1.3 CM (ref 0.6–1.1)
FRACTIONAL SHORTENING: 38.6 % (ref 28–44)
INTERVENTRICULAR SEPTUM: 1.2 CM (ref 0.6–1.1)
IVRT: 148 MSEC
LEFT INTERNAL DIMENSION IN SYSTOLE: 2.7 CM (ref 2.1–4)
LEFT VENTRICLE DIASTOLIC VOLUME INDEX: 46.9 ML/M2
LEFT VENTRICLE DIASTOLIC VOLUME: 87.7 ML
LEFT VENTRICLE END DIASTOLIC VOLUME APICAL 2 CHAMBER: 51.2 ML
LEFT VENTRICLE END DIASTOLIC VOLUME APICAL 4 CHAMBER: 53.2 ML
LEFT VENTRICLE MASS INDEX: 108.6 G/M2
LEFT VENTRICLE SYSTOLIC VOLUME INDEX: 14.4 ML/M2
LEFT VENTRICLE SYSTOLIC VOLUME: 27 ML
LEFT VENTRICULAR INTERNAL DIMENSION IN DIASTOLE: 4.4 CM (ref 3.5–6)
LEFT VENTRICULAR MASS: 203 G
LV LATERAL E/E' RATIO: 5.17 M/S
LV SEPTAL E/E' RATIO: 3.88 M/S
LVED V (TEICH): 87.7 ML
LVES V (TEICH): 27 ML
LVOT MG: 1 MMHG
LVOT MV: 0.46 CM/S
MV PEAK A VEL: 0.85 M/S
MV PEAK E VEL: 0.31 M/S
MV STENOSIS PRESSURE HALF TIME: 60 MS
MV VALVE AREA P 1/2 METHOD: 3.67 CM2
OHS LV EJECTION FRACTION SIMPSONS BIPLANE MOD: 65 %
PISA TR MAX VEL: 3.07 M/S
RA PRESSURE ESTIMATED: 3 MMHG
RIGHT VENTRICULAR END-DIASTOLIC DIMENSION: 2.2 CM
RV TB RVSP: 6 MMHG
TDI LATERAL: 0.06 M/S
TDI SEPTAL: 0.08 M/S
TDI: 0.07 M/S
TR MAX PG: 38 MMHG
TV REST PULMONARY ARTERY PRESSURE: 41 MMHG
Z-SCORE OF LEFT VENTRICULAR DIMENSION IN END DIASTOLE: -1.69
Z-SCORE OF LEFT VENTRICULAR DIMENSION IN END SYSTOLE: -1.38

## 2024-10-22 PROCEDURE — 93306 TTE W/DOPPLER COMPLETE: CPT | Mod: 26,,, | Performed by: INTERNAL MEDICINE

## 2024-10-22 PROCEDURE — 93306 TTE W/DOPPLER COMPLETE: CPT

## 2024-10-23 ENCOUNTER — TELEPHONE (OUTPATIENT)
Dept: FAMILY MEDICINE | Facility: CLINIC | Age: 69
End: 2024-10-23
Payer: MEDICARE

## 2024-10-23 DIAGNOSIS — I10 HYPERTENSION, UNSPECIFIED TYPE: Primary | ICD-10-CM

## 2024-10-23 NOTE — TELEPHONE ENCOUNTER
----- Message from Jasmyn Culp PA-C sent at 10/23/2024  9:46 AM CDT -----  Please notify patient that her echo shows a normal heart function.  However, the muscles of her heart are slightly enlarged.  She also has mild tricuspid regurg, which just means that there is a little blood spitting back up to her upper chamber during contraction. It was also noted that she was mild pulmonary hypertension.  Given her chronic symptoms at her appointment and a couple of abnormal findings on her echo I do recommend to see Cardiology.  It is important that we work on maintaining good blood pressure control.  Please let me know if she has questions.  Thanks

## 2024-10-23 NOTE — TELEPHONE ENCOUNTER
Spoke to pt who states she is unable to get through to get scheduled with Pulmonology. Advised pt she is already scheduled. Confirmed appt with pt.

## 2024-10-23 NOTE — TELEPHONE ENCOUNTER
----- Message from Alesia sent at 10/23/2024  8:10 AM CDT -----  Contact: self  Type:  Needs Medical Advice    Who Called: self  Symptoms (please be specific): pt needs to speak to nurse otr  about a referral. Pt sts she called the number she was given to make an appt, nut she can't get anyone on the phone. Pt is asking dr call to make it. Please pt for more information.      Would the patient rather a call back or a response via [facility]? call  Best Call Back Number: [phone] (home)     Additional Information: please advise and thank you.

## 2024-10-24 DIAGNOSIS — K21.9 GASTROESOPHAGEAL REFLUX DISEASE, UNSPECIFIED WHETHER ESOPHAGITIS PRESENT: ICD-10-CM

## 2024-10-24 DIAGNOSIS — I10 ESSENTIAL HYPERTENSION: ICD-10-CM

## 2024-10-24 RX ORDER — PANTOPRAZOLE SODIUM 40 MG/1
40 TABLET, DELAYED RELEASE ORAL DAILY
Qty: 90 TABLET | Refills: 3 | Status: SHIPPED | OUTPATIENT
Start: 2024-10-24

## 2024-10-24 RX ORDER — METOPROLOL SUCCINATE 200 MG/1
200 TABLET, EXTENDED RELEASE ORAL DAILY
Qty: 90 TABLET | Refills: 3 | Status: SHIPPED | OUTPATIENT
Start: 2024-10-24 | End: 2025-10-24

## 2024-10-31 ENCOUNTER — OFFICE VISIT (OUTPATIENT)
Dept: CARDIOLOGY | Facility: CLINIC | Age: 69
End: 2024-10-31
Payer: MEDICARE

## 2024-10-31 VITALS
SYSTOLIC BLOOD PRESSURE: 146 MMHG | HEIGHT: 64 IN | BODY MASS INDEX: 32.13 KG/M2 | WEIGHT: 188.19 LBS | DIASTOLIC BLOOD PRESSURE: 85 MMHG | OXYGEN SATURATION: 97 % | HEART RATE: 98 BPM

## 2024-10-31 DIAGNOSIS — Z87.891 HISTORY OF SMOKING: ICD-10-CM

## 2024-10-31 DIAGNOSIS — E78.2 MIXED HYPERLIPIDEMIA: ICD-10-CM

## 2024-10-31 DIAGNOSIS — R53.83 FATIGUE, UNSPECIFIED TYPE: ICD-10-CM

## 2024-10-31 DIAGNOSIS — I10 ESSENTIAL HYPERTENSION: ICD-10-CM

## 2024-10-31 DIAGNOSIS — J44.9 CHRONIC OBSTRUCTIVE PULMONARY DISEASE, UNSPECIFIED COPD TYPE: ICD-10-CM

## 2024-10-31 DIAGNOSIS — I27.29 OTHER SECONDARY PULMONARY HYPERTENSION: Primary | ICD-10-CM

## 2024-10-31 DIAGNOSIS — R06.02 SOB (SHORTNESS OF BREATH): ICD-10-CM

## 2024-10-31 LAB
OHS QRS DURATION: 72 MS
OHS QTC CALCULATION: 426 MS

## 2024-10-31 PROCEDURE — 99205 OFFICE O/P NEW HI 60 MIN: CPT | Mod: S$PBB,,, | Performed by: INTERNAL MEDICINE

## 2024-10-31 PROCEDURE — 99215 OFFICE O/P EST HI 40 MIN: CPT | Mod: PBBFAC,PN | Performed by: INTERNAL MEDICINE

## 2024-10-31 PROCEDURE — 99999 PR PBB SHADOW E&M-EST. PATIENT-LVL V: CPT | Mod: PBBFAC,,, | Performed by: INTERNAL MEDICINE

## 2024-11-03 ENCOUNTER — PATIENT MESSAGE (OUTPATIENT)
Dept: ADMINISTRATIVE | Facility: HOSPITAL | Age: 69
End: 2024-11-03
Payer: MEDICARE

## 2024-11-04 DIAGNOSIS — Z12.11 SCREENING FOR COLON CANCER: ICD-10-CM

## 2024-11-15 DIAGNOSIS — I10 HYPERTENSION, UNSPECIFIED TYPE: ICD-10-CM

## 2024-11-18 RX ORDER — HYDROCHLOROTHIAZIDE 25 MG/1
25 TABLET ORAL DAILY
Qty: 90 TABLET | Refills: 3 | Status: SHIPPED | OUTPATIENT
Start: 2024-11-18

## 2024-11-18 NOTE — TELEPHONE ENCOUNTER
Refill Routing Note   Medication(s) are not appropriate for processing by [facility] for the following reason(s):        Non-participating provider    [facility] action(s):  Route               Appointments  past 12m or future 3m with PCP    Date Provider   Last Visit   7/10/2024 Zoraida Orta, [facility]   Next Visit   Visit date not found Zoraida Orta NP   ED visits in past 90 days: 0        Note composed:8:23 AM 11/18/2024

## 2024-11-20 ENCOUNTER — OFFICE VISIT (OUTPATIENT)
Dept: FAMILY MEDICINE | Facility: CLINIC | Age: 69
End: 2024-11-20
Payer: MEDICARE

## 2024-11-20 ENCOUNTER — LAB VISIT (OUTPATIENT)
Dept: LAB | Facility: HOSPITAL | Age: 69
End: 2024-11-20
Payer: MEDICARE

## 2024-11-20 VITALS
TEMPERATURE: 98 F | WEIGHT: 189.81 LBS | DIASTOLIC BLOOD PRESSURE: 70 MMHG | RESPIRATION RATE: 16 BRPM | SYSTOLIC BLOOD PRESSURE: 142 MMHG | BODY MASS INDEX: 32.41 KG/M2 | HEIGHT: 64 IN

## 2024-11-20 DIAGNOSIS — R30.0 DYSURIA: ICD-10-CM

## 2024-11-20 DIAGNOSIS — J44.9 CHRONIC OBSTRUCTIVE PULMONARY DISEASE, UNSPECIFIED COPD TYPE: Primary | ICD-10-CM

## 2024-11-20 DIAGNOSIS — I10 HYPERTENSION, UNSPECIFIED TYPE: ICD-10-CM

## 2024-11-20 LAB
MICROSCOPIC COMMENT: NORMAL
NON-SQ EPI CELLS #/AREA URNS AUTO: 0 /HPF
RBC #/AREA URNS AUTO: 1 /HPF (ref 0–4)
SQUAMOUS #/AREA URNS AUTO: 0 /HPF
WBC #/AREA URNS AUTO: 3 /HPF (ref 0–5)

## 2024-11-20 PROCEDURE — 81001 URINALYSIS AUTO W/SCOPE: CPT

## 2024-11-20 PROCEDURE — 99999 PR PBB SHADOW E&M-EST. PATIENT-LVL III: CPT | Mod: PBBFAC,,,

## 2024-11-20 PROCEDURE — 87086 URINE CULTURE/COLONY COUNT: CPT

## 2024-11-20 PROCEDURE — 99213 OFFICE O/P EST LOW 20 MIN: CPT | Mod: PBBFAC,PO

## 2024-11-20 PROCEDURE — 99214 OFFICE O/P EST MOD 30 MIN: CPT | Mod: S$PBB,,,

## 2024-11-20 RX ORDER — PHENAZOPYRIDINE HYDROCHLORIDE 200 MG/1
200 TABLET, FILM COATED ORAL EVERY 6 HOURS PRN
COMMUNITY
Start: 2024-10-30

## 2024-11-20 RX ORDER — FLUTICASONE PROPIONATE 50 MCG
1 SPRAY, SUSPENSION (ML) NASAL DAILY
Qty: 18.2 ML | Refills: 2 | Status: SHIPPED | OUTPATIENT
Start: 2024-11-20

## 2024-11-20 RX ORDER — FLUTICASONE PROPIONATE AND SALMETEROL XINAFOATE 230; 21 UG/1; UG/1
2 AEROSOL, METERED RESPIRATORY (INHALATION) 2 TIMES DAILY
Qty: 12 G | Refills: 3 | Status: SHIPPED | OUTPATIENT
Start: 2024-11-20 | End: 2025-11-20

## 2024-11-20 NOTE — PROGRESS NOTES
Subjective:       Patient ID:  [id number]     Chief Complaint: Follow-up      History of Present Illness        Patients blood pressure is elevated today. Patient reports they are compliant with their current antihypertensive medication regimen, which consist of: HZTC 25mg daily, metoprolol 200mg daily, and Valsartan 160mg daily. Patient does not monitor their BP at home. Patient denies CP, SOB, LE swelling, lightheadedness, dizziness, and HA.        Patient reports that her breathing has improved since our last appointment.  She reports that she found an old Advair inhaler and has been using that.  She reports that she continues to use her albuterol inhaler pretty consistently.  She does have an upcoming appointment with pulmonology.      Patient does note that she has been experiencing mild dysuria with urination.  She reports it was not severe as in normal UTI.  No other concerns today.    Past Medical History:   Diagnosis Date    Breast cancer in female 09/07/2022    IDC with high grade DCIS    COPD (chronic obstructive pulmonary disease)     Degeneration of lumbar intervertebral disc     GERD (gastroesophageal reflux disease)     HTN (hypertension)     Hyperlipemia, mixed       Active Problem List with Overview Notes    Diagnosis Date Noted    Paronychia of great toe, left 06/03/2024    Malignant neoplasm of overlapping sites of right breast in female, estrogen receptor positive 03/27/2024    Recurrent major depressive disorder, in full remission 02/21/2024    Walker as ambulation aid 02/21/2024    Hallux rigidus of right foot 09/10/2023    Painful orthopaedic hardware 09/10/2023    Hammer toe of right foot 08/16/2023    Osteoarthritis of ankle and foot 08/16/2023    Hypermobile joint syndrome of right foot 08/16/2023    Neuroma of second interspace of right foot 08/05/2023    Hallux abducto valgus, right 08/05/2023    Ingrown nail 03/20/2023    Urge incontinence of urine 02/15/2023    Atherosclerosis of aorta  02/13/2023    Calcified granuloma of lung 02/13/2023    Long term (current) use of aromatase inhibitors 10/11/2022    Invasive ductal carcinoma of breast, female, right 10/05/2022    Ductal carcinoma in situ (DCIS) of right breast 08/23/2022    Osteopenia of multiple sites 08/23/2022    History of endometrial cancer 08/23/2022    Status post total left knee replacement 03/03/2021    Chronic pain syndrome 03/03/2021    Pain of breast 03/03/2021    Allergic rhinitis 01/05/2021    Depressive disorder 01/05/2021    Degeneration of lumbar intervertebral disc 10/01/2018    Chronic obstructive lung disease 05/15/2018     fev1/fvc = 47%      Hyperlipidemia 05/15/2018    History of smoking 06/02/2017    Gastroesophageal reflux disease 05/02/2017     Dr. Correa      Essential hypertension 02/28/2017      Review of patient's allergies indicates:  No Known Allergies      Current Outpatient Medications:     albuterol (PROVENTIL/VENTOLIN HFA) 90 mcg/actuation inhaler, INHALE 2 PUFFS BY MOUTH EVERY 4 HOURS AS NEEDED FOR WHEEZING OR SHORTNESS OF BREATH, Disp: 18 g, Rfl: 6    albuterol-ipratropium (DUO-NEB) 2.5 mg-0.5 mg/3 mL nebulizer solution, INHALE CONTENTS OF 1 VIAL BY MOUTH WITH NEBULIZER EVERY 6 HOURS WHILE AWAKE AS DIRECTED, Disp: 360 mL, Rfl: 5    anastrozole (ARIMIDEX) 1 mg Tab, TAKE 1 TABLET (1 MG TOTAL) BY MOUTH ONCE DAILY., Disp: 90 tablet, Rfl: 3    aspirin (ECOTRIN) 81 MG EC tablet, Take 1 tablet by mouth once daily. Pt back on asa, Disp: , Rfl:     aspirin-acetaminophen-caffeine 250-250-65 mg (HEADACHE RELIEF, ASA-ACET-CAF,) 250-250-65 mg per tablet, Take 1 tablet by mouth every 6 to 8 hours as needed., Disp: , Rfl:     buPROPion (WELLBUTRIN) 100 MG tablet, TAKE 1 TABLET(100 MG) BY MOUTH THREE TIMES DAILY (Patient taking differently: Take 100 mg by mouth 2 (two) times daily.), Disp: 270 tablet, Rfl: 3    calcium carbonate-vitamin D3 (CALCIUM 600 + D,3,) 600-125 mg-unit Tab, Take 2 tablets by mouth once daily.,  Disp: 180 tablet, Rfl: 3    celecoxib (CELEBREX) 200 MG capsule, TAKE 1 CAPSULE (200 MG TOTAL) BY MOUTH 2 (TWO) TIMES DAILY. (Patient taking differently: Take 200 mg by mouth once daily.), Disp: 60 capsule, Rfl: 3    cetirizine (ZYRTEC) 10 MG tablet, TAKE 1 TABLET (10 MG TOTAL) BY MOUTH ONCE DAILY., Disp: 90 tablet, Rfl: 5    cyclobenzaprine (FLEXERIL) 10 MG tablet, Take 1 tablet (10 mg total) by mouth nightly., Disp: 90 tablet, Rfl: 3    doxepin (SINEQUAN) 25 MG capsule, TAKE 2 CAPSULES (50 MG TOTAL) BY MOUTH NIGHTLY., Disp: 180 capsule, Rfl: 3    guaiFENesin (MUCINEX) 600 mg 12 hr tablet, Take 2 tablets (1,200 mg total) by mouth 2 (two) times daily., Disp: 20 tablet, Rfl: 0    hydrALAZINE (APRESOLINE) 10 MG tablet, Take 1 tablet (10 mg total) by mouth 3 (three) times daily., Disp: 90 tablet, Rfl: 11    hydroCHLOROthiazide (HYDRODIURIL) 25 MG tablet, TAKE 1 TABLET (25 MG TOTAL) BY MOUTH ONCE DAILY., Disp: 90 tablet, Rfl: 3    LUMIGAN 0.01 % Drop, Place into both eyes., Disp: , Rfl:     metoprolol succinate (TOPROL-XL) 200 MG 24 hr tablet, TAKE 1 TABLET (200 MG TOTAL) BY MOUTH ONCE DAILY., Disp: 90 tablet, Rfl: 3    montelukast (SINGULAIR) 10 mg tablet, Take 1 tablet (10 mg total) by mouth once daily., Disp: 90 tablet, Rfl: 3    nystatin (MYCOSTATIN) cream, Apply topically 2 (two) times daily., Disp: 30 g, Rfl: 1    oxyCODONE-acetaminophen (PERCOCET)  mg per tablet, Take 1 tablet by mouth., Disp: , Rfl:     pantoprazole (PROTONIX) 40 MG tablet, TAKE 1 TABLET (40 MG TOTAL) BY MOUTH ONCE DAILY., Disp: 90 tablet, Rfl: 3    phenazopyridine (PYRIDIUM) 200 MG tablet, Take 200 mg by mouth every 6 (six) hours as needed., Disp: , Rfl:     valsartan (DIOVAN) 160 MG tablet, Take 1 tablet (160 mg total) by mouth once daily., Disp: 90 tablet, Rfl: 3    walker (ULTRA-LIGHT ROLLATOR) Misc, 1 Units by Misc.(Non-Drug; Combo Route) route Daily., Disp: 1 each, Rfl: 0    conjugated estrogens (PREMARIN) vaginal cream, Place 0.5 g  vaginally twice a week., Disp: 30 g, Rfl: 5    fluticasone propionate (FLONASE) 50 mcg/actuation nasal spray, 1 spray (50 mcg total) by Each Nostril route once daily., Disp: 18.2 mL, Rfl: 2    fluticasone-salmeterol 230-21 mcg/dose (ADVAIR HFA) 230-21 mcg/actuation HFAA inhaler, Inhale 2 puffs into the lungs 2 (two) times daily. Controller, Disp: 12 g, Rfl: 3    metroNIDAZOLE (METROGEL) 0.75 % (37.5mg/5 gram) vaginal gel, Place 1 applicator vaginally Daily. (Patient not taking: Reported on 11/20/2024), Disp: 70 g, Rfl: 0    pseudoephedrine (SUDAFED) 120 mg 12 hr tablet, Take 120 mg by mouth every 12 (twelve) hours. (Patient not taking: Reported on 11/20/2024), Disp: , Rfl:     Lab Results   Component Value Date    WBC 8.84 09/30/2024    HGB 11.4 (L) 09/30/2024    HCT 35.9 (L) 09/30/2024     09/30/2024    CHOL 203 (H) 05/10/2022    TRIG 101 05/10/2022    HDL 50 05/10/2022    ALT 14 09/30/2024    AST 11 09/30/2024     09/30/2024    K 3.6 09/30/2024     09/30/2024    CREATININE 1.0 09/30/2024    BUN 25 (H) 09/30/2024    CO2 28 09/30/2024    TSH 1.040 05/10/2022    HGBA1C 5.5 07/26/2023       Review of Systems   Constitutional:  Negative for fatigue and fever.   HENT: Negative.  Negative for congestion, sneezing and sore throat.    Eyes: Negative.    Respiratory:  Negative for cough, shortness of breath and wheezing.    Cardiovascular:  Negative for chest pain, palpitations and leg swelling.   Gastrointestinal:  Negative for abdominal pain, nausea and vomiting.   Genitourinary:  Positive for dysuria.   Musculoskeletal: Negative.  Negative for arthralgias.   Skin: Negative.  Negative for rash.   Neurological:  Negative for dizziness, weakness, light-headedness, numbness and headaches.   Hematological: Negative.    Psychiatric/Behavioral: Negative.         Objective:      Physical Exam  Constitutional:       Appearance: Normal appearance.   HENT:      Head: Normocephalic and atraumatic.      Nose: Nose  "normal.   Eyes:      Extraocular Movements: Extraocular movements intact.   Cardiovascular:      Rate and Rhythm: Normal rate and regular rhythm.   Pulmonary:      Effort: Pulmonary effort is normal.      Breath sounds: Normal breath sounds.   Musculoskeletal:         General: Normal range of motion.      Cervical back: Normal range of motion.   Skin:     General: Skin is warm and dry.   Neurological:      General: No focal deficit present.      Mental Status: She is alert and oriented to person, place, and time.   Psychiatric:         Mood and Affect: Mood normal.         Assessment:       1. Chronic obstructive pulmonary disease, unspecified COPD type    2. Hypertension, unspecified type    3. Dysuria        Plan:       Laxmi Yu" was seen today for follow-up.    Diagnoses and all orders for this visit:    Chronic obstructive pulmonary disease, unspecified COPD type  - improvement in symptoms and PE   -     fluticasone propionate (FLONASE) 50 mcg/actuation nasal spray; 1 spray (50 mcg total) by Each Nostril route once daily.  -     fluticasone-salmeterol 230-21 mcg/dose (ADVAIR HFA) 230-21 mcg/actuation HFAA inhaler; Inhale 2 puffs into the lungs 2 (two) times daily. Controller  - continue albuterol inhaler PRN  - keep upcoming appt with pulmonology     Hypertension, unspecified type  - continues to be elevated in clinic.  Patient reports that when she had went to a local urgent care her blood pressure was well-controlled.  Patient reports that she has not been monitoring her blood pressure at home.  She was not interested in increasing medications at this time.  - discussed dash diet, exercise/weight loss, and increased cardiovascular exercise   - monitor BP at home w/ goal <130/80  - plan to reassess at upcoming appt with BP log.      Dysuria  -     CULTURE, URINE; Future  -     Urinalysis Microscopic; Future    Future Appointments       Date Provider Specialty Appt Notes    11/20/2024  Lab .    12/4/2024 " Stephan Song MD Pulmonology cough    1/6/2025 Jasmyn Culp PA-C Family Medicine 3 mo fu    2/5/2025  Radiology hemonc    3/31/2025  Lab hemonc    3/31/2025 Mell Veloz NP Hematology and Oncology [facility]    4/1/2025 Beata Lai MD Family Medicine 6 mo fu               Portions of this note were dictated using voice recognition software and may contain dictation related errors in spelling / grammar / syntax not discovered on text review.     Jasmyn Culp PA-C

## 2024-11-21 ENCOUNTER — OFFICE VISIT (OUTPATIENT)
Dept: FAMILY MEDICINE | Facility: CLINIC | Age: 69
End: 2024-11-21
Payer: MEDICARE

## 2024-11-21 VITALS
TEMPERATURE: 98 F | HEIGHT: 64 IN | SYSTOLIC BLOOD PRESSURE: 142 MMHG | RESPIRATION RATE: 18 BRPM | WEIGHT: 189.81 LBS | BODY MASS INDEX: 32.41 KG/M2 | DIASTOLIC BLOOD PRESSURE: 84 MMHG

## 2024-11-21 DIAGNOSIS — H93.8X2 SENSATION OF FULLNESS IN LEFT EAR: Primary | ICD-10-CM

## 2024-11-21 LAB
BACTERIA UR CULT: NORMAL
BACTERIA UR CULT: NORMAL

## 2024-11-21 PROCEDURE — 99999 PR PBB SHADOW E&M-EST. PATIENT-LVL III: CPT | Mod: PBBFAC,,,

## 2024-11-21 PROCEDURE — 99212 OFFICE O/P EST SF 10 MIN: CPT | Mod: S$PBB,,,

## 2024-11-21 PROCEDURE — 99213 OFFICE O/P EST LOW 20 MIN: CPT | Mod: PBBFAC,PO

## 2024-11-21 NOTE — PROGRESS NOTES
Subjective:       Patient ID:  [id number]     Chief Complaint: Ear Fullness      History of Present Illness      Ms. Chand is a 69-year-old female who presents to the clinic for left ear discomfort.      Patient reports this morning at 3:00 a.m. she felt as though something called in her ear.  She reports since then she has been having ear fullness with decreased hearing.  She denies pain.  No other concerns.    Past Medical History:   Diagnosis Date    Breast cancer in female 09/07/2022    IDC with high grade DCIS    COPD (chronic obstructive pulmonary disease)     Degeneration of lumbar intervertebral disc     GERD (gastroesophageal reflux disease)     HTN (hypertension)     Hyperlipemia, mixed       Active Problem List with Overview Notes    Diagnosis Date Noted    Paronychia of great toe, left 06/03/2024    Malignant neoplasm of overlapping sites of right breast in female, estrogen receptor positive 03/27/2024    Recurrent major depressive disorder, in full remission 02/21/2024    Walker as ambulation aid 02/21/2024    Hallux rigidus of right foot 09/10/2023    Painful orthopaedic hardware 09/10/2023    Hammer toe of right foot 08/16/2023    Osteoarthritis of ankle and foot 08/16/2023    Hypermobile joint syndrome of right foot 08/16/2023    Neuroma of second interspace of right foot 08/05/2023    Hallux abducto valgus, right 08/05/2023    Ingrown nail 03/20/2023    Urge incontinence of urine 02/15/2023    Atherosclerosis of aorta 02/13/2023    Calcified granuloma of lung 02/13/2023    Long term (current) use of aromatase inhibitors 10/11/2022    Invasive ductal carcinoma of breast, female, right 10/05/2022    Ductal carcinoma in situ (DCIS) of right breast 08/23/2022    Osteopenia of multiple sites 08/23/2022    History of endometrial cancer 08/23/2022    Status post total left knee replacement 03/03/2021    Chronic pain syndrome 03/03/2021    Pain of breast 03/03/2021    Allergic rhinitis 01/05/2021     Depressive disorder 01/05/2021    Degeneration of lumbar intervertebral disc 10/01/2018    Chronic obstructive lung disease 05/15/2018     fev1/fvc = 47%      Hyperlipidemia 05/15/2018    History of smoking 06/02/2017    Gastroesophageal reflux disease 05/02/2017     Dr. Correa      Essential hypertension 02/28/2017      Review of patient's allergies indicates:  No Known Allergies      Current Outpatient Medications:     albuterol (PROVENTIL/VENTOLIN HFA) 90 mcg/actuation inhaler, INHALE 2 PUFFS BY MOUTH EVERY 4 HOURS AS NEEDED FOR WHEEZING OR SHORTNESS OF BREATH, Disp: 18 g, Rfl: 6    albuterol-ipratropium (DUO-NEB) 2.5 mg-0.5 mg/3 mL nebulizer solution, INHALE CONTENTS OF 1 VIAL BY MOUTH WITH NEBULIZER EVERY 6 HOURS WHILE AWAKE AS DIRECTED, Disp: 360 mL, Rfl: 5    anastrozole (ARIMIDEX) 1 mg Tab, TAKE 1 TABLET (1 MG TOTAL) BY MOUTH ONCE DAILY., Disp: 90 tablet, Rfl: 3    aspirin (ECOTRIN) 81 MG EC tablet, Take 1 tablet by mouth once daily. Pt back on asa, Disp: , Rfl:     aspirin-acetaminophen-caffeine 250-250-65 mg (HEADACHE RELIEF, ASA-ACET-CAF,) 250-250-65 mg per tablet, Take 1 tablet by mouth every 6 to 8 hours as needed., Disp: , Rfl:     buPROPion (WELLBUTRIN) 100 MG tablet, TAKE 1 TABLET(100 MG) BY MOUTH THREE TIMES DAILY (Patient taking differently: Take 100 mg by mouth 2 (two) times daily.), Disp: 270 tablet, Rfl: 3    calcium carbonate-vitamin D3 (CALCIUM 600 + D,3,) 600-125 mg-unit Tab, Take 2 tablets by mouth once daily., Disp: 180 tablet, Rfl: 3    celecoxib (CELEBREX) 200 MG capsule, TAKE 1 CAPSULE (200 MG TOTAL) BY MOUTH 2 (TWO) TIMES DAILY. (Patient taking differently: Take 200 mg by mouth once daily.), Disp: 60 capsule, Rfl: 3    cetirizine (ZYRTEC) 10 MG tablet, TAKE 1 TABLET (10 MG TOTAL) BY MOUTH ONCE DAILY., Disp: 90 tablet, Rfl: 5    cyclobenzaprine (FLEXERIL) 10 MG tablet, Take 1 tablet (10 mg total) by mouth nightly., Disp: 90 tablet, Rfl: 3    doxepin (SINEQUAN) 25 MG capsule, TAKE 2  CAPSULES (50 MG TOTAL) BY MOUTH NIGHTLY., Disp: 180 capsule, Rfl: 3    fluticasone propionate (FLONASE) 50 mcg/actuation nasal spray, 1 spray (50 mcg total) by Each Nostril route once daily., Disp: 18.2 mL, Rfl: 2    fluticasone-salmeterol 230-21 mcg/dose (ADVAIR HFA) 230-21 mcg/actuation HFAA inhaler, Inhale 2 puffs into the lungs 2 (two) times daily. Controller, Disp: 12 g, Rfl: 3    guaiFENesin (MUCINEX) 600 mg 12 hr tablet, Take 2 tablets (1,200 mg total) by mouth 2 (two) times daily., Disp: 20 tablet, Rfl: 0    hydrALAZINE (APRESOLINE) 10 MG tablet, Take 1 tablet (10 mg total) by mouth 3 (three) times daily., Disp: 90 tablet, Rfl: 11    hydroCHLOROthiazide (HYDRODIURIL) 25 MG tablet, TAKE 1 TABLET (25 MG TOTAL) BY MOUTH ONCE DAILY., Disp: 90 tablet, Rfl: 3    LUMIGAN 0.01 % Drop, Place into both eyes., Disp: , Rfl:     metoprolol succinate (TOPROL-XL) 200 MG 24 hr tablet, TAKE 1 TABLET (200 MG TOTAL) BY MOUTH ONCE DAILY., Disp: 90 tablet, Rfl: 3    metroNIDAZOLE (METROGEL) 0.75 % (37.5mg/5 gram) vaginal gel, Place 1 applicator vaginally Daily., Disp: 70 g, Rfl: 0    montelukast (SINGULAIR) 10 mg tablet, Take 1 tablet (10 mg total) by mouth once daily., Disp: 90 tablet, Rfl: 3    oxyCODONE-acetaminophen (PERCOCET)  mg per tablet, Take 1 tablet by mouth., Disp: , Rfl:     pantoprazole (PROTONIX) 40 MG tablet, TAKE 1 TABLET (40 MG TOTAL) BY MOUTH ONCE DAILY., Disp: 90 tablet, Rfl: 3    phenazopyridine (PYRIDIUM) 200 MG tablet, Take 200 mg by mouth every 6 (six) hours as needed., Disp: , Rfl:     pseudoephedrine (SUDAFED) 120 mg 12 hr tablet, Take 120 mg by mouth every 12 (twelve) hours., Disp: , Rfl:     valsartan (DIOVAN) 160 MG tablet, Take 1 tablet (160 mg total) by mouth once daily., Disp: 90 tablet, Rfl: 3    walker (ULTRA-LIGHT ROLLATOR) Misc, 1 Units by Misc.(Non-Drug; Combo Route) route Daily., Disp: 1 each, Rfl: 0    conjugated estrogens (PREMARIN) vaginal cream, Place 0.5 g vaginally twice a week.,  "Disp: 30 g, Rfl: 5    nystatin (MYCOSTATIN) cream, Apply topically 2 (two) times daily. (Patient not taking: Reported on 11/21/2024), Disp: 30 g, Rfl: 1    Lab Results   Component Value Date    WBC 8.84 09/30/2024    HGB 11.4 (L) 09/30/2024    HCT 35.9 (L) 09/30/2024     09/30/2024    CHOL 203 (H) 05/10/2022    TRIG 101 05/10/2022    HDL 50 05/10/2022    ALT 14 09/30/2024    AST 11 09/30/2024     09/30/2024    K 3.6 09/30/2024     09/30/2024    CREATININE 1.0 09/30/2024    BUN 25 (H) 09/30/2024    CO2 28 09/30/2024    TSH 1.040 05/10/2022    HGBA1C 5.5 07/26/2023     Review of Systems   All other systems reviewed and are negative.       Objective:      Physical Exam  HENT:      Head:      Comments:       Right Ear: Tympanic membrane normal.      Left Ear: Tympanic membrane normal.      Ears:      Comments: Small amount of cerumen was noted.  Was unable to flush this out of patient's ears.  However, no foreign objects noted.  Neurological:      Mental Status: She is alert.        Assessment:       1. Sensation of fullness in left ear        Plan:       Laxmi Yu" was seen today for ear fullness.    Diagnoses and all orders for this visit:    Sensation of fullness in left ear  - advised patient to continue to monitor  - continues to use Zyrtec and Flonase              Future Appointments       Date Provider Specialty Appt Notes    12/4/2024 Stephan Song MD Pulmonology cough    1/6/2025 Jasmyn Culp PA-C Family Medicine 3 mo fu    2/5/2025  Radiology hemonc    3/31/2025  Lab hemonc    3/31/2025 Mell Veloz NP Hematology and Oncology [facility]    4/1/2025 Beata Lai MD Family Medicine 6 mo fu               Portions of this note were dictated using voice recognition software and may contain dictation related errors in spelling / grammar / syntax not discovered on text review.     Jasmyn Culp PA-C    "

## 2024-11-22 ENCOUNTER — TELEPHONE (OUTPATIENT)
Dept: FAMILY MEDICINE | Facility: CLINIC | Age: 69
End: 2024-11-22
Payer: MEDICARE

## 2024-11-22 NOTE — TELEPHONE ENCOUNTER
----- Message from Jasmyn Culp PA-C sent at 11/22/2024  9:14 AM CST -----  Please notify patient that her urine test look good.  Her urine culture shows multiple organisms without the specific bacteria that could be causing her symptoms.  If she continues to have symptoms we can repeat this culture.  However, in the meantime I recommend that she increase fluids and avoid caffeinated or carbonated drinks.  Thanks

## 2024-12-04 ENCOUNTER — OFFICE VISIT (OUTPATIENT)
Dept: PULMONOLOGY | Facility: CLINIC | Age: 69
End: 2024-12-04
Payer: MEDICARE

## 2024-12-04 VITALS
SYSTOLIC BLOOD PRESSURE: 127 MMHG | OXYGEN SATURATION: 96 % | WEIGHT: 189.06 LBS | DIASTOLIC BLOOD PRESSURE: 78 MMHG | BODY MASS INDEX: 32.28 KG/M2 | HEIGHT: 64 IN

## 2024-12-04 DIAGNOSIS — J41.1 BRONCHITIS, CHRONIC, MUCOPURULENT: ICD-10-CM

## 2024-12-04 DIAGNOSIS — J44.1 CHRONIC OBSTRUCTIVE PULMONARY DISEASE WITH ACUTE EXACERBATION: ICD-10-CM

## 2024-12-04 DIAGNOSIS — R05.2 SUBACUTE COUGH: ICD-10-CM

## 2024-12-04 DIAGNOSIS — R91.1 LUNG NODULE: ICD-10-CM

## 2024-12-04 DIAGNOSIS — J45.50 SEVERE PERSISTENT ASTHMA, UNCOMPLICATED: Primary | ICD-10-CM

## 2024-12-04 DIAGNOSIS — J44.89 ASTHMA-COPD OVERLAP SYNDROME: ICD-10-CM

## 2024-12-04 DIAGNOSIS — J44.9 CHRONIC OBSTRUCTIVE PULMONARY DISEASE, UNSPECIFIED COPD TYPE: ICD-10-CM

## 2024-12-04 DIAGNOSIS — J98.4 CALCIFIED GRANULOMA OF LUNG: ICD-10-CM

## 2024-12-04 DIAGNOSIS — J47.9 BRONCHIECTASIS WITHOUT COMPLICATION: ICD-10-CM

## 2024-12-04 PROCEDURE — 99215 OFFICE O/P EST HI 40 MIN: CPT | Mod: PBBFAC,PO | Performed by: INTERNAL MEDICINE

## 2024-12-04 PROCEDURE — 99999 PR PBB SHADOW E&M-EST. PATIENT-LVL V: CPT | Mod: PBBFAC,,, | Performed by: INTERNAL MEDICINE

## 2024-12-04 RX ORDER — FLUTICASONE FUROATE, UMECLIDINIUM BROMIDE AND VILANTEROL TRIFENATATE 200; 62.5; 25 UG/1; UG/1; UG/1
1 POWDER RESPIRATORY (INHALATION) DAILY
Qty: 60 EACH | Refills: 11 | Status: SHIPPED | OUTPATIENT
Start: 2024-12-04

## 2024-12-04 RX ORDER — PREDNISONE 20 MG/1
TABLET ORAL
Qty: 20 TABLET | Refills: 1 | Status: SHIPPED | OUTPATIENT
Start: 2024-12-04

## 2024-12-04 RX ORDER — IPRATROPIUM BROMIDE AND ALBUTEROL SULFATE 2.5; .5 MG/3ML; MG/3ML
3 SOLUTION RESPIRATORY (INHALATION) EVERY 4 HOURS PRN
Qty: 120 EACH | Refills: 11 | Status: SHIPPED | OUTPATIENT
Start: 2024-12-04

## 2024-12-04 RX ORDER — ALBUTEROL SULFATE 90 UG/1
2 INHALANT RESPIRATORY (INHALATION) EVERY 4 HOURS PRN
Qty: 36 G | Refills: 11 | Status: SHIPPED | OUTPATIENT
Start: 2024-12-04

## 2024-12-04 RX ORDER — LEVOFLOXACIN 500 MG/1
500 TABLET, FILM COATED ORAL DAILY
Qty: 10 TABLET | Refills: 2 | Status: SHIPPED | OUTPATIENT
Start: 2024-12-04

## 2024-12-04 NOTE — PATIENT INSTRUCTIONS
Need regular culture---- check afb cultures later.....  suspect resistant germs    Ct chest with bronchiectasis in 2021 - had calcified granuloma and ground glass nodule and emphysema.    Trial trelegy over advair ---- breztri not covered (traditional inhaler)    Use prednisone -- one daily for bad breathing for 5 days - -take now, repeat as needed.    Use albuterol inhaler or nebulizer up to every 4 hours as needed......    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    Follow up ct chest and breathing test needed  Check oxygen walking

## 2024-12-04 NOTE — PROGRESS NOTES
12/4/2024    Lamxi JUHI Mannie  New Patient Consult    Chief Complaint   Patient presents with    Cough     W/ yellow/green congestion       HPI:  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  She barely can walk.  Uses advair 2 bid but no spiriva.  Uses neb rx 4/d    Pt has occ wheezes, no noc worsening.  No asthma no childhood ashtma..    Cutting back nicotene using vaping -- better than was smoking last yr..      Pt uses steroids for lungs with good benefit.  Uses steroids 3-4 times yearly.    Pt used doxy, levaquin, azithro-- failed recently degree or mucus.  She still has yellow-green mucus despite antibiotics to 3 times.    CT scan of the chest done 2021 does show calcified granulomas, bronchiectasis, and minimal upper lung emphysema.     The patient has had MRSA in wound before-left arm abscess in 2021..    Echocardiogram done October 2024-Summary  Show Result Comparison     Left Ventricle: The left ventricle is normal in size. Mildly increased wall thickness. There is mild concentric hypertrophy. Normal wall motion. There is normal systolic function with a visually estimated ejection fraction of 60 - 65%. There is normal diastolic function.    Right Ventricle: Normal right ventricular cavity size. Wall thickness is normal. Systolic function is normal.    Tricuspid Valve: There is mild regurgitation with a centrally directed jet.    Pulmonary Artery: There is mild pulmonary hypertension. The estimated pulmonary artery systolic pressure is 41 mmHg.    IVC/SVC: Normal venous pressure at 3 mmHg.         PFSH:    Past Medical History:   Diagnosis Date    Breast cancer in female 09/07/2022    IDC with high grade DCIS    COPD (chronic obstructive pulmonary disease)     Degeneration of lumbar intervertebral disc     GERD (gastroesophageal reflux disease)     HTN (hypertension)      Hyperlipemia, mixed          Past Surgical History:   Procedure Laterality Date    BIOPSY OF AXILLARY NODE Right 9/7/2022    Procedure: BIOPSY, LYMPH NODE, AXILLARY;  Surgeon: Jatin Gutierrez MD;  Location: [facility] OR;  Service: General;  Laterality: Right;    BREAST BIOPSY Right 08/05/2022    BREAST MASS EXCISION Right 9/7/2022    Procedure: EXCISION, MASS, BREAST;  Surgeon: Jatin Gutierrez MD;  Location: [facility] OR;  Service: General;  Laterality: Right;  wire localized partial mastectomy right breast with margins     CARPAL TUNNEL RELEASE  1985    CHOLECYSTECTOMY  1978    CORRECTION OF HAMMER TOE Right 9/15/2023    Procedure: CORRECTION, HAMMER TOE;  Surgeon: Devyn Mccullough DPM;  Location: [facility] OR;  Service: Podiatry;  Laterality: Right;    FOOT HARDWARE REMOVAL Right 9/15/2023    Procedure: REMOVAL, HARDWARE, FOOT;  Surgeon: Devyn Mccullough DPM;  Location: [facility] OR;  Service: Podiatry;  Laterality: Right;    HYSTERECTOMY      KNEE SURGERY Left 06/01/2020    LUMBAR DISC SURGERY  01/01/1990    plate and roland    LUMBAR FUSION  2011    MIDFOOT ARTHRODESIS Right 9/15/2023    Procedure: FUSION, JOINT, MIDFOOT;  Surgeon: Devyn Mccullough DPM;  Location: [facility] OR;  Service: Podiatry;  Laterality: Right;  [place] 28 1st MPJ fusion plates screws bone grafts as well as cut guide.    TOTAL KNEE REPLACEMENT USING COMPUTER NAVIGATION Left 02/15/2021     Social History     Tobacco Use    Smoking status: Every Day     Types: Vaping with nicotine     Passive exposure: Never    Smokeless tobacco: Never   Substance Use Topics    Alcohol use: Yes    Drug use: Not Currently     Family History   Problem Relation Name Age of Onset    Hypertension Mother      Diabetes Mother      Hypertension Father      Lung cancer Maternal Grandfather      Breast cancer Neg Hx       Review of patient's allergies indicates:  No Known Allergies       Review of Systems:  a review of eleven systems covering constitutional, Eye, HEENT,  "Psych, Respiratory, Cardiac, GI, , Musculoskeletal, Endocrine, Dermatologic was negative except for pertinent findings as listed ABOVE and below:  pertinent positives as above, rest good        Exam:Comprehensive exam done. /78 (BP Location: Left arm, Patient Position: Sitting)   Ht 5' 4" (1.626 m)   Wt 85.7 kg (189 lb 0.7 oz)   SpO2 96% Comment: on room air at rest  BMI 32.45 kg/m²   Exam included Vitals as listed, and patient's appearance and affect and alertness and mood, oral exam for yeast and hygiene and pharynx lesions and Mallapatti (M) score, neck with inspection for jvd and masses and thyroid abnormalities and lymph nodes (supraclavicular and infraclavicular nodes and axillary also examined and noted if abn), chest exam included symmetry and effort and fremitus and percussion and auscultation, cardiac exam included rhythm and gallops and murmur and rubs and jvd and edema, abdominal exam for mass and hepatosplenomegaly and tenderness and hernias and bowel sounds, Musculoskeletal exam with muscle tone and posture and mobility/gait and  strength, and skin for rashes and cyanosis and pallor and turgor, extremity for clubbing.  Findings were normal except for pertinent findings listed below:  M2, coarse breath sounds.  No edema           Radiographs (ct chest and cxr) reviewed: view by direct vision      Labs reviewed           PFT will be done and results to be reviewed       Plan:  Clinical impression is apparently straight forward and impression with management as below.    Laxmi Yu" was seen today for cough.    Diagnoses and all orders for this visit:    Severe persistent asthma, uncomplicated  -     fluticasone-umeclidin-vilanter (TRELEGY ELLIPTA) 200-62.5-25 mcg inhaler; Inhale 1 puff into the lungs once daily.  -     predniSONE (DELTASONE) 20 MG tablet; Take one daily for 5 days and may repeat for shortness of breath.  -     albuterol (PROVENTIL/VENTOLIN HFA) 90 mcg/actuation inhaler; " Inhale 2 puffs into the lungs every 4 (four) hours as needed for Wheezing. INHALE 2 PUFFS BY MOUTH EVERY 4 HOURS AS NEEDED FOR WHEEZING OR SHORTNESS OF BREATH    Subacute cough  -     Ambulatory referral/consult to Pulmonology    Chronic obstructive pulmonary disease with acute exacerbation  -     Ambulatory referral/consult to Pulmonology  -     albuterol (PROVENTIL/VENTOLIN HFA) 90 mcg/actuation inhaler; Inhale 2 puffs into the lungs every 4 (four) hours as needed for Wheezing. INHALE 2 PUFFS BY MOUTH EVERY 4 HOURS AS NEEDED FOR WHEEZING OR SHORTNESS OF BREATH  -     Complete PFT with bronchodilator; Future  -     Six Minute Walk Test to qualify for Home Oxygen; Future    Bronchiectasis without complication  -     AFB Culture & Smear; Standing  -     Culture, Respiratory with Gram Stain; Standing  -     levoFLOXacin (LEVAQUIN) 500 MG tablet; Take 1 tablet (500 mg total) by mouth once daily.    Lung nodule  -     CT Chest Without Contrast; Future    Calcified granuloma of lung    Asthma-COPD overlap syndrome  -     Complete PFT with bronchodilator; Future  -     Six Minute Walk Test to qualify for Home Oxygen; Future    Bronchitis, chronic, mucopurulent  -     AFB Culture & Smear; Standing  -     Culture, Respiratory with Gram Stain; Standing  -     albuterol (PROVENTIL/VENTOLIN HFA) 90 mcg/actuation inhaler; Inhale 2 puffs into the lungs every 4 (four) hours as needed for Wheezing. INHALE 2 PUFFS BY MOUTH EVERY 4 HOURS AS NEEDED FOR WHEEZING OR SHORTNESS OF BREATH  -     levoFLOXacin (LEVAQUIN) 500 MG tablet; Take 1 tablet (500 mg total) by mouth once daily.  -     Complete PFT with bronchodilator; Future    Chronic obstructive pulmonary disease, unspecified COPD type  -     albuterol-ipratropium (DUO-NEB) 2.5 mg-0.5 mg/3 mL nebulizer solution; Take 3 mLs by nebulization every 4 (four) hours as needed for Wheezing or Shortness of Breath. Rescue        Follow up in about 2 weeks (around 12/18/2024), or if symptoms  worsen or fail to improve.    Discussed with patient above for education the following:      Patient Instructions   Need regular culture---- check afb cultures later.....  suspect resistant germs    Ct chest with bronchiectasis in 2021 - had calcified granuloma and ground glass nodule and emphysema.    Trial trelegy over advair ---- breztri not covered (traditional inhaler)    Use prednisone -- one daily for bad breathing for 5 days - -take now, repeat as needed.    Use albuterol inhaler or nebulizer up to every 4 hours as needed......    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    Follow up ct chest and breathing test needed  Check oxygen walking           Yes

## 2024-12-16 DIAGNOSIS — J44.9 CHRONIC OBSTRUCTIVE PULMONARY DISEASE, UNSPECIFIED COPD TYPE: ICD-10-CM

## 2024-12-16 RX ORDER — IPRATROPIUM BROMIDE AND ALBUTEROL SULFATE 2.5; .5 MG/3ML; MG/3ML
3 SOLUTION RESPIRATORY (INHALATION) EVERY 4 HOURS PRN
Qty: 120 EACH | Refills: 11 | Status: SHIPPED | OUTPATIENT
Start: 2024-12-16

## 2024-12-19 ENCOUNTER — HOSPITAL ENCOUNTER (OUTPATIENT)
Dept: PULMONOLOGY | Facility: HOSPITAL | Age: 69
Discharge: HOME | End: 2024-12-19
Attending: INTERNAL MEDICINE
Payer: MEDICARE

## 2024-12-19 ENCOUNTER — HOSPITAL ENCOUNTER (OUTPATIENT)
Dept: RADIOLOGY | Facility: HOSPITAL | Age: 69
Discharge: HOME | End: 2024-12-19
Attending: INTERNAL MEDICINE
Payer: MEDICARE

## 2024-12-19 ENCOUNTER — OFFICE VISIT (OUTPATIENT)
Dept: PULMONOLOGY | Facility: CLINIC | Age: 69
End: 2024-12-19
Payer: MEDICARE

## 2024-12-19 VITALS
DIASTOLIC BLOOD PRESSURE: 50 MMHG | WEIGHT: 191.81 LBS | HEART RATE: 74 BPM | BODY MASS INDEX: 32.74 KG/M2 | HEIGHT: 64 IN | OXYGEN SATURATION: 95 % | SYSTOLIC BLOOD PRESSURE: 102 MMHG

## 2024-12-19 DIAGNOSIS — J41.1 BRONCHITIS, CHRONIC, MUCOPURULENT: ICD-10-CM

## 2024-12-19 DIAGNOSIS — R91.1 LUNG NODULE: ICD-10-CM

## 2024-12-19 DIAGNOSIS — R11.0 NAUSEA: ICD-10-CM

## 2024-12-19 DIAGNOSIS — J44.1 CHRONIC OBSTRUCTIVE PULMONARY DISEASE WITH ACUTE EXACERBATION: ICD-10-CM

## 2024-12-19 DIAGNOSIS — R91.1 SOLITARY PULMONARY NODULE: Primary | ICD-10-CM

## 2024-12-19 DIAGNOSIS — K91.5 POST-CHOLECYSTECTOMY SYNDROME: ICD-10-CM

## 2024-12-19 DIAGNOSIS — J44.9 CHRONIC OBSTRUCTIVE PULMONARY DISEASE, UNSPECIFIED COPD TYPE: ICD-10-CM

## 2024-12-19 DIAGNOSIS — J45.50 SEVERE PERSISTENT ASTHMA, UNCOMPLICATED: ICD-10-CM

## 2024-12-19 DIAGNOSIS — J44.89 ASTHMA-COPD OVERLAP SYNDROME: ICD-10-CM

## 2024-12-19 LAB
DLCO SINGLE BREATH LLN: 15.7
DLCO SINGLE BREATH PRE REF: 53.9 %
DLCO SINGLE BREATH REF: 21.43
DLCOC SBVA LLN: 2.89
DLCOC SBVA REF: 4.34
DLCOC SINGLE BREATH LLN: 15.7
DLCOC SINGLE BREATH REF: 21.43
DLCOVA LLN: 2.89
DLCOVA PRE REF: 68.4 %
DLCOVA PRE: 2.97 ML/(MIN*MMHG*L) (ref 2.89–5.79)
DLCOVA REF: 4.34
ERV LLN: -16449.34
ERV PRE REF: 86.2 %
ERV REF: 0.66
FEF 25 75 CHG: -0.5 %
FEF 25 75 LLN: 1.21
FEF 25 75 POST REF: 14.4 %
FEF 25 75 PRE REF: 14.5 %
FEF 25 75 REF: 2.61
FET100 CHG: -9.3 %
FEV1 CHG: 5.6 %
FEV1 FVC CHG: 10.5 %
FEV1 FVC LLN: 65
FEV1 FVC POST REF: 68.2 %
FEV1 FVC PRE REF: 61.7 %
FEV1 FVC REF: 78
FEV1 LLN: 1.63
FEV1 POST REF: 47.8 %
FEV1 PRE REF: 45.3 %
FEV1 REF: 2.23
FRCPLETH LLN: 1.89
FRCPLETH PREREF: 158.3 %
FRCPLETH REF: 2.71
FVC CHG: -4.4 %
FVC LLN: 2.1
FVC POST REF: 69.6 %
FVC PRE REF: 72.8 %
FVC REF: 2.87
IVC PRE: 2.43 L (ref 2.1–3.68)
IVC SINGLE BREATH LLN: 2.1
IVC SINGLE BREATH PRE REF: 84.8 %
IVC SINGLE BREATH REF: 2.87
MVV LLN: 71
MVV PRE REF: 40.1 %
MVV REF: 84
PEF CHG: 26.7 %
PEF LLN: 4.01
PEF POST REF: 45.5 %
PEF PRE REF: 35.9 %
PEF REF: 5.72
POST FEF 25 75: 0.38 L/S (ref 1.21–4)
POST FET 100: 7.57 SEC
POST FEV1 FVC: 53.32 % (ref 65.04–89.54)
POST FEV1: 1.07 L (ref 1.63–2.81)
POST FVC: 2 L (ref 2.1–3.68)
POST PEF: 2.6 L/S (ref 4.01–7.43)
PRE DLCO: 11.55 ML/(MIN*MMHG) (ref 15.7–27.16)
PRE ERV: 0.57 L (ref -16449.34–16450.66)
PRE FEF 25 75: 0.38 L/S (ref 1.21–4)
PRE FET 100: 8.35 SEC
PRE FEV1 FVC: 48.28 % (ref 65.04–89.54)
PRE FEV1: 1.01 L (ref 1.63–2.81)
PRE FRC PL: 4.29 L (ref 1.89–3.53)
PRE FVC: 2.09 L (ref 2.1–3.68)
PRE MVV: 33.65 L/MIN (ref 71.27–96.42)
PRE PEF: 2.06 L/S (ref 4.01–7.43)
PRE RV: 3.72 L (ref 1.47–2.62)
PRE TLC: 5.81 L (ref 3.95–5.93)
RAW LLN: 3.06
RAW PRE REF: 367.7 %
RAW PRE: 11.25 CMH2O*S/L (ref 3.06–3.06)
RAW REF: 3.06
RV LLN: 1.47
RV PRE REF: 181.8 %
RV REF: 2.05
RVTLC LLN: 33
RVTLC PRE REF: 150.9 %
RVTLC PRE: 64.02 % (ref 32.83–52.01)
RVTLC REF: 42
TLC LLN: 3.95
TLC PRE REF: 117.6 %
TLC REF: 4.94
VA PRE: 3.89 L (ref 4.79–4.79)
VA SINGLE BREATH LLN: 4.79
VA SINGLE BREATH PRE REF: 81.3 %
VA SINGLE BREATH REF: 4.79
VC LLN: 2.1
VC PRE REF: 72.8 %
VC PRE: 2.09 L (ref 2.1–3.68)
VC REF: 2.87

## 2024-12-19 PROCEDURE — 94729 DIFFUSING CAPACITY: CPT

## 2024-12-19 PROCEDURE — 99999 PR PBB SHADOW E&M-EST. PATIENT-LVL III: CPT | Mod: PBBFAC,,, | Performed by: INTERNAL MEDICINE

## 2024-12-19 PROCEDURE — 94726 PLETHYSMOGRAPHY LUNG VOLUMES: CPT

## 2024-12-19 PROCEDURE — 94060 EVALUATION OF WHEEZING: CPT

## 2024-12-19 PROCEDURE — 99214 OFFICE O/P EST MOD 30 MIN: CPT | Mod: S$PBB,,, | Performed by: INTERNAL MEDICINE

## 2024-12-19 PROCEDURE — 99213 OFFICE O/P EST LOW 20 MIN: CPT | Mod: PBBFAC,PO | Performed by: INTERNAL MEDICINE

## 2024-12-19 PROCEDURE — 94618 PULMONARY STRESS TESTING: CPT

## 2024-12-19 PROCEDURE — 71250 CT THORAX DX C-: CPT | Mod: 26,,, | Performed by: RADIOLOGY

## 2024-12-19 PROCEDURE — 71250 CT THORAX DX C-: CPT | Mod: TC

## 2024-12-19 RX ORDER — CELECOXIB 100 MG/1
100 CAPSULE ORAL 2 TIMES DAILY
COMMUNITY
Start: 2024-12-09

## 2024-12-19 RX ORDER — PREDNISONE 20 MG/1
TABLET ORAL
Qty: 20 TABLET | Refills: 1 | Status: SHIPPED | OUTPATIENT
Start: 2024-12-19

## 2024-12-19 RX ORDER — ALBUTEROL SULFATE 2.5 MG/.5ML
SOLUTION RESPIRATORY (INHALATION)
Status: DISPENSED
Start: 2024-12-19 | End: 2024-12-19

## 2024-12-19 RX ORDER — ONDANSETRON 8 MG/1
8 TABLET, ORALLY DISINTEGRATING ORAL EVERY 12 HOURS PRN
Qty: 60 TABLET | Refills: 11 | Status: SHIPPED | OUTPATIENT
Start: 2024-12-19

## 2024-12-19 RX ORDER — OSELTAMIVIR PHOSPHATE 75 MG/1
75 CAPSULE ORAL 2 TIMES DAILY
Qty: 10 CAPSULE | Refills: 0 | Status: SHIPPED | OUTPATIENT
Start: 2024-12-19 | End: 2024-12-24

## 2024-12-19 NOTE — PATIENT INSTRUCTIONS
Ct chest viewed-- ground glass nodule and small solid nodules seen.  Recheck ct in a yr needed..      Breathing test and walk test noted -- moderate copd -- no oxygen needed    You seem to be steroid dependant-- may consider special shots?   With no smokes you may not need shots....      Use zofran as you have for chronic nausea -- onset after gallbladder surgery.    Need to get flu vaccine -- lots of flu-- use tamiflu as discussed if flu or exposures    Recheck 3 months..

## 2024-12-19 NOTE — PROGRESS NOTES
Notify breathing test does show moderately severe COPD.  If she were not a smoker-she might consider emphysema valves.  No change in plan otherwise

## 2024-12-19 NOTE — PROGRESS NOTES
12/19/2024    Laxmi Chand  New Patient Consult    Chief Complaint   Patient presents with    Follow-up    COPD       HPI:  12/19/2024 off smokes, cough cleared, the patient still has a take steroids frequently.  She feels like she is doing significantly better.      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  She barely can walk.  Uses advair 2 bid but no spiriva.  Uses neb rx 4/d    Pt has occ wheezes, no noc worsening.  No asthma no childhood ashtma..    Cutting back nicotene using vaping -- better than was smoking last yr..      Pt uses steroids for lungs with good benefit.  Uses steroids 3-4 times yearly.    Pt used doxy, levaquin, azithro-- failed recently degree or mucus.  She still has yellow-green mucus despite antibiotics to 3 times.    CT scan of the chest done 2021 does show calcified granulomas, bronchiectasis, and minimal upper lung emphysema.     The patient has had MRSA in wound before-left arm abscess in 2021..    Echocardiogram done October 2024-Summary  Show Result Comparison     Left Ventricle: The left ventricle is normal in size. Mildly increased wall thickness. There is mild concentric hypertrophy. Normal wall motion. There is normal systolic function with a visually estimated ejection fraction of 60 - 65%. There is normal diastolic function.    Right Ventricle: Normal right ventricular cavity size. Wall thickness is normal. Systolic function is normal.    Tricuspid Valve: There is mild regurgitation with a centrally directed jet.    Pulmonary Artery: There is mild pulmonary hypertension. The estimated pulmonary artery systolic pressure is 41 mmHg.    IVC/SVC: Normal venous pressure at 3 mmHg.  Patient Instructions   Need regular culture---- check afb cultures later.....  suspect resistant germs    Ct chest with bronchiectasis in 2021 - had calcified granuloma and  ground glass nodule and emphysema.    Trial trelegy over advair ---- breztri not covered (traditional inhaler)    Use prednisone -- one daily for bad breathing for 5 days - -take now, repeat as needed.    Use albuterol inhaler or nebulizer up to every 4 hours as needed......    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    Follow up ct chest and breathing test needed  Check oxygen walking       PFSH:    Past Medical History:   Diagnosis Date    Breast cancer in female 09/07/2022    IDC with high grade DCIS    COPD (chronic obstructive pulmonary disease)     Degeneration of lumbar intervertebral disc     GERD (gastroesophageal reflux disease)     HTN (hypertension)     Hyperlipemia, mixed          Past Surgical History:   Procedure Laterality Date    BIOPSY OF AXILLARY NODE Right 9/7/2022    Procedure: BIOPSY, LYMPH NODE, AXILLARY;  Surgeon: Jatin Gutierrez MD;  Location: [facility] OR;  Service: General;  Laterality: Right;    BREAST BIOPSY Right 08/05/2022    BREAST MASS EXCISION Right 9/7/2022    Procedure: EXCISION, MASS, BREAST;  Surgeon: Jatin Gutierrez MD;  Location: [facility] OR;  Service: General;  Laterality: Right;  wire localized partial mastectomy right breast with margins     CARPAL TUNNEL RELEASE  1985    CHOLECYSTECTOMY  1978    CORRECTION OF HAMMER TOE Right 9/15/2023    Procedure: CORRECTION, HAMMER TOE;  Surgeon: Devyn Mccullough DPM;  Location: [facility] OR;  Service: Podiatry;  Laterality: Right;    FOOT HARDWARE REMOVAL Right 9/15/2023    Procedure: REMOVAL, HARDWARE, FOOT;  Surgeon: Devyn Mccullough DPM;  Location: [facility] OR;  Service: Podiatry;  Laterality: Right;    HYSTERECTOMY      KNEE SURGERY Left 06/01/2020    LUMBAR DISC SURGERY  01/01/1990    plate and roland    LUMBAR FUSION  2011    MIDFOOT ARTHRODESIS Right 9/15/2023    Procedure: FUSION, JOINT, MIDFOOT;  Surgeon: Devyn Mccullough DPM;   "Location: [facility] OR;  Service: Podiatry;  Laterality: Right;  [place] 28 1st MPJ fusion plates screws bone grafts as well as cut guide.    TOTAL KNEE REPLACEMENT USING COMPUTER NAVIGATION Left 02/15/2021     Social History     Tobacco Use    Smoking status: Every Day     Types: Vaping with nicotine     Passive exposure: Never    Smokeless tobacco: Never   Substance Use Topics    Alcohol use: Yes    Drug use: Not Currently     Family History   Problem Relation Name Age of Onset    Hypertension Mother      Diabetes Mother      Hypertension Father      Lung cancer Maternal Grandfather      Breast cancer Neg Hx       Review of patient's allergies indicates:  No Known Allergies       Review of Systems:  a review of eleven systems covering constitutional, Eye, HEENT, Psych, Respiratory, Cardiac, GI, , Musculoskeletal, Endocrine, Dermatologic was negative except for pertinent findings as listed ABOVE and below:  pertinent positives as above, rest good        Exam:Comprehensive exam done. BP (!) 102/50 (BP Location: Left arm, Patient Position: Sitting)   Pulse 74   Ht 5' 4" (1.626 m)   Wt 87 kg (191 lb 12.8 oz)   SpO2 95% Comment: on room air at rest  BMI 32.92 kg/m²   Exam included Vitals as listed, and patient's appearance and affect and alertness and mood, oral exam for yeast and hygiene and pharynx lesions and Mallapatti (M) score, neck with inspection for jvd and masses and thyroid abnormalities and lymph nodes (supraclavicular and infraclavicular nodes and axillary also examined and noted if abn), chest exam included symmetry and effort and fremitus and percussion and auscultation, cardiac exam included rhythm and gallops and murmur and rubs and jvd and edema, abdominal exam for mass and hepatosplenomegaly and tenderness and hernias and bowel sounds, Musculoskeletal exam with muscle tone and posture and mobility/gait and  strength, and skin for rashes and cyanosis and pallor and turgor, extremity for " "clubbing.  Findings were normal except for pertinent findings listed below:  M2, coarse breath sounds.  No edema           Radiographs (ct chest and cxr) reviewed: view by direct vision      Labs reviewed           PFT will be done and results to be reviewed       Plan:  Clinical impression is apparently straight forward and impression with management as below.    Laxmi Yu" was seen today for follow-up and copd.    Diagnoses and all orders for this visit:    Solitary pulmonary nodule  -     oseltamivir (TAMIFLU) 75 MG capsule; Take 1 capsule (75 mg total) by mouth 2 (two) times daily. for 5 days  -     CT Chest Without Contrast; Future    Severe persistent asthma, uncomplicated  -     predniSONE (DELTASONE) 20 MG tablet; Take one daily for 5 days and may repeat for shortness of breath.    Chronic obstructive pulmonary disease, unspecified COPD type    Post-cholecystectomy syndrome  -     ondansetron (ZOFRAN-ODT) 8 MG TbDL; Take 1 tablet (8 mg total) by mouth every 12 (twelve) hours as needed (nausea).    Nausea  -     ondansetron (ZOFRAN-ODT) 8 MG TbDL; Take 1 tablet (8 mg total) by mouth every 12 (twelve) hours as needed (nausea).          Follow up in about 3 months (around 3/19/2025), or if symptoms worsen or fail to improve.    Discussed with patient above for education the following:      Patient Instructions   Ct chest viewed-- ground glass nodule and small solid nodules seen.  Recheck ct in a yr needed..      Breathing test and walk test noted -- moderate copd -- no oxygen needed    You seem to be steroid dependant-- may consider special shots?   With no smokes you may not need shots....      Use zofran as you have for chronic nausea -- onset after gallbladder surgery.    Need to get flu vaccine -- lots of flu-- use tamiflu as discussed if flu or exposures    Recheck 3 months..        "

## 2025-01-06 ENCOUNTER — OFFICE VISIT (OUTPATIENT)
Dept: FAMILY MEDICINE | Facility: CLINIC | Age: 70
End: 2025-01-06
Payer: MEDICARE

## 2025-01-06 VITALS
HEART RATE: 77 BPM | WEIGHT: 193.56 LBS | TEMPERATURE: 98 F | DIASTOLIC BLOOD PRESSURE: 62 MMHG | SYSTOLIC BLOOD PRESSURE: 130 MMHG | OXYGEN SATURATION: 98 % | HEIGHT: 64 IN | BODY MASS INDEX: 33.05 KG/M2 | RESPIRATION RATE: 18 BRPM

## 2025-01-06 DIAGNOSIS — I27.29 OTHER SECONDARY PULMONARY HYPERTENSION: ICD-10-CM

## 2025-01-06 DIAGNOSIS — I10 HYPERTENSION, UNSPECIFIED TYPE: Primary | ICD-10-CM

## 2025-01-06 DIAGNOSIS — J44.1 CHRONIC OBSTRUCTIVE PULMONARY DISEASE WITH ACUTE EXACERBATION: ICD-10-CM

## 2025-01-06 DIAGNOSIS — J45.50 SEVERE PERSISTENT ASTHMA, UNCOMPLICATED: ICD-10-CM

## 2025-01-06 PROCEDURE — G2211 COMPLEX E/M VISIT ADD ON: HCPCS | Mod: S$PBB,,,

## 2025-01-06 PROCEDURE — 99214 OFFICE O/P EST MOD 30 MIN: CPT | Mod: S$PBB,,,

## 2025-01-06 PROCEDURE — 99215 OFFICE O/P EST HI 40 MIN: CPT | Mod: PBBFAC,PO

## 2025-01-06 PROCEDURE — 99999 PR PBB SHADOW E&M-EST. PATIENT-LVL V: CPT | Mod: PBBFAC,,,

## 2025-01-06 RX ORDER — ALBUTEROL SULFATE 90 UG/1
2 INHALANT RESPIRATORY (INHALATION) EVERY 4 HOURS PRN
Qty: 36 G | Refills: 11 | Status: SHIPPED | OUTPATIENT
Start: 2025-01-06

## 2025-01-06 NOTE — PROGRESS NOTES
Subjective:       Patient ID:  [id number]     Chief Complaint: Follow-up      History of Present Illness      Ms. Chand presents today for follow-up regarding COPD and HTN. She experiences shortness of breath when in a rush or moving quickly. She restarted steroids 3-4 days ago due to increased shortness of breath and reports improvement. She uses albuterol inhaler 4 times daily and has noticed improvement with Trelegy. She also uses a nebulizer as needed, sometimes alternating with the inhaler. She recently saw her pulmonologist. She takes Hydrochlorothiazide, Valsartan, and Metoprolol for blood pressure management. Overall she is doing well today.   No other concerns.          Past Medical History:   Diagnosis Date    Breast cancer in female 09/07/2022    IDC with high grade DCIS    COPD (chronic obstructive pulmonary disease)     Degeneration of lumbar intervertebral disc     GERD (gastroesophageal reflux disease)     HTN (hypertension)     Hyperlipemia, mixed       Active Problem List with Overview Notes    Diagnosis Date Noted    Other secondary pulmonary hypertension 01/06/2025    Severe persistent asthma, uncomplicated 01/06/2025    Paronychia of great toe, left 06/03/2024    Malignant neoplasm of overlapping sites of right breast in female, estrogen receptor positive 03/27/2024    Recurrent major depressive disorder, in full remission 02/21/2024    Walker as ambulation aid 02/21/2024    Hallux rigidus of right foot 09/10/2023    Painful orthopaedic hardware 09/10/2023    Hammer toe of right foot 08/16/2023    Osteoarthritis of ankle and foot 08/16/2023    Hypermobile joint syndrome of right foot 08/16/2023    Neuroma of second interspace of right foot 08/05/2023    Hallux abducto valgus, right 08/05/2023    Ingrown nail 03/20/2023    Urge incontinence of urine 02/15/2023    Atherosclerosis of aorta 02/13/2023    Calcified granuloma of lung 02/13/2023    Long term (current) use of aromatase inhibitors  10/11/2022    Invasive ductal carcinoma of breast, female, right 10/05/2022    Ductal carcinoma in situ (DCIS) of right breast 08/23/2022    Osteopenia of multiple sites 08/23/2022    History of endometrial cancer 08/23/2022    Status post total left knee replacement 03/03/2021    Chronic pain syndrome 03/03/2021    Pain of breast 03/03/2021    Allergic rhinitis 01/05/2021    Depressive disorder 01/05/2021    Degeneration of lumbar intervertebral disc 10/01/2018    Chronic obstructive lung disease 05/15/2018     fev1/fvc = 47%      Hyperlipidemia 05/15/2018    History of smoking 06/02/2017    Gastroesophageal reflux disease 05/02/2017     Dr. Correa      Essential hypertension 02/28/2017      Review of patient's allergies indicates:  No Known Allergies      Current Outpatient Medications:     albuterol-ipratropium (DUO-NEB) 2.5 mg-0.5 mg/3 mL nebulizer solution, Take 3 mLs by nebulization every 4 (four) hours as needed for Wheezing or Shortness of Breath. Rescue, Disp: 120 each, Rfl: 11    anastrozole (ARIMIDEX) 1 mg Tab, TAKE 1 TABLET (1 MG TOTAL) BY MOUTH ONCE DAILY., Disp: 90 tablet, Rfl: 3    aspirin (ECOTRIN) 81 MG EC tablet, Take 1 tablet by mouth once daily. Pt back on asa, Disp: , Rfl:     aspirin-acetaminophen-caffeine 250-250-65 mg (HEADACHE RELIEF, ASA-ACET-CAF,) 250-250-65 mg per tablet, Take 1 tablet by mouth every 6 to 8 hours as needed., Disp: , Rfl:     buPROPion (WELLBUTRIN) 100 MG tablet, TAKE 1 TABLET(100 MG) BY MOUTH THREE TIMES DAILY, Disp: 270 tablet, Rfl: 3    calcium carbonate-vitamin D3 (CALCIUM 600 + D,3,) 600-125 mg-unit Tab, Take 2 tablets by mouth once daily., Disp: 180 tablet, Rfl: 3    celecoxib (CELEBREX) 100 MG capsule, Take 100 mg by mouth 2 (two) times daily., Disp: , Rfl:     cetirizine (ZYRTEC) 10 MG tablet, TAKE 1 TABLET (10 MG TOTAL) BY MOUTH ONCE DAILY., Disp: 90 tablet, Rfl: 5    cyclobenzaprine (FLEXERIL) 10 MG tablet, Take 1 tablet (10 mg total) by mouth nightly., Disp:  90 tablet, Rfl: 3    doxepin (SINEQUAN) 25 MG capsule, TAKE 2 CAPSULES (50 MG TOTAL) BY MOUTH NIGHTLY., Disp: 180 capsule, Rfl: 3    fluticasone propionate (FLONASE) 50 mcg/actuation nasal spray, 1 spray (50 mcg total) by Each Nostril route once daily., Disp: 18.2 mL, Rfl: 2    fluticasone-salmeterol 230-21 mcg/dose (ADVAIR HFA) 230-21 mcg/actuation HFAA inhaler, Inhale 2 puffs into the lungs 2 (two) times daily. Controller, Disp: 12 g, Rfl: 3    fluticasone-umeclidin-vilanter (TRELEGY ELLIPTA) 200-62.5-25 mcg inhaler, Inhale 1 puff into the lungs once daily., Disp: 60 each, Rfl: 11    guaiFENesin (MUCINEX) 600 mg 12 hr tablet, Take 2 tablets (1,200 mg total) by mouth 2 (two) times daily., Disp: 20 tablet, Rfl: 0    hydrALAZINE (APRESOLINE) 10 MG tablet, Take 1 tablet (10 mg total) by mouth 3 (three) times daily., Disp: 90 tablet, Rfl: 11    hydroCHLOROthiazide (HYDRODIURIL) 25 MG tablet, TAKE 1 TABLET (25 MG TOTAL) BY MOUTH ONCE DAILY., Disp: 90 tablet, Rfl: 3    levoFLOXacin (LEVAQUIN) 500 MG tablet, Take 1 tablet (500 mg total) by mouth once daily., Disp: 10 tablet, Rfl: 2    LUMIGAN 0.01 % Drop, Place into both eyes., Disp: , Rfl:     metoprolol succinate (TOPROL-XL) 200 MG 24 hr tablet, TAKE 1 TABLET (200 MG TOTAL) BY MOUTH ONCE DAILY., Disp: 90 tablet, Rfl: 3    metroNIDAZOLE (METROGEL) 0.75 % (37.5mg/5 gram) vaginal gel, Place 1 applicator vaginally Daily., Disp: 70 g, Rfl: 0    montelukast (SINGULAIR) 10 mg tablet, Take 1 tablet (10 mg total) by mouth once daily., Disp: 90 tablet, Rfl: 3    nystatin (MYCOSTATIN) cream, Apply topically 2 (two) times daily., Disp: 30 g, Rfl: 1    ondansetron (ZOFRAN-ODT) 8 MG TbDL, Take 1 tablet (8 mg total) by mouth every 12 (twelve) hours as needed (nausea)., Disp: 60 tablet, Rfl: 11    oxyCODONE-acetaminophen (PERCOCET)  mg per tablet, Take 1 tablet by mouth., Disp: , Rfl:     pantoprazole (PROTONIX) 40 MG tablet, TAKE 1 TABLET (40 MG TOTAL) BY MOUTH ONCE DAILY.,  Disp: 90 tablet, Rfl: 3    phenazopyridine (PYRIDIUM) 200 MG tablet, Take 200 mg by mouth every 6 (six) hours as needed., Disp: , Rfl:     predniSONE (DELTASONE) 20 MG tablet, Take one daily for 5 days and may repeat for shortness of breath., Disp: 20 tablet, Rfl: 1    pseudoephedrine (SUDAFED) 120 mg 12 hr tablet, Take 120 mg by mouth every 12 (twelve) hours., Disp: , Rfl:     valsartan (DIOVAN) 160 MG tablet, Take 1 tablet (160 mg total) by mouth once daily., Disp: 90 tablet, Rfl: 3    albuterol (PROVENTIL/VENTOLIN HFA) 90 mcg/actuation inhaler, Inhale 2 puffs into the lungs every 4 (four) hours as needed for Wheezing. INHALE 2 PUFFS BY MOUTH EVERY 4 HOURS AS NEEDED FOR WHEEZING OR SHORTNESS OF BREATH, Disp: 36 g, Rfl: 11    conjugated estrogens (PREMARIN) vaginal cream, Place 0.5 g vaginally twice a week., Disp: 30 g, Rfl: 5    Lab Results   Component Value Date    WBC 8.84 09/30/2024    HGB 11.4 (L) 09/30/2024    HCT 35.9 (L) 09/30/2024     09/30/2024    CHOL 203 (H) 05/10/2022    TRIG 101 05/10/2022    HDL 50 05/10/2022    ALT 14 09/30/2024    AST 11 09/30/2024     09/30/2024    K 3.6 09/30/2024     09/30/2024    CREATININE 1.0 09/30/2024    BUN 25 (H) 09/30/2024    CO2 28 09/30/2024    TSH 1.040 05/10/2022    HGBA1C 5.5 07/26/2023       Review of Systems   Constitutional:  Negative for fatigue and fever.   HENT: Negative.  Negative for congestion, sneezing and sore throat.    Eyes: Negative.    Respiratory:  Positive for shortness of breath. Negative for cough and wheezing.    Cardiovascular:  Negative for chest pain, palpitations and leg swelling.   Gastrointestinal:  Negative for abdominal pain, nausea and vomiting.   Genitourinary: Negative.    Musculoskeletal: Negative.  Negative for arthralgias.   Skin: Negative.  Negative for rash.   Neurological:  Negative for dizziness, weakness, light-headedness, numbness and headaches.   Hematological: Negative.    Psychiatric/Behavioral: Negative.  "        Objective:      Physical Exam  Constitutional:       Appearance: Normal appearance.   HENT:      Head: Normocephalic and atraumatic.      Nose: Nose normal.   Eyes:      Extraocular Movements: Extraocular movements intact.   Cardiovascular:      Rate and Rhythm: Normal rate and regular rhythm.   Pulmonary:      Effort: Pulmonary effort is normal.      Breath sounds: Normal breath sounds.   Musculoskeletal:      Cervical back: Normal range of motion.      Comments: Ambulates with walker   Skin:     General: Skin is warm and dry.   Neurological:      General: No focal deficit present.      Mental Status: She is alert and oriented to person, place, and time.   Psychiatric:         Mood and Affect: Mood normal.         Assessment:       1. Hypertension, unspecified type    2. Severe persistent asthma, uncomplicated    3. Chronic obstructive pulmonary disease with acute exacerbation    4. Other secondary pulmonary hypertension        Plan:       Laxmi Yu" was seen today for follow-up.    Diagnoses and all orders for this visit:    Hypertension, unspecified type  - Blood pressure is excellent today, though it has been fluctuating but appears to be stabilizing.  - Continued prescriptions for hydrochlorothiazide, valsartan, and metoprolol for blood pressure management.  - Encouraged patient to continue monitoring blood pressure at home.    Severe persistent asthma, uncomplicated  Chronic obstructive pulmonary disease with acute exacerbation  -     albuterol (PROVENTIL/VENTOLIN HFA) 90 mcg/actuation inhaler; Inhale 2 puffs into the lungs every 4 (four) hours as needed for Wheezing. INHALE 2 PUFFS BY MOUTH EVERY 4 HOURS AS NEEDED FOR WHEEZING OR SHORTNESS OF BREATH  - Auscultated the patient's lungs, which sound good today.  - Discussed connection between pulmonary hypertension and COPD.  - Medication management: Refilled albuterol inhaler prescription to be filled at [facility].  - Ms. Chand uses albuterol inhaler " 4 times daily, patient reports pulm is aware   - Patient uses a nebulizer 4 times daily, occasionally substituting with a hand inhaler when out.  - The Trelegy device appears to be providing significant benefit.  - As per pulmonologist's directions, instructed the patient to take steroids for 4-5 days when experiencing increased dyspnea.  - Advised caution during cold weather due to potential exacerbation of respiratory symptoms.  - continue to follow-up with pulmonologist     Other secondary pulmonary hypertension  - Explained the relationship between heart and lung function, emphasizing the pulmonary artery's role in oxygenation.  - Discussed pulmonary hypertension and its potential impact on overall cardiopulmonary health.  - Ms. Chand reports dyspnea when rushing or hurrying, but overall improvement in breathing has been noted, corroborated by the pulmonologist's assessment.  - Keep upcoming Scheduled heart tests   - Advised the patient to monitor breathing and continue follow-ups with the pulmonologist and cardiologist     - Follow up with Dr. Lai in April as scheduled.  - Ms. Chand advised to contact the office if any additional needs arise before next appointment and can send messages through the portal if needed.        Future Appointments       Date Provider Specialty Appt Notes    2/5/2025  Radiology [facility]mon[facility]    3/20/2025 Stephan Song MD Pulmonology 3 months    3/31/2025  Lab [facility]mon[facility]    3/31/2025 Mell Veloz NP Hematology and Oncology [facility]    4/1/2025 Beata Lai MD Family Medicine 6 mo fu               Portions of this note were dictated using voice recognition software and may contain dictation related errors in spelling / grammar / syntax not discovered on text review.     Jasmyn Culp PA-C

## 2025-01-08 ENCOUNTER — TELEPHONE (OUTPATIENT)
Dept: CARDIOLOGY | Facility: HOSPITAL | Age: 70
End: 2025-01-08

## 2025-01-08 NOTE — TELEPHONE ENCOUNTER
Patient advised, test will be at [facility] ([address]  Will need to register on the first floor at the main entrance.  Patient advised that arrival time is 06:30.  Patient advised that they may be here about 3.5-4 hours, and may want to bring something to occupy their time, as there will be periods of waiting.    Patient advised, may take her medications prior to testing if you need to.  Patient should HOLD migraine medication. Advised if food is needed to take medications, please keep it light, like toast and juice.    Patient advised to avoid all caffeine 12 hours prior to testing.  This includes chocolate, tea and decaf coffee.    Will provide peanut butter crackers for a snack after stress test.  If patient would prefer something else, please bring a snack from home.    Wear comfortable clothing.  No lotions, oils, or powders to the upper chest area. May wear deodorant.    No metal jewelry, buttons, or zippers to the upper body.  Patient verbalizes understanding of instructions.

## 2025-01-09 ENCOUNTER — HOSPITAL ENCOUNTER (OUTPATIENT)
Dept: RADIOLOGY | Facility: HOSPITAL | Age: 70
Discharge: HOME | End: 2025-01-09
Attending: INTERNAL MEDICINE
Payer: MEDICARE

## 2025-01-09 ENCOUNTER — HOSPITAL ENCOUNTER (OUTPATIENT)
Dept: CARDIOLOGY | Facility: HOSPITAL | Age: 70
Discharge: HOME | End: 2025-01-09
Attending: INTERNAL MEDICINE
Payer: MEDICARE

## 2025-01-09 DIAGNOSIS — R53.83 FATIGUE, UNSPECIFIED TYPE: ICD-10-CM

## 2025-01-09 DIAGNOSIS — Z87.891 HISTORY OF SMOKING: ICD-10-CM

## 2025-01-09 DIAGNOSIS — I27.29 OTHER SECONDARY PULMONARY HYPERTENSION: ICD-10-CM

## 2025-01-09 DIAGNOSIS — I10 ESSENTIAL HYPERTENSION: ICD-10-CM

## 2025-01-09 DIAGNOSIS — R06.02 SOB (SHORTNESS OF BREATH): ICD-10-CM

## 2025-01-09 DIAGNOSIS — E78.2 MIXED HYPERLIPIDEMIA: ICD-10-CM

## 2025-01-09 DIAGNOSIS — J44.9 CHRONIC OBSTRUCTIVE PULMONARY DISEASE, UNSPECIFIED COPD TYPE: ICD-10-CM

## 2025-01-09 PROCEDURE — A9502 TC99M TETROFOSMIN: HCPCS | Performed by: INTERNAL MEDICINE

## 2025-01-09 PROCEDURE — 63600175 PHARM REV CODE 636 W HCPCS: Performed by: INTERNAL MEDICINE

## 2025-01-09 PROCEDURE — 93017 CV STRESS TEST TRACING ONLY: CPT

## 2025-01-09 RX ORDER — REGADENOSON 0.08 MG/ML
0.4 INJECTION, SOLUTION INTRAVENOUS
Status: COMPLETED | OUTPATIENT
Start: 2025-01-09 | End: 2025-01-09

## 2025-01-09 RX ADMIN — REGADENOSON 0.4 MG: 0.08 INJECTION, SOLUTION INTRAVENOUS at 08:01

## 2025-01-09 RX ADMIN — TETROFOSMIN 12.9 MILLICURIE: 1.38 INJECTION, POWDER, LYOPHILIZED, FOR SOLUTION INTRAVENOUS at 06:01

## 2025-01-09 RX ADMIN — TETROFOSMIN 26.5 MILLICURIE: 1.38 INJECTION, POWDER, LYOPHILIZED, FOR SOLUTION INTRAVENOUS at 08:01

## 2025-01-10 LAB
CV PHARM DOSE: 0.4 MG
CV STRESS BASE HR: 73 BPM
DIASTOLIC BLOOD PRESSURE: 74 MMHG
EJECTION FRACTION- HIGH: 65 %
END DIASTOLIC INDEX-HIGH: 153 ML/M2
END DIASTOLIC INDEX-LOW: 93 ML/M2
END SYSTOLIC INDEX-HIGH: 71 ML/M2
END SYSTOLIC INDEX-LOW: 31 ML/M2
NUC REST DIASTOLIC VOLUME INDEX: 74
NUC REST EJECTION FRACTION: 72
NUC REST SYSTOLIC VOLUME INDEX: 21
NUC STRESS DIASTOLIC VOLUME INDEX: 83
NUC STRESS EJECTION FRACTION: 72 %
NUC STRESS SYSTOLIC VOLUME INDEX: 23
OHS CV CPX 1 MINUTE RECOVERY HEART RATE: 90 BPM
OHS CV CPX 85 PERCENT MAX PREDICTED HEART RATE MALE: 128
OHS CV CPX MAX PREDICTED HEART RATE: 151
OHS CV CPX PATIENT IS FEMALE: 1
OHS CV CPX PATIENT IS MALE: 0
OHS CV CPX PEAK DIASTOLIC BLOOD PRESSURE: 64 MMHG
OHS CV CPX PEAK HEAR RATE: 90 BPM
OHS CV CPX PEAK RATE PRESSURE PRODUCT: NORMAL
OHS CV CPX PEAK SYSTOLIC BLOOD PRESSURE: 144 MMHG
OHS CV CPX PERCENT MAX PREDICTED HEART RATE ACHIEVED: 62
OHS CV CPX RATE PRESSURE PRODUCT PRESENTING: NORMAL
OHS CV INITIAL DOSE: 12.9 MCG/KG/MIN
OHS CV PEAK DOSE: 26.5 MCG/KG/MIN
RETIRED EF AND QEF - SEE NOTES: 53 %
SYSTOLIC BLOOD PRESSURE: 140 MMHG

## 2025-02-05 ENCOUNTER — HOSPITAL ENCOUNTER (OUTPATIENT)
Dept: RADIOLOGY | Facility: HOSPITAL | Age: 70
Discharge: HOME | End: 2025-02-05
Attending: INTERNAL MEDICINE
Payer: MEDICARE

## 2025-02-05 DIAGNOSIS — C50.011 MALIGNANT NEOPLASM OF NIPPLE OF RIGHT BREAST IN FEMALE, UNSPECIFIED ESTROGEN RECEPTOR STATUS: ICD-10-CM

## 2025-02-05 DIAGNOSIS — Z12.31 ENCOUNTER FOR SCREENING MAMMOGRAM FOR MALIGNANT NEOPLASM OF BREAST: ICD-10-CM

## 2025-02-05 DIAGNOSIS — D05.11 DUCTAL CARCINOMA IN SITU (DCIS) OF RIGHT BREAST: ICD-10-CM

## 2025-02-05 PROCEDURE — 77063 BREAST TOMOSYNTHESIS BI: CPT | Mod: 26,,, | Performed by: RADIOLOGY

## 2025-02-05 PROCEDURE — 77067 SCR MAMMO BI INCL CAD: CPT | Mod: TC

## 2025-02-05 PROCEDURE — 77067 SCR MAMMO BI INCL CAD: CPT | Mod: 26,,, | Performed by: RADIOLOGY

## 2025-02-14 DIAGNOSIS — I10 HYPERTENSION, UNSPECIFIED TYPE: ICD-10-CM

## 2025-02-14 RX ORDER — HYDRALAZINE HYDROCHLORIDE 10 MG/1
10 TABLET, FILM COATED ORAL 3 TIMES DAILY
Qty: 90 TABLET | Refills: 11 | Status: SHIPPED | OUTPATIENT
Start: 2025-02-14 | End: 2026-02-14

## 2025-02-14 NOTE — TELEPHONE ENCOUNTER
LOV: 1/6/25 Robbi    NOV: 4/1/25 shin    Preffered Pharmacy:[facility]  [place] [place] [facility] [address]

## 2025-02-19 DIAGNOSIS — Z79.811 LONG TERM (CURRENT) USE OF AROMATASE INHIBITORS: ICD-10-CM

## 2025-02-19 RX ORDER — ANASTROZOLE 1 MG/1
1 TABLET ORAL DAILY
Qty: 90 TABLET | Refills: 3 | Status: SHIPPED | OUTPATIENT
Start: 2025-02-19 | End: 2026-02-19

## 2025-02-22 DIAGNOSIS — Z00.00 ENCOUNTER FOR MEDICARE ANNUAL WELLNESS EXAM: ICD-10-CM

## 2025-02-26 ENCOUNTER — OFFICE VISIT (OUTPATIENT)
Dept: FAMILY MEDICINE | Facility: CLINIC | Age: 70
End: 2025-02-26
Payer: MEDICARE

## 2025-02-26 VITALS
RESPIRATION RATE: 18 BRPM | HEART RATE: 73 BPM | HEIGHT: 64 IN | DIASTOLIC BLOOD PRESSURE: 72 MMHG | WEIGHT: 197.75 LBS | OXYGEN SATURATION: 97 % | SYSTOLIC BLOOD PRESSURE: 120 MMHG | BODY MASS INDEX: 33.76 KG/M2

## 2025-02-26 DIAGNOSIS — M65.4 DE QUERVAIN'S TENOSYNOVITIS, RIGHT: ICD-10-CM

## 2025-02-26 DIAGNOSIS — G56.01 RIGHT CARPAL TUNNEL SYNDROME: Primary | ICD-10-CM

## 2025-02-26 DIAGNOSIS — M71.331 SYNOVIAL CYST OF RIGHT WRIST: ICD-10-CM

## 2025-02-26 PROCEDURE — 99999 PR PBB SHADOW E&M-EST. PATIENT-LVL V: CPT | Mod: PBBFAC,,, | Performed by: FAMILY MEDICINE

## 2025-02-26 PROCEDURE — 99215 OFFICE O/P EST HI 40 MIN: CPT | Mod: PBBFAC,PO | Performed by: FAMILY MEDICINE

## 2025-02-26 PROCEDURE — 99213 OFFICE O/P EST LOW 20 MIN: CPT | Mod: S$PBB,,, | Performed by: FAMILY MEDICINE

## 2025-02-26 RX ORDER — METHYLPREDNISOLONE 4 MG/1
TABLET ORAL
Qty: 1 EACH | Refills: 0 | Status: SHIPPED | OUTPATIENT
Start: 2025-02-26

## 2025-02-26 NOTE — PATIENT INSTRUCTIONS
Contact your Ortho-Hand Surgeon who did the Left carpal tunnel and hand surgery.    Get a splint as discussed.  Warm compress to right wrist 3-4 times a day.    Contact your PCP if any worsening or for any new concerns as we discussed.

## 2025-02-26 NOTE — PROGRESS NOTES
Subjective:       Patient ID: Laxmi Chand is a 69 y.o. female.    Chief Complaint: No chief complaint on file.    New to me patient here for UC visit. 2-3 mos right hand pain and numbness; feels similar to Left CTS in her past.  Middle fingers and thumb; no neck pain.      Wants to see Ortho who did her left side in past  Review of Systems   Constitutional:  Negative for fever.   Respiratory:  Negative for shortness of breath.    Cardiovascular:  Negative for chest pain.   Gastrointestinal:  Negative for abdominal pain and nausea.   Musculoskeletal:  Positive for arthralgias.   Skin:  Negative for rash.   Neurological:  Positive for numbness.   All other systems reviewed and are negative.      Objective:      Physical Exam  Vitals reviewed.   Constitutional:       Appearance: Normal appearance.   Cardiovascular:      Rate and Rhythm: Normal rate and regular rhythm.      Pulses:           Radial pulses are 2+ on the right side.      Comments: SQ cyst overlying distal radial pulse  Musculoskeletal:      Right hand: Normal capillary refill. Normal pulse.      Comments: Positive Tinel's; also has a firm taught palmar tendon.  SQ cyst over radio-carpal flexor area.   Skin:     General: Skin is warm and dry.   Neurological:      General: No focal deficit present.      Mental Status: She is alert.      Motor: Motor function is intact.       Assessment:       1. Right carpal tunnel syndrome    2. De Quervain's tenosynovitis, right    3. Synovial cyst of right wrist        Plan:       Right carpal tunnel syndrome  -     methylPREDNISolone (MEDROL DOSEPACK) 4 mg tablet; use as directed  Dispense: 1 each; Refill: 0    De Quervain's tenosynovitis, right    Synovial cyst of right wrist      Patient Instructions   Contact your Ortho-Hand Surgeon who did the Left carpal tunnel and hand surgery.    Get a splint as discussed.  Warm compress to right wrist 3-4 times a day.    Contact your PCP if any worsening or for any new  concerns as we discussed.

## 2025-03-05 ENCOUNTER — E-VISIT (OUTPATIENT)
Dept: FAMILY MEDICINE | Facility: CLINIC | Age: 70
End: 2025-03-05
Payer: MEDICARE

## 2025-03-05 ENCOUNTER — TELEPHONE (OUTPATIENT)
Dept: FAMILY MEDICINE | Facility: CLINIC | Age: 70
End: 2025-03-05
Payer: MEDICARE

## 2025-03-05 DIAGNOSIS — B37.31 VAGINAL YEAST INFECTION: Primary | ICD-10-CM

## 2025-03-05 NOTE — TELEPHONE ENCOUNTER
----- Message from Cali sent at 3/5/2025 12:39 PM CST -----  Type:  Needs Medical AdviceWho Called: [id number] - [place], [place] - [address] [facility] [id number] [facility] [address] [facility] [id number]: [phone] Fax: [phone] Would the patient rather a call back or a response via [facility]? Call [facility] Call Back Number: [phone] Additional Information: pt st she has a severe yeast infection. Asking if something can be called into the pharmacy for her. please call to discuss

## 2025-03-06 RX ORDER — FLUCONAZOLE 150 MG/1
150 TABLET ORAL
Qty: 2 TABLET | Refills: 0 | Status: SHIPPED | OUTPATIENT
Start: 2025-03-06 | End: 2025-03-10

## 2025-03-06 NOTE — PROGRESS NOTES
Patient ID: Laxmi Chand is a 69 y.o. female.    Chief Complaint: General Illness (Entered automatically based on patient selection in [employer].)    The patient initiated a request through [employer] on 3/5/2025 for evaluation and management with a chief complaint of General Illness (Entered automatically based on patient selection in [employer].)     I evaluated the questionnaire submission on 3/6/2025.    [facility]    3/5/2025  1:34 PM CST - Filed by Patient   What do you need help with? Vaginal Symptoms   Do you agree to participate in an E-Visit? Yes   If you have any of the following symptoms,  please do not complete an E-Visit,  schedule an appointment with your provider: I acknowledge   What is the main issue you would like addressed today? Yeast infection/ itching/ odor   Which of the following vaginal concerns do you have? Itching;  Other   Describe your symptoms. Itching/odor /   Do you have vaginal discharge? No discharge   Do you have pain while passing urine? No   Do you have any of the following symptoms? None of the above    Have you taken antibiotics in the last two weeks? No    Do you use any of the following? No   Which of the following applies to your menstrual period? Have not had for a while   Which of the following applies to your menstrual cycle? No bleeding   Do you have spotting between periods? No   Do you have pain with your period? No   Have you had similar symptoms in the past? No   Have you had a temperature of 100.4 or higher? No   Provide any additional information you feel is important.    Please attach any relevant images or files    Are you able to take your vital signs? No         Encounter Diagnosis   Name Primary?    Vaginal yeast infection Yes        No orders of the defined types were placed in this encounter.     Medications Ordered This Encounter   Medications    fluconazole (DIFLUCAN) 150 MG Tab     Sig: Take 1 tablet (150 mg total) by mouth Every  3 (three) days. for 2 doses     Dispense:  2 tablet     Refill:  0        No follow-ups on file.      E-Visit Time Trackin min

## 2025-03-11 ENCOUNTER — OFFICE VISIT (OUTPATIENT)
Dept: FAMILY MEDICINE | Facility: CLINIC | Age: 70
End: 2025-03-11
Payer: MEDICARE

## 2025-03-11 ENCOUNTER — LAB VISIT (OUTPATIENT)
Dept: LAB | Facility: HOSPITAL | Age: 70
End: 2025-03-11
Payer: MEDICARE

## 2025-03-11 VITALS
OXYGEN SATURATION: 99 % | DIASTOLIC BLOOD PRESSURE: 76 MMHG | WEIGHT: 196.19 LBS | HEIGHT: 64 IN | BODY MASS INDEX: 33.49 KG/M2 | HEART RATE: 62 BPM | SYSTOLIC BLOOD PRESSURE: 130 MMHG | RESPIRATION RATE: 20 BRPM | TEMPERATURE: 98 F

## 2025-03-11 DIAGNOSIS — E66.09 CLASS 1 OBESITY DUE TO EXCESS CALORIES WITH BODY MASS INDEX (BMI) OF 33.0 TO 33.9 IN ADULT, UNSPECIFIED WHETHER SERIOUS COMORBIDITY PRESENT: ICD-10-CM

## 2025-03-11 DIAGNOSIS — E66.811 CLASS 1 OBESITY DUE TO EXCESS CALORIES WITH BODY MASS INDEX (BMI) OF 33.0 TO 33.9 IN ADULT, UNSPECIFIED WHETHER SERIOUS COMORBIDITY PRESENT: ICD-10-CM

## 2025-03-11 DIAGNOSIS — I10 HYPERTENSION, UNSPECIFIED TYPE: ICD-10-CM

## 2025-03-11 DIAGNOSIS — E78.5 HYPERLIPIDEMIA, UNSPECIFIED HYPERLIPIDEMIA TYPE: ICD-10-CM

## 2025-03-11 DIAGNOSIS — R79.9 ABNORMAL FINDING OF BLOOD CHEMISTRY, UNSPECIFIED: ICD-10-CM

## 2025-03-11 DIAGNOSIS — B37.31 VAGINAL YEAST INFECTION: ICD-10-CM

## 2025-03-11 DIAGNOSIS — J44.1 CHRONIC OBSTRUCTIVE PULMONARY DISEASE WITH ACUTE EXACERBATION: ICD-10-CM

## 2025-03-11 DIAGNOSIS — I10 HYPERTENSION, UNSPECIFIED TYPE: Primary | ICD-10-CM

## 2025-03-11 DIAGNOSIS — I70.0 ATHEROSCLEROSIS OF AORTA: ICD-10-CM

## 2025-03-11 DIAGNOSIS — F33.42 RECURRENT MAJOR DEPRESSIVE DISORDER, IN FULL REMISSION: ICD-10-CM

## 2025-03-11 LAB
ALBUMIN SERPL BCP-MCNC: 3.8 G/DL (ref 3.5–5.2)
ALP SERPL-CCNC: 60 U/L (ref 40–150)
ALT SERPL W/O P-5'-P-CCNC: 19 U/L (ref 10–44)
ANION GAP SERPL CALC-SCNC: 9 MMOL/L (ref 8–16)
AST SERPL-CCNC: 15 U/L (ref 10–40)
BASOPHILS # BLD AUTO: 0.04 K/UL (ref 0–0.2)
BASOPHILS NFR BLD: 0.5 % (ref 0–1.9)
BILIRUB SERPL-MCNC: 0.3 MG/DL (ref 0.1–1)
BUN SERPL-MCNC: 22 MG/DL (ref 8–23)
CALCIUM SERPL-MCNC: 9.3 MG/DL (ref 8.7–10.5)
CHLORIDE SERPL-SCNC: 102 MMOL/L (ref 95–110)
CHOLEST SERPL-MCNC: 197 MG/DL (ref 120–199)
CHOLEST/HDLC SERPL: 3.7 {RATIO} (ref 2–5)
CO2 SERPL-SCNC: 26 MMOL/L (ref 23–29)
CREAT SERPL-MCNC: 1.2 MG/DL (ref 0.5–1.4)
DIFFERENTIAL METHOD BLD: ABNORMAL
EOSINOPHIL # BLD AUTO: 0.2 K/UL (ref 0–0.5)
EOSINOPHIL NFR BLD: 2.9 % (ref 0–8)
ERYTHROCYTE [DISTWIDTH] IN BLOOD BY AUTOMATED COUNT: 13.4 % (ref 11.5–14.5)
EST. GFR  (NO RACE VARIABLE): 49 ML/MIN/1.73 M^2
ESTIMATED AVG GLUCOSE: 105 MG/DL (ref 68–131)
GLUCOSE SERPL-MCNC: 81 MG/DL (ref 70–110)
HBA1C MFR BLD: 5.3 % (ref 4–5.6)
HCT VFR BLD AUTO: 38.8 % (ref 37–48.5)
HDLC SERPL-MCNC: 53 MG/DL (ref 40–75)
HDLC SERPL: 26.9 % (ref 20–50)
HGB BLD-MCNC: 12.3 G/DL (ref 12–16)
IMM GRANULOCYTES # BLD AUTO: 0.05 K/UL (ref 0–0.04)
IMM GRANULOCYTES NFR BLD AUTO: 0.6 % (ref 0–0.5)
LDLC SERPL CALC-MCNC: 111.2 MG/DL (ref 63–159)
LYMPHOCYTES # BLD AUTO: 2 K/UL (ref 1–4.8)
LYMPHOCYTES NFR BLD: 24.2 % (ref 18–48)
MCH RBC QN AUTO: 29.5 PG (ref 27–31)
MCHC RBC AUTO-ENTMCNC: 31.7 G/DL (ref 32–36)
MCV RBC AUTO: 93 FL (ref 82–98)
MONOCYTES # BLD AUTO: 0.7 K/UL (ref 0.3–1)
MONOCYTES NFR BLD: 8.9 % (ref 4–15)
NEUTROPHILS # BLD AUTO: 5.1 K/UL (ref 1.8–7.7)
NEUTROPHILS NFR BLD: 62.9 % (ref 38–73)
NONHDLC SERPL-MCNC: 144 MG/DL
NRBC BLD-RTO: 0 /100 WBC
PLATELET # BLD AUTO: 410 K/UL (ref 150–450)
PMV BLD AUTO: 9.3 FL (ref 9.2–12.9)
POTASSIUM SERPL-SCNC: 4.6 MMOL/L (ref 3.5–5.1)
PROT SERPL-MCNC: 6.8 G/DL (ref 6–8.4)
RBC # BLD AUTO: 4.17 M/UL (ref 4–5.4)
SODIUM SERPL-SCNC: 137 MMOL/L (ref 136–145)
TRIGL SERPL-MCNC: 164 MG/DL (ref 30–150)
WBC # BLD AUTO: 8.17 K/UL (ref 3.9–12.7)

## 2025-03-11 PROCEDURE — 84436 ASSAY OF TOTAL THYROXINE: CPT

## 2025-03-11 PROCEDURE — 84443 ASSAY THYROID STIM HORMONE: CPT

## 2025-03-11 PROCEDURE — 80053 COMPREHEN METABOLIC PANEL: CPT

## 2025-03-11 PROCEDURE — 99214 OFFICE O/P EST MOD 30 MIN: CPT | Mod: S$PBB,,,

## 2025-03-11 PROCEDURE — 83036 HEMOGLOBIN GLYCOSYLATED A1C: CPT

## 2025-03-11 PROCEDURE — G2211 COMPLEX E/M VISIT ADD ON: HCPCS | Mod: S$PBB,,,

## 2025-03-11 PROCEDURE — 85025 COMPLETE CBC W/AUTO DIFF WBC: CPT

## 2025-03-11 PROCEDURE — 80061 LIPID PANEL: CPT

## 2025-03-11 PROCEDURE — 99999 PR PBB SHADOW E&M-EST. PATIENT-LVL V: CPT | Mod: PBBFAC,,,

## 2025-03-11 PROCEDURE — 99215 OFFICE O/P EST HI 40 MIN: CPT | Mod: PBBFAC,PO

## 2025-03-11 RX ORDER — NYSTATIN 100000 U/G
CREAM TOPICAL 2 TIMES DAILY
Qty: 30 G | Refills: 1 | Status: SHIPPED | OUTPATIENT
Start: 2025-03-11

## 2025-03-11 RX ORDER — NYSTATIN 100000 [USP'U]/G
POWDER TOPICAL 4 TIMES DAILY
Qty: 30 G | Refills: 1 | Status: SHIPPED | OUTPATIENT
Start: 2025-03-11

## 2025-03-11 RX ORDER — DULOXETIN HYDROCHLORIDE 20 MG/1
20 CAPSULE, DELAYED RELEASE ORAL DAILY
Qty: 90 CAPSULE | Refills: 1 | Status: SHIPPED | OUTPATIENT
Start: 2025-03-11 | End: 2025-03-14 | Stop reason: HOSPADM

## 2025-03-11 RX ORDER — HYDRALAZINE HYDROCHLORIDE 10 MG/1
10 TABLET, FILM COATED ORAL 3 TIMES DAILY
Qty: 270 TABLET | Refills: 1 | Status: SHIPPED | OUTPATIENT
Start: 2025-03-11 | End: 2025-09-07

## 2025-03-11 RX ORDER — FLUCONAZOLE 150 MG/1
150 TABLET ORAL
Qty: 2 TABLET | Refills: 0 | Status: SHIPPED | OUTPATIENT
Start: 2025-03-11 | End: 2025-03-15

## 2025-03-11 NOTE — PROGRESS NOTES
Subjective:       Patient ID:  [id number]     Chief Complaint: Follow-up (Pt would like to discuss weight loss options.  Has gained weight since stopped smoking 1+ year ago.)      History of Present Illness    Ms. Chand presents today for weight loss medication consultation. She reports increased appetite and intense food cravings following recent smoking cessation. She takes Wellbutrin twice daily, occasionally increasing to 3 times daily, and reports significant drowsiness within 15-20 minutes of morning administration. Previous trial of Cymbalta in November 2023 was discontinued due to elevated blood pressure that resolved upon discontinuation. She is currently on multiple blood pressure medications. She reports persistent yeast infection symptoms despite completing two doses of prescribed antifungal medication three days apart. She initially denied discharge but now notes a small amount, along with extremely bothersome itching. No other concerns today.       Past Medical History:   Diagnosis Date    Breast cancer in female 09/07/2022    IDC with high grade DCIS    COPD (chronic obstructive pulmonary disease)     Degeneration of lumbar intervertebral disc     GERD (gastroesophageal reflux disease)     HTN (hypertension)     Hyperlipemia, mixed       Active Problem List with Overview Notes    Diagnosis Date Noted    Other secondary pulmonary hypertension 01/06/2025    Severe persistent asthma, uncomplicated 01/06/2025    Paronychia of great toe, left 06/03/2024    Malignant neoplasm of overlapping sites of right breast in female, estrogen receptor positive 03/27/2024    Recurrent major depressive disorder, in full remission 02/21/2024    Walker as ambulation aid 02/21/2024    Hallux rigidus of right foot 09/10/2023    Painful orthopaedic hardware 09/10/2023    Hammer toe of right foot 08/16/2023    Osteoarthritis of ankle and foot 08/16/2023    Hypermobile joint syndrome of right foot 08/16/2023    Neuroma of  second interspace of right foot 08/05/2023    Hallux abducto valgus, right 08/05/2023    Ingrown nail 03/20/2023    Urge incontinence of urine 02/15/2023    Atherosclerosis of aorta 02/13/2023    Calcified granuloma of lung 02/13/2023    Long term (current) use of aromatase inhibitors 10/11/2022    Invasive ductal carcinoma of breast, female, right 10/05/2022    Ductal carcinoma in situ (DCIS) of right breast 08/23/2022    Osteopenia of multiple sites 08/23/2022    History of endometrial cancer 08/23/2022    Status post total left knee replacement 03/03/2021    Chronic pain syndrome 03/03/2021    Pain of breast 03/03/2021    Allergic rhinitis 01/05/2021    Depressive disorder 01/05/2021    Degeneration of lumbar intervertebral disc 10/01/2018    Chronic obstructive lung disease 05/15/2018     fev1/fvc = 47%      Hyperlipidemia 05/15/2018    History of smoking 06/02/2017    Gastroesophageal reflux disease 05/02/2017     Dr. Correa      Essential hypertension 02/28/2017      Review of patient's allergies indicates:  No Known Allergies    Current Medications[1]    Lab Results   Component Value Date    WBC 8.84 09/30/2024    HGB 11.4 (L) 09/30/2024    HCT 35.9 (L) 09/30/2024     09/30/2024    CHOL 203 (H) 05/10/2022    TRIG 101 05/10/2022    HDL 50 05/10/2022    ALT 14 09/30/2024    AST 11 09/30/2024     09/30/2024    K 3.6 09/30/2024     09/30/2024    CREATININE 1.0 09/30/2024    BUN 25 (H) 09/30/2024    CO2 28 09/30/2024    TSH 1.040 05/10/2022    HGBA1C 5.5 07/26/2023       Review of Systems   Constitutional:  Negative for fatigue and fever.   HENT: Negative.  Negative for congestion, sneezing and sore throat.    Eyes: Negative.    Respiratory:  Negative for cough and wheezing.    Cardiovascular:  Negative for chest pain, palpitations and leg swelling.   Gastrointestinal:  Negative for abdominal pain, nausea and vomiting.   Genitourinary: Negative.    Musculoskeletal: Negative.  Negative for  "arthralgias.   Skin: Negative.  Negative for rash.   Neurological:  Negative for dizziness, weakness, light-headedness, numbness and headaches.   Hematological: Negative.    Psychiatric/Behavioral: Negative.         Objective:      Physical Exam  Constitutional:       Appearance: Normal appearance.   HENT:      Head: Normocephalic and atraumatic.      Nose: Nose normal.   Eyes:      Extraocular Movements: Extraocular movements intact.   Cardiovascular:      Rate and Rhythm: Normal rate and regular rhythm.   Pulmonary:      Effort: Pulmonary effort is normal.      Breath sounds: Normal breath sounds.   Musculoskeletal:      Cervical back: Normal range of motion.      Comments: Ambulates with walker   Skin:     General: Skin is warm and dry.   Neurological:      General: No focal deficit present.      Mental Status: She is alert and oriented to person, place, and time.   Psychiatric:         Mood and Affect: Mood normal.         Assessment:       1. Hypertension, unspecified type    2. Class 1 obesity due to excess calories with body mass index (BMI) of 33.0 to 33.9 in adult, unspecified whether serious comorbidity present    3. Vaginal yeast infection    4. Chronic obstructive pulmonary disease with acute exacerbation    5. Hyperlipidemia, unspecified hyperlipidemia type    6. Abnormal finding of blood chemistry, unspecified    7. Recurrent major depressive disorder, in full remission    8. Atherosclerosis of aorta        Plan:       Laxmi Yu" was seen today for follow-up.    Diagnoses and all orders for this visit:    Hypertension, unspecified type  -     hydrALAZINE (APRESOLINE) 10 MG tablet; Take 1 tablet (10 mg total) by mouth 3 (three) times daily.  -     Comprehensive Metabolic Panel; Future  -     Lipid Panel; Future  -     CBC Auto Differential; Future  - continue current regimen  - discussed dash diet, exercise/weight loss, and increased cardiovascular exercise   - monitor BP at home w/ goal <130/80   "     Class 1 obesity due to excess calories with body mass index (BMI) of 33.0 to 33.9 in adult, unspecified whether serious comorbidity present  -     TSH; Future  -     T4; Future  -     Hemoglobin A1C; Future  - Discussed weight management strategies.  - Noted increased eating after smoking cessation, acknowledging this is common.  - Explained the rationale for avoiding Adipex due to potential cardiovascular risks with the patient's existing conditions.  - consider GLP-1 pending labs   - Will delay visit to weight loss clinic pending lab results and medication adjustments.    Vaginal yeast infection  -     fluconazole (DIFLUCAN) 150 MG Tab; Take 1 tablet (150 mg total) by mouth Every 3 (three) days. for 2 doses  -     nystatin (MYCOSTATIN) powder; Apply topically 4 (four) times daily.  -     nystatin (MYCOSTATIN) cream; Apply topically 2 (two) times daily.    Chronic obstructive pulmonary disease with acute exacerbation  - continue current regimen  - continue to follow-up with pulm     Hyperlipidemia, unspecified hyperlipidemia type  -     TSH; Future  -     T4; Future  -     Hemoglobin A1C; Future    Abnormal finding of blood chemistry, unspecified  -     Hemoglobin A1C; Future    Recurrent major depressive disorder, in full remission  -     DULoxetine (CYMBALTA) 20 MG capsule; Take 1 capsule (20 mg total) by mouth once daily.  - Discussed medication changes for depression management.  - Will switch the patient from Wellbutrin to Cymbalta (duloxetine).  - Noted that Wellbutrin, currently taken twice daily, causes sleepiness in the patient, contrary to its usual energy-boosting effect.  - Instructed to taper Wellbutrin: decrease to daily for 1 week, then stop completely.  - Explained that Cymbalta may benefit both depression and chronic pain management.      Atherosclerosis of aorta  - Continue ASA  - lipid panel as ordered   - discuss statin in the future           Future Appointments       Date Provider  Specialty Appt Notes    3/20/2025 Stephan Song MD Pulmonology 3 months    3/31/2025  Lab [facility]mon[facility]    3/31/2025 Mell Veloz NP Hematology and Oncology [facility]    4/1/2025 Beata Lai MD Family Medicine 6 mo fu               Portions of this note were dictated using voice recognition software and may contain dictation related errors in spelling / grammar / syntax not discovered on text review.     Jasmyn Culp PA-C         [1]   Current Outpatient Medications:     albuterol (PROVENTIL/VENTOLIN HFA) 90 mcg/actuation inhaler, Inhale 2 puffs into the lungs every 4 (four) hours as needed for Wheezing. INHALE 2 PUFFS BY MOUTH EVERY 4 HOURS AS NEEDED FOR WHEEZING OR SHORTNESS OF BREATH, Disp: 36 g, Rfl: 11    albuterol-ipratropium (DUO-NEB) 2.5 mg-0.5 mg/3 mL nebulizer solution, Take 3 mLs by nebulization every 4 (four) hours as needed for Wheezing or Shortness of Breath. Rescue, Disp: 120 each, Rfl: 11    anastrozole (ARIMIDEX) 1 mg Tab, TAKE 1 TABLET (1 MG TOTAL) BY MOUTH ONCE DAILY., Disp: 90 tablet, Rfl: 3    aspirin (ECOTRIN) 81 MG EC tablet, Take 1 tablet by mouth once daily. Pt back on asa, Disp: , Rfl:     buPROPion (WELLBUTRIN) 100 MG tablet, TAKE 1 TABLET(100 MG) BY MOUTH THREE TIMES DAILY, Disp: 270 tablet, Rfl: 3    calcium carbonate-vitamin D3 (CALCIUM 600 + D,3,) 600-125 mg-unit Tab, Take 2 tablets by mouth once daily., Disp: 180 tablet, Rfl: 3    celecoxib (CELEBREX) 100 MG capsule, Take 100 mg by mouth 2 (two) times daily., Disp: , Rfl:     cetirizine (ZYRTEC) 10 MG tablet, TAKE 1 TABLET (10 MG TOTAL) BY MOUTH ONCE DAILY., Disp: 90 tablet, Rfl: 5    cyclobenzaprine (FLEXERIL) 10 MG tablet, Take 1 tablet (10 mg total) by mouth nightly., Disp: 90 tablet, Rfl: 3    doxepin (SINEQUAN) 25 MG capsule, TAKE 2 CAPSULES (50 MG TOTAL) BY MOUTH NIGHTLY., Disp: 180 capsule, Rfl: 3    fluticasone propionate (FLONASE) 50 mcg/actuation nasal spray, 1 spray (50 mcg total) by Each Nostril route once  daily., Disp: 18.2 mL, Rfl: 2    fluticasone-umeclidin-vilanter (TRELEGY ELLIPTA) 200-62.5-25 mcg inhaler, Inhale 1 puff into the lungs once daily., Disp: 60 each, Rfl: 11    guaiFENesin (MUCINEX) 600 mg 12 hr tablet, Take 2 tablets (1,200 mg total) by mouth 2 (two) times daily., Disp: 20 tablet, Rfl: 0    hydroCHLOROthiazide (HYDRODIURIL) 25 MG tablet, TAKE 1 TABLET (25 MG TOTAL) BY MOUTH ONCE DAILY., Disp: 90 tablet, Rfl: 3    levoFLOXacin (LEVAQUIN) 500 MG tablet, Take 1 tablet (500 mg total) by mouth once daily., Disp: 10 tablet, Rfl: 2    LUMIGAN 0.01 % Drop, Place into both eyes., Disp: , Rfl:     methylPREDNISolone (MEDROL DOSEPACK) 4 mg tablet, use as directed, Disp: 1 each, Rfl: 0    metoprolol succinate (TOPROL-XL) 200 MG 24 hr tablet, TAKE 1 TABLET (200 MG TOTAL) BY MOUTH ONCE DAILY., Disp: 90 tablet, Rfl: 3    metroNIDAZOLE (METROGEL) 0.75 % (37.5mg/5 gram) vaginal gel, Place 1 applicator vaginally Daily., Disp: 70 g, Rfl: 0    montelukast (SINGULAIR) 10 mg tablet, Take 1 tablet (10 mg total) by mouth once daily., Disp: 90 tablet, Rfl: 3    ondansetron (ZOFRAN-ODT) 8 MG TbDL, Take 1 tablet (8 mg total) by mouth every 12 (twelve) hours as needed (nausea)., Disp: 60 tablet, Rfl: 11    oxyCODONE-acetaminophen (PERCOCET)  mg per tablet, Take 1 tablet by mouth., Disp: , Rfl:     pantoprazole (PROTONIX) 40 MG tablet, TAKE 1 TABLET (40 MG TOTAL) BY MOUTH ONCE DAILY., Disp: 90 tablet, Rfl: 3    phenazopyridine (PYRIDIUM) 200 MG tablet, Take 200 mg by mouth every 6 (six) hours as needed., Disp: , Rfl:     predniSONE (DELTASONE) 20 MG tablet, Take one daily for 5 days and may repeat for shortness of breath., Disp: 20 tablet, Rfl: 1    pseudoephedrine (SUDAFED) 120 mg 12 hr tablet, Take 120 mg by mouth every 12 (twelve) hours., Disp: , Rfl:     valsartan (DIOVAN) 160 MG tablet, Take 1 tablet (160 mg total) by mouth once daily., Disp: 90 tablet, Rfl: 3    aspirin-acetaminophen-caffeine 250-250-65 mg (HEADACHE  RELIEF, ASA-ACET-CAF,) 250-250-65 mg per tablet, Take 1 tablet by mouth every 6 to 8 hours as needed., Disp: , Rfl:     conjugated estrogens (PREMARIN) vaginal cream, Place 0.5 g vaginally twice a week., Disp: 30 g, Rfl: 5    DULoxetine (CYMBALTA) 20 MG capsule, Take 1 capsule (20 mg total) by mouth once daily., Disp: 90 capsule, Rfl: 1    fluconazole (DIFLUCAN) 150 MG Tab, Take 1 tablet (150 mg total) by mouth Every 3 (three) days. for 2 doses, Disp: 2 tablet, Rfl: 0    hydrALAZINE (APRESOLINE) 10 MG tablet, Take 1 tablet (10 mg total) by mouth 3 (three) times daily., Disp: 270 tablet, Rfl: 1    nystatin (MYCOSTATIN) cream, Apply topically 2 (two) times daily., Disp: 30 g, Rfl: 1    nystatin (MYCOSTATIN) powder, Apply topically 4 (four) times daily., Disp: 30 g, Rfl: 1

## 2025-03-12 LAB
T4 SERPL-MCNC: 9.1 UG/DL (ref 4.5–11.5)
TSH SERPL DL<=0.005 MIU/L-ACNC: 1.78 UIU/ML (ref 0.4–4)

## 2025-03-13 ENCOUNTER — RESULTS FOLLOW-UP (OUTPATIENT)
Dept: FAMILY MEDICINE | Facility: CLINIC | Age: 70
End: 2025-03-13
Payer: MEDICARE

## 2025-03-13 DIAGNOSIS — E66.811 CLASS 1 OBESITY DUE TO EXCESS CALORIES WITH BODY MASS INDEX (BMI) OF 33.0 TO 33.9 IN ADULT, UNSPECIFIED WHETHER SERIOUS COMORBIDITY PRESENT: Primary | ICD-10-CM

## 2025-03-13 DIAGNOSIS — F33.42 RECURRENT MAJOR DEPRESSIVE DISORDER, IN FULL REMISSION: ICD-10-CM

## 2025-03-13 DIAGNOSIS — E66.09 CLASS 1 OBESITY DUE TO EXCESS CALORIES WITH BODY MASS INDEX (BMI) OF 33.0 TO 33.9 IN ADULT, UNSPECIFIED WHETHER SERIOUS COMORBIDITY PRESENT: Primary | ICD-10-CM

## 2025-03-13 RX ORDER — SEMAGLUTIDE 0.25 MG/.5ML
0.25 INJECTION, SOLUTION SUBCUTANEOUS WEEKLY
Qty: 2 ML | Refills: 0 | Status: SHIPPED | OUTPATIENT
Start: 2025-03-13 | End: 2025-04-12

## 2025-03-14 RX ORDER — BUPROPION HYDROCHLORIDE 100 MG/1
100 TABLET ORAL 3 TIMES DAILY
Qty: 270 TABLET | Refills: 3 | Status: SHIPPED | OUTPATIENT
Start: 2025-03-14

## 2025-03-19 DIAGNOSIS — J44.9 CHRONIC OBSTRUCTIVE PULMONARY DISEASE, UNSPECIFIED COPD TYPE: ICD-10-CM

## 2025-03-19 RX ORDER — MONTELUKAST SODIUM 10 MG/1
10 TABLET ORAL DAILY
Qty: 90 TABLET | Refills: 3 | Status: SHIPPED | OUTPATIENT
Start: 2025-03-19

## 2025-03-19 NOTE — TELEPHONE ENCOUNTER
LOV: 3/11/25 Robbi    NOV: 4/1/25 Ming      Preffered Pharmacy:  [facility]  [place], [place] - [address]

## 2025-03-20 ENCOUNTER — OFFICE VISIT (OUTPATIENT)
Dept: PULMONOLOGY | Facility: CLINIC | Age: 70
End: 2025-03-20
Payer: MEDICARE

## 2025-03-20 VITALS
DIASTOLIC BLOOD PRESSURE: 67 MMHG | OXYGEN SATURATION: 97 % | SYSTOLIC BLOOD PRESSURE: 109 MMHG | BODY MASS INDEX: 33.42 KG/M2 | HEART RATE: 63 BPM | HEIGHT: 64 IN | WEIGHT: 195.75 LBS

## 2025-03-20 DIAGNOSIS — R91.1 SOLITARY PULMONARY NODULE: ICD-10-CM

## 2025-03-20 DIAGNOSIS — J45.50 SEVERE PERSISTENT ASTHMA, UNCOMPLICATED: Primary | ICD-10-CM

## 2025-03-20 DIAGNOSIS — J82.83 EOSINOPHILIC ASTHMA: ICD-10-CM

## 2025-03-20 DIAGNOSIS — J44.89 ASTHMA-COPD OVERLAP SYNDROME: ICD-10-CM

## 2025-03-20 PROCEDURE — 99999 PR PBB SHADOW E&M-EST. PATIENT-LVL III: CPT | Mod: PBBFAC,,, | Performed by: INTERNAL MEDICINE

## 2025-03-20 PROCEDURE — 99213 OFFICE O/P EST LOW 20 MIN: CPT | Mod: PBBFAC,PO | Performed by: INTERNAL MEDICINE

## 2025-03-20 PROCEDURE — 99213 OFFICE O/P EST LOW 20 MIN: CPT | Mod: S$PBB,,, | Performed by: INTERNAL MEDICINE

## 2025-03-20 RX ORDER — CELECOXIB 200 MG/1
200 CAPSULE ORAL
COMMUNITY
Start: 2025-03-17

## 2025-03-20 NOTE — PATIENT INSTRUCTIONS
Your breathing has improved greatly -  you have needed prednisone twice since last visit.  Your eosinophils are high  Use albuterol as needed  Continue trelegy--   Use prednisone as needed    You have been steroid dependant despite using trelegy once daily.  Would recommend dupixent --loading dose giverntoday lot [id number] exp 7/31/2026 given by staff-- will order dupixent every 2 wks.    Dupixent may work withing 3- 6 months.  Sometimes with 1st shot....    Nodule in lung needs follow up ct in December.      Sleep apnea discussed--   you may have-- consider testing -- may get Zepbound for weight?    Will order 3 month follow up -- if all going better would reeval in December with ct chest..

## 2025-03-20 NOTE — PROGRESS NOTES
Patient is here for uploading Dupixent injection 600 mg/4 mL.  2 patient identifiers used (Name and ). 1-300 mg/ 2 mL SQ injection given into the right abdomen. 1-300 mg/ 2 mL SQ injection given into the left abdomen. No redness, swelling, bleeding, or reaction noted to injection site.  Pt tolerated well.    NDC: 5925-6321-17  LOT#: [id number]  Expiration: 2026

## 2025-03-20 NOTE — PROGRESS NOTES
3/20/2025    Laxmi Chand  New Patient Consult    Chief Complaint   Patient presents with    Follow-up    COPD       HPI:  3/20/2025 pt breathing much better but needed prednisone twice since December visit despite using trelegy daily.     Uses albuterol daily. Not having nocturnal arousal but sob when arouses from sleep.    12/19/2024 off smokes, cough cleared, the patient still has a take steroids frequently.  She feels like she is doing significantly better.  Patient Instructions   Ct chest viewed-- ground glass nodule and small solid nodules seen.  Recheck ct in a yr needed..      Breathing test and walk test noted -- moderate copd -- no oxygen needed    You seem to be steroid dependant-- may consider special shots?   With no smokes you may not need shots....      Use zofran as you have for chronic nausea -- onset after gallbladder surgery.    Need to get flu vaccine -- lots of flu-- use tamiflu as discussed if flu or exposures    Recheck 3 months..    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  She barely can walk.  Uses advair 2 bid but no spiriva.  Uses neb rx 4/d    Pt has occ wheezes, no noc worsening.  No asthma no childhood ashtma..    Cutting back nicotene using vaping -- better than was smoking last yr..      Pt uses steroids for lungs with good benefit.  Uses steroids 3-4 times yearly.    Pt used doxy, levaquin, azithro-- failed recently degree or mucus.  She still has yellow-green mucus despite antibiotics to 3 times.    CT scan of the chest done 2021 does show calcified granulomas, bronchiectasis, and minimal upper lung emphysema.     The patient has had MRSA in wound before-left arm abscess in 2021..    Echocardiogram done October 2024-Summary  Show Result Comparison     Left Ventricle: The left ventricle is normal in size. Mildly increased wall thickness. There is mild  concentric hypertrophy. Normal wall motion. There is normal systolic function with a visually estimated ejection fraction of 60 - 65%. There is normal diastolic function.    Right Ventricle: Normal right ventricular cavity size. Wall thickness is normal. Systolic function is normal.    Tricuspid Valve: There is mild regurgitation with a centrally directed jet.    Pulmonary Artery: There is mild pulmonary hypertension. The estimated pulmonary artery systolic pressure is 41 mmHg.    IVC/SVC: Normal venous pressure at 3 mmHg.  Patient Instructions   Need regular culture---- check afb cultures later.....  suspect resistant germs    Ct chest with bronchiectasis in 2021 - had calcified granuloma and ground glass nodule and emphysema.    Trial trelegy over advair ---- breztri not covered (traditional inhaler)    Use prednisone -- one daily for bad breathing for 5 days - -take now, repeat as needed.    Use albuterol inhaler or nebulizer up to every 4 hours as needed......    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    Follow up ct chest and breathing test needed  Check oxygen walking       PFSH:    Past Medical History:   Diagnosis Date    Breast cancer in female 09/07/2022    IDC with high grade DCIS    COPD (chronic obstructive pulmonary disease)     Degeneration of lumbar intervertebral disc     GERD (gastroesophageal reflux disease)     HTN (hypertension)     Hyperlipemia, mixed          Past Surgical History:   Procedure Laterality Date    BIOPSY OF AXILLARY NODE Right 9/7/2022    Procedure: BIOPSY, LYMPH NODE, AXILLARY;  Surgeon: Jatin Gutierrez MD;  Location: [facility] OR;  Service: General;  Laterality: Right;    BREAST BIOPSY Right 08/05/2022    BREAST MASS EXCISION Right 9/7/2022    Procedure: EXCISION, MASS, BREAST;  Surgeon: Jatin Gutierrez MD;  Location: [facility] OR;  Service: General;  Laterality: Right;  wire localized  "partial mastectomy right breast with margins     CARPAL TUNNEL RELEASE  1985    CHOLECYSTECTOMY  1978    CORRECTION OF HAMMER TOE Right 9/15/2023    Procedure: CORRECTION, HAMMER TOE;  Surgeon: Devyn Mccullough DPM;  Location: [facility] OR;  Service: Podiatry;  Laterality: Right;    FOOT HARDWARE REMOVAL Right 9/15/2023    Procedure: REMOVAL, HARDWARE, FOOT;  Surgeon: Devyn Mccullough DPM;  Location: [facility] OR;  Service: Podiatry;  Laterality: Right;    HYSTERECTOMY      KNEE SURGERY Left 06/01/2020    LUMBAR DISC SURGERY  01/01/1990    plate and roland    LUMBAR FUSION  2011    MIDFOOT ARTHRODESIS Right 9/15/2023    Procedure: FUSION, JOINT, MIDFOOT;  Surgeon: Devyn Mccullough DPM;  Location: [facility] OR;  Service: Podiatry;  Laterality: Right;  Amberg 28 1st MPJ fusion plates screws bone grafts as well as cut guide.    TOTAL KNEE REPLACEMENT USING COMPUTER NAVIGATION Left 02/15/2021     Social History     Tobacco Use    Smoking status: Every Day     Types: Vaping with nicotine     Passive exposure: Never    Smokeless tobacco: Never   Substance Use Topics    Alcohol use: Yes    Drug use: Not Currently     Family History   Problem Relation Name Age of Onset    Hypertension Mother      Diabetes Mother      Hypertension Father      Lung cancer Maternal Grandfather      Breast cancer Neg Hx       Review of patient's allergies indicates:  No Known Allergies       Review of Systems:  a review of eleven systems covering constitutional, Eye, HEENT, Psych, Respiratory, Cardiac, GI, , Musculoskeletal, Endocrine, Dermatologic was negative except for pertinent findings as listed ABOVE and below:  pertinent positives as above, rest good        Exam:Comprehensive exam done. /67 (BP Location: Left forearm, Patient Position: Sitting)   Pulse 63   Ht 5' 4" (1.626 m)   Wt 88.8 kg (195 lb 12.3 oz)   SpO2 97% Comment: on room air at rest  BMI 33.60 kg/m²   Exam included Vitals as listed, and patient's appearance and affect " "and alertness and mood, oral exam for yeast and hygiene and pharynx lesions and Mallapatti (M) score, neck with inspection for jvd and masses and thyroid abnormalities and lymph nodes (supraclavicular and infraclavicular nodes and axillary also examined and noted if abn), chest exam included symmetry and effort and fremitus and percussion and auscultation, cardiac exam included rhythm and gallops and murmur and rubs and jvd and edema, abdominal exam for mass and hepatosplenomegaly and tenderness and hernias and bowel sounds, Musculoskeletal exam with muscle tone and posture and mobility/gait and  strength, and skin for rashes and cyanosis and pallor and turgor, extremity for clubbing.  Findings were normal except for pertinent findings listed below:  M2, good breath sounds.  No edema           Radiographs (ct chest and cxr) reviewed: view by direct vision      Labs reviewed           PFT    Spirometry with a bronchodilator, lung volume by body box, and diffusion capacity were measured December 19, 2024.  Airflow obstruction is measured with an FEV1 of 1.01 L or 45% of predicted. There is moderately severe airflow  obstruction measured. The FEV1 improves by 6% with 12% needed for statistical significance.  Lung volumes by body box show total lung capacity to be elevated at 118% of predicted with a residual volume of 182%  of predicted. There is air trapping. Diffusion is low at 54% of predicted.  The patient has moderately severe obstruction with air trapping and loss of diffusion. The bronchodilator is not statistically  significant. Clinical correlation recommended.    Plan:  Clinical impression is apparently straight forward and impression with management as below.    Laxmi Yu" was seen today for follow-up and copd.    Diagnoses and all orders for this visit:    Severe persistent asthma, uncomplicated  -     dupilumab 300 mg/2 mL PnIj; Inject 2 mLs (300 mg total) into the skin every 14 (fourteen) " days.    Eosinophilic asthma  -     dupilumab 300 mg/2 mL PnIj; Inject 2 mLs (300 mg total) into the skin every 14 (fourteen) days.    Asthma-COPD overlap syndrome    Solitary pulmonary nodule  -     CT Chest Without Contrast; Future            No follow-ups on file.    Discussed with patient above for education the following:      Patient Instructions   Your breathing has improved greatly -  you have needed prednisone twice since last visit.  Your eosinophils are high  Use albuterol as needed  Continue trelegy--   Use prednisone as needed    You have been steroid dependant despite using trelegy once daily.  Would recommend dupixent --loading dose giverntoday lot [id number] exp 7/31/2026 given by staff-- will order dupixent every 2 wks.    Dupixent may work withing 3- 6 months.  Sometimes with 1st shot....    Nodule in lung needs follow up ct in December.      Sleep apnea discussed--   you may have-- consider testing -- may get Zepbound for weight?    Will order 3 month follow up -- if all going better would reeval in December with ct chest..

## 2025-03-25 ENCOUNTER — TELEPHONE (OUTPATIENT)
Dept: PULMONOLOGY | Facility: CLINIC | Age: 70
End: 2025-03-25
Payer: MEDICARE

## 2025-03-25 NOTE — TELEPHONE ENCOUNTER
Returned pt call about Dupixent PA, pt explained she was approved and will have med shipped soon. Explained to pt if she has any questions or needs to come to clinic for the injection just call the clinic. Pt v/u.

## 2025-03-25 NOTE — TELEPHONE ENCOUNTER
----- Message from Efizity sent at 3/25/2025  8:27 AM CDT -----  Type:  Needs Medical AdviceWho Called: the patientSymptoms (please be specific):How long has patient had these symptoms:  Pharmacy name and phone #:  Would the patient rather a call back or a response via [facility]? call back/[facility] Call Back Number: [phone] Additional Information: Speak to staff in reference to PA for DupixentPlease adviseThanks

## 2025-03-31 ENCOUNTER — OFFICE VISIT (OUTPATIENT)
Dept: HEMATOLOGY/ONCOLOGY | Facility: CLINIC | Age: 70
End: 2025-03-31
Payer: MEDICARE

## 2025-03-31 ENCOUNTER — LAB VISIT (OUTPATIENT)
Dept: LAB | Facility: HOSPITAL | Age: 70
End: 2025-03-31
Attending: NURSE PRACTITIONER
Payer: MEDICARE

## 2025-03-31 VITALS
HEART RATE: 77 BPM | BODY MASS INDEX: 34.74 KG/M2 | OXYGEN SATURATION: 95 % | WEIGHT: 202.38 LBS | DIASTOLIC BLOOD PRESSURE: 71 MMHG | SYSTOLIC BLOOD PRESSURE: 150 MMHG | RESPIRATION RATE: 18 BRPM

## 2025-03-31 DIAGNOSIS — C50.011 MALIGNANT NEOPLASM OF NIPPLE OF RIGHT BREAST IN FEMALE, UNSPECIFIED ESTROGEN RECEPTOR STATUS: ICD-10-CM

## 2025-03-31 LAB
ABSOLUTE EOSINOPHIL (OHS): 0.21 K/UL
ABSOLUTE MONOCYTE (OHS): 0.63 K/UL (ref 0.3–1)
ABSOLUTE NEUTROPHIL COUNT (OHS): 4.17 K/UL (ref 1.8–7.7)
ALBUMIN SERPL BCP-MCNC: 3.5 G/DL (ref 3.5–5.2)
ALP SERPL-CCNC: 51 UNIT/L (ref 40–150)
ALT SERPL W/O P-5'-P-CCNC: 10 UNIT/L (ref 10–44)
ANION GAP (OHS): 8 MMOL/L (ref 8–16)
AST SERPL-CCNC: 13 UNIT/L (ref 11–45)
BASOPHILS # BLD AUTO: 0.04 K/UL
BASOPHILS NFR BLD AUTO: 0.6 %
BILIRUB SERPL-MCNC: 0.5 MG/DL (ref 0.1–1)
BUN SERPL-MCNC: 24 MG/DL (ref 8–23)
CALCIUM SERPL-MCNC: 9.3 MG/DL (ref 8.7–10.5)
CHLORIDE SERPL-SCNC: 104 MMOL/L (ref 95–110)
CO2 SERPL-SCNC: 27 MMOL/L (ref 23–29)
CREAT SERPL-MCNC: 1.4 MG/DL (ref 0.5–1.4)
ERYTHROCYTE [DISTWIDTH] IN BLOOD BY AUTOMATED COUNT: 13.2 % (ref 11.5–14.5)
GFR SERPLBLD CREATININE-BSD FMLA CKD-EPI: 41 ML/MIN/1.73/M2
GLUCOSE SERPL-MCNC: 97 MG/DL (ref 70–110)
HCT VFR BLD AUTO: 35.5 % (ref 37–48.5)
HGB BLD-MCNC: 11.1 GM/DL (ref 12–16)
IMM GRANULOCYTES # BLD AUTO: 0.05 K/UL (ref 0–0.04)
IMM GRANULOCYTES NFR BLD AUTO: 0.7 % (ref 0–0.5)
LYMPHOCYTES # BLD AUTO: 1.67 K/UL (ref 1–4.8)
MCH RBC QN AUTO: 28.5 PG (ref 27–31)
MCHC RBC AUTO-ENTMCNC: 31.3 G/DL (ref 32–36)
MCV RBC AUTO: 91 FL (ref 82–98)
NUCLEATED RBC (/100WBC) (OHS): 0 /100 WBC
PLATELET # BLD AUTO: 290 K/UL (ref 150–450)
PMV BLD AUTO: 8.8 FL (ref 9.2–12.9)
POTASSIUM SERPL-SCNC: 4.1 MMOL/L (ref 3.5–5.1)
PROT SERPL-MCNC: 6.2 GM/DL (ref 6–8.4)
RBC # BLD AUTO: 3.89 M/UL (ref 4–5.4)
RELATIVE EOSINOPHIL (OHS): 3.1 %
RELATIVE LYMPHOCYTE (OHS): 24.7 % (ref 18–48)
RELATIVE MONOCYTE (OHS): 9.3 % (ref 4–15)
RELATIVE NEUTROPHIL (OHS): 61.6 % (ref 38–73)
SODIUM SERPL-SCNC: 139 MMOL/L (ref 136–145)
WBC # BLD AUTO: 6.77 K/UL (ref 3.9–12.7)

## 2025-03-31 PROCEDURE — 85025 COMPLETE CBC W/AUTO DIFF WBC: CPT

## 2025-03-31 PROCEDURE — 80053 COMPREHEN METABOLIC PANEL: CPT

## 2025-03-31 PROCEDURE — 36415 COLL VENOUS BLD VENIPUNCTURE: CPT

## 2025-03-31 PROCEDURE — 99214 OFFICE O/P EST MOD 30 MIN: CPT | Mod: PBBFAC | Performed by: NURSE PRACTITIONER

## 2025-03-31 PROCEDURE — 99999 PR PBB SHADOW E&M-EST. PATIENT-LVL IV: CPT | Mod: PBBFAC,,, | Performed by: NURSE PRACTITIONER

## 2025-03-31 NOTE — PROGRESS NOTES
[facility] [place]   OFFICE VISIT NOTE    Patient Name: Laxmi Chand  YOB: 1955    PRESENTING HISTORY     History of Present Illness:  Ms. Laxmi Chand is a 69 y.o. female     Medical conditions:  Lumbar disc degeneration, chronic pain syndrome, depression, COPD, asthma, HLD, HTN, history of breast cancer, history of endometrial cancer    Meds:  Albuterol, anastrozole, aspirin, wellbutrin, calcium, celebrex, flexeril, doxepin 50 mg nightly, dupilumab (by pulm), trelegy, flonase, HCTZ, hydralazine, metoprolol, montelukast, pantoprazole, semaglutide, valsartan     Patient follows with Ms. Magdiel for history of breast cancer   Also follow up with rad onc     CKD: should not be using celebrex     History of Present Illness    CHIEF COMPLAINT:  Patient presents today for follow up    CANCER HISTORY:  She has history of breast cancer treated with right-sided lumpectomy and lymph node removal followed by radiation therapy without chemotherapy. She continues anastrozole and follows with oncology as well as rad oncology. She also has history of pre-cancerous endometrial condition treated with hysterectomy at age 23.    MUSCULOSKELETAL:  She has severe arthritis, particularly affecting the back.    MEDICATIONS:  She takes doxepin 50 mg nightly for sleep, Celebrex 200 mg daily for pain, Flexeril 10 mg nightly, valsartan nightly for blood pressure, and Percocet for back pain management.    SOCIAL HISTORY:  She reports drinking one fountain Coke daily, which she sips throughout the day.       Review of Systems   Constitutional:  Negative for chills and fever.   Respiratory:  Positive for cough.    Cardiovascular:  Negative for chest pain.   Gastrointestinal:  Negative for blood in stool, constipation, nausea and vomiting.   Genitourinary:  Negative for hematuria.   Musculoskeletal:  Positive for back pain and gait problem.   Psychiatric/Behavioral:  Positive for sleep disturbance.      "      OBJECTIVE:   Vital Signs:  Vitals:    04/01/25 1127   BP: 122/60   Pulse: 68   SpO2: 98%   Weight: 91.4 kg (201 lb 8 oz)   Height: 5' 4" (1.626 m)       Physical Exam  Constitutional:       General: She is not in acute distress.     Appearance: She is not ill-appearing or toxic-appearing.   HENT:      Head: Normocephalic and atraumatic.      Mouth/Throat:      Mouth: Mucous membranes are moist.      Pharynx: Uvula midline. No pharyngeal swelling.   Cardiovascular:      Rate and Rhythm: Normal rate and regular rhythm.   Pulmonary:      Effort: Pulmonary effort is normal. No tachypnea, bradypnea, accessory muscle usage, prolonged expiration or respiratory distress.      Breath sounds: Normal breath sounds. No stridor. No wheezing, rhonchi or rales.   Abdominal:      General: Bowel sounds are normal. There is no distension.      Palpations: Abdomen is soft.      Tenderness: There is no abdominal tenderness. There is no guarding or rebound.   Musculoskeletal:      Comments: Ambulates with a rollator    Neurological:      General: No focal deficit present.      Mental Status: She is alert.   Psychiatric:         Mood and Affect: Mood normal.         Behavior: Behavior normal.         ASSESSMENT & PLAN:     Chronic obstructive pulmonary disease, unspecified COPD type  - Stable  - Continue with albuterol, dupilumab, trelegy  - Continue to follow up with pulmonology     Hypertension, unspecified type  - Continued prescribing Valsartan for blood pressure management, to be taken at night.  - Advised against using phentermine due to blood pressure concerns.  - Instructed the patient to continue monitoring blood pressure regularly.    Recurrent major depressive disorder, in full remission  - Stable  - Continue with wellbutrin     Hyperlipidemia, unspecified hyperlipidemia type  - Stable  - Continue to monitor  - Rosuvastatin 20 mg daily     Atherosclerosis of aorta  - Stable  - Continue to monitor  - Rosuvastatin 20 mg " daily     CKD stage 3b, GFR 30-44 ml/min  - Evaluated pain management options, considering CKD and need to avoid NSAIDs.  - Discontinued Celebrex 200 mg daily.  - Recommend switching to Tylenol for pain management to protect kidney function.  - Advised avoiding nephrotoxic medications.    Insomnia, unspecified type  -     doxepin (SILENOR) 6 mg Tab; Take 1 each by mouth every evening.  Dispense: 30 tablet; Refill: 1  - Decreased Doxepin from 50 mg to 25 mg at night, with instructions for further tapering -- 10 mg nightly PRN for week 2 and then down to 6 mg nightly PRN starting week 3   - Instructed the patient to monitor sleep quality with the new medication regimen.    Encounter for screening for malignant neoplasm of colon  -     Case Request Endoscopy: COLONOSCOPY    Polyp of colon, unspecified part of colon, unspecified type  -     Case Request Endoscopy: COLONOSCOPY    Degeneration of intervertebral disc of lumbar region, unspecified whether pain present  -     acetaminophen (TYLENOL) 500 MG tablet; Take 2 tablets (1,000 mg total) by mouth daily as needed for Pain.  Dispense: 30 tablet; Refill: 1  - Prescribed Tylenol (acetaminophen) for pain management, up to 2-3 tablets daily.  - Continued Flexeril 10 mg at night for muscle relaxation.  - Placed a pending order for bath chair, awaiting bathtub measurements.  - Recommend using Percocet for severe pain days only. Managed by pain management     History of breast cancer  - Noted the patient's history of breast cancer and ongoing follow-up with oncology providers.  - Reviewed recent oncology follow-up results, which were reported as good.  - Continued anastrozole as part of ongoing treatment.  - Advised the patient to maintain regular follow-ups with oncology, with the next appointment scheduled in 6 months.  - Noted the patient's history of right-side lumpectomy and lymph node removal for breast cancer treatment.    WEIGHT MANAGEMENT:  - Discussed weight  management strategies, noting limitations of medication options due to blood pressure issues.  - Discussed weight management strategies, noting limitations of medication options due to financial constraints.  - Discussed relationship between prednisone use and weight gain.  - Educated on temporary nature of medical weight loss options and importance of dietary changes for sustainable weight loss.  - Reduce intake of simple sugars, carbohydrates (rice, pasta, noodles, bread).  - Cut down on fountain soda consumption.  - Monitor portion sizes and overall food intake.  - Increase physical activity as tolerated.        Follow up with PA in 4 months and with me in 8 months   Sooner appointment if indicated/needed       Beata Lai MD  Family Medicine  [facility] [place]     This note was created using [employer] voice recognition software that occasionally misinterprets phrases or words

## 2025-03-31 NOTE — PROGRESS NOTES
Service Date:  3/31/25    Chief Complaint: No chief complaint on file.    Laxmi Chand is a 69 y.o. female with right breast invasive ductal carcinoma only 2 mm in size and right breast DCIS.  S/p lumpectomy on 9/7/22.  Currently on aromatase inhibitor therapy.      Doing well.  No complaints me.    9/30/2024:  She returns today follow-up and is without complaint    3/31/2025:  She returns today follow-up.  She continues to tolerate Arimidex well    Review of Systems   Constitutional:  Positive for fatigue. Negative for appetite change and unexpected weight change.   HENT: Negative.  Negative for mouth sores.    Eyes: Negative.  Negative for visual disturbance.   Respiratory: Negative.  Negative for cough and shortness of breath.    Cardiovascular: Negative.  Negative for chest pain.   Gastrointestinal: Negative.  Negative for abdominal pain and diarrhea.   Endocrine: Negative.    Genitourinary: Negative.  Negative for frequency.   Musculoskeletal: Negative.    Integumentary:  Negative for rash. Negative.   Neurological: Negative.    Hematological: Negative.  Negative for adenopathy.   Psychiatric/Behavioral: Negative.  The patient is not nervous/anxious.         Current Outpatient Medications   Medication Instructions    albuterol (PROVENTIL/VENTOLIN HFA) 90 mcg/actuation inhaler 2 puffs, Inhalation, Every 4 hours PRN, INHALE 2 PUFFS BY MOUTH EVERY 4 HOURS AS NEEDED FOR WHEEZING OR SHORTNESS OF BREATH    albuterol-ipratropium (DUO-NEB) 2.5 mg-0.5 mg/3 mL nebulizer solution 3 mLs, Nebulization, Every 4 hours PRN, Rescue    anastrozole (ARIMIDEX) 1 mg, Oral, Daily    aspirin (ECOTRIN) 81 MG EC tablet 1 tablet, Daily    buPROPion (WELLBUTRIN) 100 mg, Oral, 3 times daily    calcium carbonate-vitamin D3 (CALCIUM 600 + D,3,) 600-125 mg-unit Tab 2 tablets, Oral, Daily    celecoxib (CELEBREX) 200 mg    cetirizine (ZYRTEC) 10 mg, Oral, Daily    conjugated estrogens (PREMARIN) 0.5 g, Vaginal, Twice weekly     cyclobenzaprine (FLEXERIL) 10 mg, Oral, Nightly    doxepin (SINEQUAN) 50 mg, Oral, Nightly    DUPIXENT  mg, Subcutaneous, Every 14 days    fluticasone propionate (FLONASE) 50 mcg, Each Nostril, Daily    fluticasone-umeclidin-vilanter (TRELEGY ELLIPTA) 200-62.5-25 mcg inhaler 1 puff, Inhalation, Daily    guaiFENesin (MUCINEX) 1,200 mg, Oral, 2 times daily    hydrALAZINE (APRESOLINE) 10 mg, Oral, 3 times daily    hydroCHLOROthiazide (HYDRODIURIL) 25 mg, Oral, Daily    levoFLOXacin (LEVAQUIN) 500 mg, Oral, Daily    LUMIGAN 0.01 % Drop Place into both eyes.    methylPREDNISolone (MEDROL DOSEPACK) 4 mg tablet use as directed    metoprolol succinate (TOPROL-XL) 200 mg, Oral, Daily    metroNIDAZOLE (METROGEL) 0.75 % (37.5mg/5 gram) vaginal gel 1 applicator, Vaginal, Daily    montelukast (SINGULAIR) 10 mg, Oral, Daily    nystatin (MYCOSTATIN) cream Topical (Top), 2 times daily    nystatin (MYCOSTATIN) powder Topical (Top), 4 times daily    ondansetron (ZOFRAN-ODT) 8 mg, Oral, Every 12 hours PRN    oxyCODONE-acetaminophen (PERCOCET)  mg per tablet 1 tablet    pantoprazole (PROTONIX) 40 mg, Oral, Daily    phenazopyridine (PYRIDIUM) 200 mg, Every 6 hours PRN    predniSONE (DELTASONE) 20 MG tablet Take one daily for 5 days and may repeat for shortness of breath.    pseudoephedrine (SUDAFED) 120 mg, Every 12 hours (non-standard times)    valsartan (DIOVAN) 160 mg, Oral, Daily    WEGOVY 0.25 mg, Subcutaneous, Weekly        Past Medical History:   Diagnosis Date    Breast cancer in female 09/07/2022    IDC with high grade DCIS    COPD (chronic obstructive pulmonary disease)     Degeneration of lumbar intervertebral disc     GERD (gastroesophageal reflux disease)     HTN (hypertension)     Hyperlipemia, mixed         Past Surgical History:   Procedure Laterality Date    BIOPSY OF AXILLARY NODE Right 9/7/2022    Procedure: BIOPSY, LYMPH NODE, AXILLARY;  Surgeon: Jatin Gutierrez MD;  Location: [facility];  Service:  General;  Laterality: Right;    BREAST BIOPSY Right 08/05/2022    BREAST MASS EXCISION Right 9/7/2022    Procedure: EXCISION, MASS, BREAST;  Surgeon: Jatin Gutierrez MD;  Location: [facility] OR;  Service: General;  Laterality: Right;  wire localized partial mastectomy right breast with margins     CARPAL TUNNEL RELEASE  1985    CHOLECYSTECTOMY  1978    CORRECTION OF HAMMER TOE Right 9/15/2023    Procedure: CORRECTION, HAMMER TOE;  Surgeon: Devyn Mccullough DPM;  Location: [facility] OR;  Service: Podiatry;  Laterality: Right;    FOOT HARDWARE REMOVAL Right 9/15/2023    Procedure: REMOVAL, HARDWARE, FOOT;  Surgeon: Devyn Mccullough DPM;  Location: [facility] OR;  Service: Podiatry;  Laterality: Right;    HYSTERECTOMY      KNEE SURGERY Left 06/01/2020    LUMBAR DISC SURGERY  01/01/1990    plate and roland    LUMBAR FUSION  2011    MIDFOOT ARTHRODESIS Right 9/15/2023    Procedure: FUSION, JOINT, MIDFOOT;  Surgeon: Devyn Mccullough DPM;  Location: [facility] OR;  Service: Podiatry;  Laterality: Right;  [place] 28 1st MPJ fusion plates screws bone grafts as well as cut guide.    TOTAL KNEE REPLACEMENT USING COMPUTER NAVIGATION Left 02/15/2021        Family History   Problem Relation Name Age of Onset    Hypertension Mother      Diabetes Mother      Hypertension Father      Lung cancer Maternal Grandfather      Breast cancer Neg Hx         Social History     Tobacco Use    Smoking status: Every Day     Types: Vaping with nicotine     Passive exposure: Never    Smokeless tobacco: Never   Substance Use Topics    Alcohol use: Yes    Drug use: Not Currently         There were no vitals filed for this visit.       Physical Exam:  There were no vitals taken for this visit.    Physical Exam  Constitutional:       Appearance: Normal appearance.   HENT:      Head: Normocephalic and atraumatic.      Nose: Nose normal.      Mouth/Throat:      Mouth: Mucous membranes are moist.      Pharynx: Oropharynx is clear.   Eyes:       Conjunctiva/sclera: Conjunctivae normal.   Cardiovascular:      Rate and Rhythm: Normal rate and regular rhythm.      Heart sounds: Normal heart sounds.   Pulmonary:      Effort: Pulmonary effort is normal.      Breath sounds: Normal breath sounds.   Abdominal:      General: Abdomen is flat. Bowel sounds are normal.      Palpations: Abdomen is soft.   Musculoskeletal:         General: Normal range of motion.      Cervical back: Normal range of motion and neck supple.   Skin:     General: Skin is warm and dry.   Neurological:      General: No focal deficit present.      Mental Status: She is alert and oriented to person, place, and time. Mental status is at baseline.   Psychiatric:         Mood and Affect: Mood normal.          Labs:  Lab Results   Component Value Date    WBC 6.77 03/31/2025    RBC 3.89 (L) 03/31/2025    HGB 11.1 (L) 03/31/2025    HCT 35.5 (L) 03/31/2025    MCV 91 03/31/2025    MCH 28.5 03/31/2025    MCHC 31.3 (L) 03/31/2025    RDW 13.2 03/31/2025     03/31/2025    MPV 8.8 (L) 03/31/2025    GRAN 5.1 03/11/2025    GRAN 62.9 03/11/2025    LYMPH 24.7 03/31/2025    LYMPH 1.67 03/31/2025    MONO 9.3 03/31/2025    MONO 0.63 03/31/2025    EOS 3.1 03/31/2025    EOS 0.21 03/31/2025    BASO 0.04 03/11/2025    EOSINOPHIL 2.9 03/11/2025    BASOPHIL 0.6 03/31/2025    BASOPHIL 0.04 03/31/2025     Sodium   Date Value Ref Range Status   03/31/2025 139 136 - 145 mmol/L Final   03/11/2025 137 136 - 145 mmol/L Final     Potassium   Date Value Ref Range Status   03/31/2025 4.1 3.5 - 5.1 mmol/L Final   03/11/2025 4.6 3.5 - 5.1 mmol/L Final     Chloride   Date Value Ref Range Status   03/31/2025 104 95 - 110 mmol/L Final   03/11/2025 102 95 - 110 mmol/L Final     CO2   Date Value Ref Range Status   03/31/2025 27 23 - 29 mmol/L Final   03/11/2025 26 23 - 29 mmol/L Final     Glucose   Date Value Ref Range Status   03/11/2025 81 70 - 110 mg/dL Final     BUN   Date Value Ref Range Status   03/31/2025 24 (H) 8 - 23  mg/dL Final     Creatinine   Date Value Ref Range Status   03/31/2025 1.4 0.5 - 1.4 mg/dL Final     Calcium   Date Value Ref Range Status   03/31/2025 9.3 8.7 - 10.5 mg/dL Final   03/11/2025 9.3 8.7 - 10.5 mg/dL Final     Total Protein   Date Value Ref Range Status   03/11/2025 6.8 6.0 - 8.4 g/dL Final     Albumin   Date Value Ref Range Status   03/31/2025 3.5 3.5 - 5.2 g/dL Final   03/11/2025 3.8 3.5 - 5.2 g/dL Final     Total Bilirubin   Date Value Ref Range Status   03/11/2025 0.3 0.1 - 1.0 mg/dL Final     Comment:     For infants and newborns, interpretation of results should be based  on gestational age, weight and in agreement with clinical  observations.    Premature Infant recommended reference ranges:  Up to 24 hours.............<8.0 mg/dL  Up to 48 hours............<12.0 mg/dL  3-5 days..................<15.0 mg/dL  6-29 days.................<15.0 mg/dL       Bilirubin Total   Date Value Ref Range Status   03/31/2025 0.5 0.1 - 1.0 mg/dL Final     Comment:     For infants and newborns, interpretation of results should be based   on gestational age, weight and in agreement with clinical   observations.    Premature Infant recommended reference ranges:   0-24 hours:  <8.0 mg/dL   24-48 hours: <12.0 mg/dL   3-5 days:    <15.0 mg/dL   6-29 days:   <15.0 mg/dL     Alkaline Phosphatase   Date Value Ref Range Status   03/11/2025 60 40 - 150 U/L Final     ALP   Date Value Ref Range Status   03/31/2025 51 40 - 150 unit/L Final     AST   Date Value Ref Range Status   03/31/2025 13 11 - 45 unit/L Final   03/11/2025 15 10 - 40 U/L Final     ALT   Date Value Ref Range Status   03/31/2025 10 10 - 44 unit/L Final   03/11/2025 19 10 - 44 U/L Final     Anion Gap   Date Value Ref Range Status   03/31/2025 8 8 - 16 mmol/L Final     eGFR if    Date Value Ref Range Status   05/10/2022 >60 >60 mL/min/1.73 m^2 Final     eGFR if non    Date Value Ref Range Status   05/10/2022 >60 >60 mL/min/1.73 m^2  Final     Comment:     Calculation used to obtain the estimated glomerular filtration  rate (eGFR) is the CKD-EPI equation.          A/P:    R breast DCIS  R breast IDCA [id number]  - DCIS was strongly hormone positive so patient is on aromatase inhibitor  -invasive component was only 2 mm, so even though it was HER2 positive, it was not treated with any adjuvant chemotherapy.    -completed radiation therapy to the breast  -on anastrozole currently, started 10/11/22  - mammogram scheduled for August.  -MRI showed no malignancies.  Most recent diagnostic mammogram looks good.  Recommend to repeat screening mammogram in 6 months  -continue Arimidex  -mammogram 02/2025 showed LAKESHA.  Recommended 1 year follow-up    Osteopenia/long-term current AI use  - DEXA done 9/2024  -continue Prolia every 6 month  -proceed with next dose of Prolia

## 2025-04-01 ENCOUNTER — OFFICE VISIT (OUTPATIENT)
Dept: FAMILY MEDICINE | Facility: CLINIC | Age: 70
End: 2025-04-01
Payer: MEDICARE

## 2025-04-01 ENCOUNTER — TELEPHONE (OUTPATIENT)
Dept: FAMILY MEDICINE | Facility: CLINIC | Age: 70
End: 2025-04-01

## 2025-04-01 ENCOUNTER — PATIENT MESSAGE (OUTPATIENT)
Dept: FAMILY MEDICINE | Facility: CLINIC | Age: 70
End: 2025-04-01

## 2025-04-01 VITALS
WEIGHT: 201.5 LBS | DIASTOLIC BLOOD PRESSURE: 60 MMHG | OXYGEN SATURATION: 98 % | HEIGHT: 64 IN | BODY MASS INDEX: 34.4 KG/M2 | HEART RATE: 68 BPM | SYSTOLIC BLOOD PRESSURE: 122 MMHG

## 2025-04-01 DIAGNOSIS — M51.369 DEGENERATION OF INTERVERTEBRAL DISC OF LUMBAR REGION, UNSPECIFIED WHETHER PAIN PRESENT: ICD-10-CM

## 2025-04-01 DIAGNOSIS — Z12.11 ENCOUNTER FOR SCREENING FOR MALIGNANT NEOPLASM OF COLON: ICD-10-CM

## 2025-04-01 DIAGNOSIS — E78.5 HYPERLIPIDEMIA, UNSPECIFIED HYPERLIPIDEMIA TYPE: ICD-10-CM

## 2025-04-01 DIAGNOSIS — F33.42 RECURRENT MAJOR DEPRESSIVE DISORDER, IN FULL REMISSION: ICD-10-CM

## 2025-04-01 DIAGNOSIS — Z85.3 HISTORY OF BREAST CANCER: ICD-10-CM

## 2025-04-01 DIAGNOSIS — I10 HYPERTENSION, UNSPECIFIED TYPE: ICD-10-CM

## 2025-04-01 DIAGNOSIS — I70.0 ATHEROSCLEROSIS OF AORTA: ICD-10-CM

## 2025-04-01 DIAGNOSIS — K63.5 POLYP OF COLON, UNSPECIFIED PART OF COLON, UNSPECIFIED TYPE: ICD-10-CM

## 2025-04-01 DIAGNOSIS — J44.9 CHRONIC OBSTRUCTIVE PULMONARY DISEASE, UNSPECIFIED COPD TYPE: Primary | ICD-10-CM

## 2025-04-01 DIAGNOSIS — N18.32 CKD STAGE 3B, GFR 30-44 ML/MIN: ICD-10-CM

## 2025-04-01 DIAGNOSIS — G47.00 INSOMNIA, UNSPECIFIED TYPE: ICD-10-CM

## 2025-04-01 PROCEDURE — 99215 OFFICE O/P EST HI 40 MIN: CPT | Mod: PBBFAC,PO | Performed by: STUDENT IN AN ORGANIZED HEALTH CARE EDUCATION/TRAINING PROGRAM

## 2025-04-01 PROCEDURE — 99999 PR PBB SHADOW E&M-EST. PATIENT-LVL V: CPT | Mod: PBBFAC,,, | Performed by: STUDENT IN AN ORGANIZED HEALTH CARE EDUCATION/TRAINING PROGRAM

## 2025-04-01 RX ORDER — DOXEPIN 6 MG/1
1 TABLET, FILM COATED ORAL NIGHTLY
Qty: 30 TABLET | Refills: 1 | Status: SHIPPED | OUTPATIENT
Start: 2025-04-01

## 2025-04-01 RX ORDER — ROSUVASTATIN CALCIUM 20 MG/1
20 TABLET, COATED ORAL DAILY
Qty: 90 TABLET | Refills: 3 | Status: SHIPPED | OUTPATIENT
Start: 2025-04-01 | End: 2026-04-01

## 2025-04-01 RX ORDER — ACETAMINOPHEN 500 MG
1000 TABLET ORAL DAILY PRN
Qty: 30 TABLET | Refills: 1 | COMMUNITY
Start: 2025-04-01

## 2025-04-01 RX ORDER — DOXEPIN HYDROCHLORIDE 10 MG/1
10 CAPSULE ORAL NIGHTLY
Qty: 7 CAPSULE | Refills: 0 | Status: SHIPPED | OUTPATIENT
Start: 2025-04-07 | End: 2025-04-14

## 2025-04-01 NOTE — TELEPHONE ENCOUNTER
Zoey Herrera Staff     ----- Message from Zoey sent at 4/1/2025  2:40 PM CDT -----  Contact: Pharmacy  Type:  Pharmacy Calling to Clarify an RX    Name of Caller:Pharmacy     [facility] [place] [place] [facility] [address] [facility] [address] Phone: [phone] Fax: [phone]    Prescription Name:doxepin (SILENOR) 6 mg Tab  What do they need to clarify?:  Patient receives doxepin (SILENOR)25 mg Tab from a different Physician. Is the office aware?  Best Call Back Number:see above   Additional Information: Please advise

## 2025-04-01 NOTE — PATIENT INSTRUCTIONS
Please try   - Week 1: take 1 tablet of doxepin 25 mg nightly for insomnia  - Week 2: take 1/2 tablet of doxepin 25 mg -- this will be 12.5 mg doxepin nightly for insomnia  - Week 3: take 1 tablet of Doxepin 6 mg nightly for insomnia (this has been sent to your pharmacy)    Please take  Tylenol for pain  Do NOT take NSAID (aleve, ibuprofen/advil, celebrex)

## 2025-04-01 NOTE — TELEPHONE ENCOUNTER
Umm (pharmacist) with [facility] is calling to confirm if Dr. Lai was aware patient received a prescription for Doxepin 25 mg from a different provider as pharmacy received a new prescription for Doxepin 6 mg today.  Upon further assessment it was noted patient was seen in office today,  new orders received as follows:      Instructions    Please try   - Week 1: take 1 tablet of doxepin 25 mg nightly for insomnia  - Week 2: take 1/2 tablet of doxepin 25 mg -- this will be 12.5 mg doxepin nightly for insomnia  - Week 3: take 1 tablet of Doxepin 6 mg nightly for insomnia (this has been sent to your pharmacy)     Please take  Tylenol for pain  Do NOT take NSAID (aleve, ibuprofen/advil, celebrex)         Mrs. Salomon states Doxepin 25 mg comes in capsule form only.  Patient would not be able to take 1/2 a capsule to equal 12.5 mg for week 2.  Mrs. Salomon states the only dosage available for Doxepin in tablet form is 6 mg.      Please advise. Thank you.

## 2025-04-07 DIAGNOSIS — J41.1 BRONCHITIS, CHRONIC, MUCOPURULENT: ICD-10-CM

## 2025-04-07 DIAGNOSIS — J47.9 BRONCHIECTASIS WITHOUT COMPLICATION: ICD-10-CM

## 2025-04-07 RX ORDER — LEVOFLOXACIN 500 MG/1
500 TABLET, FILM COATED ORAL DAILY
Qty: 10 TABLET | Refills: 2 | Status: SHIPPED | OUTPATIENT
Start: 2025-04-07

## 2025-04-08 ENCOUNTER — TELEPHONE (OUTPATIENT)
Dept: PULMONOLOGY | Facility: CLINIC | Age: 70
End: 2025-04-08
Payer: MEDICARE

## 2025-04-08 DIAGNOSIS — Z78.9 DRUG INTERACTION: Primary | ICD-10-CM

## 2025-04-08 NOTE — TELEPHONE ENCOUNTER
[facility] spoke to Umm about pt Levaquin RX having a drug interaction with Doxepin r/t QT intervals. Explained to Umm a message sent to provider for recommendation, Umm harris/sadaf

## 2025-04-08 NOTE — PROGRESS NOTES
April 8, 2025-the pharmacy called in h-concern regarding doxepin and Levaquin.  The doxepin dose is very low.  Her prior QT interval on EKG was very short at 426 October 2024.      Will go ahead monitor EKG in 2 days-April 10th.  She should start the Levaquin as soon as able.  If she can skip the doxepin-would recommend skipping doxepin.  Ask the pharmacy to go ahead and fill the Levaquin and have the patient start as soon as able.  Sputum culture would be highly recommended-cultures have been ordered

## 2025-04-09 NOTE — TELEPHONE ENCOUNTER
Reached out to pt to notify provider wants Levaquin filled and pt take as soon as able, EKG ordered for 04/10/25 to monitor QT intervals, pt collect Sputum sample, and recommends pt skip dose of Doxepin. Pt v/u and explained they are tapering the Doxepin down and she is taking a small dose. Sputum containers at  for pt to  at her convenience. Pt reports she will  Sputum cups 04/10/25 when she goes to hospital for Infusion and EKG. Reached out to [facility] to notify to fill. Pharmacist v/u.

## 2025-04-10 ENCOUNTER — INFUSION (OUTPATIENT)
Dept: INFUSION THERAPY | Facility: HOSPITAL | Age: 70
End: 2025-04-10
Attending: NURSE PRACTITIONER
Payer: MEDICARE

## 2025-04-10 VITALS
HEIGHT: 64 IN | BODY MASS INDEX: 33.63 KG/M2 | RESPIRATION RATE: 18 BRPM | TEMPERATURE: 98 F | HEART RATE: 65 BPM | OXYGEN SATURATION: 98 % | WEIGHT: 197 LBS | DIASTOLIC BLOOD PRESSURE: 75 MMHG | SYSTOLIC BLOOD PRESSURE: 148 MMHG

## 2025-04-10 DIAGNOSIS — C50.811 MALIGNANT NEOPLASM OF OVERLAPPING SITES OF RIGHT BREAST IN FEMALE, ESTROGEN RECEPTOR POSITIVE: ICD-10-CM

## 2025-04-10 DIAGNOSIS — Z79.811 LONG TERM (CURRENT) USE OF AROMATASE INHIBITORS: Primary | ICD-10-CM

## 2025-04-10 DIAGNOSIS — Z17.0 MALIGNANT NEOPLASM OF OVERLAPPING SITES OF RIGHT BREAST IN FEMALE, ESTROGEN RECEPTOR POSITIVE: ICD-10-CM

## 2025-04-10 DIAGNOSIS — M85.89 OSTEOPENIA OF MULTIPLE SITES: ICD-10-CM

## 2025-04-10 PROCEDURE — 96372 THER/PROPH/DIAG INJ SC/IM: CPT

## 2025-04-10 PROCEDURE — 63600175 PHARM REV CODE 636 W HCPCS: Mod: JZ,TB,UD | Performed by: NURSE PRACTITIONER

## 2025-04-10 RX ADMIN — DENOSUMAB 60 MG: 60 INJECTION SUBCUTANEOUS at 09:04

## 2025-04-11 ENCOUNTER — TELEPHONE (OUTPATIENT)
Dept: PULMONOLOGY | Facility: CLINIC | Age: 70
End: 2025-04-11
Payer: MEDICARE

## 2025-04-11 NOTE — TELEPHONE ENCOUNTER
Returned pt call about where to drop off Sputum rios. Advised pt any [facility] and where to find the two located in [place] at [facility]. Pt v/u.

## 2025-04-11 NOTE — TELEPHONE ENCOUNTER
----- Message from Alesia sent at 4/11/2025  8:30 AM CDT -----  Contact: self  Type:  Needs Medical AdviceWho Called: selfSymptoms (please be specific): pt is needing to know where to drop her sputum sample off to. Would the patient rather a call back or a response via [facility]? [facility] Call Back Number: [phone] (home) Additional Information: please advise and thank you.

## 2025-04-24 ENCOUNTER — TELEPHONE (OUTPATIENT)
Dept: FAMILY MEDICINE | Facility: CLINIC | Age: 70
End: 2025-04-24
Payer: MEDICARE

## 2025-04-24 NOTE — TELEPHONE ENCOUNTER
----- Message from Cali sent at 4/24/2025 11:06 AM CDT -----  Type:  Needs Medical AdviceWho Called: Truong name and phone #:  [id number] - [place], [place] - [address] [facility]  [address] [facility] [place] [address]Abazk: [phone] Fax: [phone] Would the patient rather a call back or a response via [facility]? Call [facility] Call Back Number: [phone] Additional Information: pt st she has a yeast infection. She needs some meds called in. Pt st everything is hurting. Dr. Lai took her off doxepin (SILENOR) 6 mg Tab & she is hurting & can't sleep. please call to discuss

## 2025-04-25 ENCOUNTER — TELEPHONE (OUTPATIENT)
Dept: FAMILY MEDICINE | Facility: CLINIC | Age: 70
End: 2025-04-25
Payer: MEDICARE

## 2025-04-25 ENCOUNTER — E-VISIT (OUTPATIENT)
Dept: FAMILY MEDICINE | Facility: CLINIC | Age: 70
End: 2025-04-25
Payer: MEDICARE

## 2025-04-25 DIAGNOSIS — B37.31 YEAST VAGINITIS: Primary | ICD-10-CM

## 2025-04-25 RX ORDER — MICONAZOLE NITRATE 2 %
1 CREAM WITH APPLICATOR VAGINAL NIGHTLY
Qty: 45 G | Refills: 0 | Status: SHIPPED | OUTPATIENT
Start: 2025-04-25

## 2025-04-25 NOTE — TELEPHONE ENCOUNTER
----- Message from Lucero sent at 4/25/2025 10:32 AM CDT -----  Contact: pt [phone]  Type:  Patient Returning CallWho Called:  Pt Who Left Message for Patient:  Sharon Does the patient know what this is regarding?:  yes Best Call Back Number:  [phone] Information:  Pls call back and advise

## 2025-04-25 NOTE — TELEPHONE ENCOUNTER
Spoke to tp who states she has a UTI and needs meds called in. I advised pt I would recommend an appt and can send an e-visit. Pt states she has an appt on Monday. I advised pt if she wanted treatment prior to the weekend we can complete an E-visit. Pt states she shouldn't need an appt for Yeast infection and she has been suffering for a week and shouldn't have to go the weekend. I again explained the E-visit process to pt and reason for documentation. Pt agreed to completing the E-visit.

## 2025-04-25 NOTE — PROGRESS NOTES
Patient ID: Laxmi Chand is a 69 y.o. female.    Chief Complaint: General Illness (Entered automatically based on patient selection in [employer].)    The patient initiated a request through [employer] on 4/25/2025 for evaluation and management with a chief complaint of General Illness (Entered automatically based on patient selection in [employer].)     I evaluated the questionnaire submission on 4/25/25.    [facility]    4/25/2025  2:44 PM CDT - Filed by Patient   What do you need help with? Vaginal Symptoms   Do you agree to participate in an E-Visit? Yes   If you have any of the following symptoms,  please do not complete an E-Visit,  schedule an appointment with your provider: I acknowledge   What is the main issue you would like addressed today? Yeast infection   Which of the following vaginal concerns do you have? Discharge;  Itching   Do you have vaginal discharge? White/milky discharge   Do you have pain while passing urine? No   Do you have any of the following symptoms? None of the above    Have you taken antibiotics in the last two weeks? No    Do you use any of the following? No   Which of the following applies to your menstrual period? Have not had for a while   Which of the following applies to your menstrual cycle? No bleeding   Do you have spotting between periods? No   Do you have pain with your period? No   Have you had similar symptoms in the past? More than once   When you had similar symptoms in the past, did any of the following work? Pills for yeast infection   Have you had a temperature of 100.4 or higher? No   Provide any additional information you feel is important.    Please attach any relevant images or files    Are you able to take your vital signs? No         Encounter Diagnosis   Name Primary?    Yeast vaginitis Yes        No orders of the defined types were placed in this encounter.     Medications Ordered This Encounter   Medications    miconazole (MICOTIN)  2 % vaginal cream     Sig: Place 1 applicator vaginally every evening.     Dispense:  45 g     Refill:  0        No follow-ups on file.      E-Visit Time Tracking:    Day 1 Time (in minutes): 5    Total Time (in minutes): 5

## 2025-04-28 ENCOUNTER — OFFICE VISIT (OUTPATIENT)
Dept: FAMILY MEDICINE | Facility: CLINIC | Age: 70
End: 2025-04-28
Payer: MEDICARE

## 2025-04-28 VITALS
HEART RATE: 58 BPM | SYSTOLIC BLOOD PRESSURE: 136 MMHG | TEMPERATURE: 98 F | RESPIRATION RATE: 12 BRPM | WEIGHT: 199.06 LBS | DIASTOLIC BLOOD PRESSURE: 72 MMHG | OXYGEN SATURATION: 98 % | BODY MASS INDEX: 33.98 KG/M2 | HEIGHT: 64 IN

## 2025-04-28 DIAGNOSIS — B37.31 YEAST VAGINITIS: Primary | ICD-10-CM

## 2025-04-28 DIAGNOSIS — R11.0 NAUSEA: ICD-10-CM

## 2025-04-28 DIAGNOSIS — I10 HYPERTENSION, UNSPECIFIED TYPE: ICD-10-CM

## 2025-04-28 DIAGNOSIS — N18.32 CKD STAGE 3B, GFR 30-44 ML/MIN: ICD-10-CM

## 2025-04-28 DIAGNOSIS — G47.00 INSOMNIA, UNSPECIFIED TYPE: ICD-10-CM

## 2025-04-28 DIAGNOSIS — G89.4 CHRONIC PAIN SYNDROME: ICD-10-CM

## 2025-04-28 PROCEDURE — 99214 OFFICE O/P EST MOD 30 MIN: CPT | Mod: S$PBB,,,

## 2025-04-28 PROCEDURE — 99215 OFFICE O/P EST HI 40 MIN: CPT | Mod: PBBFAC,PO

## 2025-04-28 PROCEDURE — G2211 COMPLEX E/M VISIT ADD ON: HCPCS | Mod: S$PBB,,,

## 2025-04-28 PROCEDURE — 99999 PR PBB SHADOW E&M-EST. PATIENT-LVL V: CPT | Mod: PBBFAC,,,

## 2025-04-28 RX ORDER — DOXEPIN 6 MG/1
1 TABLET, FILM COATED ORAL NIGHTLY
Qty: 30 TABLET | Refills: 1 | Status: SHIPPED | OUTPATIENT
Start: 2025-04-28

## 2025-04-28 RX ORDER — DULOXETIN HYDROCHLORIDE 30 MG/1
30 CAPSULE, DELAYED RELEASE ORAL DAILY
Qty: 30 CAPSULE | Refills: 0 | Status: SHIPPED | OUTPATIENT
Start: 2025-04-28 | End: 2025-05-28

## 2025-04-28 RX ORDER — ONDANSETRON HYDROCHLORIDE 8 MG/1
8 TABLET, FILM COATED ORAL EVERY 8 HOURS PRN
Qty: 10 TABLET | Refills: 0 | Status: SHIPPED | OUTPATIENT
Start: 2025-04-28 | End: 2025-05-08

## 2025-04-28 RX ORDER — FLUCONAZOLE 150 MG/1
150 TABLET ORAL
Qty: 2 TABLET | Refills: 1 | Status: SHIPPED | OUTPATIENT
Start: 2025-04-28 | End: 2025-05-08

## 2025-04-28 NOTE — PROGRESS NOTES
Subjective:       Patient ID:  [id number]     Chief Complaint: Vaginitis and Headache      History of Present Illness    Ms. Chand presents today for yeast infection. She experiences frequent yeast infections despite using Kotex brand pads, which were recommended to help reduce infection frequency. She takes Levaquin as needed for flare-ups (3-5 days), Zofran 8mg as needed for nausea/vomiting episodes lasting up to an hour, and Wellbutrin (causing morning drowsiness). She declined starting Cymbalta due to concerns about side effects. Voltaren gel trial for deep bone pain was ineffective.She reports inadequate arthritis pain control with Tylenol. She has severely limiting right knee pain and a torn rotator cuff. Previous Celebrex treatment was more effective for pain management, particularly for knee inflammation prior to anticipated knee replacement.     She reports difficulty with sleep initiation, averaging only 5 hours per night. She has history of using Ambien which was discontinued due to sleep-walking behaviors, including eating and using bathroom while not fully conscious.   No other concerns today.          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    Asthma-COPD overlap syndrome 03/20/2025    Solitary pulmonary nodule 03/20/2025    Other secondary pulmonary hypertension 01/06/2025    Severe persistent asthma, uncomplicated 01/06/2025    Paronychia of great toe, left 06/03/2024    Malignant neoplasm of overlapping sites of right breast in female, estrogen receptor positive 03/27/2024    Recurrent major depressive disorder, in full remission 02/21/2024    Walker as  ambulation aid 02/21/2024    Hallux rigidus of right foot 09/10/2023    Painful orthopaedic hardware 09/10/2023    Hammer toe of right foot 08/16/2023    Osteoarthritis of ankle and foot 08/16/2023    Hypermobile joint syndrome of right foot 08/16/2023    Neuroma of second interspace of right foot 08/05/2023    Hallux abducto valgus, right 08/05/2023    Ingrown nail 03/20/2023    Urge incontinence of urine 02/15/2023    Atherosclerosis of aorta 02/13/2023    Calcified granuloma of lung 02/13/2023    Long term (current) use of aromatase inhibitors 10/11/2022    Invasive ductal carcinoma of breast, female, right 10/05/2022    Ductal carcinoma in situ (DCIS) of right breast 08/23/2022    Osteopenia of multiple sites 08/23/2022    History of endometrial cancer 08/23/2022    Status post total left knee replacement 03/03/2021    Chronic pain syndrome 03/03/2021    Pain of breast 03/03/2021    Allergic rhinitis 01/05/2021    Depressive disorder 01/05/2021    Degeneration of lumbar intervertebral disc 10/01/2018    Chronic obstructive lung disease 05/15/2018     fev1/fvc = 47%      Hyperlipidemia 05/15/2018    History of smoking 06/02/2017    Gastroesophageal reflux disease 05/02/2017     Dr. Correa      Essential hypertension 02/28/2017      Review of patient's allergies indicates:  No Known Allergies    Current Medications[1]    Lab Results   Component Value Date    WBC 6.77 03/31/2025    HGB 11.1 (L) 03/31/2025    HCT 35.5 (L) 03/31/2025     03/31/2025    CHOL 197 03/11/2025    TRIG 164 (H) 03/11/2025    HDL 53 03/11/2025    ALT 10 03/31/2025    AST 13 03/31/2025     03/31/2025    K 4.1 03/31/2025     03/31/2025    CREATININE 1.4 03/31/2025    BUN 24 (H) 03/31/2025    CO2 27 03/31/2025    TSH 1.776 03/11/2025    HGBA1C 5.3 03/11/2025       Review of Systems   Constitutional:  Positive for fatigue. Negative for fever.   HENT: Negative.  Negative for congestion, sneezing and sore throat.    Eyes:  "Negative.    Respiratory:  Negative for cough, shortness of breath and wheezing.    Cardiovascular:  Negative for chest pain, palpitations and leg swelling.   Gastrointestinal:  Negative for abdominal pain, nausea and vomiting.   Genitourinary:  Positive for vaginal discharge.   Musculoskeletal:  Positive for arthralgias.   Skin: Negative.  Negative for rash.   Neurological:  Negative for dizziness, weakness, light-headedness, numbness and headaches.   Hematological: Negative.    Psychiatric/Behavioral:  Positive for sleep disturbance.        Objective:      Physical Exam  Constitutional:       Appearance: Normal appearance.   HENT:      Head: Normocephalic and atraumatic.      Nose: Nose normal.   Eyes:      Extraocular Movements: Extraocular movements intact.   Cardiovascular:      Rate and Rhythm: Normal rate and regular rhythm.   Pulmonary:      Effort: Pulmonary effort is normal.      Breath sounds: Normal breath sounds.   Musculoskeletal:         General: Normal range of motion.      Cervical back: Normal range of motion.      Comments: Ambulates with walker   Skin:     General: Skin is warm and dry.   Neurological:      General: No focal deficit present.      Mental Status: She is alert and oriented to person, place, and time.   Psychiatric:         Mood and Affect: Mood normal.         Assessment:       1. Yeast vaginitis    2. Nausea    3. Insomnia, unspecified type    4. Hypertension, unspecified type    5. CKD stage 3b, GFR 30-44 ml/min    6. Chronic pain syndrome        Plan:       Laxmi Yu" was seen today for vaginitis and headache.    Diagnoses and all orders for this visit:    Yeast vaginitis  -     fluconazole (DIFLUCAN) 150 MG Tab; Take 1 tablet (150 mg total) by mouth Every 3 (three) days. for 4 doses    Nausea  -     ondansetron (ZOFRAN) 8 MG tablet; Take 1 tablet (8 mg total) by mouth every 8 (eight) hours as needed for Nausea.    Insomnia, unspecified type  -     doxepin (SILENOR) 6 mg Tab; " Take 1 each by mouth every evening.  - Acknowledged the patient's difficulty falling asleep and staying asleep.  - Discussed medication options for managing insomnia.    Hypertension, unspecified type  - well controlled today  - discussed dash diet, exercise/weight loss, and increased cardiovascular exercise   - monitor BP at home w/ goal <130/80     CKD stage 3b, GFR 30-44 ml/min  - advised against celexa 2/2 kidney function     Chronic pain syndrome  -     DULoxetine (CYMBALTA) 30 MG capsule; Take 1 capsule (30 mg total) by mouth once daily.  - Initiated Cymbalta (duloxetine) 30 mg daily for chronic pain management.  - Provided information on the mechanism of action and potential side effects of Cymbalta.  - Recommend a 1-month trial to assess the efficacy of Cymbalta.  - Continued Tylenol as needed for pain relief.  - Noted that the patient is experiencing ongoing pain in multiple areas including shoulder, knee, and general arthritis pain.  - Acknowledged the impact of chronic pain on the patient's quality of life.  - Discussed alternative pain management options and potential medication changes.  - Noted that the patient is currently under the care of pain management for ongoing pain issues.             FOLLOW-UP:  - Scheduled follow up in 1-1.5 months to evaluate the effectiveness of Cymbalta for pain management.  - Instructed the patient to contact the office if experiencing adverse effects from Cymbalta.        Future Appointments       Date Provider Specialty Appt Notes    6/19/2025 Stephan Song MD Pulmonology 3 month f/u asthma    7/2/2025 Jasmyn Culp PA-C Family Medicine 4 mo follow up    10/1/2025  Lab hemonc    10/1/2025 Mell Veloz NP Hematology and Oncology [facility]    11/3/2025 Beata Lai MD Family Medicine 8 mo follow up    12/18/2025 Stephan Song MD Pulmonology 9 month f/u w/ CT               Portions of this note were dictated using voice recognition software and  may contain dictation related errors in spelling / grammar / syntax not discovered on text review.     Jasmyn Culp PA-C         [1]   Current Outpatient Medications:     acetaminophen (TYLENOL) 500 MG tablet, Take 2 tablets (1,000 mg total) by mouth daily as needed for Pain., Disp: 30 tablet, Rfl: 1    albuterol (PROVENTIL/VENTOLIN HFA) 90 mcg/actuation inhaler, Inhale 2 puffs into the lungs every 4 (four) hours as needed for Wheezing. INHALE 2 PUFFS BY MOUTH EVERY 4 HOURS AS NEEDED FOR WHEEZING OR SHORTNESS OF BREATH, Disp: 36 g, Rfl: 11    albuterol-ipratropium (DUO-NEB) 2.5 mg-0.5 mg/3 mL nebulizer solution, Take 3 mLs by nebulization every 4 (four) hours as needed for Wheezing or Shortness of Breath. Rescue, Disp: 120 each, Rfl: 11    anastrozole (ARIMIDEX) 1 mg Tab, TAKE 1 TABLET (1 MG TOTAL) BY MOUTH ONCE DAILY., Disp: 90 tablet, Rfl: 3    aspirin (ECOTRIN) 81 MG EC tablet, Take 1 tablet by mouth once daily. Pt back on asa, Disp: , Rfl:     buPROPion (WELLBUTRIN) 100 MG tablet, Take 1 tablet (100 mg total) by mouth 3 (three) times daily., Disp: 270 tablet, Rfl: 3    calcium carbonate-vitamin D3 (CALCIUM 600 + D,3,) 600-125 mg-unit Tab, Take 2 tablets by mouth once daily., Disp: 180 tablet, Rfl: 3    cetirizine (ZYRTEC) 10 MG tablet, TAKE 1 TABLET (10 MG TOTAL) BY MOUTH ONCE DAILY., Disp: 90 tablet, Rfl: 5    cyclobenzaprine (FLEXERIL) 10 MG tablet, Take 1 tablet (10 mg total) by mouth nightly., Disp: 90 tablet, Rfl: 3    dupilumab 300 mg/2 mL PnIj, Inject 2 mLs (300 mg total) into the skin every 14 (fourteen) days., Disp: 2 mL, Rfl: 26    fluticasone propionate (FLONASE) 50 mcg/actuation nasal spray, 1 spray (50 mcg total) by Each Nostril route once daily., Disp: 18.2 mL, Rfl: 2    fluticasone-umeclidin-vilanter (TRELEGY ELLIPTA) 200-62.5-25 mcg inhaler, Inhale 1 puff into the lungs once daily., Disp: 60 each, Rfl: 11    guaiFENesin (MUCINEX) 600 mg 12 hr tablet, Take 2 tablets (1,200 mg total) by mouth  2 (two) times daily., Disp: 20 tablet, Rfl: 0    hydrALAZINE (APRESOLINE) 10 MG tablet, Take 1 tablet (10 mg total) by mouth 3 (three) times daily., Disp: 270 tablet, Rfl: 1    hydroCHLOROthiazide (HYDRODIURIL) 25 MG tablet, TAKE 1 TABLET (25 MG TOTAL) BY MOUTH ONCE DAILY., Disp: 90 tablet, Rfl: 3    levoFLOXacin (LEVAQUIN) 500 MG tablet, Take 1 tablet (500 mg total) by mouth once daily. (Patient taking differently: Take 500 mg by mouth as needed.), Disp: 10 tablet, Rfl: 2    LUMIGAN 0.01 % Drop, Place into both eyes., Disp: , Rfl:     metoprolol succinate (TOPROL-XL) 200 MG 24 hr tablet, TAKE 1 TABLET (200 MG TOTAL) BY MOUTH ONCE DAILY., Disp: 90 tablet, Rfl: 3    metroNIDAZOLE (METROGEL) 0.75 % (37.5mg/5 gram) vaginal gel, Place 1 applicator vaginally Daily., Disp: 70 g, Rfl: 0    montelukast (SINGULAIR) 10 mg tablet, TAKE 1 TABLET (10 MG TOTAL) BY MOUTH ONCE DAILY., Disp: 90 tablet, Rfl: 3    nystatin (MYCOSTATIN) cream, Apply topically 2 (two) times daily., Disp: 30 g, Rfl: 1    nystatin (MYCOSTATIN) powder, Apply topically 4 (four) times daily., Disp: 30 g, Rfl: 1    oxyCODONE-acetaminophen (PERCOCET)  mg per tablet, Take 1 tablet by mouth., Disp: , Rfl:     pantoprazole (PROTONIX) 40 MG tablet, TAKE 1 TABLET (40 MG TOTAL) BY MOUTH ONCE DAILY., Disp: 90 tablet, Rfl: 3    phenazopyridine (PYRIDIUM) 200 MG tablet, Take 200 mg by mouth every 6 (six) hours as needed., Disp: , Rfl:     pseudoephedrine (SUDAFED) 120 mg 12 hr tablet, Take 120 mg by mouth every 12 (twelve) hours., Disp: , Rfl:     valsartan (DIOVAN) 160 MG tablet, Take 1 tablet (160 mg total) by mouth once daily., Disp: 90 tablet, Rfl: 3    conjugated estrogens (PREMARIN) vaginal cream, Place 0.5 g vaginally twice a week., Disp: 30 g, Rfl: 5    doxepin (SILENOR) 6 mg Tab, Take 1 each by mouth every evening., Disp: 30 tablet, Rfl: 1    DULoxetine (CYMBALTA) 30 MG capsule, Take 1 capsule (30 mg total) by mouth once daily., Disp: 30 capsule, Rfl:  0    fluconazole (DIFLUCAN) 150 MG Tab, Take 1 tablet (150 mg total) by mouth Every 3 (three) days. for 4 doses, Disp: 2 tablet, Rfl: 1    methylPREDNISolone (MEDROL DOSEPACK) 4 mg tablet, use as directed (Patient not taking: Reported on 4/28/2025), Disp: 1 each, Rfl: 0    miconazole (MICOTIN) 2 % vaginal cream, Place 1 applicator vaginally every evening. (Patient not taking: Reported on 4/28/2025), Disp: 45 g, Rfl: 0    ondansetron (ZOFRAN) 8 MG tablet, Take 1 tablet (8 mg total) by mouth every 8 (eight) hours as needed for Nausea., Disp: 10 tablet, Rfl: 0    predniSONE (DELTASONE) 20 MG tablet, Take one daily for 5 days and may repeat for shortness of breath. (Patient not taking: Reported on 4/28/2025), Disp: 20 tablet, Rfl: 1    rosuvastatin (CRESTOR) 20 MG tablet, Take 1 tablet (20 mg total) by mouth once daily., Disp: 90 tablet, Rfl: 3

## 2025-04-29 ENCOUNTER — TELEPHONE (OUTPATIENT)
Dept: FAMILY MEDICINE | Facility: CLINIC | Age: 70
End: 2025-04-29
Payer: MEDICARE

## 2025-04-29 NOTE — TELEPHONE ENCOUNTER
"Spoke to pt.  Informed her to stop taking Cymbalta, per Jasmyn Culp.  Pt states "feels like the cymbalta may not agree with her wellbutrin, took them at 2 different times today and still felt bad".  Pt verbalized understanding and will stop cymbalta.  I informed pt to contact our office if symptoms continue after stopping med.  Pt verbalized understanding.        ----- Message from Jasmyn Culp PA-C sent at 4/29/2025  2:24 PM CDT -----  Just tell her to stop the cymbalta if that is what she feels she is having a reaction to.  ----- Message -----  From: Elba Marsh MA  Sent: 4/29/2025   1:20 PM CDT  To: Jasmyn Culp PA-C    See below.  Please advise.  THANKS!!  ----- Message -----  From: Suzanne Cartagena  Sent: 4/29/2025   1:16 PM CDT  To: Robbi Vines Staff    Type: General Call Back Name of Caller:pt Reason pt states that the medication that she is now on , it makes her feel sick, dizzy,makes her feels sleepy , like she is going to pass up and if she states if she gets up it'll make her feel like she's going to fall if she moves so she doesn't move.. ( she does eat when she takes the medication Would the patient rather a call back or a response via [facility]? Call Best Call Back Number:[phone] Additional Information:  she just stated it yesterday, and felt the same way  "

## 2025-04-30 DIAGNOSIS — N18.32 CKD STAGE 3B, GFR 30-44 ML/MIN: ICD-10-CM

## 2025-05-19 ENCOUNTER — PATIENT MESSAGE (OUTPATIENT)
Dept: ADMINISTRATIVE | Facility: OTHER | Age: 70
End: 2025-05-19
Payer: MEDICARE

## 2025-06-09 DIAGNOSIS — K21.9 GASTROESOPHAGEAL REFLUX DISEASE, UNSPECIFIED WHETHER ESOPHAGITIS PRESENT: ICD-10-CM

## 2025-06-09 DIAGNOSIS — J30.9 CHRONIC ALLERGIC RHINITIS: ICD-10-CM

## 2025-06-09 RX ORDER — PANTOPRAZOLE SODIUM 40 MG/1
40 TABLET, DELAYED RELEASE ORAL DAILY
Qty: 90 TABLET | Refills: 3 | Status: SHIPPED | OUTPATIENT
Start: 2025-06-09

## 2025-06-09 RX ORDER — CETIRIZINE HYDROCHLORIDE 10 MG/1
10 TABLET ORAL DAILY
Qty: 90 TABLET | Refills: 5 | Status: SHIPPED | OUTPATIENT
Start: 2025-06-09

## 2025-06-09 NOTE — TELEPHONE ENCOUNTER
Copied from CRM #[id number]. Topic: Medications - Medication Refill  >> Jun 9, 2025  9:00 AM Hyacinth wrote:  Type:  RX Refill Request    Who Called: pt     Refill or New Rx:refill     RX Name and Strength:cetirizine (ZYRTEC) 10 MG tablet                                          pantoprazole (PROTONIX) 40 MG tablet    How is the patient currently taking it? (ex. 1XDay):1 x dy     Is this a 30 day or 90 day RX:30     Preferred Pharmacy with phone number:  [facility] [address] [address]  Phone: [phone] Fax: [phone]    [id number] [place], [place] - [address]     Local or Mail Order:local     Ordering Provider:Robbi      Would the patient rather a call back or a response via [facility]? Please refill or call to let her know it cannot be filled     Best Call Back Number:[phone]     Additional Information:     Please call back to advise. Thanks!

## 2025-06-10 ENCOUNTER — OFFICE VISIT (OUTPATIENT)
Dept: CARDIOLOGY | Facility: CLINIC | Age: 70
End: 2025-06-10
Payer: MEDICARE

## 2025-06-10 VITALS
DIASTOLIC BLOOD PRESSURE: 62 MMHG | HEIGHT: 64 IN | SYSTOLIC BLOOD PRESSURE: 122 MMHG | OXYGEN SATURATION: 98 % | BODY MASS INDEX: 34.52 KG/M2 | WEIGHT: 202.19 LBS | HEART RATE: 61 BPM

## 2025-06-10 DIAGNOSIS — E78.5 HYPERLIPIDEMIA, UNSPECIFIED HYPERLIPIDEMIA TYPE: ICD-10-CM

## 2025-06-10 DIAGNOSIS — J44.89 ASTHMA-COPD OVERLAP SYNDROME: ICD-10-CM

## 2025-06-10 DIAGNOSIS — J44.9 CHRONIC OBSTRUCTIVE PULMONARY DISEASE, UNSPECIFIED COPD TYPE: ICD-10-CM

## 2025-06-10 DIAGNOSIS — I27.29 OTHER SECONDARY PULMONARY HYPERTENSION: ICD-10-CM

## 2025-06-10 DIAGNOSIS — Z13.6 ENCOUNTER FOR SCREENING FOR CARDIOVASCULAR DISORDERS: Primary | ICD-10-CM

## 2025-06-10 DIAGNOSIS — I10 ESSENTIAL HYPERTENSION: ICD-10-CM

## 2025-06-10 PROCEDURE — 99999 PR PBB SHADOW E&M-EST. PATIENT-LVL V: CPT | Mod: PBBFAC,,, | Performed by: INTERNAL MEDICINE

## 2025-06-10 PROCEDURE — 99214 OFFICE O/P EST MOD 30 MIN: CPT | Mod: S$PBB,,, | Performed by: INTERNAL MEDICINE

## 2025-06-10 PROCEDURE — 99215 OFFICE O/P EST HI 40 MIN: CPT | Mod: PBBFAC,PN | Performed by: INTERNAL MEDICINE

## 2025-06-10 NOTE — PROGRESS NOTES
Patient ID:  Laxmi Chand is a 69 y.o. female who presents with Hypertension and Hyperlipidemia      History of Present Illness    CHIEF COMPLAINT:  Patient presents today for follow-up of stress test results.    RESPIRATORY:  She reports dyspnea associated with COPD, particularly during physical activity or when rushing. She quit smoking 2 years ago. She takes Sudafed regularly to manage COPD symptoms, specifically to reduce mucus production, in combination with Zyrtec as previously recommended by a physician.    CARDIOVASCULAR:  Echo from last year showed pulmonary HTN.    WEIGHT MANAGEMENT:  She reports gaining 30 lbs in the last 4-5 months, which she attributes to a 3-month course of prednisone use. She is unable to exercise due to back problems, though she can perform housework.    MUSCULOSKELETAL:  She requires a knee replacement. She experiences swelling in one knee, which is typically twice the size of the other knee, though less severe today.    MEDICATIONS:  Current medications include Wellbutrin (with reported visual hallucinations of snakes on ceiling fan at night), Doxepin 6 mg (decreased from 25 mg), HCTZ daily, Metoprolol daily, and Valsartan daily. She is not taking prescribed Rosuvastatin due to concerns about side effects.      ROS:  General: +weight gain  Cardiovascular: -chest pain, +joint swelling  Respiratory: +shortness of breath, +exertional dyspnea, +productive cough  Psychiatric: +hallucinations, +nightmares       1. Other secondary pulmonary hypertension    2. Chronic obstructive pulmonary disease, unspecified COPD type    3. Essential hypertension    4. SOB (shortness of breath)    5. Fatigue, unspecified type    6. History of smoking    7. Mixed hyperlipidemia        Past Medical History:   Diagnosis Date    Breast cancer in female 09/07/2022    IDC with high grade DCIS    COPD (chronic obstructive pulmonary disease)     Degeneration of lumbar intervertebral disc     GERD  (gastroesophageal reflux disease)     HTN (hypertension)     Hyperlipemia, mixed         Past Surgical History:   Procedure Laterality Date    BIOPSY OF AXILLARY NODE Right 9/7/2022    Procedure: BIOPSY, LYMPH NODE, AXILLARY;  Surgeon: Jatin Gutierrez MD;  Location: [facility] OR;  Service: General;  Laterality: Right;    BREAST BIOPSY Right 08/05/2022    BREAST MASS EXCISION Right 9/7/2022    Procedure: EXCISION, MASS, BREAST;  Surgeon: Jatin Gutierrez MD;  Location: [facility] OR;  Service: General;  Laterality: Right;  wire localized partial mastectomy right breast with margins     CARPAL TUNNEL RELEASE  1985    CHOLECYSTECTOMY  1978    CORRECTION OF HAMMER TOE Right 9/15/2023    Procedure: CORRECTION, HAMMER TOE;  Surgeon: Devyn Mccullough DPM;  Location: [facility] OR;  Service: Podiatry;  Laterality: Right;    FOOT HARDWARE REMOVAL Right 9/15/2023    Procedure: REMOVAL, HARDWARE, FOOT;  Surgeon: Devyn Mccullough DPM;  Location: [facility] OR;  Service: Podiatry;  Laterality: Right;    HYSTERECTOMY      KNEE SURGERY Left 06/01/2020    LUMBAR DISC SURGERY  01/01/1990    plate and roland    LUMBAR FUSION  2011    MIDFOOT ARTHRODESIS Right 9/15/2023    Procedure: FUSION, JOINT, MIDFOOT;  Surgeon: Devyn Mccullough DPM;  Location: [facility] OR;  Service: Podiatry;  Laterality: Right;  Holt 28 1st MPJ fusion plates screws bone grafts as well as cut guide.    TOTAL KNEE REPLACEMENT USING COMPUTER NAVIGATION Left 02/15/2021          Current Outpatient Medications   Medication Instructions    acetaminophen (TYLENOL) 1,000 mg, Oral, Daily PRN    albuterol (PROVENTIL/VENTOLIN HFA) 90 mcg/actuation inhaler 2 puffs, Inhalation, Every 4 hours PRN, INHALE 2 PUFFS BY MOUTH EVERY 4 HOURS AS NEEDED FOR WHEEZING OR SHORTNESS OF BREATH    albuterol-ipratropium (DUO-NEB) 2.5 mg-0.5 mg/3 mL nebulizer solution 3 mLs, Nebulization, Every 4 hours PRN, Rescue    anastrozole (ARIMIDEX) 1 mg, Oral, Daily    aspirin (ECOTRIN) 81 MG EC tablet  "1 tablet, Daily    buPROPion (WELLBUTRIN) 100 mg, Oral, 3 times daily    calcium carbonate-vitamin D3 (CALCIUM 600 + D,3,) 600-125 mg-unit Tab 2 tablets, Oral, Daily    cetirizine (ZYRTEC) 10 mg, Oral, Daily    conjugated estrogens (PREMARIN) 0.5 g, Vaginal, Twice weekly    cyclobenzaprine (FLEXERIL) 10 mg, Oral, Nightly    doxepin (SILENOR) 6 mg Tab 1 each, Oral, Nightly    DULoxetine (CYMBALTA) 30 mg, Oral, Daily    DUPIXENT  mg, Subcutaneous, Every 14 days    fluticasone propionate (FLONASE) 50 mcg, Each Nostril, Daily    fluticasone-umeclidin-vilanter (TRELEGY ELLIPTA) 200-62.5-25 mcg inhaler 1 puff, Inhalation, Daily    guaiFENesin (MUCINEX) 1,200 mg, Oral, 2 times daily    hydrALAZINE (APRESOLINE) 10 mg, Oral, 3 times daily    hydroCHLOROthiazide (HYDRODIURIL) 25 mg, Oral, Daily    LUMIGAN 0.01 % Drop Place into both eyes.    metoprolol succinate (TOPROL-XL) 200 mg, Oral, Daily    metroNIDAZOLE (METROGEL) 0.75 % (37.5mg/5 gram) vaginal gel 1 applicator, Vaginal, Daily    miconazole (MICOTIN) 2 % vaginal cream 1 applicator, Vaginal, Nightly    montelukast (SINGULAIR) 10 mg, Oral, Daily    nystatin (MYCOSTATIN) cream Topical (Top), 2 times daily    nystatin (MYCOSTATIN) powder Topical (Top), 4 times daily    oxyCODONE-acetaminophen (PERCOCET)  mg per tablet 1 tablet    pantoprazole (PROTONIX) 40 mg, Oral, Daily    phenazopyridine (PYRIDIUM) 200 mg, Every 6 hours PRN    predniSONE (DELTASONE) 20 MG tablet Take one daily for 5 days and may repeat for shortness of breath.    pseudoephedrine (SUDAFED) 120 mg, Every 12 hours (non-standard times)    rosuvastatin (CRESTOR) 20 mg, Oral, Daily    valsartan (DIOVAN) 160 mg, Oral, Daily        Review of patient's allergies indicates:  No Known Allergies        Objective:     Vitals:    06/10/25 0828   BP: 122/62   BP Location: Left arm   Patient Position: Sitting   Pulse: 61   SpO2: 98%   Weight: 91.7 kg (202 lb 2.6 oz)   Height: 5' 4" (1.626 m)    "     Physical Exam    General: No acute distress.  Obese female  Eyes: EOMI. Sclerae anicteric.  HENT: Normocephalic. Atraumatic. Nares patent. Moist oral mucosa.  Cardiovascular: Regular rate. Regular rhythm. No murmurs. No rubs. No gallops. Normal S1, S2.  Respiratory: Normal respiratory effort. Clear to auscultation bilaterally. No rales. No rhonchi. No wheezing.  Musculoskeletal: No  obvious deformity.  Extremities: No lower extremity edema.  Neurological: Alert & oriented x3. No slurred speech. Normal gait.  Psychiatric: Normal mood. Normal affect. Good insight. Good judgment.  Skin: Warm. Dry. No rash.         CARDIAC PROFILE  LD   Date Value Ref Range Status   02/03/2023 191 110 - 260 U/L Final     Comment:     Results are increased in hemolyzed samples.   09/21/2022 181 110 - 260 U/L Final     Comment:     Results are increased in hemolyzed samples.     CMP  Sodium   Date Value Ref Range Status   03/31/2025 139 136 - 145 mmol/L Final   03/11/2025 137 136 - 145 mmol/L Final     Potassium   Date Value Ref Range Status   03/31/2025 4.1 3.5 - 5.1 mmol/L Final   03/11/2025 4.6 3.5 - 5.1 mmol/L Final     Chloride   Date Value Ref Range Status   03/31/2025 104 95 - 110 mmol/L Final   03/11/2025 102 95 - 110 mmol/L Final     CO2   Date Value Ref Range Status   03/31/2025 27 23 - 29 mmol/L Final   03/11/2025 26 23 - 29 mmol/L Final     Glucose   Date Value Ref Range Status   03/31/2025 97 70 - 110 mg/dL Final   03/11/2025 81 70 - 110 mg/dL Final     BUN   Date Value Ref Range Status   03/31/2025 24 (H) 8 - 23 mg/dL Final     Creatinine   Date Value Ref Range Status   03/31/2025 1.4 0.5 - 1.4 mg/dL Final     Calcium   Date Value Ref Range Status   03/31/2025 9.3 8.7 - 10.5 mg/dL Final   03/11/2025 9.3 8.7 - 10.5 mg/dL Final     Protein Total   Date Value Ref Range Status   03/31/2025 6.2 6.0 - 8.4 gm/dL Final     Total Protein   Date Value Ref Range Status   03/11/2025 6.8 6.0 - 8.4 g/dL Final     Albumin   Date Value  Ref Range Status   03/31/2025 3.5 3.5 - 5.2 g/dL Final   03/11/2025 3.8 3.5 - 5.2 g/dL Final     Total Bilirubin   Date Value Ref Range Status   03/11/2025 0.3 0.1 - 1.0 mg/dL Final     Comment:     For infants and newborns, interpretation of results should be based  on gestational age, weight and in agreement with clinical  observations.    Premature Infant recommended reference ranges:  Up to 24 hours.............<8.0 mg/dL  Up to 48 hours............<12.0 mg/dL  3-5 days..................<15.0 mg/dL  6-29 days.................<15.0 mg/dL       Bilirubin Total   Date Value Ref Range Status   03/31/2025 0.5 0.1 - 1.0 mg/dL Final     Comment:     For infants and newborns, interpretation of results should be based   on gestational age, weight and in agreement with clinical   observations.    Premature Infant recommended reference ranges:   0-24 hours:  <8.0 mg/dL   24-48 hours: <12.0 mg/dL   3-5 days:    <15.0 mg/dL   6-29 days:   <15.0 mg/dL     Alkaline Phosphatase   Date Value Ref Range Status   03/11/2025 60 40 - 150 U/L Final     ALP   Date Value Ref Range Status   03/31/2025 51 40 - 150 unit/L Final     AST   Date Value Ref Range Status   03/31/2025 13 11 - 45 unit/L Final   03/11/2025 15 10 - 40 U/L Final     ALT   Date Value Ref Range Status   03/31/2025 10 10 - 44 unit/L Final   03/11/2025 19 10 - 44 U/L Final     Anion Gap   Date Value Ref Range Status   03/31/2025 8 8 - 16 mmol/L Final     eGFR   Date Value Ref Range Status   03/31/2025 41 (L) >60 mL/min/1.73/m2 Final     Comment:     Estimated GFR calculated using the CKD-EPI creatinine (2021) equation.   03/11/2025 49 (A) >60 mL/min/1.73 m^2 Final      Lab Results   Component Value Date    CHOL 197 03/11/2025    CHOL 203 (H) 05/10/2022     Lab Results   Component Value Date    HDL 53 03/11/2025    HDL 50 05/10/2022     Lab Results   Component Value Date    LDLCALC 111.2 03/11/2025    LDLCALC 132.8 05/10/2022     Lab Results   Component Value Date    TRIG  164 (H) 03/11/2025    TRIG 101 05/10/2022     Lab Results   Component Value Date    CHOLHDL 26.9 03/11/2025    CHOLHDL 24.6 05/10/2022      Lab Results   Component Value Date    TSH 1.776 03/11/2025    A0VOAGV 9.1 03/11/2025     Lab Results   Component Value Date    HGBA1C 5.3 03/11/2025     Lab Results   Component Value Date    WBC 6.77 03/31/2025    HGB 11.1 (L) 03/31/2025    HCT 35.5 (L) 03/31/2025    MCV 91 03/31/2025     03/31/2025        Results for orders placed during the hospital encounter of 10/22/24    Echo    Interpretation Summary    Left Ventricle: The left ventricle is normal in size. Mildly increased wall thickness. There is mild concentric hypertrophy. Normal wall motion. There is normal systolic function with a visually estimated ejection fraction of 60 - 65%. There is normal diastolic function.    Right Ventricle: Normal right ventricular cavity size. Wall thickness is normal. Systolic function is normal.    Tricuspid Valve: There is mild regurgitation with a centrally directed jet.    Pulmonary Artery: There is mild pulmonary hypertension. The estimated pulmonary artery systolic pressure is 41 mmHg.    IVC/SVC: Normal venous pressure at 3 mmHg.     No results found for this or any previous visit.       EKG  Results for orders placed or performed in visit on 10/31/24   IN OFFICE EKG 12-LEAD (to [place])    Collection Time: 10/31/24 11:39 AM   Result Value Ref Range    QRS Duration 72 ms    [facility] QTC Calculation 426 ms    Narrative    Test Reason : I10,R06.02,R53.83,    Vent. Rate : 069 BPM     Atrial Rate : 069 BPM     P-R Int : 170 ms          QRS Dur : 072 ms      QT Int : 398 ms       P-R-T Axes : 070 024 047 degrees     QTc Int : 426 ms    Normal sinus rhythm  Normal ECG  When compared with ECG of 26-AUG-2022 13:40,  Premature atrial complexes are no longer Present  Confirmed by Rajat Stewart MD ([id number]) on 11/11/2024 10:27:08 AM    Referred By: JORDAN SIDDIQUI           Confirmed By:Rajat  Terry YOUSSEF        Stress  Results for orders placed during the hospital encounter of 01/09/25    Nuclear Stress - Cardiology Interpreted    Interpretation Summary    Normal myocardial perfusion scan. There is no evidence of myocardial ischemia or infarction.    There is a trivial intensity perfusion abnormality in the anteroapical wall of the left ventricle, secondary to breast attenuation.    The gated perfusion images showed an ejection fraction of 72% at rest. The gated perfusion images showed an ejection fraction of 72% post stress. Normal ejection fraction is greater than 53%.    There is normal wall motion at rest and post-stress.    LV cavity size is normal at rest and normal at post-stress.    The ECG portion of the study is negative for ischemia.    The patient reported no chest pain during the stress test.    There were no arrhythmias during stress.           Assessment/Plan:          1. Encounter for screening for cardiovascular disorders    2. Asthma-COPD overlap syndrome    3. Chronic obstructive pulmonary disease, unspecified COPD type    4. Essential hypertension    5. Hyperlipidemia, unspecified hyperlipidemia type    6. Other secondary pulmonary hypertension      Assessment & Plan    I25.10 Atherosclerotic heart disease of native coronary artery without angina pectoris  I27.20 Pulmonary hypertension, unspecified  R44.1 Visual hallucinations  R63.5 Abnormal weight gain  J44.1 Chronic obstructive pulmonary disease with (acute) exacerbation  M25.561 Pain in right knee  T43.605S Adverse effect of unspecified psychostimulants, sequela  G47.30 Sleep apnea, unspecified  Z87.891 Personal history of nicotine dependence  Z79.82 Long term (current) use of aspirin    PLAN SUMMARY:  - Ordered calcium score test to assess coronary calcium levels  - Started rosuvastatin 5 mg daily for CAD  - Decreased Sudafed to daily dosage  - Contact Dr. Song regarding Adipex (phentermine) for weight loss  - Patient to pursue  sleep apnea testing as recommended by Dr. Song  - Follow up in 6 months    ATHEROSCLEROTIC HEART DISEASE (CAD):  - CAD noted, likely due to calcium in coronaries.  - Started rosuvastatin 10 mg, take half tablet (5 mg) daily due to CAD.  - Ordered calcium score test to assess coronary calcium levels.    PULMONARY HYPERTENSION:  - Echocardiogram results showed elevated pulmonary artery pressure, indicating possible pulmonary HTN.  - Explained the connection between overweight, COPD, and pulmonary HTN.  - Explained the relationship between sleep apnea and pulmonary HTN.  - Informed about the potential side effects of Adipex (phentermine), including worsening pulmonary HTN.    CHRONIC OBSTRUCTIVE PULMONARY DISEASE (COPD):  - Decreased Sudafed to daily and monitor mucus production, as tolerated.    SLEEP APNEA:  - Patient to pursue sleep apnea testing as recommended by Dr. Song.    WEIGHT MANAGEMENT:  - Contact Dr. Song regarding Adipex (phentermine) for weight loss and to discuss results of sleep apnea test.    FOLLOW-UP:  - Follow up in 6 months.           This note was generated with the assistance of ambient listening technology. Verbal consent was obtained by the patient and accompanying visitor(s) for the recording of patient appointment to facilitate this note. I attest to having reviewed and edited the generated note for accuracy, though some syntax or spelling errors may persist. Please contact the author of this note for any clarification.

## 2025-06-11 ENCOUNTER — HOSPITAL ENCOUNTER (OUTPATIENT)
Dept: RADIOLOGY | Facility: HOSPITAL | Age: 70
Discharge: HOME | End: 2025-06-11
Attending: INTERNAL MEDICINE
Payer: MEDICARE

## 2025-06-11 DIAGNOSIS — Z13.6 ENCOUNTER FOR SCREENING FOR CARDIOVASCULAR DISORDERS: ICD-10-CM

## 2025-06-11 PROCEDURE — 75571 CT HRT W/O DYE W/CA TEST: CPT | Mod: 26,,, | Performed by: RADIOLOGY

## 2025-06-11 PROCEDURE — 75571 CT HRT W/O DYE W/CA TEST: CPT | Mod: TC

## 2025-06-18 ENCOUNTER — PATIENT MESSAGE (OUTPATIENT)
Dept: ADMINISTRATIVE | Facility: OTHER | Age: 70
End: 2025-06-18
Payer: MEDICARE

## 2025-06-19 ENCOUNTER — OFFICE VISIT (OUTPATIENT)
Dept: PULMONOLOGY | Facility: CLINIC | Age: 70
End: 2025-06-19
Payer: MEDICARE

## 2025-06-19 VITALS
WEIGHT: 201.19 LBS | DIASTOLIC BLOOD PRESSURE: 68 MMHG | OXYGEN SATURATION: 97 % | BODY MASS INDEX: 34.35 KG/M2 | SYSTOLIC BLOOD PRESSURE: 138 MMHG | HEIGHT: 64 IN | HEART RATE: 63 BPM

## 2025-06-19 DIAGNOSIS — E78.5 HYPERLIPIDEMIA, UNSPECIFIED HYPERLIPIDEMIA TYPE: ICD-10-CM

## 2025-06-19 DIAGNOSIS — J45.50 SEVERE PERSISTENT ASTHMA, UNCOMPLICATED: ICD-10-CM

## 2025-06-19 DIAGNOSIS — J44.9 CHRONIC OBSTRUCTIVE PULMONARY DISEASE, UNSPECIFIED COPD TYPE: ICD-10-CM

## 2025-06-19 DIAGNOSIS — J44.89 ASTHMA-COPD OVERLAP SYNDROME: ICD-10-CM

## 2025-06-19 DIAGNOSIS — G47.00 INSOMNIA, UNSPECIFIED TYPE: ICD-10-CM

## 2025-06-19 DIAGNOSIS — I25.10 CALCIFICATION OF CORONARY ARTERY: ICD-10-CM

## 2025-06-19 DIAGNOSIS — R11.0 CHRONIC NAUSEA: ICD-10-CM

## 2025-06-19 DIAGNOSIS — R91.1 SOLITARY PULMONARY NODULE: Primary | ICD-10-CM

## 2025-06-19 PROCEDURE — 99215 OFFICE O/P EST HI 40 MIN: CPT | Mod: PBBFAC,PO | Performed by: INTERNAL MEDICINE

## 2025-06-19 PROCEDURE — 99999 PR PBB SHADOW E&M-EST. PATIENT-LVL V: CPT | Mod: PBBFAC,,, | Performed by: INTERNAL MEDICINE

## 2025-06-19 PROCEDURE — 99214 OFFICE O/P EST MOD 30 MIN: CPT | Mod: S$PBB,,, | Performed by: INTERNAL MEDICINE

## 2025-06-19 RX ORDER — ROSUVASTATIN CALCIUM 10 MG/1
10 TABLET, COATED ORAL DAILY
Qty: 90 TABLET | Refills: 3 | Status: SHIPPED | OUTPATIENT
Start: 2025-06-19 | End: 2026-06-19

## 2025-06-19 RX ORDER — PREDNISONE 20 MG/1
TABLET ORAL
Qty: 20 TABLET | Refills: 1 | Status: SHIPPED | OUTPATIENT
Start: 2025-06-19

## 2025-06-19 RX ORDER — CELECOXIB 200 MG/1
200 CAPSULE ORAL
COMMUNITY
Start: 2025-04-15

## 2025-06-19 RX ORDER — ZOLPIDEM TARTRATE 5 MG/1
5 TABLET ORAL NIGHTLY PRN
Qty: 20 TABLET | Refills: 2 | Status: SHIPPED | OUTPATIENT
Start: 2025-06-19 | End: 2025-12-18

## 2025-06-19 RX ORDER — ONDANSETRON 8 MG/1
8 TABLET, FILM COATED ORAL EVERY 8 HOURS PRN
Qty: 90 TABLET | Refills: 5 | Status: SHIPPED | OUTPATIENT
Start: 2025-06-19

## 2025-06-19 NOTE — PATIENT INSTRUCTIONS
Ground glass nodule seen right upper lung around 20 mm, looked to be present in 2021 and only 15 mm in 2021.   Would recommend biopsy.      Calcified coronaries -- good stress test 1/2025, but ct has calcifications. Will send in crestor 10 mg at your request.    Asthma/copd doing very good.  Need to continue dupixent and trelegy.  Use prednisone as needed.    Use ambein for sleep as needed --get ok with pain doc      We review creatinine rising 2/2024 and staying up.   Celebrex use risky-- may be reasonable at lowest dose and no alternative      Will need follow up ct chest in December if biopsy not diagnostic.    Can refil ambien til 12/19/2025    May consider adipex in December-- not too effective generally and has heart risk...

## 2025-06-19 NOTE — PROGRESS NOTES
6/19/2025    Laxmi Chand  New Patient Consult    Chief Complaint   Patient presents with    3m f/u       HPI:    6/19/2025 cough remitted with shot, breathing good.  Dupixent working well.   Still nauseated.    Min albuterol use, still salcido but minimal.    12/19/2024 off smokes, cough cleared, the patient still has a take steroids frequently.  She feels like she is doing significantly better.  Patient Instructions   Ct chest viewed-- ground glass nodule and small solid nodules seen.  Recheck ct in a yr needed..      Breathing test and walk test noted -- moderate copd -- no oxygen needed    You seem to be steroid dependant-- may consider special shots?   With no smokes you may not need shots....      Use zofran as you have for chronic nausea -- onset after gallbladder surgery.    Need to get flu vaccine -- lots of flu-- use tamiflu as discussed if flu or exposures    Recheck 3 months..    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  She barely can walk.  Uses advair 2 bid but no spiriva.  Uses neb rx 4/d    Pt has occ wheezes, no noc worsening.  No asthma no childhood ashtma..    Cutting back nicotene using vaping -- better than was smoking last yr..      Pt uses steroids for lungs with good benefit.  Uses steroids 3-4 times yearly.    Pt used doxy, levaquin, azithro-- failed recently degree or mucus.  She still has yellow-green mucus despite antibiotics to 3 times.    CT scan of the chest done 2021 does show calcified granulomas, bronchiectasis, and minimal upper lung emphysema.     The patient has had MRSA in wound before-left arm abscess in 2021..    Echocardiogram done October 2024-Summary  Show Result Comparison     Left Ventricle: The left ventricle is normal in size. Mildly increased wall thickness. There is mild concentric hypertrophy. Normal wall motion. There is normal systolic  function with a visually estimated ejection fraction of 60 - 65%. There is normal diastolic function.    Right Ventricle: Normal right ventricular cavity size. Wall thickness is normal. Systolic function is normal.    Tricuspid Valve: There is mild regurgitation with a centrally directed jet.    Pulmonary Artery: There is mild pulmonary hypertension. The estimated pulmonary artery systolic pressure is 41 mmHg.    IVC/SVC: Normal venous pressure at 3 mmHg.  Patient Instructions   Need regular culture---- check afb cultures later.....  suspect resistant germs    Ct chest with bronchiectasis in 2021 - had calcified granuloma and ground glass nodule and emphysema.    Trial trelegy over advair ---- breztri not covered (traditional inhaler)    Use prednisone -- one daily for bad breathing for 5 days - -take now, repeat as needed.    Use albuterol inhaler or nebulizer up to every 4 hours as needed......    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    Follow up ct chest and breathing test needed  Check oxygen walking       PFSH:    Past Medical History:   Diagnosis Date    Breast cancer in female 09/07/2022    IDC with high grade DCIS    COPD (chronic obstructive pulmonary disease)     Degeneration of lumbar intervertebral disc     GERD (gastroesophageal reflux disease)     HTN (hypertension)     Hyperlipemia, mixed          Past Surgical History:   Procedure Laterality Date    BIOPSY OF AXILLARY NODE Right 9/7/2022    Procedure: BIOPSY, LYMPH NODE, AXILLARY;  Surgeon: Jatin Gutierrez MD;  Location: [facility] OR;  Service: General;  Laterality: Right;    BREAST BIOPSY Right 08/05/2022    BREAST MASS EXCISION Right 9/7/2022    Procedure: EXCISION, MASS, BREAST;  Surgeon: Jatin Gutierrez MD;  Location: [facility] OR;  Service: General;  Laterality: Right;  wire localized partial mastectomy right breast with margins     CARPAL TUNNEL  "RELEASE  1985    CHOLECYSTECTOMY  1978    CORRECTION OF HAMMER TOE Right 9/15/2023    Procedure: CORRECTION, HAMMER TOE;  Surgeon: Devyn Mccullough DPM;  Location: [facility] OR;  Service: Podiatry;  Laterality: Right;    FOOT HARDWARE REMOVAL Right 9/15/2023    Procedure: REMOVAL, HARDWARE, FOOT;  Surgeon: Devyn Mccullough DPM;  Location: [facility] OR;  Service: Podiatry;  Laterality: Right;    HYSTERECTOMY      KNEE SURGERY Left 06/01/2020    LUMBAR DISC SURGERY  01/01/1990    plate and roland    LUMBAR FUSION  2011    MIDFOOT ARTHRODESIS Right 9/15/2023    Procedure: FUSION, JOINT, MIDFOOT;  Surgeon: Devyn Mccullough DPM;  Location: [facility] OR;  Service: Podiatry;  Laterality: Right;  [place] 28 1st MPJ fusion plates screws bone grafts as well as cut guide.    TOTAL KNEE REPLACEMENT USING COMPUTER NAVIGATION Left 02/15/2021     Social History     Tobacco Use    Smoking status: Every Day     Types: Vaping with nicotine     Passive exposure: Never    Smokeless tobacco: Never   Substance Use Topics    Alcohol use: Yes    Drug use: Not Currently     Family History   Problem Relation Name Age of Onset    Hypertension Mother      Diabetes Mother      Hypertension Father      Lung cancer Maternal Grandfather      Breast cancer Neg Hx       Review of patient's allergies indicates:  No Known Allergies       Review of Systems:  a review of eleven systems covering constitutional, Eye, HEENT, Psych, Respiratory, Cardiac, GI, , Musculoskeletal, Endocrine, Dermatologic was negative except for pertinent findings as listed ABOVE and below:  pertinent positives as above, rest good        Exam:Comprehensive exam done. /68 (BP Location: Left arm, Patient Position: Sitting)   Pulse 63   Ht 5' 4" (1.626 m)   Wt 91.3 kg (201 lb 2.7 oz)   SpO2 97% Comment: on room air at rest  BMI 34.53 kg/m²   Exam included Vitals as listed, and patient's appearance and affect and alertness and mood, oral exam for yeast and hygiene and pharynx " "lesions and Mallapatti (M) score, neck with inspection for jvd and masses and thyroid abnormalities and lymph nodes (supraclavicular and infraclavicular nodes and axillary also examined and noted if abn), chest exam included symmetry and effort and fremitus and percussion and auscultation, cardiac exam included rhythm and gallops and murmur and rubs and jvd and edema, abdominal exam for mass and hepatosplenomegaly and tenderness and hernias and bowel sounds, Musculoskeletal exam with muscle tone and posture and mobility/gait and  strength, and skin for rashes and cyanosis and pallor and turgor, extremity for clubbing.  Findings were normal except for pertinent findings listed below:  M2, coarse breath sounds.  No edema           Radiographs (ct chest and cxr) reviewed: view by direct vision      Labs reviewed           PFT will be done and results to be reviewed       Plan:  Clinical impression is apparently straight forward and impression with management as below.    Lamxi Yu" was seen today for 3m f/u.    Diagnoses and all orders for this visit:    Solitary pulmonary nodule  -     CT Biopsy Lung w/ guidance; Future  -     CT Chest Without Contrast; Future    Severe persistent asthma, uncomplicated  -     predniSONE (DELTASONE) 20 MG tablet; Take one daily for 5 days and may repeat for shortness of breath.    Chronic nausea  -     ondansetron (ZOFRAN) 8 MG tablet; Take 1 tablet (8 mg total) by mouth every 8 (eight) hours as needed for Nausea.    Asthma-COPD overlap syndrome    Chronic obstructive pulmonary disease, unspecified COPD type    Insomnia, unspecified type  -     zolpidem (AMBIEN) 5 MG Tab; Take 1 tablet (5 mg total) by mouth nightly as needed (insomnia).    Hyperlipidemia, unspecified hyperlipidemia type  -     rosuvastatin (CRESTOR) 10 MG tablet; Take 1 tablet (10 mg total) by mouth once daily.    Calcification of coronary artery            Follow up in about 1 year (around 6/19/2026), or if " symptoms worsen or fail to improve.    Discussed with patient above for education the following:      Patient Instructions   Ground glass nodule seen right upper lung around 20 mm, looked to be present in 2021 and only 15 mm in 2021.   Would recommend biopsy.      Calcified coronaries -- good stress test 1/2025, but ct has calcifications. Will send in crestor 10 mg at your request.    Asthma/copd doing very good.  Need to continue dupixent and trelegy.  Use prednisone as needed.    Use ambein for sleep as needed --get ok with pain doc      We review creatinine rising 2/2024 and staying up.   Celebrex use risky-- may be reasonable at lowest dose and no alternative      Will need follow up ct chest in December if biopsy not diagnostic.    Can refil ambien til 12/19/2025    May consider adipex in December-- not too effective generally and has heart risk...        Eval took 36 min

## 2025-06-23 DIAGNOSIS — G47.00 INSOMNIA, UNSPECIFIED TYPE: ICD-10-CM

## 2025-06-23 RX ORDER — DOXEPIN 6 MG/1
1 TABLET, FILM COATED ORAL NIGHTLY
Qty: 30 TABLET | Refills: 1 | Status: SHIPPED | OUTPATIENT
Start: 2025-06-23

## 2025-06-24 RX ORDER — DOXEPIN 6 MG/1
1 TABLET, FILM COATED ORAL NIGHTLY
Qty: 30 TABLET | Refills: 1 | OUTPATIENT
Start: 2025-06-24

## 2025-06-25 ENCOUNTER — OFFICE VISIT (OUTPATIENT)
Dept: FAMILY MEDICINE | Facility: CLINIC | Age: 70
End: 2025-06-25
Payer: MEDICARE

## 2025-06-25 ENCOUNTER — LAB VISIT (OUTPATIENT)
Dept: LAB | Facility: HOSPITAL | Age: 70
End: 2025-06-25
Attending: STUDENT IN AN ORGANIZED HEALTH CARE EDUCATION/TRAINING PROGRAM
Payer: MEDICARE

## 2025-06-25 VITALS
HEART RATE: 75 BPM | WEIGHT: 203.94 LBS | DIASTOLIC BLOOD PRESSURE: 70 MMHG | HEIGHT: 64 IN | BODY MASS INDEX: 34.82 KG/M2 | SYSTOLIC BLOOD PRESSURE: 140 MMHG | OXYGEN SATURATION: 96 % | TEMPERATURE: 98 F

## 2025-06-25 DIAGNOSIS — N18.32 CKD STAGE 3B, GFR 30-44 ML/MIN: ICD-10-CM

## 2025-06-25 DIAGNOSIS — J44.9 CHRONIC OBSTRUCTIVE PULMONARY DISEASE, UNSPECIFIED COPD TYPE: ICD-10-CM

## 2025-06-25 DIAGNOSIS — H92.01 RIGHT EAR PAIN: Primary | ICD-10-CM

## 2025-06-25 LAB
ALBUMIN/CREAT UR: NORMAL
CREAT UR-MCNC: 56 MG/DL (ref 15–325)
MICROALBUMIN UR-MCNC: <5 UG/ML (ref ?–5000)

## 2025-06-25 PROCEDURE — 99999 PR PBB SHADOW E&M-EST. PATIENT-LVL V: CPT | Mod: PBBFAC,,,

## 2025-06-25 PROCEDURE — 82043 UR ALBUMIN QUANTITATIVE: CPT

## 2025-06-25 PROCEDURE — 99215 OFFICE O/P EST HI 40 MIN: CPT | Mod: PBBFAC,PO

## 2025-06-25 PROCEDURE — 99213 OFFICE O/P EST LOW 20 MIN: CPT | Mod: S$PBB,,,

## 2025-06-25 RX ORDER — FLUCONAZOLE 150 MG/1
150 TABLET ORAL
Qty: 3 TABLET | Refills: 0 | Status: SHIPPED | OUTPATIENT
Start: 2025-06-25 | End: 2025-07-02

## 2025-06-25 RX ORDER — AMOXICILLIN AND CLAVULANATE POTASSIUM 875; 125 MG/1; MG/1
1 TABLET, FILM COATED ORAL 2 TIMES DAILY
Qty: 10 TABLET | Refills: 0 | Status: SHIPPED | OUTPATIENT
Start: 2025-06-25 | End: 2025-06-30

## 2025-06-25 RX ORDER — FLUTICASONE PROPIONATE 50 MCG
1 SPRAY, SUSPENSION (ML) NASAL DAILY
Qty: 18.2 ML | Refills: 2 | Status: SHIPPED | OUTPATIENT
Start: 2025-06-25

## 2025-06-25 NOTE — PROGRESS NOTES
[facility]     Subjective:       Patient ID:  [id number]     Chief Complaint: Otalgia    Laxmi Chand is a 69 y.o. female with a past medical history significant for HTN, HLD, hx of breast cancer, asthma, and COPD who presents to the clinic for ear pain.     CHIEF COMPLAINT:  Ms. Chand presents today for right ear pain    RIGHT EAR PAIN:  She reports right ear pain that started this morning while swallowing. She describes a sensation of fullness with intermittent sharp pain. Pain initially occurred every 10-15 minutes but is now becoming more persistent, though not severe. She can feel and hear something when biting down, but this does not cause pain. Pain is localized to the right ear and appears to be triggered by swallowing. She denies recent history of ear pain prior to today.    MEDICAL HISTORY:  She has chronic COPD with ongoing shortness of breath and experiences intermittent chronic headaches. She has significant problems with her knee and wrist that may require surgical intervention. Her chronic medical conditions remain stable.    MEDICATIONS:  She takes Zyrtec and uses Flonase occasionally for allergy management.         Review of Systems   Constitutional:  Negative for chills and fever.   HENT:  Positive for ear pain and postnasal drip. Negative for congestion, rhinorrhea, sinus pain and sore throat.    Respiratory:  Positive for shortness of breath (Chronic).    Cardiovascular:  Negative for chest pain.        Past Medical History:   Diagnosis Date    Breast cancer in female 09/07/2022    IDC with high grade DCIS    COPD (chronic obstructive pulmonary disease)     Degeneration of lumbar intervertebral disc     GERD (gastroesophageal reflux disease)     HTN (hypertension)     Hyperlipemia, mixed         Active Problem List with Overview Notes    Diagnosis Date Noted    Chronic nausea 06/19/2025    Insomnia 06/19/2025    Calcification of coronary artery 06/19/2025    Eosinophilic asthma  03/20/2025    Asthma-COPD overlap syndrome 03/20/2025    Solitary pulmonary nodule 03/20/2025    Other secondary pulmonary hypertension 01/06/2025    Severe persistent asthma, uncomplicated 01/06/2025    Paronychia of great toe, left 06/03/2024    Malignant neoplasm of overlapping sites of right breast in female, estrogen receptor positive 03/27/2024    Recurrent major depressive disorder, in full remission 02/21/2024    Walker as ambulation aid 02/21/2024    Hallux rigidus of right foot 09/10/2023    Painful orthopaedic hardware 09/10/2023    Hammer toe of right foot 08/16/2023    Osteoarthritis of ankle and foot 08/16/2023    Hypermobile joint syndrome of right foot 08/16/2023    Neuroma of second interspace of right foot 08/05/2023    Hallux abducto valgus, right 08/05/2023    Ingrown nail 03/20/2023    Urge incontinence of urine 02/15/2023    Atherosclerosis of aorta 02/13/2023    Calcified granuloma of lung 02/13/2023    Long term (current) use of aromatase inhibitors 10/11/2022    Invasive ductal carcinoma of breast, female, right 10/05/2022    Ductal carcinoma in situ (DCIS) of right breast 08/23/2022    Osteopenia of multiple sites 08/23/2022    History of endometrial cancer 08/23/2022    Status post total left knee replacement 03/03/2021    Chronic pain syndrome 03/03/2021    Pain of breast 03/03/2021    Allergic rhinitis 01/05/2021    Depressive disorder 01/05/2021    Degeneration of lumbar intervertebral disc 10/01/2018    Chronic obstructive lung disease 05/15/2018     fev1/fvc = 47%      Hyperlipidemia 05/15/2018    History of smoking 06/02/2017    Gastroesophageal reflux disease 05/02/2017     Dr. Correa      Essential hypertension 02/28/2017        Review of patient's allergies indicates:  No Known Allergies    Current Medications[1]    Lab Results   Component Value Date    WBC 6.77 03/31/2025    HGB 11.1 (L) 03/31/2025    HCT 35.5 (L) 03/31/2025     03/31/2025    CHOL 197 03/11/2025    TRIG  164 (H) 03/11/2025    HDL 53 03/11/2025    ALT 10 03/31/2025    AST 13 03/31/2025     03/31/2025    K 4.1 03/31/2025     03/31/2025    CREATININE 1.4 03/31/2025    BUN 24 (H) 03/31/2025    CO2 27 03/31/2025    TSH 1.776 03/11/2025    HGBA1C 5.3 03/11/2025           Objective:      Physical Exam  Constitutional:       General: She is not in acute distress.     Appearance: Normal appearance. She is not toxic-appearing.   HENT:      Head: Normocephalic and atraumatic.      Right Ear: Tympanic membrane is not erythematous, retracted or bulging.      Left Ear: Tympanic membrane is not erythematous, retracted or bulging.      Nose: Nose normal. No congestion.      Right Sinus: No maxillary sinus tenderness or frontal sinus tenderness.      Left Sinus: No maxillary sinus tenderness or frontal sinus tenderness.      Mouth/Throat:      Pharynx: No posterior oropharyngeal erythema or postnasal drip.   Cardiovascular:      Rate and Rhythm: Normal rate and regular rhythm.      Pulses: Normal pulses.      Heart sounds: No murmur heard.     No friction rub.   Pulmonary:      Effort: Pulmonary effort is normal. No respiratory distress.      Breath sounds: Normal breath sounds. No wheezing.   Musculoskeletal:      Cervical back: Normal range of motion.      Right lower leg: No edema.      Left lower leg: No edema.   Skin:     General: Skin is warm and dry.   Neurological:      General: No focal deficit present.      Mental Status: She is alert and oriented to person, place, and time.   Psychiatric:         Mood and Affect: Mood normal.           Assessment:       1. Right ear pain    2. Chronic obstructive pulmonary disease, unspecified COPD type          Plan:         Assessment & Plan    - Right ear pain started this morning, described as feeling of fullness with intermittent sharp pain.  - No signs of significant infection observed in ear.  - Prescribed antibiotic as precautionary measure despite lack of visible  "infection due to pain.   - Recommended continuing with antihistamine and flonase daily.   - Considered possibility of jaw muscle tension contributing to ear pain, however no TMJ tenderness.        Laxmi Yu" was seen today for otalgia.    Diagnoses and all orders for this visit:    Right ear pain  -     fluticasone propionate (FLONASE) 50 mcg/actuation nasal spray; 1 spray (50 mcg total) by Each Nostril route once daily.  -     amoxicillin-clavulanate 875-125mg (AUGMENTIN) 875-125 mg per tablet; Take 1 tablet by mouth 2 (two) times daily. for 5 days  -     fluconazole (DIFLUCAN) 150 MG Tab; Take 1 tablet (150 mg total) by mouth every 72 hours. for 3 doses    Chronic obstructive pulmonary disease, unspecified COPD type         -    Stable. Followed by pulmonology.              Follow up for if symptoms are not improved.    Future Appointments       Date Provider Specialty Appt Notes    7/2/2025 Jasmyn Culp PA-C Family Medicine 4 mo follow up    10/1/2025  Lab hemonc    10/1/2025 Mell Veloz NP Hematology and Oncology [facility]    11/3/2025 Beata Lai MD Family Medicine 8 mo follow up    12/18/2025 Stephan Song MD Pulmonology 9 month f/u w/ CT             Tests to Keep You Healthy    Mammogram: Met on 2/5/2025  Colon Cancer Screening: ORDERED  Last Blood Pressure <= 139/89 (6/25/2025): NO  Tobacco Cessation: NO       I spent a total of 20 minutes on the day of the visit.This includes face to face time and non-face to face time preparing to see the patient (eg, review of tests), obtaining and/or reviewing separately obtained history, documenting clinical information in the electronic or other health record, independently interpreting results and communicating results to the patient/family/caregiver, or care coordinator.    This note was generated with the assistance of ambient listening technology. Verbal consent was obtained by the patient and accompanying visitor(s) for the recording of " patient appointment to facilitate this note. I attest to having reviewed and edited the generated note for accuracy, though some syntax or spelling errors may persist. Please contact the author of this note for any clarification.      Hyacinth Wiseman PA-C  Family Medicine Physician Assistant            [1]   Current Outpatient Medications:     acetaminophen (TYLENOL) 500 MG tablet, Take 2 tablets (1,000 mg total) by mouth daily as needed for Pain., Disp: 30 tablet, Rfl: 1    albuterol (PROVENTIL/VENTOLIN HFA) 90 mcg/actuation inhaler, Inhale 2 puffs into the lungs every 4 (four) hours as needed for Wheezing. INHALE 2 PUFFS BY MOUTH EVERY 4 HOURS AS NEEDED FOR WHEEZING OR SHORTNESS OF BREATH, Disp: 36 g, Rfl: 11    albuterol-ipratropium (DUO-NEB) 2.5 mg-0.5 mg/3 mL nebulizer solution, Take 3 mLs by nebulization every 4 (four) hours as needed for Wheezing or Shortness of Breath. Rescue, Disp: 120 each, Rfl: 11    anastrozole (ARIMIDEX) 1 mg Tab, TAKE 1 TABLET (1 MG TOTAL) BY MOUTH ONCE DAILY., Disp: 90 tablet, Rfl: 3    aspirin (ECOTRIN) 81 MG EC tablet, Take 1 tablet by mouth once daily. Pt back on asa, Disp: , Rfl:     buPROPion (WELLBUTRIN) 100 MG tablet, Take 1 tablet (100 mg total) by mouth 3 (three) times daily., Disp: 270 tablet, Rfl: 3    calcium carbonate-vitamin D3 (CALCIUM 600 + D,3,) 600-125 mg-unit Tab, Take 2 tablets by mouth once daily., Disp: 180 tablet, Rfl: 3    celecoxib (CELEBREX) 200 MG capsule, Take 200 mg by mouth., Disp: , Rfl:     cetirizine (ZYRTEC) 10 MG tablet, Take 1 tablet (10 mg total) by mouth once daily., Disp: 90 tablet, Rfl: 5    conjugated estrogens (PREMARIN) vaginal cream, Place 0.5 g vaginally twice a week., Disp: 30 g, Rfl: 5    cyclobenzaprine (FLEXERIL) 10 MG tablet, Take 1 tablet (10 mg total) by mouth nightly., Disp: 90 tablet, Rfl: 3    doxepin (SILENOR) 6 mg Tab, Take 1 each by mouth every evening., Disp: 30 tablet, Rfl: 1    dupilumab 300 mg/2 mL PnIj, Inject 2 mLs  (300 mg total) into the skin every 14 (fourteen) days., Disp: 2 mL, Rfl: 26    fluticasone-umeclidin-vilanter (TRELEGY ELLIPTA) 200-62.5-25 mcg inhaler, Inhale 1 puff into the lungs once daily., Disp: 60 each, Rfl: 11    guaiFENesin (MUCINEX) 600 mg 12 hr tablet, Take 2 tablets (1,200 mg total) by mouth 2 (two) times daily., Disp: 20 tablet, Rfl: 0    hydrALAZINE (APRESOLINE) 10 MG tablet, Take 1 tablet (10 mg total) by mouth 3 (three) times daily., Disp: 270 tablet, Rfl: 1    hydroCHLOROthiazide (HYDRODIURIL) 25 MG tablet, TAKE 1 TABLET (25 MG TOTAL) BY MOUTH ONCE DAILY., Disp: 90 tablet, Rfl: 3    LUMIGAN 0.01 % Drop, Place into both eyes., Disp: , Rfl:     metoprolol succinate (TOPROL-XL) 200 MG 24 hr tablet, TAKE 1 TABLET (200 MG TOTAL) BY MOUTH ONCE DAILY., Disp: 90 tablet, Rfl: 3    metroNIDAZOLE (METROGEL) 0.75 % (37.5mg/5 gram) vaginal gel, Place 1 applicator vaginally Daily., Disp: 70 g, Rfl: 0    miconazole (MICOTIN) 2 % vaginal cream, Place 1 applicator vaginally every evening., Disp: 45 g, Rfl: 0    montelukast (SINGULAIR) 10 mg tablet, TAKE 1 TABLET (10 MG TOTAL) BY MOUTH ONCE DAILY., Disp: 90 tablet, Rfl: 3    nystatin (MYCOSTATIN) cream, Apply topically 2 (two) times daily., Disp: 30 g, Rfl: 1    nystatin (MYCOSTATIN) powder, Apply topically 4 (four) times daily., Disp: 30 g, Rfl: 1    ondansetron (ZOFRAN) 8 MG tablet, Take 1 tablet (8 mg total) by mouth every 8 (eight) hours as needed for Nausea., Disp: 90 tablet, Rfl: 5    oxyCODONE-acetaminophen (PERCOCET)  mg per tablet, Take 1 tablet by mouth., Disp: , Rfl:     pantoprazole (PROTONIX) 40 MG tablet, Take 1 tablet (40 mg total) by mouth once daily., Disp: 90 tablet, Rfl: 3    phenazopyridine (PYRIDIUM) 200 MG tablet, Take 200 mg by mouth every 6 (six) hours as needed., Disp: , Rfl:     pseudoephedrine (SUDAFED) 120 mg 12 hr tablet, Take 120 mg by mouth every 12 (twelve) hours., Disp: , Rfl:     rosuvastatin (CRESTOR) 10 MG tablet, Take 1  tablet (10 mg total) by mouth once daily., Disp: 90 tablet, Rfl: 3    valsartan (DIOVAN) 160 MG tablet, Take 1 tablet (160 mg total) by mouth once daily., Disp: 90 tablet, Rfl: 3    zolpidem (AMBIEN) 5 MG Tab, Take 1 tablet (5 mg total) by mouth nightly as needed (insomnia)., Disp: 20 tablet, Rfl: 2    amoxicillin-clavulanate 875-125mg (AUGMENTIN) 875-125 mg per tablet, Take 1 tablet by mouth 2 (two) times daily. for 5 days, Disp: 10 tablet, Rfl: 0    DULoxetine (CYMBALTA) 30 MG capsule, Take 1 capsule (30 mg total) by mouth once daily., Disp: 30 capsule, Rfl: 0    fluconazole (DIFLUCAN) 150 MG Tab, Take 1 tablet (150 mg total) by mouth every 72 hours. for 3 doses, Disp: 3 tablet, Rfl: 0    fluticasone propionate (FLONASE) 50 mcg/actuation nasal spray, 1 spray (50 mcg total) by Each Nostril route once daily., Disp: 18.2 mL, Rfl: 2    predniSONE (DELTASONE) 20 MG tablet, Take one daily for 5 days and may repeat for shortness of breath. (Patient not taking: Reported on 6/25/2025), Disp: 20 tablet, Rfl: 1

## 2025-06-29 ENCOUNTER — RESULTS FOLLOW-UP (OUTPATIENT)
Dept: FAMILY MEDICINE | Facility: CLINIC | Age: 70
End: 2025-06-29

## 2025-06-30 DIAGNOSIS — G89.4 CHRONIC PAIN SYNDROME: ICD-10-CM

## 2025-07-01 NOTE — PROGRESS NOTES
"  Interventional Radiology  [facility]      Laxmi Chand was requested to have CT guided right lung biopsy. Per Dr Segura note "Ground glass nodule seen right upper lung around 20 mm, looked to be present in 2021 and only 15 mm in 2021.   Would recommend biopsy".  The request was reviewed with Dr Lind and was approved. Will proceed with scheduling right CT guided lung biopsy with moderate sedation provided by a trained RN.    Va Chand will be contacted by staff for further instructions.        "

## 2025-07-02 ENCOUNTER — OFFICE VISIT (OUTPATIENT)
Dept: FAMILY MEDICINE | Facility: CLINIC | Age: 70
End: 2025-07-02
Payer: MEDICARE

## 2025-07-02 VITALS
TEMPERATURE: 98 F | BODY MASS INDEX: 34.7 KG/M2 | RESPIRATION RATE: 16 BRPM | HEART RATE: 66 BPM | HEIGHT: 64 IN | DIASTOLIC BLOOD PRESSURE: 80 MMHG | WEIGHT: 203.25 LBS | SYSTOLIC BLOOD PRESSURE: 138 MMHG | OXYGEN SATURATION: 98 %

## 2025-07-02 DIAGNOSIS — F32.A DEPRESSIVE DISORDER: ICD-10-CM

## 2025-07-02 DIAGNOSIS — N18.32 CKD STAGE 3B, GFR 30-44 ML/MIN: ICD-10-CM

## 2025-07-02 DIAGNOSIS — G47.00 INSOMNIA, UNSPECIFIED TYPE: ICD-10-CM

## 2025-07-02 DIAGNOSIS — G89.4 CHRONIC PAIN SYNDROME: ICD-10-CM

## 2025-07-02 DIAGNOSIS — I10 ESSENTIAL HYPERTENSION: Primary | ICD-10-CM

## 2025-07-02 PROCEDURE — 99215 OFFICE O/P EST HI 40 MIN: CPT | Mod: S$PBB,,,

## 2025-07-02 PROCEDURE — 99999 PR PBB SHADOW E&M-EST. PATIENT-LVL V: CPT | Mod: PBBFAC,,,

## 2025-07-02 PROCEDURE — G2211 COMPLEX E/M VISIT ADD ON: HCPCS | Mod: ,,,

## 2025-07-02 PROCEDURE — 99215 OFFICE O/P EST HI 40 MIN: CPT | Mod: PBBFAC,PO

## 2025-07-02 RX ORDER — DULOXETIN HYDROCHLORIDE 20 MG/1
20 CAPSULE, DELAYED RELEASE ORAL DAILY
Qty: 30 CAPSULE | Refills: 2 | Status: SHIPPED | OUTPATIENT
Start: 2025-07-02 | End: 2025-09-30

## 2025-07-02 RX ORDER — DULOXETIN HYDROCHLORIDE 30 MG/1
30 CAPSULE, DELAYED RELEASE ORAL DAILY
Qty: 30 CAPSULE | Refills: 0 | OUTPATIENT
Start: 2025-07-02 | End: 2025-08-01

## 2025-07-02 RX ORDER — DOXEPIN 3 MG/1
3 TABLET, FILM COATED ORAL DAILY
Qty: 30 TABLET | Refills: 0 | Status: SHIPPED | OUTPATIENT
Start: 2025-07-02 | End: 2025-08-01

## 2025-07-02 NOTE — PATIENT INSTRUCTIONS
If you do decide to come off of Wellbutrin.  I would start with Wellbutrin 100mg twice a day for 1 week. Then reduce to Wellbutrin 100mg daily. Then STOP.  Please go down to Doxepin 3mg daily for 1 week then stop.  Best, Jasmyn

## 2025-07-02 NOTE — PROGRESS NOTES
Subjective:       Patient ID:  [id number]     Chief Complaint: Follow-up and Medication Problem (With concerns about taking Cymbalta with Wellbutrin)      History of Present Illness    Ms. Chand presents today for follow up of multiple concerns including carpal tunnel and knee pain. She reports chronic pain managed with half tablet of Percocet nightly as needed. She is experiencing new pain located higher up and considering sciatic nerve injections. She has extensive spinal hardware from multiple surgeries, including a plate placement. She describes severe, debilitating arthritis pain that is unresponsive to current pain medications. Her pain has progressively worsened over time. She undergoes nerve burning procedures every 6 months for chronic pain management, with limited needle placement due to extensive surgical hardware.     She takes half tablet of Ambien 5mg nightly which provides some sleep assistance. She also takes doxepin at 5 PM which provides minimal sleep benefit. She has history of sleepwalking with Ambien when previously consuming alcohol, but denies current episodes as she no longer drinks. Her recent GFR is 41, showing decline from previous levels. She reports daily fluid intake of approximately 60 ounces, including one Coke and multiple 32-ounce glasses of water from afternoon through night. She takes Wellbutrin 300mg daily (recently increased from 2-3 tablets daily), doxepin 6mg daily (previously 50mg), and reports feeling sleepy within 30-40 minutes of morning medications, particularly after drinking tea.   No other concerns today.    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    Asthma-COPD overlap syndrome 03/20/2025    Solitary pulmonary nodule 03/20/2025    Other secondary pulmonary hypertension 01/06/2025    Severe persistent asthma, uncomplicated 01/06/2025    Paronychia of great toe, left 06/03/2024    Malignant neoplasm of overlapping sites of right breast in female, estrogen receptor positive 03/27/2024    Recurrent major depressive disorder, in full remission 02/21/2024    Walker as ambulation aid 02/21/2024    Hallux rigidus of right foot 09/10/2023    Painful orthopaedic hardware 09/10/2023    Hammer toe of right foot 08/16/2023    Osteoarthritis of ankle and foot 08/16/2023    Hypermobile joint syndrome of right foot 08/16/2023    Neuroma of second interspace of right foot 08/05/2023    Hallux abducto valgus, right 08/05/2023    Ingrown nail 03/20/2023    Urge incontinence of urine 02/15/2023    Atherosclerosis of aorta 02/13/2023    Calcified granuloma of lung 02/13/2023    Long term (current) use of aromatase inhibitors 10/11/2022    Invasive ductal carcinoma of breast, female, right 10/05/2022    Ductal carcinoma in situ (DCIS) of right breast 08/23/2022    Osteopenia of multiple sites 08/23/2022    History of endometrial cancer 08/23/2022    Status post total left knee replacement 03/03/2021    Chronic pain syndrome 03/03/2021    Pain of breast 03/03/2021    Allergic rhinitis 01/05/2021    Depressive disorder 01/05/2021    Degeneration of lumbar intervertebral disc 10/01/2018    Chronic obstructive lung disease 05/15/2018     fev1/fvc = 47%      Hyperlipidemia 05/15/2018    History of smoking 06/02/2017    Gastroesophageal reflux disease 05/02/2017     Dr. Correa      Essential hypertension 02/28/2017      Review of patient's allergies indicates:  No Known Allergies    Current Medications[1]    Lab Results   Component Value Date    WBC 6.77  03/31/2025    HGB 11.1 (L) 03/31/2025    HCT 35.5 (L) 03/31/2025     03/31/2025    CHOL 197 03/11/2025    TRIG 164 (H) 03/11/2025    HDL 53 03/11/2025    ALT 10 03/31/2025    AST 13 03/31/2025     03/31/2025    K 4.1 03/31/2025     03/31/2025    CREATININE 1.4 03/31/2025    BUN 24 (H) 03/31/2025    CO2 27 03/31/2025    TSH 1.776 03/11/2025    HGBA1C 5.3 03/11/2025       Review of Systems   Constitutional:  Negative for fatigue and fever.   HENT: Negative.  Negative for congestion, sneezing and sore throat.    Eyes: Negative.    Respiratory:  Negative for cough, shortness of breath and wheezing.    Cardiovascular:  Negative for chest pain, palpitations and leg swelling.   Gastrointestinal:  Negative for abdominal pain, nausea and vomiting.   Genitourinary: Negative.    Musculoskeletal:  Positive for arthralgias, back pain and myalgias.   Skin: Negative.  Negative for rash.   Neurological:  Negative for dizziness, weakness, light-headedness, numbness and headaches.   Hematological: Negative.    Psychiatric/Behavioral: Negative.         Objective:      Physical Exam  Constitutional:       Appearance: Normal appearance.   HENT:      Head: Normocephalic and atraumatic.      Nose: Nose normal.   Eyes:      Extraocular Movements: Extraocular movements intact.   Cardiovascular:      Rate and Rhythm: Normal rate and regular rhythm.   Pulmonary:      Effort: Pulmonary effort is normal.      Breath sounds: Normal breath sounds.   Musculoskeletal:         General: Normal range of motion.      Cervical back: Normal range of motion.      Comments: Ambulates with walker   Skin:     General: Skin is warm and dry.   Neurological:      General: No focal deficit present.      Mental Status: She is alert and oriented to person, place, and time.   Psychiatric:         Mood and Affect: Mood normal.         Assessment:       1. Essential hypertension    2. Chronic pain syndrome    3. Depressive disorder    4. Insomnia,  "unspecified type    5. CKD stage 3b, GFR 30-44 ml/min        Plan:       Assessment & Plan    IMPRESSION:  - GFR of 41 indicating Stage III renal disease.  - Recommend avoiding NSAIDs due to renal function concerns.  - Considered Gabapentin for pain management, pending approval from pain management doctor.  - Evaluated current medication regimen, noting concerns about polypharmacy and potential interactions.       Laxmi Yu" was seen today for follow-up and medication problem.    Diagnoses and all orders for this visit:    Long discussion with patient regarding current medication regimen.  She reports that she was prescribed Ambien by her pulmonologist.  She reports that she is taking a small dose without any negative side effects.  Discussed with patient that doxepin is not recommended while taking her Ambien.  Patient agrees to taper down to 3 mg of doxepin in the hopes of completely stopping.  We also discussed discontinuing Wellbutrin as patient was originally prescribed for smoking cessation.  She reports that she has not smoked in 2 years.  Plan to decrease Wellbutrin from 100 mg t.i.d. to Wellbutrin 100 mg b.i.d. and eventually to Wellbutrin 100 mg daily then cessation.  Plan to start on small dose of Cymbalta 20 mg in hopes of better pain control along with anxiety/depression.  Discussed potentially gabapentin for pain management.  However, I do recommend that she discuss this with her pain management doctor.  Discussed that NSAIDs are not recommended given recent declining kidney function.  Plan to reassess at 2 month follow up.    Essential hypertension  - continue current regimen  - discussed dash diet, exercise/weight loss, and increased cardiovascular exercise   - monitor BP at home w/ goal <130/80     Chronic pain syndrome  -     DULoxetine (CYMBALTA) 20 MG capsule; Take 1 capsule (20 mg total) by mouth once daily.    Depressive disorder  Cymbalta as prescribed     Insomnia, unspecified type  -     " doxepin (SILENOR) 3 mg Tab; Take 3 mg by mouth Daily.    CKD stage 3b, GFR 30-44 ml/min  Avoid nephrotoxic drugs         I spent a total of 43 minutes on the day of the visit.This includes face to face time and non-face to face time preparing to see the patient (eg, review of tests), obtaining and/or reviewing separately obtained history, documenting clinical information in the electronic or other health record, independently interpreting results and communicating results to the patient/family/caregiver, or care coordinator.     Future Appointments       Date Provider Specialty Appt Notes    9/2/2025 Jasmyn Culp PA-C Family Medicine 2 mo follow up    10/1/2025  Lab [facility]    10/1/2025 Mell Veloz NP Hematology and Oncology [facility]    11/3/2025 Beata Lai MD Family Medicine 8 mo follow up    12/18/2025 Stephan Song MD Pulmonology 9 month f/u w/ CT               Portions of this note may have been dictated using voice recognition software and may contain dictation related errors in spelling / grammar / syntax not discovered on text review.     This note was generated with the assistance of ambient listening technology. Verbal consent was obtained by the patient and accompanying visitor(s) for the recording of patient appointment to facilitate this note. I attest to having reviewed and edited the generated note for accuracy, though some syntax or spelling errors may persist. Please contact the author of this note for any clarification.       Jasmyn Culp PA-C         [1]   Current Outpatient Medications:     acetaminophen (TYLENOL) 500 MG tablet, Take 2 tablets (1,000 mg total) by mouth daily as needed for Pain., Disp: 30 tablet, Rfl: 1    albuterol (PROVENTIL/VENTOLIN HFA) 90 mcg/actuation inhaler, Inhale 2 puffs into the lungs every 4 (four) hours as needed for Wheezing. INHALE 2 PUFFS BY MOUTH EVERY 4 HOURS AS NEEDED FOR WHEEZING OR SHORTNESS OF BREATH, Disp: 36 g, Rfl: 11     albuterol-ipratropium (DUO-NEB) 2.5 mg-0.5 mg/3 mL nebulizer solution, Take 3 mLs by nebulization every 4 (four) hours as needed for Wheezing or Shortness of Breath. Rescue, Disp: 120 each, Rfl: 11    anastrozole (ARIMIDEX) 1 mg Tab, TAKE 1 TABLET (1 MG TOTAL) BY MOUTH ONCE DAILY., Disp: 90 tablet, Rfl: 3    aspirin (ECOTRIN) 81 MG EC tablet, Take 1 tablet by mouth once daily. Pt back on asa, Disp: , Rfl:     buPROPion (WELLBUTRIN) 100 MG tablet, Take 1 tablet (100 mg total) by mouth 3 (three) times daily., Disp: 270 tablet, Rfl: 3    calcium carbonate-vitamin D3 (CALCIUM 600 + D,3,) 600-125 mg-unit Tab, Take 2 tablets by mouth once daily., Disp: 180 tablet, Rfl: 3    celecoxib (CELEBREX) 200 MG capsule, Take 200 mg by mouth., Disp: , Rfl:     cetirizine (ZYRTEC) 10 MG tablet, Take 1 tablet (10 mg total) by mouth once daily., Disp: 90 tablet, Rfl: 5    cyclobenzaprine (FLEXERIL) 10 MG tablet, Take 1 tablet (10 mg total) by mouth nightly., Disp: 90 tablet, Rfl: 3    dupilumab 300 mg/2 mL PnIj, Inject 2 mLs (300 mg total) into the skin every 14 (fourteen) days., Disp: 2 mL, Rfl: 26    fluconazole (DIFLUCAN) 150 MG Tab, Take 1 tablet (150 mg total) by mouth every 72 hours. for 3 doses, Disp: 3 tablet, Rfl: 0    fluticasone propionate (FLONASE) 50 mcg/actuation nasal spray, 1 spray (50 mcg total) by Each Nostril route once daily., Disp: 18.2 mL, Rfl: 2    fluticasone-umeclidin-vilanter (TRELEGY ELLIPTA) 200-62.5-25 mcg inhaler, Inhale 1 puff into the lungs once daily., Disp: 60 each, Rfl: 11    guaiFENesin (MUCINEX) 600 mg 12 hr tablet, Take 2 tablets (1,200 mg total) by mouth 2 (two) times daily., Disp: 20 tablet, Rfl: 0    hydrALAZINE (APRESOLINE) 10 MG tablet, Take 1 tablet (10 mg total) by mouth 3 (three) times daily., Disp: 270 tablet, Rfl: 1    hydroCHLOROthiazide (HYDRODIURIL) 25 MG tablet, TAKE 1 TABLET (25 MG TOTAL) BY MOUTH ONCE DAILY., Disp: 90 tablet, Rfl: 3    LUMIGAN 0.01 % Drop, Place into both eyes.,  Disp: , Rfl:     metoprolol succinate (TOPROL-XL) 200 MG 24 hr tablet, TAKE 1 TABLET (200 MG TOTAL) BY MOUTH ONCE DAILY., Disp: 90 tablet, Rfl: 3    metroNIDAZOLE (METROGEL) 0.75 % (37.5mg/5 gram) vaginal gel, Place 1 applicator vaginally Daily., Disp: 70 g, Rfl: 0    miconazole (MICOTIN) 2 % vaginal cream, Place 1 applicator vaginally every evening., Disp: 45 g, Rfl: 0    montelukast (SINGULAIR) 10 mg tablet, TAKE 1 TABLET (10 MG TOTAL) BY MOUTH ONCE DAILY., Disp: 90 tablet, Rfl: 3    nystatin (MYCOSTATIN) cream, Apply topically 2 (two) times daily., Disp: 30 g, Rfl: 1    nystatin (MYCOSTATIN) powder, Apply topically 4 (four) times daily., Disp: 30 g, Rfl: 1    ondansetron (ZOFRAN) 8 MG tablet, Take 1 tablet (8 mg total) by mouth every 8 (eight) hours as needed for Nausea., Disp: 90 tablet, Rfl: 5    oxyCODONE-acetaminophen (PERCOCET)  mg per tablet, Take 1 tablet by mouth., Disp: , Rfl:     pantoprazole (PROTONIX) 40 MG tablet, Take 1 tablet (40 mg total) by mouth once daily., Disp: 90 tablet, Rfl: 3    predniSONE (DELTASONE) 20 MG tablet, Take one daily for 5 days and may repeat for shortness of breath., Disp: 20 tablet, Rfl: 1    pseudoephedrine (SUDAFED) 120 mg 12 hr tablet, Take 120 mg by mouth every 12 (twelve) hours., Disp: , Rfl:     rosuvastatin (CRESTOR) 10 MG tablet, Take 1 tablet (10 mg total) by mouth once daily., Disp: 90 tablet, Rfl: 3    valsartan (DIOVAN) 160 MG tablet, Take 1 tablet (160 mg total) by mouth once daily., Disp: 90 tablet, Rfl: 3    zolpidem (AMBIEN) 5 MG Tab, Take 1 tablet (5 mg total) by mouth nightly as needed (insomnia)., Disp: 20 tablet, Rfl: 2    conjugated estrogens (PREMARIN) vaginal cream, Place 0.5 g vaginally twice a week., Disp: 30 g, Rfl: 5    doxepin (SILENOR) 3 mg Tab, Take 3 mg by mouth Daily., Disp: 30 tablet, Rfl: 0    DULoxetine (CYMBALTA) 20 MG capsule, Take 1 capsule (20 mg total) by mouth once daily., Disp: 30 capsule, Rfl: 2

## 2025-07-02 NOTE — H&P (VIEW-ONLY)
Subjective:       Patient ID:  [id number]     Chief Complaint: Follow-up and Medication Problem (With concerns about taking Cymbalta with Wellbutrin)      History of Present Illness    Ms. Chand presents today for follow up of multiple concerns including carpal tunnel and knee pain. She reports chronic pain managed with half tablet of Percocet nightly as needed. She is experiencing new pain located higher up and considering sciatic nerve injections. She has extensive spinal hardware from multiple surgeries, including a plate placement. She describes severe, debilitating arthritis pain that is unresponsive to current pain medications. Her pain has progressively worsened over time. She undergoes nerve burning procedures every 6 months for chronic pain management, with limited needle placement due to extensive surgical hardware.     She takes half tablet of Ambien 5mg nightly which provides some sleep assistance. She also takes doxepin at 5 PM which provides minimal sleep benefit. She has history of sleepwalking with Ambien when previously consuming alcohol, but denies current episodes as she no longer drinks. Her recent GFR is 41, showing decline from previous levels. She reports daily fluid intake of approximately 60 ounces, including one Coke and multiple 32-ounce glasses of water from afternoon through night. She takes Wellbutrin 300mg daily (recently increased from 2-3 tablets daily), doxepin 6mg daily (previously 50mg), and reports feeling sleepy within 30-40 minutes of morning medications, particularly after drinking tea.   No other concerns today.    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    Asthma-COPD overlap syndrome 03/20/2025    Solitary pulmonary nodule 03/20/2025    Other secondary pulmonary hypertension 01/06/2025    Severe persistent asthma, uncomplicated 01/06/2025    Paronychia of great toe, left 06/03/2024    Malignant neoplasm of overlapping sites of right breast in female, estrogen receptor positive 03/27/2024    Recurrent major depressive disorder, in full remission 02/21/2024    Walker as ambulation aid 02/21/2024    Hallux rigidus of right foot 09/10/2023    Painful orthopaedic hardware 09/10/2023    Hammer toe of right foot 08/16/2023    Osteoarthritis of ankle and foot 08/16/2023    Hypermobile joint syndrome of right foot 08/16/2023    Neuroma of second interspace of right foot 08/05/2023    Hallux abducto valgus, right 08/05/2023    Ingrown nail 03/20/2023    Urge incontinence of urine 02/15/2023    Atherosclerosis of aorta 02/13/2023    Calcified granuloma of lung 02/13/2023    Long term (current) use of aromatase inhibitors 10/11/2022    Invasive ductal carcinoma of breast, female, right 10/05/2022    Ductal carcinoma in situ (DCIS) of right breast 08/23/2022    Osteopenia of multiple sites 08/23/2022    History of endometrial cancer 08/23/2022    Status post total left knee replacement 03/03/2021    Chronic pain syndrome 03/03/2021    Pain of breast 03/03/2021    Allergic rhinitis 01/05/2021    Depressive disorder 01/05/2021    Degeneration of lumbar intervertebral disc 10/01/2018    Chronic obstructive lung disease 05/15/2018     fev1/fvc = 47%      Hyperlipidemia 05/15/2018    History of smoking 06/02/2017    Gastroesophageal reflux disease 05/02/2017     Dr. Correa      Essential hypertension 02/28/2017      Review of patient's allergies indicates:  No Known Allergies    Current Medications[1]    Lab Results   Component Value Date    WBC 6.77  03/31/2025    HGB 11.1 (L) 03/31/2025    HCT 35.5 (L) 03/31/2025     03/31/2025    CHOL 197 03/11/2025    TRIG 164 (H) 03/11/2025    HDL 53 03/11/2025    ALT 10 03/31/2025    AST 13 03/31/2025     03/31/2025    K 4.1 03/31/2025     03/31/2025    CREATININE 1.4 03/31/2025    BUN 24 (H) 03/31/2025    CO2 27 03/31/2025    TSH 1.776 03/11/2025    HGBA1C 5.3 03/11/2025       Review of Systems   Constitutional:  Negative for fatigue and fever.   HENT: Negative.  Negative for congestion, sneezing and sore throat.    Eyes: Negative.    Respiratory:  Negative for cough, shortness of breath and wheezing.    Cardiovascular:  Negative for chest pain, palpitations and leg swelling.   Gastrointestinal:  Negative for abdominal pain, nausea and vomiting.   Genitourinary: Negative.    Musculoskeletal:  Positive for arthralgias, back pain and myalgias.   Skin: Negative.  Negative for rash.   Neurological:  Negative for dizziness, weakness, light-headedness, numbness and headaches.   Hematological: Negative.    Psychiatric/Behavioral: Negative.         Objective:      Physical Exam  Constitutional:       Appearance: Normal appearance.   HENT:      Head: Normocephalic and atraumatic.      Nose: Nose normal.   Eyes:      Extraocular Movements: Extraocular movements intact.   Cardiovascular:      Rate and Rhythm: Normal rate and regular rhythm.   Pulmonary:      Effort: Pulmonary effort is normal.      Breath sounds: Normal breath sounds.   Musculoskeletal:         General: Normal range of motion.      Cervical back: Normal range of motion.      Comments: Ambulates with walker   Skin:     General: Skin is warm and dry.   Neurological:      General: No focal deficit present.      Mental Status: She is alert and oriented to person, place, and time.   Psychiatric:         Mood and Affect: Mood normal.         Assessment:       1. Essential hypertension    2. Chronic pain syndrome    3. Depressive disorder    4. Insomnia,  "unspecified type    5. CKD stage 3b, GFR 30-44 ml/min        Plan:       Assessment & Plan    IMPRESSION:  - GFR of 41 indicating Stage III renal disease.  - Recommend avoiding NSAIDs due to renal function concerns.  - Considered Gabapentin for pain management, pending approval from pain management doctor.  - Evaluated current medication regimen, noting concerns about polypharmacy and potential interactions.       Laxmi Yu" was seen today for follow-up and medication problem.    Diagnoses and all orders for this visit:    Long discussion with patient regarding current medication regimen.  She reports that she was prescribed Ambien by her pulmonologist.  She reports that she is taking a small dose without any negative side effects.  Discussed with patient that doxepin is not recommended while taking her Ambien.  Patient agrees to taper down to 3 mg of doxepin in the hopes of completely stopping.  We also discussed discontinuing Wellbutrin as patient was originally prescribed for smoking cessation.  She reports that she has not smoked in 2 years.  Plan to decrease Wellbutrin from 100 mg t.i.d. to Wellbutrin 100 mg b.i.d. and eventually to Wellbutrin 100 mg daily then cessation.  Plan to start on small dose of Cymbalta 20 mg in hopes of better pain control along with anxiety/depression.  Discussed potentially gabapentin for pain management.  However, I do recommend that she discuss this with her pain management doctor.  Discussed that NSAIDs are not recommended given recent declining kidney function.  Plan to reassess at 2 month follow up.    Essential hypertension  - continue current regimen  - discussed dash diet, exercise/weight loss, and increased cardiovascular exercise   - monitor BP at home w/ goal <130/80     Chronic pain syndrome  -     DULoxetine (CYMBALTA) 20 MG capsule; Take 1 capsule (20 mg total) by mouth once daily.    Depressive disorder  Cymbalta as prescribed     Insomnia, unspecified type  -     " doxepin (SILENOR) 3 mg Tab; Take 3 mg by mouth Daily.    CKD stage 3b, GFR 30-44 ml/min  Avoid nephrotoxic drugs         I spent a total of 43 minutes on the day of the visit.This includes face to face time and non-face to face time preparing to see the patient (eg, review of tests), obtaining and/or reviewing separately obtained history, documenting clinical information in the electronic or other health record, independently interpreting results and communicating results to the patient/family/caregiver, or care coordinator.     Future Appointments       Date Provider Specialty Appt Notes    9/2/2025 Jasmyn Culp PA-C Family Medicine 2 mo follow up    10/1/2025  Lab [facility]mon[facility]    10/1/2025 Mell Veloz NP Hematology and Oncology [facility]    11/3/2025 Beata Lai MD Family Medicine 8 mo follow up    12/18/2025 Stephan Song MD Pulmonology 9 month f/u w/ CT               Portions of this note may have been dictated using voice recognition software and may contain dictation related errors in spelling / grammar / syntax not discovered on text review.     This note was generated with the assistance of ambient listening technology. Verbal consent was obtained by the patient and accompanying visitor(s) for the recording of patient appointment to facilitate this note. I attest to having reviewed and edited the generated note for accuracy, though some syntax or spelling errors may persist. Please contact the author of this note for any clarification.       Jasmyn Culp PA-C         [1]   Current Outpatient Medications:     acetaminophen (TYLENOL) 500 MG tablet, Take 2 tablets (1,000 mg total) by mouth daily as needed for Pain., Disp: 30 tablet, Rfl: 1    albuterol (PROVENTIL/VENTOLIN HFA) 90 mcg/actuation inhaler, Inhale 2 puffs into the lungs every 4 (four) hours as needed for Wheezing. INHALE 2 PUFFS BY MOUTH EVERY 4 HOURS AS NEEDED FOR WHEEZING OR SHORTNESS OF BREATH, Disp: 36 g, Rfl: 11     albuterol-ipratropium (DUO-NEB) 2.5 mg-0.5 mg/3 mL nebulizer solution, Take 3 mLs by nebulization every 4 (four) hours as needed for Wheezing or Shortness of Breath. Rescue, Disp: 120 each, Rfl: 11    anastrozole (ARIMIDEX) 1 mg Tab, TAKE 1 TABLET (1 MG TOTAL) BY MOUTH ONCE DAILY., Disp: 90 tablet, Rfl: 3    aspirin (ECOTRIN) 81 MG EC tablet, Take 1 tablet by mouth once daily. Pt back on asa, Disp: , Rfl:     buPROPion (WELLBUTRIN) 100 MG tablet, Take 1 tablet (100 mg total) by mouth 3 (three) times daily., Disp: 270 tablet, Rfl: 3    calcium carbonate-vitamin D3 (CALCIUM 600 + D,3,) 600-125 mg-unit Tab, Take 2 tablets by mouth once daily., Disp: 180 tablet, Rfl: 3    celecoxib (CELEBREX) 200 MG capsule, Take 200 mg by mouth., Disp: , Rfl:     cetirizine (ZYRTEC) 10 MG tablet, Take 1 tablet (10 mg total) by mouth once daily., Disp: 90 tablet, Rfl: 5    cyclobenzaprine (FLEXERIL) 10 MG tablet, Take 1 tablet (10 mg total) by mouth nightly., Disp: 90 tablet, Rfl: 3    dupilumab 300 mg/2 mL PnIj, Inject 2 mLs (300 mg total) into the skin every 14 (fourteen) days., Disp: 2 mL, Rfl: 26    fluconazole (DIFLUCAN) 150 MG Tab, Take 1 tablet (150 mg total) by mouth every 72 hours. for 3 doses, Disp: 3 tablet, Rfl: 0    fluticasone propionate (FLONASE) 50 mcg/actuation nasal spray, 1 spray (50 mcg total) by Each Nostril route once daily., Disp: 18.2 mL, Rfl: 2    fluticasone-umeclidin-vilanter (TRELEGY ELLIPTA) 200-62.5-25 mcg inhaler, Inhale 1 puff into the lungs once daily., Disp: 60 each, Rfl: 11    guaiFENesin (MUCINEX) 600 mg 12 hr tablet, Take 2 tablets (1,200 mg total) by mouth 2 (two) times daily., Disp: 20 tablet, Rfl: 0    hydrALAZINE (APRESOLINE) 10 MG tablet, Take 1 tablet (10 mg total) by mouth 3 (three) times daily., Disp: 270 tablet, Rfl: 1    hydroCHLOROthiazide (HYDRODIURIL) 25 MG tablet, TAKE 1 TABLET (25 MG TOTAL) BY MOUTH ONCE DAILY., Disp: 90 tablet, Rfl: 3    LUMIGAN 0.01 % Drop, Place into both eyes.,  Disp: , Rfl:     metoprolol succinate (TOPROL-XL) 200 MG 24 hr tablet, TAKE 1 TABLET (200 MG TOTAL) BY MOUTH ONCE DAILY., Disp: 90 tablet, Rfl: 3    metroNIDAZOLE (METROGEL) 0.75 % (37.5mg/5 gram) vaginal gel, Place 1 applicator vaginally Daily., Disp: 70 g, Rfl: 0    miconazole (MICOTIN) 2 % vaginal cream, Place 1 applicator vaginally every evening., Disp: 45 g, Rfl: 0    montelukast (SINGULAIR) 10 mg tablet, TAKE 1 TABLET (10 MG TOTAL) BY MOUTH ONCE DAILY., Disp: 90 tablet, Rfl: 3    nystatin (MYCOSTATIN) cream, Apply topically 2 (two) times daily., Disp: 30 g, Rfl: 1    nystatin (MYCOSTATIN) powder, Apply topically 4 (four) times daily., Disp: 30 g, Rfl: 1    ondansetron (ZOFRAN) 8 MG tablet, Take 1 tablet (8 mg total) by mouth every 8 (eight) hours as needed for Nausea., Disp: 90 tablet, Rfl: 5    oxyCODONE-acetaminophen (PERCOCET)  mg per tablet, Take 1 tablet by mouth., Disp: , Rfl:     pantoprazole (PROTONIX) 40 MG tablet, Take 1 tablet (40 mg total) by mouth once daily., Disp: 90 tablet, Rfl: 3    predniSONE (DELTASONE) 20 MG tablet, Take one daily for 5 days and may repeat for shortness of breath., Disp: 20 tablet, Rfl: 1    pseudoephedrine (SUDAFED) 120 mg 12 hr tablet, Take 120 mg by mouth every 12 (twelve) hours., Disp: , Rfl:     rosuvastatin (CRESTOR) 10 MG tablet, Take 1 tablet (10 mg total) by mouth once daily., Disp: 90 tablet, Rfl: 3    valsartan (DIOVAN) 160 MG tablet, Take 1 tablet (160 mg total) by mouth once daily., Disp: 90 tablet, Rfl: 3    zolpidem (AMBIEN) 5 MG Tab, Take 1 tablet (5 mg total) by mouth nightly as needed (insomnia)., Disp: 20 tablet, Rfl: 2    conjugated estrogens (PREMARIN) vaginal cream, Place 0.5 g vaginally twice a week., Disp: 30 g, Rfl: 5    doxepin (SILENOR) 3 mg Tab, Take 3 mg by mouth Daily., Disp: 30 tablet, Rfl: 0    DULoxetine (CYMBALTA) 20 MG capsule, Take 1 capsule (20 mg total) by mouth once daily., Disp: 30 capsule, Rfl: 2

## 2025-07-07 ENCOUNTER — TELEPHONE (OUTPATIENT)
Dept: INTERVENTIONAL RADIOLOGY/VASCULAR | Facility: HOSPITAL | Age: 70
End: 2025-07-07

## 2025-07-07 NOTE — NURSING
Radiology Pre-Procedural Instructions- [facility]    Radiology Nurse contacted patient to provide instructions for scheduled CT Lung Biopsy at 900 on 7/17/2025.   Patient instructed to arrive no later than 730 am to outpatient registration.   Registration will direct patient to outpatient lab for pre-op lab work if required.  Following labs, patient needs to check in at the surgery waiting room desk located on the second floor.     Patient instructed to remain NPO after midnight with the exception of approved essential meds taken with a small sip of water.  Instructed patient to bathe the night before and the morning of their procedure with an anti bacterial soap or Hibiclens.   Patient is aware of their responsibility to make post transportation arrangements home by a responsible adult.   Patient educated on all additional pre-op instructions per policy and verbalized understanding.

## 2025-07-10 DIAGNOSIS — I10 HYPERTENSION, UNSPECIFIED TYPE: ICD-10-CM

## 2025-07-10 RX ORDER — VALSARTAN 160 MG/1
160 TABLET ORAL DAILY
Qty: 90 TABLET | Refills: 3 | Status: SHIPPED | OUTPATIENT
Start: 2025-07-10 | End: 2026-07-10

## 2025-07-17 ENCOUNTER — HOSPITAL ENCOUNTER (OUTPATIENT)
Dept: RADIOLOGY | Facility: HOSPITAL | Age: 70
Discharge: HOME | End: 2025-07-17
Attending: INTERNAL MEDICINE
Payer: MEDICARE

## 2025-07-17 VITALS
SYSTOLIC BLOOD PRESSURE: 127 MMHG | HEIGHT: 64 IN | HEART RATE: 70 BPM | TEMPERATURE: 99 F | WEIGHT: 203.25 LBS | BODY MASS INDEX: 34.7 KG/M2 | DIASTOLIC BLOOD PRESSURE: 84 MMHG | OXYGEN SATURATION: 99 % | RESPIRATION RATE: 18 BRPM

## 2025-07-17 DIAGNOSIS — R91.1 SOLITARY PULMONARY NODULE: ICD-10-CM

## 2025-07-17 DIAGNOSIS — R91.1 SOLITARY PULMONARY NODULE: Primary | ICD-10-CM

## 2025-07-17 PROCEDURE — 63600175 PHARM REV CODE 636 W HCPCS: Mod: UD | Performed by: RADIOLOGY

## 2025-07-17 PROCEDURE — 71045 X-RAY EXAM CHEST 1 VIEW: CPT | Mod: TC

## 2025-07-17 PROCEDURE — 27000826 CT BIOPSY LUNG W/ GUIDANCE

## 2025-07-17 PROCEDURE — 63600175 PHARM REV CODE 636 W HCPCS: Mod: UD

## 2025-07-17 PROCEDURE — 71045 X-RAY EXAM CHEST 1 VIEW: CPT | Mod: 26,,, | Performed by: RADIOLOGY

## 2025-07-17 PROCEDURE — 63600175 PHARM REV CODE 636 W HCPCS: Performed by: PHYSICIAN ASSISTANT

## 2025-07-17 PROCEDURE — 71045 X-RAY EXAM CHEST 1 VIEW: CPT | Mod: 26,76,, | Performed by: RADIOLOGY

## 2025-07-17 RX ORDER — MIDAZOLAM HYDROCHLORIDE 1 MG/ML
INJECTION, SOLUTION INTRAMUSCULAR; INTRAVENOUS
Status: COMPLETED | OUTPATIENT
Start: 2025-07-17 | End: 2025-07-17

## 2025-07-17 RX ORDER — FLUMAZENIL 0.1 MG/ML
INJECTION INTRAVENOUS
Status: DISCONTINUED
Start: 2025-07-17 | End: 2025-07-17 | Stop reason: WASHOUT

## 2025-07-17 RX ORDER — LIDOCAINE HYDROCHLORIDE 10 MG/ML
INJECTION, SOLUTION INFILTRATION; PERINEURAL
Status: COMPLETED
Start: 2025-07-17 | End: 2025-07-17

## 2025-07-17 RX ORDER — FENTANYL CITRATE 50 UG/ML
INJECTION, SOLUTION INTRAMUSCULAR; INTRAVENOUS
Status: COMPLETED
Start: 2025-07-17 | End: 2025-07-17

## 2025-07-17 RX ORDER — NALOXONE HCL 0.4 MG/ML
VIAL (ML) INJECTION
Status: DISCONTINUED
Start: 2025-07-17 | End: 2025-07-17 | Stop reason: WASHOUT

## 2025-07-17 RX ORDER — MIDAZOLAM HYDROCHLORIDE 2 MG/2ML
INJECTION, SOLUTION INTRAMUSCULAR; INTRAVENOUS
Status: COMPLETED
Start: 2025-07-17 | End: 2025-07-17

## 2025-07-17 RX ORDER — LIDOCAINE HYDROCHLORIDE 10 MG/ML
1 INJECTION, SOLUTION EPIDURAL; INFILTRATION; INTRACAUDAL; PERINEURAL ONCE AS NEEDED
Status: COMPLETED | OUTPATIENT
Start: 2025-07-17 | End: 2025-07-17

## 2025-07-17 RX ORDER — FENTANYL CITRATE 50 UG/ML
INJECTION, SOLUTION INTRAMUSCULAR; INTRAVENOUS
Status: COMPLETED | OUTPATIENT
Start: 2025-07-17 | End: 2025-07-17

## 2025-07-17 RX ADMIN — FENTANYL CITRATE 25 MCG: 50 INJECTION, SOLUTION INTRAMUSCULAR; INTRAVENOUS at 09:07

## 2025-07-17 RX ADMIN — LIDOCAINE HYDROCHLORIDE 100 MG: 10 INJECTION, SOLUTION INFILTRATION; PERINEURAL at 09:07

## 2025-07-17 RX ADMIN — FENTANYL CITRATE 25 MCG: 0.05 INJECTION, SOLUTION INTRAMUSCULAR; INTRAVENOUS at 10:07

## 2025-07-17 RX ADMIN — MIDAZOLAM 0.5 MG: 1 INJECTION INTRAMUSCULAR; INTRAVENOUS at 10:07

## 2025-07-17 RX ADMIN — MIDAZOLAM HYDROCHLORIDE 0.5 MG: 1 INJECTION, SOLUTION INTRAMUSCULAR; INTRAVENOUS at 09:07

## 2025-07-17 RX ADMIN — LIDOCAINE HYDROCHLORIDE 0.1 MG: 10 INJECTION, SOLUTION EPIDURAL; INFILTRATION; INTRACAUDAL; PERINEURAL at 08:07

## 2025-07-17 NOTE — DISCHARGE SUMMARY
Radiology Post-Procedure Note    Pre Op Diagnosis: Ground glass nodule  Post Op Diagnosis: Same    Procedure: CT guided lung biopsy    Procedure performed by: MD Lelo    Written Informed Consent Obtained: Yes  Specimen Removed: YES 4 cores  Estimated Blood Loss: less than 50     Findings:   Technically successful biopsy with small volume hemorrhage and pneumothorax on post images. Will continue to monitor.    Patient tolerated procedure well.    Ahsan Lind MD  Department of Radiology

## 2025-07-17 NOTE — DISCHARGE INSTRUCTIONS
General Discharge Information:      1.  Do not drive a motor vehicle, operate machinery or power tools, or signs legal papers for 24 hours.    2.  Go home and rest.    3.  Do not lift anything greater than 10 lbs for the next 5 to 7 days.     4.  Remove dressing in 48 hours, no tub baths for 5 to 7 days, ok to shower after removing dressing.      5. Several times every hour while you are awake, take 2-3 deep breaths and cough.     6. Results will take 1 to 2 weeks, you will discuss the results with ordering provider.    7. Call your doctor for severe pain, bleeding, fever, or signs or symptoms of infection (pain, swelling, redness, foul odor, drainage).

## 2025-07-17 NOTE — INTERVAL H&P NOTE
The patient has been examined and the H&P has been reviewed:    I concur with the findings and no changes have occurred since H&P was written.    ASA 2  Mallampati 2     The procedure was discussed in detail, including risks and alternatives. The patient voices understanding and all questions were answered. The patient agrees to proceed as planned.

## 2025-07-17 NOTE — PLAN OF CARE
Patient cleared per Radiologist for discharge. Patient is to return tomorrow morning for chest xray, follow up on small pneumo. Discharge instructions given to patient and family, verbalized understanding. IV discontinued with tip intact. Discharged via wheelchair.

## 2025-07-17 NOTE — NURSING
Procedure explained and pt consented in DSU by IZZY Calderon PA-C.   Pt arrived via stretcher to CT for CT Guided Lung Biopsy.   AAOx4,- Time out performed.     Pt received Fentanyl 75 mcg and Versed 1.5 mg IV. Vital signs monitored and remained stable for duration of procedure. Biopsies collected By Radiologist.   Specimens submitted to lab for Pathology. .  Bandage applied to pts right chest . CDI- Report given to Catherine ENRIQUEZ  Pt transported to DSU  STAT Post- Chest CT - shows small pneumothorax  STAT Post- Chest  Xray - done, awaiting read    PATIENT TO REMAIN NPO UNTIL AFTER 3 HOUR TIMED XRAY TO ENSURE CHEST TUBE IS NOT NEEDED.     Timed chest Xray ordered and due at 1320 and must be cleared by radiologist prior to pts discharge.

## 2025-07-18 ENCOUNTER — HOSPITAL ENCOUNTER (OUTPATIENT)
Dept: RADIOLOGY | Facility: HOSPITAL | Age: 70
Discharge: HOME | End: 2025-07-18
Attending: RADIOLOGY
Payer: MEDICARE

## 2025-07-18 DIAGNOSIS — R91.1 SOLITARY PULMONARY NODULE: ICD-10-CM

## 2025-07-18 PROCEDURE — 71045 X-RAY EXAM CHEST 1 VIEW: CPT | Mod: TC

## 2025-07-18 PROCEDURE — 71045 X-RAY EXAM CHEST 1 VIEW: CPT | Mod: 26,,, | Performed by: RADIOLOGY

## 2025-07-19 ENCOUNTER — HOSPITAL ENCOUNTER (EMERGENCY)
Facility: HOSPITAL | Age: 70
Discharge: HOME | End: 2025-07-19
Attending: EMERGENCY MEDICINE
Payer: MEDICARE

## 2025-07-19 VITALS
HEIGHT: 64 IN | HEART RATE: 80 BPM | TEMPERATURE: 98 F | BODY MASS INDEX: 34.49 KG/M2 | OXYGEN SATURATION: 97 % | DIASTOLIC BLOOD PRESSURE: 84 MMHG | WEIGHT: 202 LBS | RESPIRATION RATE: 20 BRPM | SYSTOLIC BLOOD PRESSURE: 138 MMHG

## 2025-07-19 DIAGNOSIS — J95.811 PNEUMOTHORAX OF RIGHT LUNG AFTER BIOPSY: Primary | ICD-10-CM

## 2025-07-19 DIAGNOSIS — R06.02 SHORTNESS OF BREATH: ICD-10-CM

## 2025-07-19 LAB
ABSOLUTE EOSINOPHIL (SMH): 0.06 K/UL
ABSOLUTE MONOCYTE (SMH): 0.71 K/UL (ref 0.3–1)
ABSOLUTE NEUTROPHIL COUNT (SMH): 4.7 K/UL (ref 1.8–7.7)
ALBUMIN SERPL-MCNC: 3.7 G/DL (ref 3.5–5.2)
ALP SERPL-CCNC: 42 UNIT/L (ref 40–150)
ALT SERPL-CCNC: 14 UNIT/L (ref 10–44)
ANION GAP (SMH): 9 MMOL/L (ref 8–16)
AST SERPL-CCNC: 77 UNIT/L (ref 11–45)
BASOPHILS # BLD AUTO: 0.03 K/UL
BASOPHILS NFR BLD AUTO: 0.4 %
BILIRUB SERPL-MCNC: 0.3 MG/DL (ref 0.1–1)
BUN SERPL-MCNC: 16 MG/DL (ref 8–23)
CALCIUM SERPL-MCNC: 9.2 MG/DL (ref 8.7–10.5)
CHLORIDE SERPL-SCNC: 101 MMOL/L (ref 95–110)
CO2 SERPL-SCNC: 30 MMOL/L (ref 23–29)
CREAT SERPL-MCNC: 1.2 MG/DL (ref 0.5–1.4)
ERYTHROCYTE [DISTWIDTH] IN BLOOD BY AUTOMATED COUNT: 13.1 % (ref 11.5–14.5)
GFR SERPLBLD CREATININE-BSD FMLA CKD-EPI: 49 ML/MIN/1.73/M2
GLUCOSE SERPL-MCNC: 101 MG/DL (ref 70–110)
HCT VFR BLD AUTO: 36.3 % (ref 37–48.5)
HGB BLD-MCNC: 11.4 GM/DL (ref 12–16)
HOLD SPECIMEN: NORMAL
IMM GRANULOCYTES # BLD AUTO: 0.02 K/UL (ref 0–0.04)
IMM GRANULOCYTES NFR BLD AUTO: 0.3 % (ref 0–0.5)
LYMPHOCYTES # BLD AUTO: 1.55 K/UL (ref 1–4.8)
MCH RBC QN AUTO: 28.8 PG (ref 27–31)
MCHC RBC AUTO-ENTMCNC: 31.4 G/DL (ref 32–36)
MCV RBC AUTO: 92 FL (ref 82–98)
NUCLEATED RBC (/100WBC) (SMH): 0 /100 WBC
PLATELET # BLD AUTO: 308 K/UL (ref 150–450)
PMV BLD AUTO: 9 FL (ref 9.2–12.9)
POTASSIUM SERPL-SCNC: 3.6 MMOL/L (ref 3.5–5.1)
PROT SERPL-MCNC: 6.6 GM/DL (ref 6–8.4)
RBC # BLD AUTO: 3.96 M/UL (ref 4–5.4)
RELATIVE EOSINOPHIL (SMH): 0.9 % (ref 0–8)
RELATIVE LYMPHOCYTE (SMH): 22.1 % (ref 18–48)
RELATIVE MONOCYTE (SMH): 10.1 % (ref 4–15)
RELATIVE NEUTROPHIL (SMH): 66.2 % (ref 38–73)
SODIUM SERPL-SCNC: 140 MMOL/L (ref 136–145)
TROPONIN I SERPL HS-MCNC: 4 NG/L
WBC # BLD AUTO: 7.02 K/UL (ref 3.9–12.7)

## 2025-07-19 PROCEDURE — 36415 COLL VENOUS BLD VENIPUNCTURE: CPT | Performed by: PHYSICIAN ASSISTANT

## 2025-07-19 PROCEDURE — 85025 COMPLETE CBC W/AUTO DIFF WBC: CPT | Performed by: PHYSICIAN ASSISTANT

## 2025-07-19 PROCEDURE — 84484 ASSAY OF TROPONIN QUANT: CPT | Performed by: PHYSICIAN ASSISTANT

## 2025-07-19 PROCEDURE — 93010 ELECTROCARDIOGRAM REPORT: CPT | Mod: ,,, | Performed by: GENERAL PRACTICE

## 2025-07-19 PROCEDURE — 99285 EMERGENCY DEPT VISIT HI MDM: CPT | Mod: 25

## 2025-07-19 PROCEDURE — 80053 COMPREHEN METABOLIC PANEL: CPT | Performed by: PHYSICIAN ASSISTANT

## 2025-07-19 PROCEDURE — 93005 ELECTROCARDIOGRAM TRACING: CPT

## 2025-07-19 NOTE — FIRST PROVIDER EVALUATION
Emergency Department TeleTriage Encounter Note      CHIEF COMPLAINT    Chief Complaint   Patient presents with    Shortness of Breath     Pt states she had lung biopsy two days ago. Pt having increased shortness of breath today. Was directed to come to ER by pulmonologist.       VITAL SIGNS   Initial Vitals [07/19/25 1653]   BP Pulse Resp Temp SpO2   139/81 78 (!) 24 98 °F (36.7 °C) 98 %      MAP       --            ALLERGIES    Review of patient's allergies indicates:  No Known Allergies    PROVIDER TRIAGE NOTE  Patient had lung biopsy 2 days and and is presenting with complaint of right chest pain worse with cough and inspiration and increased SOB.       ORDERS  Labs Reviewed - No data to display    ED Orders (720h ago, onward)      None              Virtual Visit Note: The provider triage portion of this emergency department evaluation and documentation was performed via [employer], a HIPAA-compliant telemedicine application, in concert with a tele-presenter in the room. A face to face patient evaluation with one of my colleagues will occur once the patient is placed in an emergency department room.      DISCLAIMER: This note was prepared with [employer]ala[employer] voice recognition transcription software. Garbled syntax, mangled pronouns, and other bizarre constructions may be attributed to that software system.

## 2025-07-20 NOTE — ED PROVIDER NOTES
Encounter Date: 7/19/2025       History     Chief Complaint   Patient presents with    Shortness of Breath     Pt states she had lung biopsy two days ago. Pt having increased shortness of breath today. Was directed to come to ER by pulmonologist.     Laxmi Chand is a 69 y.o. female presenting for evaluation of shortness of breath, persisting over the last couple of days after undergoing a right lung biopsy by interventional radiology.  Patient states that she had a small pneumothorax, after the procedure.  She had a repeat chest x-ray yesterday, which showed some improvement.  Today, she did have some associated shortness of breath and they recommended she come back to the emergency department for further evaluation.  She denies any chest pain or palpitations.  No fever, no chills.  No cough or congestion.  She has a past medical history of Breast cancer in female (09/07/2022), COPD (chronic obstructive pulmonary disease), Degeneration of lumbar intervertebral disc, GERD (gastroesophageal reflux disease), HTN (hypertension), and Hyperlipemia, mixed.      The history is provided by the patient.     Review of patient's allergies indicates:  No Known Allergies  Past Medical History:   Diagnosis Date    Breast cancer in female 09/07/2022    IDC with high grade DCIS    COPD (chronic obstructive pulmonary disease)     Degeneration of lumbar intervertebral disc     GERD (gastroesophageal reflux disease)     HTN (hypertension)     Hyperlipemia, mixed      Past Surgical History:   Procedure Laterality Date    BIOPSY OF AXILLARY NODE Right 9/7/2022    Procedure: BIOPSY, LYMPH NODE, AXILLARY;  Surgeon: Jatin Gutierrez MD;  Location: [facility] OR;  Service: General;  Laterality: Right;    BREAST BIOPSY Right 08/05/2022    BREAST MASS EXCISION Right 9/7/2022    Procedure: EXCISION, MASS, BREAST;  Surgeon: Jatin Gutierrez MD;  Location: [facility] OR;  Service: General;  Laterality: Right;  wire localized partial mastectomy  right breast with margins     CARPAL TUNNEL RELEASE  1985    CHOLECYSTECTOMY  1978    CORRECTION OF HAMMER TOE Right 9/15/2023    Procedure: CORRECTION, HAMMER TOE;  Surgeon: Devyn Mccullough DPM;  Location: [facility] OR;  Service: Podiatry;  Laterality: Right;    FOOT HARDWARE REMOVAL Right 9/15/2023    Procedure: REMOVAL, HARDWARE, FOOT;  Surgeon: Devyn Mccullough DPM;  Location: [facility] OR;  Service: Podiatry;  Laterality: Right;    HYSTERECTOMY      KNEE SURGERY Left 06/01/2020    LUMBAR DISC SURGERY  01/01/1990    plate and roland    LUMBAR FUSION  2011    MIDFOOT ARTHRODESIS Right 9/15/2023    Procedure: FUSION, JOINT, MIDFOOT;  Surgeon: Devyn Mccullough DPM;  Location: [facility] OR;  Service: Podiatry;  Laterality: Right;  [place] 28 1st MPJ fusion plates screws bone grafts as well as cut guide.    TOTAL KNEE REPLACEMENT USING COMPUTER NAVIGATION Left 02/15/2021     Family History   Problem Relation Name Age of Onset    Hypertension Mother      Diabetes Mother      Hypertension Father      Lung cancer Maternal Grandfather      Breast cancer Neg Hx       Social History[1]  Review of Systems   Constitutional:  Negative for chills and fever.   Respiratory:  Positive for shortness of breath. Negative for cough, chest tightness and wheezing.    Cardiovascular:  Negative for chest pain and palpitations.   Musculoskeletal:  Negative for arthralgias, back pain, joint swelling, myalgias, neck pain and neck stiffness.   Skin:  Negative for color change, pallor, rash and wound.   Neurological:  Negative for weakness and numbness.   Hematological:  Does not bruise/bleed easily.       Physical Exam     Initial Vitals [07/19/25 1653]   BP Pulse Resp Temp SpO2   139/81 78 (!) 24 98 °F (36.7 °C) 98 %      MAP       --         Physical Exam    Nursing note and vitals reviewed.  Constitutional: She appears well-developed and well-nourished. She is not diaphoretic. No distress.   HENT:   Head: Normocephalic and atraumatic.   Neck:  Neck supple.   Normal range of motion.  Cardiovascular:  Normal rate, regular rhythm, normal heart sounds and intact distal pulses.     Exam reveals no gallop and no friction rub.       No murmur heard.  Pulmonary/Chest: Breath sounds normal. No respiratory distress. She has no wheezes. She has no rhonchi. She has no rales.   Equal, bilateral breath sounds noted without wheezing.  No tachypnea.    Mild area of ecchymosis noted to right lateral chest wall without erythema, bleeding or discharge.     Musculoskeletal:         General: No tenderness or edema. Normal range of motion.      Cervical back: Normal range of motion and neck supple.     Neurological: She is alert and oriented to person, place, and time. She has normal strength. No sensory deficit.   Skin: Skin is warm and dry. No rash and no abscess noted. No erythema.   Psychiatric: She has a normal mood and affect.         ED Course   Procedures  Labs Reviewed   COMPREHENSIVE METABOLIC PANEL - Abnormal       Result Value    Sodium 140      Potassium 3.6      Chloride 101      CO2 30 (*)     Glucose 101      BUN 16      Creatinine 1.2      Calcium 9.2      Protein Total 6.6      Albumin 3.7      Bilirubin Total 0.3      ALP 42      AST 77 (*)     ALT 14      Anion Gap 9      eGFR 49 (*)    CBC WITH DIFFERENTIAL - Abnormal    WBC 7.02      RBC 3.96 (*)     Hgb 11.4 (*)     Hct 36.3 (*)     MCV 92      MCH 28.8      MCHC 31.4 (*)     RDW 13.1      Platelet Count 308      MPV 9.0 (*)     Nucleated RBC 0      Neut % 66.2      Lymph % 22.1      Mono % 10.1      Eos % 0.9      Basophil % 0.4      Imm Grans % 0.3      Neut # 4.7      Lymph # 1.55      Mono # 0.71      Eos # 0.06      Baso # 0.03      Imm Grans # 0.02     TROPONIN I HIGH SENSITIVITY - Normal    Troponin High Sensitive 4     CBC W/ AUTO DIFFERENTIAL    Narrative:     The following orders were created for panel order CBC auto differential.  Procedure                               Abnormality          Status                     ---------                               -----------         ------                     CBC with Differential[[id number]]       Abnormal            Final result                 Please view results for these tests on the individual orders.   EXTRA TUBES    Narrative:     The following orders were created for panel order EXTRA TUBES.  Procedure                               Abnormality         Status                     ---------                               -----------         ------                     Gold Top Hold[[id number]]                                   Final result                 Please view results for these tests on the individual orders.   GOLD TOP HOLD    Extra Tube Hold for add-ons.            Imaging Results              X-Ray Chest PA And Lateral (Final result)  Result time 07/19/25 17:40:42      Final result by Willie Moffett MD (07/19/25 17:40:42)                   Impression:      Small right pneumothorax, slightly decreased in volume from chest radiograph 1 day prior.  No new consolidation.  Otherwise no change.      Electronically signed by: Willie Moffett MD  Date:    07/19/2025  Time:    17:40               Narrative:    EXAMINATION:  XR CHEST PA AND LATERAL    CLINICAL HISTORY:  Shortness of breath    TECHNIQUE:  PA and lateral views of the chest were performed.    COMPARISON:  Chest radiograph 07/18/2025, CT calcium scoring 06/11/2025, chest CT 12/19/2024    FINDINGS:  Patient is somewhat rotated.  Resolution is limited by body habitus with underpenetration.    Redemonstrated small pneumothorax at the right upper lung zone/apex, decreased in size from chest radiograph 1 day prior.  No left-sided pneumothorax.  Cardiomediastinal silhouette is midline and stable without evidence tension pneumothorax.  Bibasilar minimal platelike scarring versus atelectasis.  The lungs are otherwise well expanded without consolidation or sizable pleural effusion.  Hilar contours are  within normal limits.  Osseous structures appear stable without acute findings.                                       Medications - No data to display  Medical Decision Making  Differential diagnosis:  Pneumothorax  Pleural effusion  PE    Pt emergently evaluated here in the ED.    Patient is well-appearing on exam without tachypnea or hypoxia.  Labs are stable.  Repeat chest x-ray shows interval improvement of small right-sided pneumothorax secondary to recent lung biopsy.  No lung consolidation.  She states she feels well enough to go home.  She declines any other evaluation at this time.  She states she will follow back up as needed.  She is encouraged to follow up with pulmonology as scheduled.  She voices understanding and is agreeable with the plan.  She is given specific return precautions.    Amount and/or Complexity of Data Reviewed  Labs: ordered. Decision-making details documented in ED Course.  Radiology: ordered. Decision-making details documented in ED Course.                                          Clinical Impression:  Final diagnoses:  [R06.02] Shortness of breath  [J95.811] Pneumothorax of right lung after biopsy (Primary)          ED Disposition Condition    Discharge Stable          ED Prescriptions    None       Follow-up Information       Follow up With Specialties Details Why Contact Info Additional Information    [facility] ED Emergency Medicine  As needed, If symptoms worsen [address] [place] [address] [facility] [id number] 1st floor                   [1]   Social History  Tobacco Use    Smoking status: Every Day     Types: Vaping with nicotine     Passive exposure: Never    Smokeless tobacco: Never   Substance Use Topics    Alcohol use: Yes    Drug use: Not Currently        Nancy Law PA-C  07/19/25 [id number]

## 2025-07-21 ENCOUNTER — TELEPHONE (OUTPATIENT)
Dept: PULMONOLOGY | Facility: CLINIC | Age: 70
End: 2025-07-21
Payer: MEDICARE

## 2025-07-21 NOTE — TELEPHONE ENCOUNTER
Spoke to patient.  Informed her that we don't have any results back as of yet on her biopsy.  I did advise that once we do, we will be in contact.  V/U.   Yes

## 2025-07-21 NOTE — TELEPHONE ENCOUNTER
Copied from CRM #[id number]. Topic: General Inquiry - Test Results  >> Jul 21, 2025  8:30 AM Hyacinth wrote:  Type:  Test Results    Who Called: pt     Name of Test (Lab/Mammo/Etc): lung biopsy     Date of Test: 07/17     Ordering Provider: Stephan Song     Where the test was performed: [facility]      Would the patient rather a call back or a response via [facility]? Pls call     Best Call Back Number: [phone]     Additional Information:  pt would like to discuss test results and possibly abimael an appt    Please call back to advise. Thanks!

## 2025-07-22 DIAGNOSIS — C34.11 CANCER OF UPPER LOBE OF RIGHT LUNG: ICD-10-CM

## 2025-07-22 DIAGNOSIS — J44.9 CHRONIC OBSTRUCTIVE PULMONARY DISEASE, UNSPECIFIED COPD TYPE: Primary | ICD-10-CM

## 2025-07-24 ENCOUNTER — HOSPITAL ENCOUNTER (OUTPATIENT)
Dept: PULMONOLOGY | Facility: HOSPITAL | Age: 70
Discharge: HOME | End: 2025-07-24
Attending: INTERNAL MEDICINE
Payer: MEDICARE

## 2025-07-24 DIAGNOSIS — J44.9 CHRONIC OBSTRUCTIVE PULMONARY DISEASE, UNSPECIFIED COPD TYPE: ICD-10-CM

## 2025-07-24 LAB
OHS QRS DURATION: 82 MS
OHS QTC CALCULATION: 437 MS

## 2025-07-24 PROCEDURE — 94060 EVALUATION OF WHEEZING: CPT

## 2025-07-24 PROCEDURE — 94726 PLETHYSMOGRAPHY LUNG VOLUMES: CPT

## 2025-07-24 RX ORDER — ALBUTEROL SULFATE 2.5 MG/.5ML
SOLUTION RESPIRATORY (INHALATION)
Status: DISPENSED
Start: 2025-07-24 | End: 2025-07-24

## 2025-07-25 ENCOUNTER — TELEPHONE (OUTPATIENT)
Dept: HEMATOLOGY/ONCOLOGY | Facility: CLINIC | Age: 70
End: 2025-07-25
Payer: MEDICARE

## 2025-07-25 NOTE — TELEPHONE ENCOUNTER
----- Message from Mell Veloz NP sent at 7/25/2025 11:35 AM CDT -----  You can send clearance  ----- Message -----  From: Iram Buck RN  Sent: 7/25/2025  11:05 AM CDT  To: Mell Veloz NP    Please advise if pt needs to see you or Dr Khoobehi prior. I don't see hx of blood clots or blood thinners on file. Seen for breast ca. Thanks!  ----- Message -----  From: Fouzia Grant  Sent: 7/25/2025   9:04 AM CDT  To: Magdiel Monte Staff    Pt is scheduled to have surgery on her hand and is requesting a clearance from HERBER Veloz. Pt's surgery is scheduled for July 31st. Pt is having the procedure at [facility] in [place], [place].    Pt's # [phone]  [facility] # [phone]  Fax# [phone]

## 2025-07-28 ENCOUNTER — TELEPHONE (OUTPATIENT)
Dept: HEMATOLOGY/ONCOLOGY | Facility: CLINIC | Age: 70
End: 2025-07-28
Payer: MEDICARE

## 2025-07-28 ENCOUNTER — OFFICE VISIT (OUTPATIENT)
Dept: PULMONOLOGY | Facility: CLINIC | Age: 70
End: 2025-07-28
Payer: MEDICARE

## 2025-07-28 VITALS
BODY MASS INDEX: 34.77 KG/M2 | DIASTOLIC BLOOD PRESSURE: 76 MMHG | SYSTOLIC BLOOD PRESSURE: 148 MMHG | HEART RATE: 74 BPM | WEIGHT: 203.69 LBS | HEIGHT: 64 IN | OXYGEN SATURATION: 98 %

## 2025-07-28 DIAGNOSIS — J44.9 CHRONIC OBSTRUCTIVE PULMONARY DISEASE, UNSPECIFIED COPD TYPE: ICD-10-CM

## 2025-07-28 DIAGNOSIS — G47.00 INSOMNIA, UNSPECIFIED TYPE: ICD-10-CM

## 2025-07-28 DIAGNOSIS — J45.50 SEVERE PERSISTENT ASTHMA, UNCOMPLICATED: ICD-10-CM

## 2025-07-28 DIAGNOSIS — C34.11 MALIGNANT NEOPLASM OF UPPER LOBE OF RIGHT LUNG: Primary | ICD-10-CM

## 2025-07-28 DIAGNOSIS — C34.11 ADENOCARCINOMA OF UPPER LOBE OF RIGHT LUNG: ICD-10-CM

## 2025-07-28 DIAGNOSIS — C34.90 MALIGNANT NEOPLASM OF UNSPECIFIED PART OF UNSPECIFIED BRONCHUS OR LUNG: ICD-10-CM

## 2025-07-28 PROCEDURE — 99999 PR PBB SHADOW E&M-EST. PATIENT-LVL V: CPT | Mod: PBBFAC,,, | Performed by: INTERNAL MEDICINE

## 2025-07-28 PROCEDURE — 99213 OFFICE O/P EST LOW 20 MIN: CPT | Mod: S$PBB,,, | Performed by: INTERNAL MEDICINE

## 2025-07-28 PROCEDURE — 99215 OFFICE O/P EST HI 40 MIN: CPT | Mod: PBBFAC,PO | Performed by: INTERNAL MEDICINE

## 2025-07-28 RX ORDER — ZOLPIDEM TARTRATE 5 MG/1
5 TABLET ORAL NIGHTLY PRN
Qty: 20 TABLET | Refills: 2 | Status: SHIPPED | OUTPATIENT
Start: 2025-07-28 | End: 2026-01-26

## 2025-07-28 NOTE — PATIENT INSTRUCTIONS
We discuss breathing test and lungs -- surgery may not be good to consider    Ct chest 2021 had small lung nodule that grew slowly and more dense til 12/2024 -- biopsy films viewed.    Will consult thoracic and medical/radiation oncology    Ambein renewed    Breathing more short now-- use prednisone --avoid while being treated with radiation    Your lungs should be adequate for knee surgery      Re check 4 months..

## 2025-07-28 NOTE — TELEPHONE ENCOUNTER
Spoke with pt to notify that clearance letter for surgery was faxed to Dr Johnson's office. She states that she is about to see Dr Song now and he wants her to see radiation.     Copied from CRM #[id number]. Topic: General Inquiry - Patient Advice  >> Jul 28, 2025  8:30 AM Gerry wrote:  Type:  Needs Medical Advice    Who Called:  pt     Would the patient rather a call back or a response via [facility]?  Call back pls    Best Call Back Number: [phone]    Additional Information: pt is having infusion and carpel tunnel for her hand, can someone assist her? Thank you,

## 2025-07-28 NOTE — PROGRESS NOTES
7/28/2025    Laxmi Chand  New Patient Consult    Chief Complaint   Patient presents with    Follow-up       HPI:    7/28/2025 pt dx lung cancer.  Pft with fev about a liter.  Pt has mom dying after falls --with decub's-- has severe pain  Pt on  dupixent and doing better breathing, more sob with  heat.     6/19/2025 cough remitted with shot, breathing good.  Dupixent working well.   Still nauseated.    Min albuterol use, still salcido but minimal.    Patient Instructions   Ground glass nodule seen right upper lung around 20 mm, looked to be present in 2021 and only 15 mm in 2021.   Would recommend biopsy.      Calcified coronaries -- good stress test 1/2025, but ct has calcifications. Will send in crestor 10 mg at your request.    Asthma/copd doing very good.  Need to continue dupixent and trelegy.  Use prednisone as needed.    Use ambein for sleep as needed --get ok with pain doc      We review creatinine rising 2/2024 and staying up.   Celebrex use risky-- may be reasonable at lowest dose and no alternative      Will need follow up ct chest in December if biopsy not diagnostic.    Can refil ambien til 12/19/2025    May consider adipex in December-- not too effective generally and has heart risk...  12/19/2024 off smokes, cough cleared, the patient still has a take steroids frequently.  She feels like she is doing significantly better.  Patient Instructions   Ct chest viewed-- ground glass nodule and small solid nodules seen.  Recheck ct in a yr needed..      Breathing test and walk test noted -- moderate copd -- no oxygen needed    You seem to be steroid dependant-- may consider special shots?   With no smokes you may not need shots....      Use zofran as you have for chronic nausea -- onset after gallbladder surgery.    Need to get flu vaccine -- lots of flu-- use tamiflu as discussed if flu or exposures    Recheck 3 months..    12/4/2024   Pt cough last 3+ months with yg mucous -- failed doxy and augmentin  pt  worked restaurant.  Has bad back last 30 yrs-the patient mobilizes with a roller walker.  She barely can walk.  Uses advair 2 bid but no spiriva.  Uses neb rx 4/d    Pt has occ wheezes, no noc worsening.  No asthma no childhood ashtma..    Cutting back nicotene using vaping -- better than was smoking last yr..      Pt uses steroids for lungs with good benefit.  Uses steroids 3-4 times yearly.    Pt used doxy, levaquin, azithro-- failed recently degree or mucus.  She still has yellow-green mucus despite antibiotics to 3 times.    CT scan of the chest done 2021 does show calcified granulomas, bronchiectasis, and minimal upper lung emphysema.     The patient has had MRSA in wound before-left arm abscess in 2021..    Echocardiogram done October 2024-Summary  Show Result Comparison     Left Ventricle: The left ventricle is normal in size. Mildly increased wall thickness. There is mild concentric hypertrophy. Normal wall motion. There is normal systolic function with a visually estimated ejection fraction of 60 - 65%. There is normal diastolic function.    Right Ventricle: Normal right ventricular cavity size. Wall thickness is normal. Systolic function is normal.    Tricuspid Valve: There is mild regurgitation with a centrally directed jet.    Pulmonary Artery: There is mild pulmonary hypertension. The estimated pulmonary artery systolic pressure is 41 mmHg.    IVC/SVC: Normal venous pressure at 3 mmHg.  Patient Instructions   Need regular culture---- check afb cultures later.....  suspect resistant germs    Ct chest with bronchiectasis in 2021 - had calcified granuloma and ground glass nodule and emphysema.    Trial trelegy over advair ---- breztri not covered (traditional inhaler)    Use prednisone -- one daily for bad breathing for 5 days - -take now, repeat as needed.    Use albuterol inhaler or nebulizer up to every 4 hours as needed......    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    Follow up ct chest and breathing test needed  Check oxygen walking       PFSH:    Past Medical History:   Diagnosis Date    Breast cancer in female 09/07/2022    IDC with high grade DCIS    COPD (chronic obstructive pulmonary disease)     Degeneration of lumbar intervertebral disc     GERD (gastroesophageal reflux disease)     HTN (hypertension)     Hyperlipemia, mixed          Past Surgical History:   Procedure Laterality Date    BIOPSY OF AXILLARY NODE Right 9/7/2022    Procedure: BIOPSY, LYMPH NODE, AXILLARY;  Surgeon: Jatin Gutierrez MD;  Location: [facility] OR;  Service: General;  Laterality: Right;    BREAST BIOPSY Right 08/05/2022    BREAST MASS EXCISION Right 9/7/2022    Procedure: EXCISION, MASS, BREAST;  Surgeon: Jatin Gutierrez MD;  Location: [facility] OR;  Service: General;  Laterality: Right;  wire localized partial mastectomy right breast with margins     CARPAL TUNNEL RELEASE  1985    CHOLECYSTECTOMY  1978    CORRECTION OF HAMMER TOE Right 9/15/2023    Procedure: CORRECTION, HAMMER TOE;  Surgeon: Devyn Mccullough DPM;  Location: [facility] OR;  Service: Podiatry;  Laterality: Right;    FOOT HARDWARE REMOVAL Right 9/15/2023    Procedure: REMOVAL, HARDWARE, FOOT;  Surgeon: Devyn Mccullough DPM;  Location: [facility] OR;  Service: Podiatry;  Laterality: Right;    HYSTERECTOMY      KNEE SURGERY Left 06/01/2020    LUMBAR DISC SURGERY  01/01/1990    plate and roland    LUMBAR FUSION  2011    MIDFOOT ARTHRODESIS Right 9/15/2023    Procedure: FUSION, JOINT, MIDFOOT;  Surgeon: Devyn Mccullough DPM;  Location: [facility] OR;  Service: Podiatry;  Laterality: Right;  [place] 28 1st MPJ fusion plates screws bone grafts as well as cut guide.    TOTAL KNEE REPLACEMENT USING COMPUTER NAVIGATION Left 02/15/2021     Social History     Tobacco Use    Smoking status: Every Day     Types: Vaping with nicotine     Passive exposure: Never    Smokeless tobacco: Never  "  Substance Use Topics    Alcohol use: Yes    Drug use: Not Currently     Family History   Problem Relation Name Age of Onset    Hypertension Mother      Diabetes Mother      Hypertension Father      Lung cancer Maternal Grandfather      Breast cancer Neg Hx       Review of patient's allergies indicates:  No Known Allergies       Review of Systems:  a review of eleven systems covering constitutional, Eye, HEENT, Psych, Respiratory, Cardiac, GI, , Musculoskeletal, Endocrine, Dermatologic was negative except for pertinent findings as listed ABOVE and below:  pertinent positives as above, rest good        Exam:Comprehensive exam done. BP (!) 148/76 (BP Location: Left arm, Patient Position: Sitting)   Pulse 74   Ht 5' 4" (1.626 m)   Wt 92.4 kg (203 lb 11.3 oz)   SpO2 98% Comment: on room air at rest  BMI 34.97 kg/m²   Exam included Vitals as listed, and patient's appearance and affect and alertness and mood, oral exam for yeast and hygiene and pharynx lesions and Mallapatti (M) score, neck with inspection for jvd and masses and thyroid abnormalities and lymph nodes (supraclavicular and infraclavicular nodes and axillary also examined and noted if abn), chest exam included symmetry and effort and fremitus and percussion and auscultation, cardiac exam included rhythm and gallops and murmur and rubs and jvd and edema, abdominal exam for mass and hepatosplenomegaly and tenderness and hernias and bowel sounds, Musculoskeletal exam with muscle tone and posture and mobility/gait and  strength, and skin for rashes and cyanosis and pallor and turgor, extremity for clubbing.  Findings were normal except for pertinent findings listed below:  M2, coarse breath sounds.  No edema           Radiographs (ct chest and cxr) reviewed: view by direct vision      Labs reviewed           PFT will be done and results to be reviewed       Plan:  Clinical impression is apparently straight forward and impression with management as " "below.    Laxmi "Va" was seen today for follow-up.    Diagnoses and all orders for this visit:    Malignant neoplasm of upper lobe of right lung  -     Ambulatory referral/consult to Oncology; Future    Chronic obstructive pulmonary disease, unspecified COPD type    Severe persistent asthma, uncomplicated    Insomnia, unspecified type  -     zolpidem (AMBIEN) 5 MG Tab; Take 1 tablet (5 mg total) by mouth nightly as needed (insomnia).              Follow up in about 4 months (around 11/28/2025), or if symptoms worsen or fail to improve.    Discussed with patient above for education the following:      Patient Instructions   We discuss breathing test and lungs -- surgery may not be good to consider    Ct chest 2021 had small lung nodule that grew slowly and more dense til 12/2024 -- biopsy films viewed.    Will consult thoracic and medical/radiation oncology    Ambein renewed    Breathing more short now-- use prednisone --avoid while being treated with radiation    Your lungs should be adequate for knee surgery      Re check 4 months..  Leona took 21 min        "

## 2025-07-29 ENCOUNTER — HOSPITAL ENCOUNTER (OUTPATIENT)
Dept: RADIOLOGY | Facility: HOSPITAL | Age: 70
Discharge: HOME | End: 2025-07-29
Attending: RADIOLOGY
Payer: MEDICARE

## 2025-07-29 VITALS — BODY MASS INDEX: 34.31 KG/M2 | HEIGHT: 64 IN | WEIGHT: 201 LBS

## 2025-07-29 DIAGNOSIS — C34.11 ADENOCARCINOMA OF UPPER LOBE OF RIGHT LUNG: ICD-10-CM

## 2025-07-29 PROCEDURE — 78815 PET IMAGE W/CT SKULL-THIGH: CPT | Mod: 26,PI,, | Performed by: RADIOLOGY

## 2025-07-29 PROCEDURE — 82962 GLUCOSE BLOOD TEST: CPT | Mod: PO

## 2025-07-29 PROCEDURE — A9552 F18 FDG: HCPCS | Mod: PO | Performed by: RADIOLOGY

## 2025-07-29 RX ORDER — FLUDEOXYGLUCOSE F18 500 MCI/ML
13.5 INJECTION INTRAVENOUS
Status: COMPLETED | OUTPATIENT
Start: 2025-07-29 | End: 2025-07-29

## 2025-07-29 RX ADMIN — FLUDEOXYGLUCOSE F-18 13.5 MILLICURIE: 500 INJECTION INTRAVENOUS at 03:07

## 2025-07-30 ENCOUNTER — CLINICAL SUPPORT (OUTPATIENT)
Dept: RADIATION ONCOLOGY | Facility: CLINIC | Age: 70
End: 2025-07-30
Payer: MEDICARE

## 2025-07-30 ENCOUNTER — TELEPHONE (OUTPATIENT)
Facility: CLINIC | Age: 70
End: 2025-07-30
Payer: MEDICARE

## 2025-07-30 VITALS
HEART RATE: 76 BPM | TEMPERATURE: 98 F | SYSTOLIC BLOOD PRESSURE: 146 MMHG | WEIGHT: 203 LBS | BODY MASS INDEX: 34.84 KG/M2 | DIASTOLIC BLOOD PRESSURE: 66 MMHG

## 2025-07-30 DIAGNOSIS — C34.90 MALIGNANT NEOPLASM OF LUNG, UNSPECIFIED LATERALITY, UNSPECIFIED PART OF LUNG: ICD-10-CM

## 2025-07-30 DIAGNOSIS — C34.90 MALIGNANT NEOPLASM OF LUNG, UNSPECIFIED LATERALITY, UNSPECIFIED PART OF LUNG: Primary | ICD-10-CM

## 2025-07-30 DIAGNOSIS — C50.911 INVASIVE DUCTAL CARCINOMA OF BREAST, FEMALE, RIGHT: Primary | ICD-10-CM

## 2025-07-30 LAB — POCT GLUCOSE: 103 MG/DL (ref 70–110)

## 2025-07-30 NOTE — Clinical Note
Adis-- Saw her today. Agree with Dr. Song, lung function not the best for surgery. Still vaping; counseled her to quit. You see her Friday. Planning on SBRT, fyi.

## 2025-07-30 NOTE — PATIENT INSTRUCTIONS
Instructions given on chest radiation and skin care.  Brochures given on Radiation for Cancer and Stereotactic Radiation.  Information sheet provided on SBRT Radiation Therapy.  Patient verbalized understanding.

## 2025-07-30 NOTE — PROGRESS NOTES
Laxmi Chand  [id number]  1955 7/30/2025  Stephan Song Md  [address] [facility] [place]  [address]  [place] [address]    REASON FOR CONSULTATION:     IA2, [id number] lepidic adenocarcinoma of RUL  IA, [id number] g1 IDC UOQ R breast, ER+/MI(-)/HER2+    TREATMENT GOAL: primary    HISTORY OF PRESENT ILLNESS:   Laxmi Chand is a 69 y.o. postmenopausal female with (-)FHx of BCa and past medical history of endometrial cancer s/p hysterectomy 40yrs ago who presented with abnormal screening mammogram noting new calcifications in the upper outer quadrant of the right breast confirmed on diagnostic mammogram.  Core needle biopsy returned high-grade DCIS, solid pattern; ER+ %, MI(-).     She underwent lumpectomy and ALND with Dr. Gutierrez, 09/07/2022:   - 2 mm grade 1 (5/9) invasive ductal carcinoma; LVSI (-); ER+ 95%, MI(-), HER2 3+   - 1.5 cm grade 3 DCIS, solid pattern without necrosis   - invasive disease margin (-) 9 mm deep; DCIS margin 2 mm anterior   - poor mapping; ALND: 0/10 LNs    BRCA1,2 (-)    Completed adjuvant radiotherapy, 4050 cGy to the right breast followed by 1000 cGy boost to lumpectomy cavity on November 18, 2022.  Treatment was well tolerated suspected fatigue and radiation dermatitis. Placed on AI and follows with Dr. Khoobehi.     2/5/25 MMG: BR-1    Patient is a smoker and follows with Dr. Song for asthma, COPD and pulmonary nodules on serial CTs with slow progression of a right upper lobe GGO, now 2.0 cm.  CT-guided biopsy from 07/17/2025 returned lepidic type adenocarcinoma.    7/25 PFTs   FEV1 0.96L   DLCO not given    Dr. Song expressed concern regarding patient's fitness for surgery and she is referred to discuss radiotherapy.    PET:    Patient reports chronic shortness of breath perhaps slightly worsened since biopsy.  She denies fever, chills, chest pain or hemoptysis.  She remains active but does require frequent breaks and breathing treatments.  She is not on oxygen.   She no longer  smokes but does vape.    Review of systems otherwise negative unless indicated in HPI.    Past Medical History:   Diagnosis Date    Breast cancer in female 09/07/2022    IDC with high grade DCIS    COPD (chronic obstructive pulmonary disease)     Degeneration of lumbar intervertebral disc     GERD (gastroesophageal reflux disease)     HTN (hypertension)     Hyperlipemia, mixed      Past Surgical History:   Procedure Laterality Date    BIOPSY OF AXILLARY NODE Right 9/7/2022    Procedure: BIOPSY, LYMPH NODE, AXILLARY;  Surgeon: Jatin Gutierrez MD;  Location: [facility] OR;  Service: General;  Laterality: Right;    BREAST BIOPSY Right 08/05/2022    BREAST MASS EXCISION Right 9/7/2022    Procedure: EXCISION, MASS, BREAST;  Surgeon: Jatin Gutierrez MD;  Location: [facility] OR;  Service: General;  Laterality: Right;  wire localized partial mastectomy right breast with margins     CARPAL TUNNEL RELEASE  1985    CHOLECYSTECTOMY  1978    CORRECTION OF HAMMER TOE Right 9/15/2023    Procedure: CORRECTION, HAMMER TOE;  Surgeon: Devyn Mccullough DPM;  Location: [facility] OR;  Service: Podiatry;  Laterality: Right;    FOOT HARDWARE REMOVAL Right 9/15/2023    Procedure: REMOVAL, HARDWARE, FOOT;  Surgeon: Devyn Mccullough DPM;  Location: [facility] OR;  Service: Podiatry;  Laterality: Right;    HYSTERECTOMY      KNEE SURGERY Left 06/01/2020    LUMBAR DISC SURGERY  01/01/1990    plate and roland    LUMBAR FUSION  2011    MIDFOOT ARTHRODESIS Right 9/15/2023    Procedure: FUSION, JOINT, MIDFOOT;  Surgeon: Devyn Mccullough DPM;  Location: [facility] OR;  Service: Podiatry;  Laterality: Right;  [place] 28 1st MPJ fusion plates screws bone grafts as well as cut guide.    TOTAL KNEE REPLACEMENT USING COMPUTER NAVIGATION Left 02/15/2021     Social History     Socioeconomic History    Marital status: Significant Other   Occupational History    Occupation: disabled   Tobacco Use    Smoking status: Former     Types: Vaping with nicotine      Passive exposure: Never    Smokeless tobacco: Never    Tobacco comments:     Quit 2 years ago 2023   Substance and Sexual Activity    Alcohol use: Yes    Drug use: Not Currently    Sexual activity: Yes     Partners: Male   Social History Narrative    Plans from [facility]         Gyn Exam / Hysterectomy 10 Charu1/28/2020     Annual    urinary tract infection    yeast infection        Visit Summary    No pap per guidelines    U/A for culture    Wet prep: yeast only    Pt has a PCP    Colonoscopy up to date    [place] [facility] [place]        Prescriptions:    SIG: Cipro 500 mg oral tablet, 3 days, Dispense #6 Tablet, 0 Refills    Directions: Take 1 oral tablet 2 times a day    SIG: Diflucan 100 mg oral tablet, 3 days, Dispense #3 Tablet, 0 Refills    Directions: Take 1 oral tablet once a day        Return for follow up appointment in one year or prn.     [id number]     Vulvar Cyst: Resolved        Visit Summary    Normal external gyn exam. Cyst resolved        Return for follow up appointment annual/prn.     [id number]     Chronic Vaginitis    Paratubal cysts: stable        Visit Summary    Wet prep: mostly yeast, few clue cells    Pt soaks in bath BID, stop, shower only, no douching.    u/s performed today. unchanged from last scan.        Prescriptions: Clindesse sample given    SIG: Diflucan 100 mg oral tablet, 3 days, Dispense #3 Tablet, 0 Refills    Directions: Take 1 oral tablet once a day    Recommend probiotics        Return for follow up appointment for annual or prn. MDL swab at next appt if needed.    Mammo up to date.     GYN Visit & [id number] [facility]/2017     Recurrent bacterial vaginosis.  Paratubal cysts.  Dysuria.    Repeat transvaginal ultrasound 6 weeks.        Visit Summary    Ordered:    Urine Culture, Routine     GYN Visit & [id number] [facility]     path compound melanocytic nevus...ch us...of pelvis....rtc 1y pap     [id number]     3 moles removed from back 5 mm each...same contwainer monsels applied...    one mole removed from under each breasxt 5 mm...mnonsels applied...each one sent to path marilyt..        vag dc bv...sp metrogel...no family h/o breast ca...        pelvic us...complex cyst 2.23 cm on left w possible excrescence and septation        pelvic us [place] complex [place] [facility] [place]rtc 1wk     GYN Visit & [id number] [facility]4/25/2017     Chronic Bacterial Vaginitis    Chronic Back Pain    Left Lower Quadrant resolved, possible ruptured ovarian cyst        Visit Summary    MDL swab done        Return for follow up appointment in 2 months for follow up pelvic u/s.     GYN Exam/[id number] 20164/6/2017     Annual    Vaginal odor, Bacterial Vaginosis    Ovarian Cyst        Visit Summary    Pap done, reports hx of cervical pre-cancer        Prescriptions:    SIG: metronidazole 500 mg tablet, 7 days, Dispense #14 Tablet, 0 Refills    Directions: Take 1 oral tablet 2 times a day. No alcohol discussed.        U/S today, reports had ovarian cyst on CT scan.    FSH today        Return for follow up appointment  pending results.     Social Drivers of Health     Financial Resource Strain: Medium Risk (3/6/2025)    Overall Financial Resource Strain (CARDIA)     Difficulty of Paying Living Expenses: Somewhat hard   Food Insecurity: Food Insecurity Present (3/6/2025)    Hunger Vital Sign     Worried About Running Out of Food in the Last Year: Sometimes true     Ran Out of Food in the Last Year: Sometimes true   Transportation Needs: No Transportation Needs (3/6/2025)    PRAPARE - Transportation     Lack of Transportation (Medical): No     Lack of Transportation (Non-Medical): No   Physical Activity: Insufficiently Active (3/6/2025)    Exercise Vital Sign     Days of Exercise per Week: 1 day     Minutes of Exercise per Session: 10 min   Stress: No Stress Concern Present (3/6/2025)    [employer] -  Occupational Stress Questionnaire     Feeling of Stress : Not at all   Housing Stability: Low Risk  (3/6/2025)    Housing Stability Vital Sign     Unable to Pay for Housing in the Last Year: No     Number of Times Moved in the Last Year: 0     Homeless in the Last Year: No     Family History   Problem Relation Name Age of Onset    Hypertension Mother      Diabetes Mother      Hypertension Father      Lung cancer Maternal Grandfather      Breast cancer Neg Hx         PRIOR HISTORY OF CHEMOTHERAPY OR RADIOTHERAPY: Please see HPI for patients prior oncologic history.    Medication List with Changes/Refills   Current Medications    ACETAMINOPHEN (TYLENOL) 500 MG TABLET    Take 2 tablets (1,000 mg total) by mouth daily as needed for Pain.    ALBUTEROL (PROVENTIL/VENTOLIN HFA) 90 MCG/ACTUATION INHALER    Inhale 2 puffs into the lungs every 4 (four) hours as needed for Wheezing. INHALE 2 PUFFS BY MOUTH EVERY 4 HOURS AS NEEDED FOR WHEEZING OR SHORTNESS OF BREATH    ALBUTEROL-IPRATROPIUM (DUO-NEB) 2.5 MG-0.5 MG/3 ML NEBULIZER SOLUTION    Take 3 mLs by nebulization every 4 (four) hours as needed for Wheezing or Shortness of Breath. Rescue    ANASTROZOLE (ARIMIDEX) 1 MG TAB    TAKE 1 TABLET (1 MG TOTAL) BY MOUTH ONCE DAILY.    ASPIRIN (ECOTRIN) 81 MG EC TABLET    Take 1 tablet by mouth once daily. Pt back on asa    CALCIUM CARBONATE-VITAMIN D3 (CALCIUM 600 + D,3,) 600-125 MG-UNIT TAB    Take 2 tablets by mouth once daily.    CETIRIZINE (ZYRTEC) 10 MG TABLET    Take 1 tablet (10 mg total) by mouth once daily.    CONJUGATED ESTROGENS (PREMARIN) VAGINAL CREAM    Place 0.5 g vaginally twice a week.    CYCLOBENZAPRINE (FLEXERIL) 10 MG TABLET    Take 1 tablet (10 mg total) by mouth nightly.    DOXEPIN (SILENOR) 3 MG TAB    Take 3 mg by mouth Daily.    DULOXETINE (CYMBALTA) 20 MG CAPSULE    Take 1 capsule (20 mg total) by mouth once daily.    DUPILUMAB 300 MG/2 ML PNIJ    Inject 2 mLs (300 mg total) into the skin every 14 (fourteen)  days.    FLUTICASONE PROPIONATE (FLONASE) 50 MCG/ACTUATION NASAL SPRAY    1 spray (50 mcg total) by Each Nostril route once daily.    FLUTICASONE-UMECLIDIN-VILANTER (TRELEGY ELLIPTA) 200-62.5-25 MCG INHALER    Inhale 1 puff into the lungs once daily.    GUAIFENESIN (MUCINEX) 600 MG 12 HR TABLET    Take 2 tablets (1,200 mg total) by mouth 2 (two) times daily.    HYDRALAZINE (APRESOLINE) 10 MG TABLET    Take 1 tablet (10 mg total) by mouth 3 (three) times daily.    HYDROCHLOROTHIAZIDE (HYDRODIURIL) 25 MG TABLET    TAKE 1 TABLET (25 MG TOTAL) BY MOUTH ONCE DAILY.    LUMIGAN 0.01 % DROP    Place into both eyes.    METOPROLOL SUCCINATE (TOPROL-XL) 200 MG 24 HR TABLET    TAKE 1 TABLET (200 MG TOTAL) BY MOUTH ONCE DAILY.    METRONIDAZOLE (METROGEL) 0.75 % (37.5MG/5 GRAM) VAGINAL GEL    Place 1 applicator vaginally Daily.    MICONAZOLE (MICOTIN) 2 % VAGINAL CREAM    Place 1 applicator vaginally every evening.    MONTELUKAST (SINGULAIR) 10 MG TABLET    TAKE 1 TABLET (10 MG TOTAL) BY MOUTH ONCE DAILY.    NYSTATIN (MYCOSTATIN) CREAM    Apply topically 2 (two) times daily.    NYSTATIN (MYCOSTATIN) POWDER    Apply topically 4 (four) times daily.    ONDANSETRON (ZOFRAN) 8 MG TABLET    Take 1 tablet (8 mg total) by mouth every 8 (eight) hours as needed for Nausea.    OXYCODONE-ACETAMINOPHEN (PERCOCET)  MG PER TABLET    Take 1 tablet by mouth.    PANTOPRAZOLE (PROTONIX) 40 MG TABLET    Take 1 tablet (40 mg total) by mouth once daily.    PREDNISONE (DELTASONE) 20 MG TABLET    Take one daily for 5 days and may repeat for shortness of breath.    ROSUVASTATIN (CRESTOR) 10 MG TABLET    Take 1 tablet (10 mg total) by mouth once daily.    VALSARTAN (DIOVAN) 160 MG TABLET    Take 1 tablet (160 mg total) by mouth once daily.    ZOLPIDEM (AMBIEN) 5 MG TAB    Take 1 tablet (5 mg total) by mouth nightly as needed (insomnia).     Review of patient's allergies indicates:  No Known Allergies    QUALITY OF LIFE: 90%- Able to Carry on Normal  Activity: Minor Symptoms of Disease    Vitals:    07/30/25 1049   BP: (!) 146/66   Pulse: 76   Temp: 98 °F (36.7 °C)   Weight: 92.1 kg (203 lb)   PainSc:   4   PainLoc: Back     Body mass index is 34.84 kg/m².    PHYSICAL EXAM:   GENERAL: alert; in no apparent distress.   HEAD: normocephalic, atraumatic.  EYES: pupils are equal, round, reactive to light and accommodation. Sclera anicteric. Conjunctiva not injected.   NOSE/THROAT: no nasal erythema or rhinorrhea. Oropharynx pink, without erythema, ulcerations or thrush.   NECK: no cervical motion rigidity; supple with no masses.    CHEST: Patient is speaking comfortably on room air with normal work of breathing without using accessory muscles of respiration.  CARDIOVASCULAR: regular rate and rhythm  ABDOMEN: soft, nontender, nondistended.   MUSCULOSKELETAL: no tenderness to palpation along the spine or scapulae. Normal range of motion.  NEUROLOGIC: cranial nerves II-XII intact bilaterally. Strength 5/5 in bilateral upper and lower extremities. No sensory deficits appreciated. Normal gait.  EXTREMITIES: no clubbing, cyanosis, edema.  SKIN: no erythema, rashes, ulcerations noted.     REVIEW OF IMAGING/PATHOLOGY/LABS: Please see HPI. All images reviewed personally by dictating physician.     ASSESSMENT: Laxmi Chand is a 69 y.o. female with:    IA2, [id number] lepidic adenocarcinoma of RUL  IA, [id number] g1 IDC UOQ R breast, ER+/AL(-)/HER2+    PLAN:  Laxmi Chand  presents with previous history of IA, iV2hA2N1 g1 IDC UOQ R breast, ER+/AL(-)/HER2+  treated with lumpectomy, axillary dissection and adjuvant radiotherapy to the right breast with boosting of lumpectomy cavity ending November 2022, now on AI with clear mammograms.  She has been following with pulmonology for pulmonary nodules now with a slowly progressive right upper lobe GGO measuring approximately 2 cm.  Biopsy returned lepidic type adenocarcinoma and PFTs revealed an FEV1 <1L;  she continues to vape and  Dr. Song feel she is a poor surgical candidate due to lung function.   I requested a PET scan which is not yet read but on my review appears to show no evidence of mediastinal involvement.  She will meet with Dr. Olea later this week.     Today I reiterated Dr. Song's recommendation that she is a poor surgical candidate due to  lung function and advised definitive SBRT with demonstrated similar local control to resection.   At current size, peripheral, slow growth rate with clear PET, I do not recommend EBUS mediastinal staging. Today we discussed definitive stereotactic body radiation therapy which results in excellent local control.  I described the process of rigid immobilization, abdominal compression to minimize respiratory excursion and 4D CT simulation to monitor respiratory motion followed by IMRT-based planning with intent to deliver high dose per fraction treatments every other day using advanced daily image guidance. My recommendation is 50Gy in 5 fractions.     I carefully explained the process of simulation and treatment delivery with weekly physician visits. Patient wishes to proceed.     We discussed the risks and benefits of the above treatment and have gone over in detail the acute and late toxicities of radiation therapy to the RUL. The patient expressed  understanding and has signed a consent form which is included in the patients chart.      The patient has our contact information and understands that they are free to contact us at any time with questions or concerns regarding radiation therapy.     DISPOSITION: RTC FOR CT SIM     I have personally seen and evaluated this patient with a high complexity diagnosis.      60 minutes were dedicated to reviewing/interpreting pertinent laboratory/imaging/pathology as well as prior consultations; reviewing and performing history and physical; counseling patient on oncologic recommendations; documentation in the electronic medical record including  ordering of additional tests and/or radiation treatment protocol; and coordination of care with physicians with referrals placed as appropriate.    PHYSICIAN: Mark Ramos Jr, MD    Thank you for the opportunity to meet and consult with Laxmi Chand.   Please feel free to contact me to discuss the above recommendation further.

## 2025-07-31 NOTE — PROGRESS NOTES
History & Physical    Subjective     History of Present Illness:  Patient is a 69 y.o. female former smoker with right breast IDC (treatment?), HTN, HLD, COPD, GERD, and coronary artery disease here today for evaluation of RUL lepidic adenocarcinoma. CT in Dec 2024 with subcentimeter opacities. Calcium scoring CT with incomplete view of the lungs however Ca score high risk 1600+. Percutaneous biopsy of RUL opacity positive for lepidic adenocarcinoma. PET 7/2025 with 1.5 GGO mass with SUV 1.4. no other sites of avidity. PET with ovarian cyst. Last designated chest CT in dec 2024. Most recent PFTS with spirometry only and FEV1 of 0.96L however previous DLCO was 53%. She does report ALVAREZ. She is a Former smoker.     Cholecystectomy, hysterectomy, left total knee replacement, lumbar fusion, carpal tunnel release        No chief complaint on file.      Review of patient's allergies indicates:  No Known Allergies    Current Medications[1]    Past Medical History:   Diagnosis Date    Breast cancer in female 09/07/2022    IDC with high grade DCIS    COPD (chronic obstructive pulmonary disease)     Degeneration of lumbar intervertebral disc     GERD (gastroesophageal reflux disease)     HTN (hypertension)     Hyperlipemia, mixed      Past Surgical History:   Procedure Laterality Date    BIOPSY OF AXILLARY NODE Right 9/7/2022    Procedure: BIOPSY, LYMPH NODE, AXILLARY;  Surgeon: Jatin Gutierrez MD;  Location: [facility] OR;  Service: General;  Laterality: Right;    BREAST BIOPSY Right 08/05/2022    BREAST MASS EXCISION Right 9/7/2022    Procedure: EXCISION, MASS, BREAST;  Surgeon: Jatin Gutierrez MD;  Location: [facility] OR;  Service: General;  Laterality: Right;  wire localized partial mastectomy right breast with margins     CARPAL TUNNEL RELEASE  1985    CHOLECYSTECTOMY  1978    CORRECTION OF HAMMER TOE Right 9/15/2023    Procedure: CORRECTION, HAMMER TOE;  Surgeon: Devyn Mccullough DPM;  Location: [facility] OR;  Service:  Podiatry;  Laterality: Right;    FOOT HARDWARE REMOVAL Right 9/15/2023    Procedure: REMOVAL, HARDWARE, FOOT;  Surgeon: Devyn Mccullough DPM;  Location: [facility] OR;  Service: Podiatry;  Laterality: Right;    HYSTERECTOMY      KNEE SURGERY Left 06/01/2020    LUMBAR DISC SURGERY  01/01/1990    plate and roland    LUMBAR FUSION  2011    MIDFOOT ARTHRODESIS Right 9/15/2023    Procedure: FUSION, JOINT, MIDFOOT;  Surgeon: Devyn Mccullough DPM;  Location: [facility] OR;  Service: Podiatry;  Laterality: Right;  [place] 28 1st MPJ fusion plates screws bone grafts as well as cut guide.    TOTAL KNEE REPLACEMENT USING COMPUTER NAVIGATION Left 02/15/2021     Family History   Problem Relation Name Age of Onset    Hypertension Mother      Diabetes Mother      Hypertension Father      Lung cancer Maternal Grandfather      Breast cancer Neg Hx       Social History[2]     Review of Systems:  Review of Systems       Objective     Vital Signs (Most Recent)  There were no vitals filed for this visit.    Physical Exam:  Physical Exam    Chest CT 12/19/24:   1. Several pulmonary nodules. In a low risk patient, no further follow-up is recommended.  In a high-risk patient, 12 month follow-up CT could be considered.  2. Pulmonary emphysema and coronary artery disease.  3. Bilateral thyroid nodules.  Elective ultrasound could add further characterization as clinically warranted.     PET 7/29/25:   15 mm faint pleural base ground-glass opacity in the right upper lobe with FDG activity of 1.3 corresponding to patient's known biopsy-proven lung cancer  No evidence of metastatic disease  Prior cholecystectomy and hysterectomy  4.4 x 2.9 cm left ovarian cyst    PFTs :  FEV1 - 0.96L   43.5%  Spirometry only        Assessment and Plan   Patient is a 69 y.o. female former smoker with right breast IDC (treatment?), HTN, HLD, COPD, GERD, and coronary artery disease here today for evaluation of RUL lepidic adenocarcinoma. The patient is hesitant to do  surgery. Explained that the proposed operation would be segmentectomy vs tagging and wedge resection. Patient would like to discuss with her  and children    PLAN:  Will repeat complete PFTs (08/05)  Needs updated ECHO (08/05)  Follow-up regarding patients decisions    Adis Olea M.D.  [phone] (direct)  Thoracic Surgery   Jan Knight                  [1]   Current Outpatient Medications   Medication Sig Dispense Refill    acetaminophen (TYLENOL) 500 MG tablet Take 2 tablets (1,000 mg total) by mouth daily as needed for Pain. 30 tablet 1    albuterol (PROVENTIL/VENTOLIN HFA) 90 mcg/actuation inhaler Inhale 2 puffs into the lungs every 4 (four) hours as needed for Wheezing. INHALE 2 PUFFS BY MOUTH EVERY 4 HOURS AS NEEDED FOR WHEEZING OR SHORTNESS OF BREATH 36 g 11    albuterol-ipratropium (DUO-NEB) 2.5 mg-0.5 mg/3 mL nebulizer solution Take 3 mLs by nebulization every 4 (four) hours as needed for Wheezing or Shortness of Breath. Rescue 120 each 11    anastrozole (ARIMIDEX) 1 mg Tab TAKE 1 TABLET (1 MG TOTAL) BY MOUTH ONCE DAILY. 90 tablet 3    aspirin (ECOTRIN) 81 MG EC tablet Take 1 tablet by mouth once daily. Pt back on asa      calcium carbonate-vitamin D3 (CALCIUM 600 + D,3,) 600-125 mg-unit Tab Take 2 tablets by mouth once daily. 180 tablet 3    cetirizine (ZYRTEC) 10 MG tablet Take 1 tablet (10 mg total) by mouth once daily. 90 tablet 5    conjugated estrogens (PREMARIN) vaginal cream Place 0.5 g vaginally twice a week. 30 g 5    cyclobenzaprine (FLEXERIL) 10 MG tablet Take 1 tablet (10 mg total) by mouth nightly. 90 tablet 3    doxepin (SILENOR) 3 mg Tab Take 3 mg by mouth Daily. 30 tablet 0    DULoxetine (CYMBALTA) 20 MG capsule Take 1 capsule (20 mg total) by mouth once daily. 30 capsule 2    dupilumab 300 mg/2 mL PnIj Inject 2 mLs (300 mg total) into the skin every 14 (fourteen) days. 2 mL 26    fluticasone propionate (FLONASE) 50 mcg/actuation nasal spray 1 spray (50 mcg total) by Each  Nostril route once daily. 18.2 mL 2    fluticasone-umeclidin-vilanter (TRELEGY ELLIPTA) 200-62.5-25 mcg inhaler Inhale 1 puff into the lungs once daily. 60 each 11    guaiFENesin (MUCINEX) 600 mg 12 hr tablet Take 2 tablets (1,200 mg total) by mouth 2 (two) times daily. 20 tablet 0    hydrALAZINE (APRESOLINE) 10 MG tablet Take 1 tablet (10 mg total) by mouth 3 (three) times daily. 270 tablet 1    hydroCHLOROthiazide (HYDRODIURIL) 25 MG tablet TAKE 1 TABLET (25 MG TOTAL) BY MOUTH ONCE DAILY. 90 tablet 3    LUMIGAN 0.01 % Drop Place into both eyes.      metoprolol succinate (TOPROL-XL) 200 MG 24 hr tablet TAKE 1 TABLET (200 MG TOTAL) BY MOUTH ONCE DAILY. 90 tablet 3    metroNIDAZOLE (METROGEL) 0.75 % (37.5mg/5 gram) vaginal gel Place 1 applicator vaginally Daily. 70 g 0    miconazole (MICOTIN) 2 % vaginal cream Place 1 applicator vaginally every evening. 45 g 0    montelukast (SINGULAIR) 10 mg tablet TAKE 1 TABLET (10 MG TOTAL) BY MOUTH ONCE DAILY. 90 tablet 3    nystatin (MYCOSTATIN) cream Apply topically 2 (two) times daily. 30 g 1    nystatin (MYCOSTATIN) powder Apply topically 4 (four) times daily. 30 g 1    ondansetron (ZOFRAN) 8 MG tablet Take 1 tablet (8 mg total) by mouth every 8 (eight) hours as needed for Nausea. 90 tablet 5    oxyCODONE-acetaminophen (PERCOCET)  mg per tablet Take 1 tablet by mouth.      pantoprazole (PROTONIX) 40 MG tablet Take 1 tablet (40 mg total) by mouth once daily. 90 tablet 3    predniSONE (DELTASONE) 20 MG tablet Take one daily for 5 days and may repeat for shortness of breath. 20 tablet 1    rosuvastatin (CRESTOR) 10 MG tablet Take 1 tablet (10 mg total) by mouth once daily. 90 tablet 3    valsartan (DIOVAN) 160 MG tablet Take 1 tablet (160 mg total) by mouth once daily. 90 tablet 3    zolpidem (AMBIEN) 5 MG Tab Take 1 tablet (5 mg total) by mouth nightly as needed (insomnia). 20 tablet 2     No current facility-administered medications for this visit.   [2]   Social  History  Tobacco Use    Smoking status: Former     Types: Vaping with nicotine     Passive exposure: Never    Smokeless tobacco: Never    Tobacco comments:     Quit 2 years ago 2023   Substance Use Topics    Alcohol use: Yes    Drug use: Not Currently

## 2025-08-01 ENCOUNTER — OFFICE VISIT (OUTPATIENT)
Facility: CLINIC | Age: 70
End: 2025-08-01
Payer: MEDICARE

## 2025-08-01 ENCOUNTER — TELEPHONE (OUTPATIENT)
Facility: CLINIC | Age: 70
End: 2025-08-01
Payer: MEDICARE

## 2025-08-01 ENCOUNTER — HOSPITAL ENCOUNTER (OUTPATIENT)
Dept: RADIOLOGY | Facility: HOSPITAL | Age: 70
Discharge: HOME | End: 2025-08-01
Attending: STUDENT IN AN ORGANIZED HEALTH CARE EDUCATION/TRAINING PROGRAM
Payer: MEDICARE

## 2025-08-01 VITALS
HEIGHT: 64 IN | HEART RATE: 88 BPM | TEMPERATURE: 98 F | RESPIRATION RATE: 16 BRPM | OXYGEN SATURATION: 98 % | DIASTOLIC BLOOD PRESSURE: 65 MMHG | WEIGHT: 206 LBS | SYSTOLIC BLOOD PRESSURE: 147 MMHG | BODY MASS INDEX: 35.17 KG/M2

## 2025-08-01 DIAGNOSIS — C34.90 MALIGNANT NEOPLASM OF LUNG, UNSPECIFIED LATERALITY, UNSPECIFIED PART OF LUNG: ICD-10-CM

## 2025-08-01 DIAGNOSIS — C34.90 MALIGNANT NEOPLASM OF LUNG, UNSPECIFIED LATERALITY, UNSPECIFIED PART OF LUNG: Primary | ICD-10-CM

## 2025-08-01 DIAGNOSIS — C34.11 CANCER OF UPPER LOBE OF RIGHT LUNG: ICD-10-CM

## 2025-08-01 PROCEDURE — 99999 PR PBB SHADOW E&M-EST. PATIENT-LVL V: CPT | Mod: PBBFAC,,, | Performed by: STUDENT IN AN ORGANIZED HEALTH CARE EDUCATION/TRAINING PROGRAM

## 2025-08-01 PROCEDURE — 99215 OFFICE O/P EST HI 40 MIN: CPT | Mod: PBBFAC,PN | Performed by: STUDENT IN AN ORGANIZED HEALTH CARE EDUCATION/TRAINING PROGRAM

## 2025-08-01 PROCEDURE — 71250 CT THORAX DX C-: CPT | Mod: 26,,, | Performed by: RADIOLOGY

## 2025-08-01 PROCEDURE — 71250 CT THORAX DX C-: CPT | Mod: TC

## 2025-08-01 NOTE — NURSING
Oncology Navigation   Intake      Treatment                              Acuity      Follow Up  No follow-ups on file.     Met with patient and family for clinic appointment today with Dr Olea. Patient able to discuss their plan of care with provider, questions answered and care coordinated.

## 2025-08-04 ENCOUNTER — TELEPHONE (OUTPATIENT)
Dept: CARDIOLOGY | Facility: HOSPITAL | Age: 70
End: 2025-08-04

## 2025-08-05 ENCOUNTER — HOSPITAL ENCOUNTER (OUTPATIENT)
Dept: PULMONOLOGY | Facility: HOSPITAL | Age: 70
Discharge: HOME | End: 2025-08-05
Attending: STUDENT IN AN ORGANIZED HEALTH CARE EDUCATION/TRAINING PROGRAM
Payer: MEDICARE

## 2025-08-05 ENCOUNTER — CLINICAL SUPPORT (OUTPATIENT)
Dept: CARDIOLOGY | Facility: HOSPITAL | Age: 70
End: 2025-08-05
Attending: STUDENT IN AN ORGANIZED HEALTH CARE EDUCATION/TRAINING PROGRAM
Payer: MEDICARE

## 2025-08-05 DIAGNOSIS — G47.00 INSOMNIA, UNSPECIFIED TYPE: ICD-10-CM

## 2025-08-05 DIAGNOSIS — C34.90 MALIGNANT NEOPLASM OF LUNG, UNSPECIFIED LATERALITY, UNSPECIFIED PART OF LUNG: ICD-10-CM

## 2025-08-05 LAB
AORTIC ROOT ANNULUS: 3 CM
AORTIC VALVE CUSP SEPERATION: 2.3 CM
APICAL FOUR CHAMBER EJECTION FRACTION: 64 %
APICAL TWO CHAMBER EJECTION FRACTION: 66 %
AV INDEX (PROSTH): 0.76
AV MEAN GRADIENT: 4 MMHG
AV PEAK GRADIENT: 8 MMHG
AV VALVE AREA BY VELOCITY RATIO: 2.7 CM²
AV VALVE AREA: 2.9 CM²
AV VELOCITY RATIO: 0.71
CV ECHO LV RWT: 0.61 CM
DLCO SINGLE BREATH LLN: 15.7
DLCO SINGLE BREATH PRE REF: 48.4 %
DLCO SINGLE BREATH REF: 21.43
DLCOC SBVA LLN: 2.89
DLCOC SBVA REF: 4.34
DLCOC SINGLE BREATH LLN: 15.7
DLCOC SINGLE BREATH REF: 21.43
DLCOVA LLN: 2.89
DLCOVA PRE REF: 61 %
DLCOVA PRE: 2.65 ML/(MIN*MMHG*L) (ref 2.89–5.79)
DLCOVA REF: 4.34
DOP CALC AO PEAK VEL: 1.4 M/S
DOP CALC AO VTI: 27.6 CM
DOP CALC LVOT AREA: 3.8 CM2
DOP CALC LVOT DIAMETER: 2.2 CM
DOP CALC LVOT PEAK VEL: 1 M/S
DOP CALC MV VTI: 26.8 CM
DOP CALCLVOT PEAK VEL VTI: 21.1 CM
E WAVE DECELERATION TIME: 268 MSEC
E/A RATIO: 0.74
E/E' RATIO: 6 M/S
ECHO LV POSTERIOR WALL: 1.1 CM (ref 0.6–1.1)
ERV LLN: -16449.34
ERV PRE REF: 99.7 %
ERV REF: 0.66
FEF 25 75 CHG: 5.8 %
FEF 25 75 LLN: 0.87
FEF 25 75 POST REF: 18.6 %
FEF 25 75 PRE REF: 17.6 %
FEF 25 75 REF: 1.87
FET100 CHG: 4.3 %
FEV1 CHG: 3.9 %
FEV1 FVC CHG: 1 %
FEV1 FVC LLN: 65
FEV1 FVC POST REF: 63.7 %
FEV1 FVC PRE REF: 63 %
FEV1 FVC REF: 78
FEV1 LLN: 1.61
FEV1 POST REF: 47.1 %
FEV1 PRE REF: 45.3 %
FEV1 REF: 2.21
FRACTIONAL SHORTENING: 30.6 % (ref 28–44)
FRCPLETH LLN: 1.89
FRCPLETH PREREF: 130.7 %
FRCPLETH REF: 2.71
FVC CHG: 3 %
FVC LLN: 2.08
FVC POST REF: 73.4 %
FVC PRE REF: 71.3 %
FVC REF: 2.85
INTERVENTRICULAR SEPTUM: 1.1 CM (ref 0.6–1.1)
IVC DIAMETER: 1.17 CM
IVC PRE: 2.44 L (ref 2.08–3.66)
IVC SINGLE BREATH LLN: 2.08
IVC SINGLE BREATH PRE REF: 85.7 %
IVC SINGLE BREATH REF: 2.85
LEFT ATRIUM AREA SYSTOLIC (APICAL 2 CHAMBER): 13.7 CM2
LEFT ATRIUM AREA SYSTOLIC (APICAL 4 CHAMBER): 9.64 CM2
LEFT ATRIUM SIZE: 3.6 CM
LEFT ATRIUM VOLUME MOD: 27 ML
LEFT INTERNAL DIMENSION IN SYSTOLE: 2.5 CM (ref 2.1–4)
LEFT VENTRICLE DIASTOLIC VOLUME: 53 ML
LEFT VENTRICLE END DIASTOLIC VOLUME APICAL 2 CHAMBER: 63 ML
LEFT VENTRICLE END DIASTOLIC VOLUME APICAL 4 CHAMBER: 61.3 ML
LEFT VENTRICLE END SYSTOLIC VOLUME APICAL 2 CHAMBER: 31.2 ML
LEFT VENTRICLE END SYSTOLIC VOLUME APICAL 4 CHAMBER: 20.2 ML
LEFT VENTRICLE SYSTOLIC VOLUME: 23 ML
LEFT VENTRICULAR INTERNAL DIMENSION IN DIASTOLE: 3.6 CM (ref 3.5–6)
LEFT VENTRICULAR MASS: 124.1 G
LV LATERAL E/E' RATIO: 6.1 M/S
LV SEPTAL E/E' RATIO: 6.6 M/S
LVED V (TEICH): 53.35 ML
LVES V (TEICH): 23.22 ML
LVOT MG: 2 MMHG
LVOT MV: 0.64 CM/S
MV MEAN GRADIENT: 1 MMHG
MV PEAK A VEL: 0.98 M/S
MV PEAK E VEL: 0.73 M/S
MV PEAK GRADIENT: 4 MMHG
MV STENOSIS PRESSURE HALF TIME: 73 MS
MV VALVE AREA BY CONTINUITY EQUATION: 2.99 CM2
MV VALVE AREA P 1/2 METHOD: 3.01 CM2
MVV LLN: 71
MVV PRE REF: 49.6 %
MVV REF: 84
OHS LV EJECTION FRACTION SIMPSONS BIPLANE MOD: 63 %
PEF CHG: -8.4 %
PEF LLN: 4.01
PEF POST REF: 45.5 %
PEF PRE REF: 49.7 %
PEF REF: 5.72
POST FEF 25 75: 0.35 L/S (ref 0.87–3.3)
POST FET 100: 8.92 SEC
POST FEV1 FVC: 49.74 % (ref 64.88–89.55)
POST FEV1: 1.04 L (ref 1.61–2.79)
POST FVC: 2.09 L (ref 2.08–3.66)
POST PEF: 2.6 L/S (ref 4.01–7.43)
PRE DLCO: 10.38 ML/(MIN*MMHG) (ref 15.7–27.16)
PRE ERV: 0.66 L (ref -16449.34–16450.66)
PRE FEF 25 75: 0.33 L/S (ref 0.87–3.3)
PRE FET 100: 8.55 SEC
PRE FEV1 FVC: 49.26 % (ref 64.88–89.55)
PRE FEV1: 1 L (ref 1.61–2.79)
PRE FRC PL: 3.54 L (ref 1.89–3.53)
PRE FVC: 2.03 L (ref 2.08–3.66)
PRE MVV: 41.57 L/MIN (ref 71.27–96.42)
PRE PEF: 2.85 L/S (ref 4.01–7.43)
PRE RV: 2.88 L (ref 1.47–2.62)
PRE TLC: 5.15 L (ref 3.95–5.93)
PV MV: 0.71 M/S
PV PEAK GRADIENT: 4 MMHG
PV PEAK VELOCITY: 1.05 M/S
RA PRESSURE ESTIMATED: 3 MMHG
RAW LLN: 3.06
RAW PRE REF: 322.5 %
RAW PRE: 9.87 CMH2O*S/L (ref 3.06–3.06)
RAW REF: 3.06
RIGHT ATRIUM VOLUME AREA LENGTH APICAL 4 CHAMBER: 29.4 ML
RV LLN: 1.47
RV PRE REF: 140.8 %
RV REF: 2.05
RV TISSUE DOPPLER FREE WALL SYSTOLIC VELOCITY 1 (APICAL 4 CHAMBER VIEW): 15 CM/S
RVTLC LLN: 33
RVTLC PRE REF: 132 %
RVTLC PRE: 55.98 % (ref 32.83–52.01)
RVTLC REF: 42
TDI LATERAL: 0.12 M/S
TDI SEPTAL: 0.11 M/S
TDI: 0.12 M/S
TLC LLN: 3.95
TLC PRE REF: 104.2 %
TLC REF: 4.94
TRICUSPID ANNULAR PLANE SYSTOLIC EXCURSION: 2.4 CM
VA PRE: 3.92 L (ref 4.79–4.79)
VA SINGLE BREATH LLN: 4.79
VA SINGLE BREATH PRE REF: 82 %
VA SINGLE BREATH REF: 4.79
VC LLN: 2.08
VC PRE REF: 79.4 %
VC PRE: 2.27 L (ref 2.08–3.66)
VC REF: 2.85

## 2025-08-05 PROCEDURE — 94060 EVALUATION OF WHEEZING: CPT | Mod: 26,,, | Performed by: INTERNAL MEDICINE

## 2025-08-05 PROCEDURE — 93306 TTE W/DOPPLER COMPLETE: CPT | Mod: 26,,, | Performed by: INTERNAL MEDICINE

## 2025-08-05 PROCEDURE — 94729 DIFFUSING CAPACITY: CPT

## 2025-08-05 PROCEDURE — 94060 EVALUATION OF WHEEZING: CPT

## 2025-08-05 PROCEDURE — 94729 DIFFUSING CAPACITY: CPT | Mod: 26,,, | Performed by: INTERNAL MEDICINE

## 2025-08-05 PROCEDURE — 93306 TTE W/DOPPLER COMPLETE: CPT

## 2025-08-05 PROCEDURE — 94726 PLETHYSMOGRAPHY LUNG VOLUMES: CPT | Mod: 26,,, | Performed by: INTERNAL MEDICINE

## 2025-08-05 PROCEDURE — 94726 PLETHYSMOGRAPHY LUNG VOLUMES: CPT

## 2025-08-05 RX ORDER — ALBUTEROL SULFATE 2.5 MG/.5ML
SOLUTION RESPIRATORY (INHALATION)
Status: DISPENSED
Start: 2025-08-05 | End: 2025-08-05

## 2025-08-05 NOTE — TELEPHONE ENCOUNTER
Copied from CRM #[id number]. Topic: Medications - Medication Refill  >> Aug 5, 2025  4:23 PM Hyacinth wrote:  Type:  RX Refill Request    Who Called: pt  Refill or New Rx:refill  RX Name and Strength:doxepin (SILENOR) 3 mg Tab  How is the patient currently taking it? (ex. 1XDay):1 x dy  Is this a 30 day or 90 day RX:30  Preferred Pharmacy with phone number:  [facility] [address] [address]  Phone: [phone] Fax: [phone]  Local or Mail Order:local  Ordering Provider:ALEJANDRO Culp  Would the patient rather a call back or a response via [facility]? Pls call id unable to fill  Best Call Back Number:[phone]    Additional Information: pt is out of medication, pls call in refill, thank you

## 2025-08-06 RX ORDER — DOXEPIN 3 MG/1
3 TABLET, FILM COATED ORAL DAILY
Qty: 90 TABLET | Refills: 1 | Status: SHIPPED | OUTPATIENT
Start: 2025-08-06 | End: 2025-09-05

## 2025-08-11 DIAGNOSIS — D68.9 COAGULOPATHY: ICD-10-CM

## 2025-08-11 DIAGNOSIS — C34.11 CANCER OF UPPER LOBE OF RIGHT LUNG: Primary | ICD-10-CM

## 2025-08-12 ENCOUNTER — TELEPHONE (OUTPATIENT)
Dept: CARDIOTHORACIC SURGERY | Facility: CLINIC | Age: 70
End: 2025-08-12
Payer: MEDICARE

## 2025-08-12 ENCOUNTER — PATIENT MESSAGE (OUTPATIENT)
Dept: CARDIOTHORACIC SURGERY | Facility: CLINIC | Age: 70
End: 2025-08-12
Payer: MEDICARE

## 2025-08-12 DIAGNOSIS — C34.11 CANCER OF UPPER LOBE OF RIGHT LUNG: Primary | ICD-10-CM

## 2025-08-13 ENCOUNTER — LAB VISIT (OUTPATIENT)
Dept: LAB | Facility: HOSPITAL | Age: 70
End: 2025-08-13
Attending: STUDENT IN AN ORGANIZED HEALTH CARE EDUCATION/TRAINING PROGRAM
Payer: MEDICARE

## 2025-08-13 DIAGNOSIS — C34.11 CANCER OF UPPER LOBE OF RIGHT LUNG: ICD-10-CM

## 2025-08-13 DIAGNOSIS — D68.9 COAGULOPATHY: ICD-10-CM

## 2025-08-13 LAB
ABSOLUTE EOSINOPHIL (SMH): 0.14 K/UL
ABSOLUTE MONOCYTE (SMH): 0.77 K/UL (ref 0.3–1)
ABSOLUTE NEUTROPHIL COUNT (SMH): 5.1 K/UL (ref 1.8–7.7)
ALBUMIN SERPL-MCNC: 3.4 G/DL (ref 3.5–5.2)
ALP SERPL-CCNC: 53 UNIT/L (ref 40–150)
ALT SERPL-CCNC: 27 UNIT/L (ref 10–44)
ANION GAP (SMH): 4 MMOL/L (ref 8–16)
APTT PPP: 26.2 SECONDS (ref 21–32)
AST SERPL-CCNC: 26 UNIT/L (ref 11–45)
BASOPHILS # BLD AUTO: 0.03 K/UL
BASOPHILS NFR BLD AUTO: 0.4 %
BILIRUB SERPL-MCNC: 0.4 MG/DL (ref 0.1–1)
BUN SERPL-MCNC: 26 MG/DL (ref 8–23)
CALCIUM SERPL-MCNC: 9 MG/DL (ref 8.7–10.5)
CHLORIDE SERPL-SCNC: 103 MMOL/L (ref 95–110)
CO2 SERPL-SCNC: 33 MMOL/L (ref 23–29)
CREAT SERPL-MCNC: 1.1 MG/DL (ref 0.5–1.4)
ERYTHROCYTE [DISTWIDTH] IN BLOOD BY AUTOMATED COUNT: 13.3 % (ref 11.5–14.5)
GFR SERPLBLD CREATININE-BSD FMLA CKD-EPI: 55 ML/MIN/1.73/M2
GLUCOSE SERPL-MCNC: 110 MG/DL (ref 70–110)
HCT VFR BLD AUTO: 36.7 % (ref 37–48.5)
HGB BLD-MCNC: 11.7 GM/DL (ref 12–16)
IMM GRANULOCYTES # BLD AUTO: 0.09 K/UL (ref 0–0.04)
IMM GRANULOCYTES NFR BLD AUTO: 1.1 % (ref 0–0.5)
INR PPP: 1 (ref 0.8–1.2)
LYMPHOCYTES # BLD AUTO: 1.96 K/UL (ref 1–4.8)
MCH RBC QN AUTO: 28.7 PG (ref 27–31)
MCHC RBC AUTO-ENTMCNC: 31.9 G/DL (ref 32–36)
MCV RBC AUTO: 90 FL (ref 82–98)
NUCLEATED RBC (/100WBC) (SMH): 0 /100 WBC
PLATELET # BLD AUTO: 392 K/UL (ref 150–450)
PMV BLD AUTO: 8.6 FL (ref 9.2–12.9)
POTASSIUM SERPL-SCNC: 3.7 MMOL/L (ref 3.5–5.1)
PREALB SERPL-MCNC: 30 MG/DL (ref 20–43)
PROT SERPL-MCNC: 6.3 GM/DL (ref 6–8.4)
PROTHROMBIN TIME: 10.7 SECONDS (ref 9–12.5)
RBC # BLD AUTO: 4.07 M/UL (ref 4–5.4)
RELATIVE EOSINOPHIL (SMH): 1.7 % (ref 0–8)
RELATIVE LYMPHOCYTE (SMH): 24.2 % (ref 18–48)
RELATIVE MONOCYTE (SMH): 9.5 % (ref 4–15)
RELATIVE NEUTROPHIL (SMH): 63.1 % (ref 38–73)
SODIUM SERPL-SCNC: 140 MMOL/L (ref 136–145)
WBC # BLD AUTO: 8.11 K/UL (ref 3.9–12.7)

## 2025-08-13 PROCEDURE — 36415 COLL VENOUS BLD VENIPUNCTURE: CPT

## 2025-08-13 PROCEDURE — 82040 ASSAY OF SERUM ALBUMIN: CPT

## 2025-08-13 PROCEDURE — 84134 ASSAY OF PREALBUMIN: CPT

## 2025-08-13 PROCEDURE — 85610 PROTHROMBIN TIME: CPT

## 2025-08-13 PROCEDURE — 85025 COMPLETE CBC W/AUTO DIFF WBC: CPT

## 2025-08-13 PROCEDURE — 85730 THROMBOPLASTIN TIME PARTIAL: CPT

## 2025-08-18 ENCOUNTER — OFFICE VISIT (OUTPATIENT)
Dept: PULMONOLOGY | Facility: CLINIC | Age: 70
End: 2025-08-18
Payer: MEDICARE

## 2025-08-18 VITALS
SYSTOLIC BLOOD PRESSURE: 126 MMHG | HEART RATE: 72 BPM | OXYGEN SATURATION: 97 % | BODY MASS INDEX: 35.06 KG/M2 | WEIGHT: 205.38 LBS | HEIGHT: 64 IN | DIASTOLIC BLOOD PRESSURE: 71 MMHG

## 2025-08-18 DIAGNOSIS — J44.9 CHRONIC OBSTRUCTIVE PULMONARY DISEASE, UNSPECIFIED COPD TYPE: ICD-10-CM

## 2025-08-18 DIAGNOSIS — J45.50 SEVERE PERSISTENT ASTHMA, UNCOMPLICATED: ICD-10-CM

## 2025-08-18 DIAGNOSIS — C34.11 MALIGNANT NEOPLASM OF UPPER LOBE OF RIGHT LUNG: Primary | ICD-10-CM

## 2025-08-18 PROCEDURE — 99215 OFFICE O/P EST HI 40 MIN: CPT | Mod: PBBFAC,PO | Performed by: INTERNAL MEDICINE

## 2025-08-18 PROCEDURE — 99999 PR PBB SHADOW E&M-EST. PATIENT-LVL V: CPT | Mod: PBBFAC,,, | Performed by: INTERNAL MEDICINE

## 2025-08-18 PROCEDURE — 99213 OFFICE O/P EST LOW 20 MIN: CPT | Mod: S$PBB,,, | Performed by: INTERNAL MEDICINE

## 2025-08-18 RX ORDER — LEVOFLOXACIN 500 MG/1
500 TABLET, FILM COATED ORAL DAILY
Qty: 10 TABLET | Refills: 1 | Status: SHIPPED | OUTPATIENT
Start: 2025-08-18

## 2025-08-18 RX ORDER — PREDNISONE 20 MG/1
TABLET ORAL
Qty: 20 TABLET | Refills: 1 | Status: SHIPPED | OUTPATIENT
Start: 2025-08-18

## 2025-08-18 RX ORDER — ROSUVASTATIN CALCIUM 20 MG/1
20 TABLET, COATED ORAL
COMMUNITY
Start: 2025-08-11

## 2025-08-22 ENCOUNTER — TELEPHONE (OUTPATIENT)
Dept: CARDIOTHORACIC SURGERY | Facility: CLINIC | Age: 70
End: 2025-08-22
Payer: MEDICARE

## 2025-08-29 ENCOUNTER — TELEPHONE (OUTPATIENT)
Dept: CARDIOTHORACIC SURGERY | Facility: CLINIC | Age: 70
End: 2025-08-29
Payer: MEDICARE

## 2025-08-29 RX ORDER — SODIUM CHLORIDE 0.9 % (FLUSH) 0.9 %
10 SYRINGE (ML) INJECTION
Status: CANCELLED | OUTPATIENT
Start: 2025-08-29

## 2025-08-29 RX ORDER — FAMOTIDINE 10 MG/ML
20 INJECTION, SOLUTION INTRAVENOUS
Status: CANCELLED | OUTPATIENT
Start: 2025-08-29 | End: 2025-08-29

## 2025-08-29 RX ORDER — HEPARIN 100 UNIT/ML
500 SYRINGE INTRAVENOUS
Status: CANCELLED | OUTPATIENT
Start: 2025-08-29

## 2025-08-29 RX ORDER — DIPHENHYDRAMINE HYDROCHLORIDE 50 MG/ML
50 INJECTION, SOLUTION INTRAMUSCULAR; INTRAVENOUS
Status: CANCELLED | OUTPATIENT
Start: 2025-08-29 | End: 2025-08-29

## 2025-08-29 RX ORDER — ONDANSETRON HYDROCHLORIDE 2 MG/ML
4 INJECTION, SOLUTION INTRAVENOUS
Status: CANCELLED | OUTPATIENT
Start: 2025-08-29 | End: 2025-08-29

## 2025-09-01 ENCOUNTER — ANESTHESIA EVENT (OUTPATIENT)
Dept: SURGERY | Facility: HOSPITAL | Age: 70
DRG: 164 | End: 2025-09-01
Payer: MEDICARE

## 2025-09-02 ENCOUNTER — HOSPITAL ENCOUNTER (INPATIENT)
Facility: HOSPITAL | Age: 70
LOS: 2 days | Discharge: HOME | DRG: 164 | End: 2025-09-04
Attending: STUDENT IN AN ORGANIZED HEALTH CARE EDUCATION/TRAINING PROGRAM | Admitting: STUDENT IN AN ORGANIZED HEALTH CARE EDUCATION/TRAINING PROGRAM
Payer: MEDICARE

## 2025-09-02 ENCOUNTER — ANESTHESIA (OUTPATIENT)
Dept: SURGERY | Facility: HOSPITAL | Age: 70
DRG: 164 | End: 2025-09-02
Payer: MEDICARE

## 2025-09-02 DIAGNOSIS — C34.91 MALIGNANT NEOPLASM OF RIGHT LUNG, UNSPECIFIED PART OF LUNG: ICD-10-CM

## 2025-09-02 DIAGNOSIS — C34.91 MALIGNANT NEOPLASM OF RIGHT LUNG: ICD-10-CM

## 2025-09-02 DIAGNOSIS — C50.911 INVASIVE DUCTAL CARCINOMA OF BREAST, FEMALE, RIGHT: ICD-10-CM

## 2025-09-02 DIAGNOSIS — C34.11 CANCER OF UPPER LOBE OF RIGHT LUNG: Primary | ICD-10-CM

## 2025-09-02 DIAGNOSIS — C34.11 MALIGNANT NEOPLASM OF UPPER LOBE OF RIGHT LUNG: ICD-10-CM

## 2025-09-02 LAB
ABORH RETYPE: NORMAL
GLUCOSE SERPL-MCNC: 139 MG/DL (ref 70–110)
GLUCOSE SERPL-MCNC: 148 MG/DL (ref 70–110)
HCO3 UR-SCNC: 24 MMOL/L (ref 24–28)
HCO3 UR-SCNC: 27.2 MMOL/L (ref 24–28)
HCT VFR BLD CALC: 34 %PCV (ref 36–54)
HCT VFR BLD CALC: 34 %PCV (ref 36–54)
INDIRECT COOMBS: NORMAL
PCO2 BLDA: 42.9 MMHG (ref 35–45)
PCO2 BLDA: 69.3 MMHG (ref 35–45)
PH SMN: 7.2 [PH] (ref 7.35–7.45)
PH SMN: 7.36 [PH] (ref 7.35–7.45)
PO2 BLDA: 450 MMHG (ref 80–100)
PO2 BLDA: 88 MMHG (ref 80–100)
POC BE: -1 MMOL/L (ref -2–2)
POC BE: -2 MMOL/L (ref -2–2)
POC IONIZED CALCIUM: 1.16 MMOL/L (ref 1.06–1.42)
POC IONIZED CALCIUM: 1.22 MMOL/L (ref 1.06–1.42)
POC SATURATED O2: 100 % (ref 95–100)
POC SATURATED O2: 94 % (ref 95–100)
POTASSIUM BLD-SCNC: 3.8 MMOL/L (ref 3.5–5.1)
POTASSIUM BLD-SCNC: 4.1 MMOL/L (ref 3.5–5.1)
RH BLD: NORMAL
SAMPLE: ABNORMAL
SAMPLE: ABNORMAL
SODIUM BLD-SCNC: 136 MMOL/L (ref 136–145)
SODIUM BLD-SCNC: 138 MMOL/L (ref 136–145)
SPECIMEN OUTDATE: NORMAL

## 2025-09-02 PROCEDURE — 63600175 PHARM REV CODE 636 W HCPCS

## 2025-09-02 PROCEDURE — 63600175 PHARM REV CODE 636 W HCPCS: Mod: JZ,TB,UD | Performed by: STUDENT IN AN ORGANIZED HEALTH CARE EDUCATION/TRAINING PROGRAM

## 2025-09-02 PROCEDURE — C1729 CATH, DRAINAGE: HCPCS | Performed by: STUDENT IN AN ORGANIZED HEALTH CARE EDUCATION/TRAINING PROGRAM

## 2025-09-02 PROCEDURE — 25000003 PHARM REV CODE 250: Performed by: STUDENT IN AN ORGANIZED HEALTH CARE EDUCATION/TRAINING PROGRAM

## 2025-09-02 PROCEDURE — 71000033 HC RECOVERY, INTIAL HOUR: Performed by: STUDENT IN AN ORGANIZED HEALTH CARE EDUCATION/TRAINING PROGRAM

## 2025-09-02 PROCEDURE — 94761 N-INVAS EAR/PLS OXIMETRY MLT: CPT

## 2025-09-02 PROCEDURE — 36000713 HC OR TIME LEV V EA ADD 15 MIN: Performed by: STUDENT IN AN ORGANIZED HEALTH CARE EDUCATION/TRAINING PROGRAM

## 2025-09-02 PROCEDURE — 86920 COMPATIBILITY TEST SPIN: CPT

## 2025-09-02 PROCEDURE — 20600001 HC STEP DOWN PRIVATE ROOM

## 2025-09-02 PROCEDURE — 8E0W4CZ ROBOTIC ASSISTED PROCEDURE OF TRUNK REGION, PERCUTANEOUS ENDOSCOPIC APPROACH: ICD-10-PCS | Performed by: STUDENT IN AN ORGANIZED HEALTH CARE EDUCATION/TRAINING PROGRAM

## 2025-09-02 PROCEDURE — 36620 INSERTION CATHETER ARTERY: CPT | Mod: XU,GC,, | Performed by: STUDENT IN AN ORGANIZED HEALTH CARE EDUCATION/TRAINING PROGRAM

## 2025-09-02 PROCEDURE — 8E0W4EN FLUORESCENCE GUIDED PROCEDURE OF TRUNK REGION USING PAFOLACIANINE, PERCUTANEOUS ENDOSCOPIC APPROACH: ICD-10-PCS | Performed by: STUDENT IN AN ORGANIZED HEALTH CARE EDUCATION/TRAINING PROGRAM

## 2025-09-02 PROCEDURE — 27201423 OPTIME MED/SURG SUP & DEVICES STERILE SUPPLY: Performed by: STUDENT IN AN ORGANIZED HEALTH CARE EDUCATION/TRAINING PROGRAM

## 2025-09-02 PROCEDURE — 25000003 PHARM REV CODE 250

## 2025-09-02 PROCEDURE — 94640 AIRWAY INHALATION TREATMENT: CPT

## 2025-09-02 PROCEDURE — 27000221 HC OXYGEN, UP TO 24 HOURS

## 2025-09-02 PROCEDURE — 63600175 PHARM REV CODE 636 W HCPCS: Mod: UD

## 2025-09-02 PROCEDURE — A4216 STERILE WATER/SALINE, 10 ML: HCPCS | Performed by: STUDENT IN AN ORGANIZED HEALTH CARE EDUCATION/TRAINING PROGRAM

## 2025-09-02 PROCEDURE — 71000015 HC POSTOP RECOV 1ST HR: Performed by: STUDENT IN AN ORGANIZED HEALTH CARE EDUCATION/TRAINING PROGRAM

## 2025-09-02 PROCEDURE — 0BBC4ZZ EXCISION OF RIGHT UPPER LUNG LOBE, PERCUTANEOUS ENDOSCOPIC APPROACH: ICD-10-PCS | Performed by: STUDENT IN AN ORGANIZED HEALTH CARE EDUCATION/TRAINING PROGRAM

## 2025-09-02 PROCEDURE — 63600175 PHARM REV CODE 636 W HCPCS: Performed by: STUDENT IN AN ORGANIZED HEALTH CARE EDUCATION/TRAINING PROGRAM

## 2025-09-02 PROCEDURE — 07B74ZX EXCISION OF THORAX LYMPHATIC, PERCUTANEOUS ENDOSCOPIC APPROACH, DIAGNOSTIC: ICD-10-PCS | Performed by: STUDENT IN AN ORGANIZED HEALTH CARE EDUCATION/TRAINING PROGRAM

## 2025-09-02 PROCEDURE — 71000016 HC POSTOP RECOV ADDL HR: Performed by: STUDENT IN AN ORGANIZED HEALTH CARE EDUCATION/TRAINING PROGRAM

## 2025-09-02 PROCEDURE — 36000712 HC OR TIME LEV V 1ST 15 MIN: Performed by: STUDENT IN AN ORGANIZED HEALTH CARE EDUCATION/TRAINING PROGRAM

## 2025-09-02 PROCEDURE — 86900 BLOOD TYPING SEROLOGIC ABO: CPT

## 2025-09-02 PROCEDURE — 37000009 HC ANESTHESIA EA ADD 15 MINS: Performed by: STUDENT IN AN ORGANIZED HEALTH CARE EDUCATION/TRAINING PROGRAM

## 2025-09-02 PROCEDURE — 99900035 HC TECH TIME PER 15 MIN (STAT)

## 2025-09-02 PROCEDURE — A9603 PHARM REV CODE 636 W HCPCS: HCPCS

## 2025-09-02 PROCEDURE — 25000242 PHARM REV CODE 250 ALT 637 W/ HCPCS: Performed by: STUDENT IN AN ORGANIZED HEALTH CARE EDUCATION/TRAINING PROGRAM

## 2025-09-02 PROCEDURE — 37000008 HC ANESTHESIA 1ST 15 MINUTES: Performed by: STUDENT IN AN ORGANIZED HEALTH CARE EDUCATION/TRAINING PROGRAM

## 2025-09-02 RX ORDER — PANTOPRAZOLE SODIUM 40 MG/1
40 TABLET, DELAYED RELEASE ORAL DAILY
Status: DISCONTINUED | OUTPATIENT
Start: 2025-09-03 | End: 2025-09-04 | Stop reason: HOSPADM

## 2025-09-02 RX ORDER — DULOXETIN HYDROCHLORIDE 20 MG/1
20 CAPSULE, DELAYED RELEASE ORAL DAILY
Status: DISCONTINUED | OUTPATIENT
Start: 2025-09-03 | End: 2025-09-04 | Stop reason: HOSPADM

## 2025-09-02 RX ORDER — DIPHENHYDRAMINE HYDROCHLORIDE 50 MG/ML
50 INJECTION, SOLUTION INTRAMUSCULAR; INTRAVENOUS
Status: COMPLETED | OUTPATIENT
Start: 2025-09-02 | End: 2025-09-02

## 2025-09-02 RX ORDER — LIDOCAINE HYDROCHLORIDE 10 MG/ML
1 INJECTION, SOLUTION EPIDURAL; INFILTRATION; INTRACAUDAL; PERINEURAL ONCE
Status: DISCONTINUED | OUTPATIENT
Start: 2025-09-02 | End: 2025-09-02 | Stop reason: HOSPADM

## 2025-09-02 RX ORDER — POLYETHYLENE GLYCOL 3350 17 G/17G
17 POWDER, FOR SOLUTION ORAL DAILY
Status: DISCONTINUED | OUTPATIENT
Start: 2025-09-03 | End: 2025-09-04 | Stop reason: HOSPADM

## 2025-09-02 RX ORDER — FAMOTIDINE 10 MG/ML
20 INJECTION, SOLUTION INTRAVENOUS
Status: CANCELLED | OUTPATIENT
Start: 2025-09-02 | End: 2025-09-02

## 2025-09-02 RX ORDER — KETAMINE HCL IN 0.9 % NACL 50 MG/5 ML
SYRINGE (ML) INTRAVENOUS
Status: DISCONTINUED | OUTPATIENT
Start: 2025-09-02 | End: 2025-09-02

## 2025-09-02 RX ORDER — OXYCODONE HYDROCHLORIDE 10 MG/1
10 TABLET ORAL EVERY 4 HOURS PRN
Refills: 0 | Status: DISCONTINUED | OUTPATIENT
Start: 2025-09-02 | End: 2025-09-04 | Stop reason: HOSPADM

## 2025-09-02 RX ORDER — IPRATROPIUM BROMIDE AND ALBUTEROL SULFATE 2.5; .5 MG/3ML; MG/3ML
3 SOLUTION RESPIRATORY (INHALATION)
Status: DISCONTINUED | OUTPATIENT
Start: 2025-09-02 | End: 2025-09-03

## 2025-09-02 RX ORDER — DOXEPIN HYDROCHLORIDE 10 MG/ML
3 SOLUTION ORAL NIGHTLY
Status: DISCONTINUED | OUTPATIENT
Start: 2025-09-02 | End: 2025-09-04 | Stop reason: HOSPADM

## 2025-09-02 RX ORDER — BUPIVACAINE HYDROCHLORIDE 2.5 MG/ML
INJECTION, SOLUTION EPIDURAL; INFILTRATION; INTRACAUDAL; PERINEURAL
Status: DISCONTINUED | OUTPATIENT
Start: 2025-09-02 | End: 2025-09-02 | Stop reason: HOSPADM

## 2025-09-02 RX ORDER — HYDRALAZINE HYDROCHLORIDE 20 MG/ML
10 INJECTION INTRAMUSCULAR; INTRAVENOUS EVERY 6 HOURS PRN
Status: DISCONTINUED | OUTPATIENT
Start: 2025-09-02 | End: 2025-09-02

## 2025-09-02 RX ORDER — CEFAZOLIN 2 G/1
2 INJECTION, POWDER, FOR SOLUTION INTRAMUSCULAR; INTRAVENOUS
Status: COMPLETED | OUTPATIENT
Start: 2025-09-02 | End: 2025-09-03

## 2025-09-02 RX ORDER — ATORVASTATIN CALCIUM 40 MG/1
80 TABLET, FILM COATED ORAL DAILY
Status: DISCONTINUED | OUTPATIENT
Start: 2025-09-03 | End: 2025-09-04 | Stop reason: HOSPADM

## 2025-09-02 RX ORDER — PHENYLEPHRINE HCL IN 0.9% NACL 1 MG/10 ML
SYRINGE (ML) INTRAVENOUS
Status: DISCONTINUED | OUTPATIENT
Start: 2025-09-02 | End: 2025-09-02

## 2025-09-02 RX ORDER — SODIUM CHLORIDE 0.9 % (FLUSH) 0.9 %
10 SYRINGE (ML) INJECTION
OUTPATIENT
Start: 2025-09-02

## 2025-09-02 RX ORDER — HEPARIN 100 UNIT/ML
500 SYRINGE INTRAVENOUS
Status: CANCELLED | OUTPATIENT
Start: 2025-09-02

## 2025-09-02 RX ORDER — SODIUM CHLORIDE 0.9 % (FLUSH) 0.9 %
10 SYRINGE (ML) INJECTION
Status: CANCELLED | OUTPATIENT
Start: 2025-09-02

## 2025-09-02 RX ORDER — OXYCODONE HYDROCHLORIDE 5 MG/1
5 TABLET ORAL EVERY 4 HOURS PRN
Refills: 0 | Status: DISCONTINUED | OUTPATIENT
Start: 2025-09-02 | End: 2025-09-04 | Stop reason: HOSPADM

## 2025-09-02 RX ORDER — HALOPERIDOL LACTATE 5 MG/ML
0.5 INJECTION, SOLUTION INTRAMUSCULAR EVERY 10 MIN PRN
Status: DISCONTINUED | OUTPATIENT
Start: 2025-09-02 | End: 2025-09-02 | Stop reason: HOSPADM

## 2025-09-02 RX ORDER — ONDANSETRON HYDROCHLORIDE 2 MG/ML
4 INJECTION, SOLUTION INTRAVENOUS
Status: COMPLETED | OUTPATIENT
Start: 2025-09-02 | End: 2025-09-02

## 2025-09-02 RX ORDER — METOPROLOL SUCCINATE 100 MG/1
200 TABLET, EXTENDED RELEASE ORAL DAILY
Status: DISCONTINUED | OUTPATIENT
Start: 2025-09-03 | End: 2025-09-04 | Stop reason: HOSPADM

## 2025-09-02 RX ORDER — FAMOTIDINE 10 MG/ML
20 INJECTION, SOLUTION INTRAVENOUS
Status: COMPLETED | OUTPATIENT
Start: 2025-09-02 | End: 2025-09-02

## 2025-09-02 RX ORDER — GABAPENTIN 100 MG/1
200 CAPSULE ORAL 3 TIMES DAILY
Status: DISCONTINUED | OUTPATIENT
Start: 2025-09-02 | End: 2025-09-04 | Stop reason: HOSPADM

## 2025-09-02 RX ORDER — DIPHENHYDRAMINE HYDROCHLORIDE 50 MG/ML
50 INJECTION, SOLUTION INTRAMUSCULAR; INTRAVENOUS
Status: CANCELLED | OUTPATIENT
Start: 2025-09-02 | End: 2025-09-02

## 2025-09-02 RX ORDER — SODIUM CHLORIDE 0.9 % (FLUSH) 0.9 %
10 SYRINGE (ML) INJECTION
Status: DISCONTINUED | OUTPATIENT
Start: 2025-09-02 | End: 2025-09-02 | Stop reason: HOSPADM

## 2025-09-02 RX ORDER — CEFAZOLIN 2 G/1
2 INJECTION, POWDER, FOR SOLUTION INTRAMUSCULAR; INTRAVENOUS
Status: COMPLETED | OUTPATIENT
Start: 2025-09-02 | End: 2025-09-02

## 2025-09-02 RX ORDER — SODIUM CHLORIDE 9 MG/ML
INJECTION, SOLUTION INTRAMUSCULAR; INTRAVENOUS; SUBCUTANEOUS
Status: DISCONTINUED | OUTPATIENT
Start: 2025-09-02 | End: 2025-09-02 | Stop reason: HOSPADM

## 2025-09-02 RX ORDER — ONDANSETRON HYDROCHLORIDE 2 MG/ML
INJECTION, SOLUTION INTRAVENOUS
Status: DISCONTINUED | OUTPATIENT
Start: 2025-09-02 | End: 2025-09-02

## 2025-09-02 RX ORDER — ONDANSETRON HYDROCHLORIDE 2 MG/ML
4 INJECTION, SOLUTION INTRAVENOUS
OUTPATIENT
Start: 2025-09-02 | End: 2025-09-02

## 2025-09-02 RX ORDER — HEPARIN 100 UNIT/ML
500 SYRINGE INTRAVENOUS
Status: DISCONTINUED | OUTPATIENT
Start: 2025-09-02 | End: 2025-09-04 | Stop reason: HOSPADM

## 2025-09-02 RX ORDER — PROPOFOL 10 MG/ML
VIAL (ML) INTRAVENOUS
Status: DISCONTINUED | OUTPATIENT
Start: 2025-09-02 | End: 2025-09-02

## 2025-09-02 RX ORDER — METHOCARBAMOL 500 MG/1
500 TABLET, FILM COATED ORAL 4 TIMES DAILY
Status: DISCONTINUED | OUTPATIENT
Start: 2025-09-02 | End: 2025-09-04 | Stop reason: HOSPADM

## 2025-09-02 RX ORDER — HEPARIN 100 UNIT/ML
500 SYRINGE INTRAVENOUS
OUTPATIENT
Start: 2025-09-02

## 2025-09-02 RX ORDER — BUPIVACAINE 13.3 MG/ML
INJECTION, SUSPENSION, LIPOSOMAL INFILTRATION
Status: DISCONTINUED | OUTPATIENT
Start: 2025-09-02 | End: 2025-09-02 | Stop reason: HOSPADM

## 2025-09-02 RX ORDER — AMOXICILLIN 250 MG
1 CAPSULE ORAL 2 TIMES DAILY
Status: DISCONTINUED | OUTPATIENT
Start: 2025-09-02 | End: 2025-09-04 | Stop reason: HOSPADM

## 2025-09-02 RX ORDER — ONDANSETRON 4 MG/1
4 TABLET, ORALLY DISINTEGRATING ORAL EVERY 6 HOURS PRN
Status: DISCONTINUED | OUTPATIENT
Start: 2025-09-02 | End: 2025-09-04 | Stop reason: HOSPADM

## 2025-09-02 RX ORDER — HYDRALAZINE HYDROCHLORIDE 20 MG/ML
10 INJECTION INTRAMUSCULAR; INTRAVENOUS EVERY 6 HOURS PRN
Status: DISCONTINUED | OUTPATIENT
Start: 2025-09-02 | End: 2025-09-04 | Stop reason: HOSPADM

## 2025-09-02 RX ORDER — FENTANYL CITRATE 50 UG/ML
INJECTION, SOLUTION INTRAMUSCULAR; INTRAVENOUS
Status: DISCONTINUED | OUTPATIENT
Start: 2025-09-02 | End: 2025-09-02

## 2025-09-02 RX ORDER — ACETAMINOPHEN 500 MG
1000 TABLET ORAL
Status: COMPLETED | OUTPATIENT
Start: 2025-09-02 | End: 2025-09-02

## 2025-09-02 RX ORDER — LIDOCAINE HYDROCHLORIDE 20 MG/ML
INJECTION INTRAVENOUS
Status: DISCONTINUED | OUTPATIENT
Start: 2025-09-02 | End: 2025-09-02

## 2025-09-02 RX ORDER — ENOXAPARIN SODIUM 100 MG/ML
40 INJECTION SUBCUTANEOUS EVERY 24 HOURS
Status: DISCONTINUED | OUTPATIENT
Start: 2025-09-03 | End: 2025-09-04 | Stop reason: HOSPADM

## 2025-09-02 RX ORDER — CYCLOBENZAPRINE HCL 10 MG
10 TABLET ORAL NIGHTLY
Status: DISCONTINUED | OUTPATIENT
Start: 2025-09-02 | End: 2025-09-04 | Stop reason: HOSPADM

## 2025-09-02 RX ORDER — DEXAMETHASONE SODIUM PHOSPHATE 4 MG/ML
INJECTION, SOLUTION INTRA-ARTICULAR; INTRALESIONAL; INTRAMUSCULAR; INTRAVENOUS; SOFT TISSUE
Status: DISCONTINUED | OUTPATIENT
Start: 2025-09-02 | End: 2025-09-02

## 2025-09-02 RX ORDER — DOXEPIN 3 MG/1
3 TABLET, FILM COATED ORAL NIGHTLY
Status: DISCONTINUED | OUTPATIENT
Start: 2025-09-02 | End: 2025-09-02

## 2025-09-02 RX ORDER — ONDANSETRON HYDROCHLORIDE 2 MG/ML
4 INJECTION, SOLUTION INTRAVENOUS
Status: CANCELLED | OUTPATIENT
Start: 2025-09-02 | End: 2025-09-02

## 2025-09-02 RX ORDER — SODIUM CHLORIDE 0.9 % (FLUSH) 0.9 %
10 SYRINGE (ML) INJECTION
Status: DISCONTINUED | OUTPATIENT
Start: 2025-09-02 | End: 2025-09-04 | Stop reason: HOSPADM

## 2025-09-02 RX ORDER — GLUCAGON 1 MG
1 KIT INJECTION
Status: DISCONTINUED | OUTPATIENT
Start: 2025-09-02 | End: 2025-09-02 | Stop reason: HOSPADM

## 2025-09-02 RX ORDER — BISACODYL 10 MG/1
10 SUPPOSITORY RECTAL DAILY PRN
Status: DISCONTINUED | OUTPATIENT
Start: 2025-09-02 | End: 2025-09-04 | Stop reason: HOSPADM

## 2025-09-02 RX ORDER — ACETAMINOPHEN 325 MG/1
650 TABLET ORAL EVERY 8 HOURS
Status: DISCONTINUED | OUTPATIENT
Start: 2025-09-02 | End: 2025-09-04 | Stop reason: HOSPADM

## 2025-09-02 RX ORDER — FAMOTIDINE 10 MG/ML
20 INJECTION, SOLUTION INTRAVENOUS
OUTPATIENT
Start: 2025-09-02 | End: 2025-09-02

## 2025-09-02 RX ORDER — ROCURONIUM BROMIDE 10 MG/ML
INJECTION, SOLUTION INTRAVENOUS
Status: DISCONTINUED | OUTPATIENT
Start: 2025-09-02 | End: 2025-09-02

## 2025-09-02 RX ORDER — HYDROMORPHONE HYDROCHLORIDE 1 MG/ML
0.2 INJECTION, SOLUTION INTRAMUSCULAR; INTRAVENOUS; SUBCUTANEOUS EVERY 5 MIN PRN
Status: DISCONTINUED | OUTPATIENT
Start: 2025-09-02 | End: 2025-09-02 | Stop reason: HOSPADM

## 2025-09-02 RX ADMIN — SENNOSIDES, DOCUSATE SODIUM 1 TABLET: 50; 8.6 TABLET, FILM COATED ORAL at 09:09

## 2025-09-02 RX ADMIN — ACETAMINOPHEN 1000 MG: 500 TABLET ORAL at 09:09

## 2025-09-02 RX ADMIN — ROCURONIUM BROMIDE 40 MG: 10 INJECTION, SOLUTION INTRAVENOUS at 02:09

## 2025-09-02 RX ADMIN — ONDANSETRON 4 MG: 2 INJECTION INTRAMUSCULAR; INTRAVENOUS at 09:09

## 2025-09-02 RX ADMIN — PHENYLEPHRINE HYDROCHLORIDE 0.5 MCG/KG/MIN: 10 INJECTION INTRAVENOUS at 02:09

## 2025-09-02 RX ADMIN — ONDANSETRON 4 MG: 2 INJECTION INTRAMUSCULAR; INTRAVENOUS at 05:09

## 2025-09-02 RX ADMIN — GABAPENTIN 400 MG: 300 CAPSULE ORAL at 09:09

## 2025-09-02 RX ADMIN — OXYCODONE HYDROCHLORIDE 5 MG: 5 TABLET ORAL at 11:09

## 2025-09-02 RX ADMIN — METHOCARBAMOL 500 MG: 500 TABLET ORAL at 09:09

## 2025-09-02 RX ADMIN — CYCLOBENZAPRINE 10 MG: 10 TABLET, FILM COATED ORAL at 09:09

## 2025-09-02 RX ADMIN — SODIUM CHLORIDE, SODIUM GLUCONATE, SODIUM ACETATE, POTASSIUM CHLORIDE, MAGNESIUM CHLORIDE, SODIUM PHOSPHATE, DIBASIC, AND POTASSIUM PHOSPHATE: .53; .5; .37; .037; .03; .012; .00082 INJECTION, SOLUTION INTRAVENOUS at 12:09

## 2025-09-02 RX ADMIN — GABAPENTIN 200 MG: 100 CAPSULE ORAL at 09:09

## 2025-09-02 RX ADMIN — HYDROMORPHONE HYDROCHLORIDE 0.2 MG: 1 INJECTION, SOLUTION INTRAMUSCULAR; INTRAVENOUS; SUBCUTANEOUS at 06:09

## 2025-09-02 RX ADMIN — GLYCOPYRROLATE 0.2 MG: 0.2 INJECTION, SOLUTION INTRAMUSCULAR; INTRAVENOUS at 01:09

## 2025-09-02 RX ADMIN — SUGAMMADEX 200 MG: 100 INJECTION, SOLUTION INTRAVENOUS at 05:09

## 2025-09-02 RX ADMIN — Medication 100 MCG: at 02:09

## 2025-09-02 RX ADMIN — PAFOLACIANINE INJECTION 2.4 MG: 3.2 INJECTION INTRAVENOUS at 09:09

## 2025-09-02 RX ADMIN — SODIUM CHLORIDE: 9 INJECTION, SOLUTION INTRAVENOUS at 01:09

## 2025-09-02 RX ADMIN — CEFAZOLIN 2 G: 2 INJECTION, POWDER, FOR SOLUTION INTRAMUSCULAR; INTRAVENOUS at 09:09

## 2025-09-02 RX ADMIN — DOXEPIN HYDROCHLORIDE 3 MG: 10 SOLUTION ORAL at 09:09

## 2025-09-02 RX ADMIN — DIPHENHYDRAMINE HYDROCHLORIDE 50 MG: 50 INJECTION, SOLUTION INTRAMUSCULAR; INTRAVENOUS at 09:09

## 2025-09-02 RX ADMIN — CEFAZOLIN 2 G: 2 INJECTION, POWDER, FOR SOLUTION INTRAMUSCULAR; INTRAVENOUS at 02:09

## 2025-09-02 RX ADMIN — FENTANYL CITRATE 100 MCG: 50 INJECTION, SOLUTION INTRAMUSCULAR; INTRAVENOUS at 01:09

## 2025-09-02 RX ADMIN — OXYCODONE HYDROCHLORIDE 10 MG: 10 TABLET ORAL at 06:09

## 2025-09-02 RX ADMIN — PROPOFOL 50 MG: 10 INJECTION, EMULSION INTRAVENOUS at 01:09

## 2025-09-02 RX ADMIN — IPRATROPIUM BROMIDE AND ALBUTEROL SULFATE 3 ML: 2.5; .5 SOLUTION RESPIRATORY (INHALATION) at 07:09

## 2025-09-02 RX ADMIN — ROCURONIUM BROMIDE 60 MG: 10 INJECTION, SOLUTION INTRAVENOUS at 01:09

## 2025-09-02 RX ADMIN — DEXAMETHASONE SODIUM PHOSPHATE 8 MG: 4 INJECTION, SOLUTION INTRAMUSCULAR; INTRAVENOUS at 01:09

## 2025-09-02 RX ADMIN — HYDROMORPHONE HYDROCHLORIDE 0.2 MG: 1 INJECTION, SOLUTION INTRAMUSCULAR; INTRAVENOUS; SUBCUTANEOUS at 07:09

## 2025-09-02 RX ADMIN — ACETAMINOPHEN 650 MG: 325 TABLET ORAL at 09:09

## 2025-09-02 RX ADMIN — ROCURONIUM BROMIDE 30 MG: 10 INJECTION, SOLUTION INTRAVENOUS at 03:09

## 2025-09-02 RX ADMIN — LIDOCAINE HYDROCHLORIDE 100 MG: 20 INJECTION INTRAVENOUS at 01:09

## 2025-09-02 RX ADMIN — PROPOFOL 30 MG: 10 INJECTION, EMULSION INTRAVENOUS at 02:09

## 2025-09-02 RX ADMIN — PROPOFOL 100 MG: 10 INJECTION, EMULSION INTRAVENOUS at 01:09

## 2025-09-02 RX ADMIN — Medication 10 MG: at 02:09

## 2025-09-02 RX ADMIN — Medication 20 MG: at 03:09

## 2025-09-02 RX ADMIN — Medication 20 MG: at 01:09

## 2025-09-02 RX ADMIN — FAMOTIDINE 20 MG: 10 INJECTION, SOLUTION INTRAVENOUS at 09:09

## 2025-09-03 PROBLEM — E66.9 OBESITY: Status: ACTIVE | Noted: 2025-09-03

## 2025-09-03 PROBLEM — C34.91 MALIGNANT NEOPLASM OF RIGHT LUNG: Status: ACTIVE | Noted: 2025-09-03

## 2025-09-03 PROBLEM — C34.11 CANCER OF UPPER LOBE OF RIGHT LUNG: Status: ACTIVE | Noted: 2025-09-03

## 2025-09-03 LAB
ANION GAP (OHS): 10 MMOL/L (ref 8–16)
BUN SERPL-MCNC: 26 MG/DL (ref 8–23)
CALCIUM SERPL-MCNC: 8.7 MG/DL (ref 8.7–10.5)
CHLORIDE SERPL-SCNC: 104 MMOL/L (ref 95–110)
CO2 SERPL-SCNC: 24 MMOL/L (ref 23–29)
CREAT SERPL-MCNC: 1.3 MG/DL (ref 0.5–1.4)
GFR SERPLBLD CREATININE-BSD FMLA CKD-EPI: 44 ML/MIN/1.73/M2
GLUCOSE SERPL-MCNC: 134 MG/DL (ref 70–110)
POCT GLUCOSE: 141 MG/DL (ref 70–110)
POTASSIUM SERPL-SCNC: 4.8 MMOL/L (ref 3.5–5.1)
SODIUM SERPL-SCNC: 138 MMOL/L (ref 136–145)

## 2025-09-03 PROCEDURE — 25000003 PHARM REV CODE 250

## 2025-09-03 PROCEDURE — 94799 UNLISTED PULMONARY SVC/PX: CPT

## 2025-09-03 PROCEDURE — 99900035 HC TECH TIME PER 15 MIN (STAT)

## 2025-09-03 PROCEDURE — 97116 GAIT TRAINING THERAPY: CPT

## 2025-09-03 PROCEDURE — 97535 SELF CARE MNGMENT TRAINING: CPT

## 2025-09-03 PROCEDURE — 97530 THERAPEUTIC ACTIVITIES: CPT

## 2025-09-03 PROCEDURE — 80048 BASIC METABOLIC PNL TOTAL CA: CPT | Performed by: STUDENT IN AN ORGANIZED HEALTH CARE EDUCATION/TRAINING PROGRAM

## 2025-09-03 PROCEDURE — 27000221 HC OXYGEN, UP TO 24 HOURS

## 2025-09-03 PROCEDURE — 27000646 HC AEROBIKA DEVICE

## 2025-09-03 PROCEDURE — 97161 PT EVAL LOW COMPLEX 20 MIN: CPT

## 2025-09-03 PROCEDURE — 97165 OT EVAL LOW COMPLEX 30 MIN: CPT

## 2025-09-03 PROCEDURE — 25000242 PHARM REV CODE 250 ALT 637 W/ HCPCS

## 2025-09-03 PROCEDURE — 63600175 PHARM REV CODE 636 W HCPCS: Performed by: STUDENT IN AN ORGANIZED HEALTH CARE EDUCATION/TRAINING PROGRAM

## 2025-09-03 PROCEDURE — 94640 AIRWAY INHALATION TREATMENT: CPT

## 2025-09-03 PROCEDURE — 94761 N-INVAS EAR/PLS OXIMETRY MLT: CPT

## 2025-09-03 PROCEDURE — 25000003 PHARM REV CODE 250: Performed by: STUDENT IN AN ORGANIZED HEALTH CARE EDUCATION/TRAINING PROGRAM

## 2025-09-03 PROCEDURE — 99024 POSTOP FOLLOW-UP VISIT: CPT | Mod: POP,,, | Performed by: STUDENT IN AN ORGANIZED HEALTH CARE EDUCATION/TRAINING PROGRAM

## 2025-09-03 PROCEDURE — 20600001 HC STEP DOWN PRIVATE ROOM

## 2025-09-03 PROCEDURE — 94664 DEMO&/EVAL PT USE INHALER: CPT

## 2025-09-03 PROCEDURE — 36415 COLL VENOUS BLD VENIPUNCTURE: CPT | Performed by: STUDENT IN AN ORGANIZED HEALTH CARE EDUCATION/TRAINING PROGRAM

## 2025-09-03 RX ORDER — ACETYLCYSTEINE 100 MG/ML
4 SOLUTION ORAL; RESPIRATORY (INHALATION)
Status: DISCONTINUED | OUTPATIENT
Start: 2025-09-04 | End: 2025-09-04 | Stop reason: HOSPADM

## 2025-09-03 RX ORDER — ACETYLCYSTEINE 100 MG/ML
4 SOLUTION ORAL; RESPIRATORY (INHALATION)
Status: DISCONTINUED | OUTPATIENT
Start: 2025-09-03 | End: 2025-09-03

## 2025-09-03 RX ORDER — IPRATROPIUM BROMIDE AND ALBUTEROL SULFATE 2.5; .5 MG/3ML; MG/3ML
3 SOLUTION RESPIRATORY (INHALATION)
Status: DISCONTINUED | OUTPATIENT
Start: 2025-09-03 | End: 2025-09-04 | Stop reason: HOSPADM

## 2025-09-03 RX ORDER — GUAIFENESIN 600 MG/1
600 TABLET, EXTENDED RELEASE ORAL 2 TIMES DAILY
Status: DISCONTINUED | OUTPATIENT
Start: 2025-09-03 | End: 2025-09-04 | Stop reason: HOSPADM

## 2025-09-03 RX ADMIN — IPRATROPIUM BROMIDE AND ALBUTEROL SULFATE 3 ML: 2.5; .5 SOLUTION RESPIRATORY (INHALATION) at 03:09

## 2025-09-03 RX ADMIN — OXYCODONE HYDROCHLORIDE 10 MG: 10 TABLET ORAL at 03:09

## 2025-09-03 RX ADMIN — METHOCARBAMOL 500 MG: 500 TABLET ORAL at 01:09

## 2025-09-03 RX ADMIN — OXYCODONE HYDROCHLORIDE 10 MG: 10 TABLET ORAL at 08:09

## 2025-09-03 RX ADMIN — OXYCODONE HYDROCHLORIDE 10 MG: 10 TABLET ORAL at 05:09

## 2025-09-03 RX ADMIN — CEFAZOLIN 2 G: 2 INJECTION, POWDER, FOR SOLUTION INTRAMUSCULAR; INTRAVENOUS at 05:09

## 2025-09-03 RX ADMIN — DOXEPIN HYDROCHLORIDE 3 MG: 10 SOLUTION ORAL at 08:09

## 2025-09-03 RX ADMIN — IPRATROPIUM BROMIDE AND ALBUTEROL SULFATE 3 ML: 2.5; .5 SOLUTION RESPIRATORY (INHALATION) at 07:09

## 2025-09-03 RX ADMIN — METHOCARBAMOL 500 MG: 500 TABLET ORAL at 08:09

## 2025-09-03 RX ADMIN — SENNOSIDES, DOCUSATE SODIUM 1 TABLET: 50; 8.6 TABLET, FILM COATED ORAL at 08:09

## 2025-09-03 RX ADMIN — ATORVASTATIN CALCIUM 80 MG: 40 TABLET, FILM COATED ORAL at 09:09

## 2025-09-03 RX ADMIN — OXYCODONE HYDROCHLORIDE 10 MG: 10 TABLET ORAL at 11:09

## 2025-09-03 RX ADMIN — ACETYLCYSTEINE 4 ML: 100 INHALANT RESPIRATORY (INHALATION) at 03:09

## 2025-09-03 RX ADMIN — PANTOPRAZOLE SODIUM 40 MG: 40 TABLET, DELAYED RELEASE ORAL at 09:09

## 2025-09-03 RX ADMIN — GABAPENTIN 200 MG: 100 CAPSULE ORAL at 09:09

## 2025-09-03 RX ADMIN — METHOCARBAMOL 500 MG: 500 TABLET ORAL at 09:09

## 2025-09-03 RX ADMIN — GUAIFENESIN 600 MG: 600 TABLET, EXTENDED RELEASE ORAL at 08:09

## 2025-09-03 RX ADMIN — ACETAMINOPHEN 650 MG: 325 TABLET ORAL at 05:09

## 2025-09-03 RX ADMIN — POLYETHYLENE GLYCOL 3350 17 G: 17 POWDER, FOR SOLUTION ORAL at 09:09

## 2025-09-03 RX ADMIN — ACETYLCYSTEINE 4 ML: 100 INHALANT RESPIRATORY (INHALATION) at 07:09

## 2025-09-03 RX ADMIN — GABAPENTIN 200 MG: 100 CAPSULE ORAL at 08:09

## 2025-09-03 RX ADMIN — ENOXAPARIN SODIUM 40 MG: 40 INJECTION SUBCUTANEOUS at 05:09

## 2025-09-03 RX ADMIN — DULOXETINE 20 MG: 20 CAPSULE, DELAYED RELEASE ORAL at 09:09

## 2025-09-03 RX ADMIN — CYCLOBENZAPRINE 10 MG: 10 TABLET, FILM COATED ORAL at 08:09

## 2025-09-03 RX ADMIN — SENNOSIDES, DOCUSATE SODIUM 1 TABLET: 50; 8.6 TABLET, FILM COATED ORAL at 09:09

## 2025-09-03 RX ADMIN — ACETAMINOPHEN 650 MG: 325 TABLET ORAL at 01:09

## 2025-09-03 RX ADMIN — GUAIFENESIN 600 MG: 600 TABLET, EXTENDED RELEASE ORAL at 06:09

## 2025-09-03 RX ADMIN — METOPROLOL SUCCINATE 200 MG: 100 TABLET, EXTENDED RELEASE ORAL at 09:09

## 2025-09-03 RX ADMIN — IPRATROPIUM BROMIDE AND ALBUTEROL SULFATE 3 ML: 2.5; .5 SOLUTION RESPIRATORY (INHALATION) at 12:09

## 2025-09-03 RX ADMIN — METHOCARBAMOL 500 MG: 500 TABLET ORAL at 05:09

## 2025-09-03 RX ADMIN — GABAPENTIN 200 MG: 100 CAPSULE ORAL at 03:09

## 2025-09-04 VITALS
HEART RATE: 78 BPM | TEMPERATURE: 99 F | OXYGEN SATURATION: 94 % | DIASTOLIC BLOOD PRESSURE: 63 MMHG | SYSTOLIC BLOOD PRESSURE: 137 MMHG | HEIGHT: 64 IN | BODY MASS INDEX: 35.46 KG/M2 | RESPIRATION RATE: 16 BRPM | WEIGHT: 207.69 LBS

## 2025-09-04 DIAGNOSIS — C34.11 CANCER OF UPPER LOBE OF RIGHT LUNG: Primary | ICD-10-CM

## 2025-09-04 LAB
ANION GAP (OHS): 7 MMOL/L (ref 8–16)
BUN SERPL-MCNC: 36 MG/DL (ref 8–23)
CALCIUM SERPL-MCNC: 8.9 MG/DL (ref 8.7–10.5)
CHLORIDE SERPL-SCNC: 103 MMOL/L (ref 95–110)
CO2 SERPL-SCNC: 25 MMOL/L (ref 23–29)
CREAT SERPL-MCNC: 1.4 MG/DL (ref 0.5–1.4)
GFR SERPLBLD CREATININE-BSD FMLA CKD-EPI: 41 ML/MIN/1.73/M2
GLUCOSE SERPL-MCNC: 104 MG/DL (ref 70–110)
POTASSIUM SERPL-SCNC: 3.9 MMOL/L (ref 3.5–5.1)
SODIUM SERPL-SCNC: 135 MMOL/L (ref 136–145)

## 2025-09-04 PROCEDURE — 25000003 PHARM REV CODE 250: Performed by: STUDENT IN AN ORGANIZED HEALTH CARE EDUCATION/TRAINING PROGRAM

## 2025-09-04 PROCEDURE — 80048 BASIC METABOLIC PNL TOTAL CA: CPT | Performed by: STUDENT IN AN ORGANIZED HEALTH CARE EDUCATION/TRAINING PROGRAM

## 2025-09-04 PROCEDURE — 94640 AIRWAY INHALATION TREATMENT: CPT

## 2025-09-04 PROCEDURE — 25000242 PHARM REV CODE 250 ALT 637 W/ HCPCS

## 2025-09-04 PROCEDURE — 25000003 PHARM REV CODE 250

## 2025-09-04 PROCEDURE — 36415 COLL VENOUS BLD VENIPUNCTURE: CPT | Performed by: STUDENT IN AN ORGANIZED HEALTH CARE EDUCATION/TRAINING PROGRAM

## 2025-09-04 PROCEDURE — 94761 N-INVAS EAR/PLS OXIMETRY MLT: CPT

## 2025-09-04 PROCEDURE — 99233 SBSQ HOSP IP/OBS HIGH 50: CPT | Mod: ,,, | Performed by: STUDENT IN AN ORGANIZED HEALTH CARE EDUCATION/TRAINING PROGRAM

## 2025-09-04 PROCEDURE — 94668 MNPJ CHEST WALL SBSQ: CPT

## 2025-09-04 RX ORDER — METHOCARBAMOL 500 MG/1
500 TABLET, FILM COATED ORAL 4 TIMES DAILY
Qty: 40 TABLET | Refills: 0 | Status: SHIPPED | OUTPATIENT
Start: 2025-09-04 | End: 2025-09-14

## 2025-09-04 RX ORDER — GABAPENTIN 100 MG/1
200 CAPSULE ORAL 3 TIMES DAILY
Qty: 180 CAPSULE | Refills: 0 | Status: SHIPPED | OUTPATIENT
Start: 2025-09-04 | End: 2025-10-04

## 2025-09-04 RX ORDER — OXYCODONE HYDROCHLORIDE 5 MG/1
5 TABLET ORAL EVERY 4 HOURS PRN
Qty: 28 TABLET | Refills: 0 | Status: SHIPPED | OUTPATIENT
Start: 2025-09-04

## 2025-09-04 RX ORDER — AMOXICILLIN 250 MG
1 CAPSULE ORAL 2 TIMES DAILY
Qty: 60 TABLET | Refills: 0 | Status: SHIPPED | OUTPATIENT
Start: 2025-09-04

## 2025-09-04 RX ADMIN — OXYCODONE HYDROCHLORIDE 5 MG: 5 TABLET ORAL at 01:09

## 2025-09-04 RX ADMIN — METHOCARBAMOL 500 MG: 500 TABLET ORAL at 09:09

## 2025-09-04 RX ADMIN — OXYCODONE HYDROCHLORIDE 10 MG: 10 TABLET ORAL at 05:09

## 2025-09-04 RX ADMIN — PANTOPRAZOLE SODIUM 40 MG: 40 TABLET, DELAYED RELEASE ORAL at 09:09

## 2025-09-04 RX ADMIN — SENNOSIDES, DOCUSATE SODIUM 1 TABLET: 50; 8.6 TABLET, FILM COATED ORAL at 09:09

## 2025-09-04 RX ADMIN — IPRATROPIUM BROMIDE AND ALBUTEROL SULFATE 3 ML: 2.5; .5 SOLUTION RESPIRATORY (INHALATION) at 12:09

## 2025-09-04 RX ADMIN — ACETYLCYSTEINE 4 ML: 100 INHALANT RESPIRATORY (INHALATION) at 09:09

## 2025-09-04 RX ADMIN — METHOCARBAMOL 500 MG: 500 TABLET ORAL at 01:09

## 2025-09-04 RX ADMIN — METOPROLOL SUCCINATE 200 MG: 100 TABLET, EXTENDED RELEASE ORAL at 09:09

## 2025-09-04 RX ADMIN — ATORVASTATIN CALCIUM 80 MG: 40 TABLET, FILM COATED ORAL at 09:09

## 2025-09-04 RX ADMIN — ACETAMINOPHEN 650 MG: 325 TABLET ORAL at 01:09

## 2025-09-04 RX ADMIN — GABAPENTIN 200 MG: 100 CAPSULE ORAL at 09:09

## 2025-09-04 RX ADMIN — GUAIFENESIN 600 MG: 600 TABLET, EXTENDED RELEASE ORAL at 09:09

## 2025-09-04 RX ADMIN — POLYETHYLENE GLYCOL 3350 17 G: 17 POWDER, FOR SOLUTION ORAL at 10:09

## 2025-09-04 RX ADMIN — IPRATROPIUM BROMIDE AND ALBUTEROL SULFATE 3 ML: 2.5; .5 SOLUTION RESPIRATORY (INHALATION) at 09:09

## 2025-09-04 RX ADMIN — DULOXETINE 20 MG: 20 CAPSULE, DELAYED RELEASE ORAL at 09:09

## 2025-09-06 LAB
ABO + RH BLD: NORMAL
ABO + RH BLD: NORMAL
BLD PROD TYP BPU: NORMAL
BLD PROD TYP BPU: NORMAL
BLOOD UNIT EXPIRATION DATE: NORMAL
BLOOD UNIT EXPIRATION DATE: NORMAL
BLOOD UNIT TYPE CODE: 5100
BLOOD UNIT TYPE CODE: 5100
CROSSMATCH INTERPRETATION: NORMAL
CROSSMATCH INTERPRETATION: NORMAL
DISPENSE STATUS: NORMAL
DISPENSE STATUS: NORMAL
UNIT NUMBER: NORMAL
UNIT NUMBER: NORMAL

## (undated) DEVICE — DRAIN CHAN RND HUBLS 8MM 24FR

## (undated) DEVICE — CANISTER SUCTION RIGID 3000CC

## (undated) DEVICE — DRAIN WOUND 19FR TROCAR

## (undated) DEVICE — SUT MONOCRYL 4-0 PS-2

## (undated) DEVICE — PADDING CAST 4IN SPECIALIST

## (undated) DEVICE — GOWN POLY REINF BRTH SLV LG

## (undated) DEVICE — TAPE SILK 3IN

## (undated) DEVICE — WIRE OLIVE THREADED 1.4X60MM
Type: IMPLANTABLE DEVICE | Site: FOOT | Status: NON-FUNCTIONAL
Removed: 2023-09-15

## (undated) DEVICE — MARGIN MARKER STANDARD 6 COLOR

## (undated) DEVICE — DRAPE ARM DAVINCI XI

## (undated) DEVICE — RELOAD SUREFORM 45 4.3 GRN 6R

## (undated) DEVICE — SPONGE DERMACEA GAUZE 4X4

## (undated) DEVICE — BLADE SURG #15 CARBON STEEL

## (undated) DEVICE — GLOVE SENSICARE PI SURG 7.5

## (undated) DEVICE — GOWN POLY REINF X-LONG XL

## (undated) DEVICE — TOURNIQUET 1 PORT RED 18X4IN

## (undated) DEVICE — SUT VICRYL PLUS 2-0 CT1 18

## (undated) DEVICE — SUT SILK 0 STRANDS 30IN BLK

## (undated) DEVICE — RELOAD ENDO GIA TRISTAPLE 45MM

## (undated) DEVICE — SUT 4-0 VICRYL / SH

## (undated) DEVICE — SOL 9P NACL IRR PIC IL

## (undated) DEVICE — ELECTRODE REM PLYHSV RETURN 9

## (undated) DEVICE — SUT 3-0 VICRYL / SH (J416)

## (undated) DEVICE — NDL SAFETY 25G X 1.5 ECLIPSE

## (undated) DEVICE — LABEL FOR UTILITY MARKER

## (undated) DEVICE — SYR ONLY LUER LOCK 20CC

## (undated) DEVICE — HEMOSTAT SURGICEL NUKNIT 3X4IN

## (undated) DEVICE — SUT 0 30IN ETHIBOND EXCEL G

## (undated) DEVICE — GLOVE SENSICARE PI SURG 6.5

## (undated) DEVICE — GLOVE SURG ULTRA TOUCH 7

## (undated) DEVICE — GLOVE SURG ULTRA TOUCH 7.5

## (undated) DEVICE — GAUZE SPONGE 4X4 12PLY

## (undated) DEVICE — GLOVE BIOGEL PI ORTHO PRO 7.5

## (undated) DEVICE — KIT SAHARA DRAPE DRAW/LIFT

## (undated) DEVICE — SOL WATER STRL IRR 1000ML

## (undated) DEVICE — SUT MCRYL PLUS 4-0 PS2 27IN

## (undated) DEVICE — SPONGE LAP 18X18 PREWASHED

## (undated) DEVICE — PORT AIRSEAL 12/120MM LPI

## (undated) DEVICE — GOWN SMART IMP BREATHABLE XXLG

## (undated) DEVICE — TRAY MINOR GEN SURG OMC

## (undated) DEVICE — GLOVE SURGEONS ULTRA TOUCH 6.5

## (undated) DEVICE — SUT MONOCRYL UD 4-0 27 PS-1

## (undated) DEVICE — DRAPE T EXTRM SURG 121X128X90

## (undated) DEVICE — BANDAGE ESMARK ELASTIC ST 4X9

## (undated) DEVICE — LOOP VESSEL BLUE MAXI

## (undated) DEVICE — STAPLER GIA HANDLE STD

## (undated) DEVICE — SET TUBE DAVINCI 5 HI FLOW INS

## (undated) DEVICE — DRAIN CHEST DRY SUCTION

## (undated) DEVICE — Device

## (undated) DEVICE — DRAPE COLUMN DAVINCI XI

## (undated) DEVICE — PAD SUREFIT GRND ELECTRD 10FT

## (undated) DEVICE — SUT 2-0 VICRYL / CT-1

## (undated) DEVICE — PAD ABDOMINAL STERILE 8X10IN

## (undated) DEVICE — SYR 10CC LUER LOCK

## (undated) DEVICE — OBTURATOR BLADELESS 8MM XI CLR

## (undated) DEVICE — SYR SLIP TIP 20CC

## (undated) DEVICE — SYR DISP LL 5CC

## (undated) DEVICE — TUBING SUC UNIV W/CONN 12FT

## (undated) DEVICE — PACK ELITE MINIVIEW DRAPE

## (undated) DEVICE — DRESSING TRANS 4X4 TEGADERM

## (undated) DEVICE — SEAL UNIVERSAL 5MM-8MM XI

## (undated) DEVICE — PAD PREPS ALCOHOL 2-PLY LARGE

## (undated) DEVICE — STAPLER SUREFORM CRVD TIP 45

## (undated) DEVICE — BLADE EZ CLEAN 2.5IN MODIFIED

## (undated) DEVICE — ELECTRODE BLADE INSULATED 1 IN

## (undated) DEVICE — CANISTER SUCTION 3000CC

## (undated) DEVICE — DRAIN BLAKE SIL HUBLS RND 19FR

## (undated) DEVICE — DRAPE ABDOMINAL TIBURON 14X11

## (undated) DEVICE — SUT ETHILON 4-0 BLK MONO

## (undated) DEVICE — KWIRE SMOOTH TRCR TIP .9X150MM
Type: IMPLANTABLE DEVICE | Site: FOOT | Status: NON-FUNCTIONAL
Removed: 2023-09-15

## (undated) DEVICE — DRAPE STERI INSTRUMENT 1018

## (undated) DEVICE — CANNULA SEAL 12MM

## (undated) DEVICE — SUT 0 VICRYL / CT-1

## (undated) DEVICE — DRAPE THREE-QUARTER 53X77IN

## (undated) DEVICE — DRAPE SCOPE PILLOW WARMER

## (undated) DEVICE — CANNULA REDUCER 12-8MM

## (undated) DEVICE — EVACUATOR WOUND BULB 100CC

## (undated) DEVICE — ELECTRODE MEGADYNE RETURN DUAL

## (undated) DEVICE — KIT ANTIFOG W/SPONG & FLUID

## (undated) DEVICE — SYR B-D DISP CONTROL 10CC100/C

## (undated) DEVICE — TUBING SPECLM SMOKE EVAC 10MM

## (undated) DEVICE — SPONGE GAUZE 16PLY 4X4

## (undated) DEVICE — KWIRE SMTH TRCR TIP 1.2X150MM
Type: IMPLANTABLE DEVICE | Site: FOOT | Status: NON-FUNCTIONAL
Removed: 2023-09-15

## (undated) DEVICE — ADHESIVE DERMABOND ADVANCED

## (undated) DEVICE — GAUZE DRAIN N WVN 6PLY 4X4IN

## (undated) DEVICE — SEALER MARYLAND 1 STEP 37CM

## (undated) DEVICE — SUT 2/0 30IN SILK BLK BRAI

## (undated) DEVICE — GLOVE SURG ULTRA TOUCH 6

## (undated) DEVICE — CONTAINER SPECIMEN OR STER 4OZ

## (undated) DEVICE — SUT CTD VICRYL 3-0 CR/SH

## (undated) DEVICE — SYR 30CC LUER LOCK

## (undated) DEVICE — GLOVE SENSICARE PI SURG 7

## (undated) DEVICE — NDL SPINAL 18GX3.5 SPINOCAN

## (undated) DEVICE — DRESSING N ADH OIL EMUL 3X3

## (undated) DEVICE — COVER LIGHT HANDLE

## (undated) DEVICE — ELECTRODE BLADE E-Z CLEAN 4IN

## (undated) DEVICE — JELLY KY LUBRICATING 5G PACKET

## (undated) DEVICE — TAPE CASTING 4 X 4YDS WHT

## (undated) DEVICE — COVER EQUIP 36X12 W/ELSTC BAND

## (undated) DEVICE — BAG TISS RETRV MONARCH 10MM

## (undated) DEVICE — STRIP STERI REIN CLSR 1/2X2IN

## (undated) DEVICE — BLADE AGGR LRG TOOTH MED 31X9

## (undated) DEVICE — APPLICATOR CHLORAPREP ORN 26ML

## (undated) DEVICE — BLADE SAW MED NAR 18.0X5.5MM

## (undated) DEVICE — NDL SAFETY 22G X 1.5 ECLIPSE

## (undated) DEVICE — SUT MONO 3-0 PS-2 18 PLST

## (undated) DEVICE — KWIRE SMTH TRCR TIP 1.1X150MM
Type: IMPLANTABLE DEVICE | Site: FOOT | Status: NON-FUNCTIONAL
Removed: 2023-09-15

## (undated) DEVICE — DRESSING TRANS 2X2 TEGADERM

## (undated) DEVICE — NDL HYPODERMIC BLUNT 18G 1.5IN

## (undated) DEVICE — SET TRI-LUMEN FILTERED TUBE

## (undated) DEVICE — DRAPE INCISE IOBAN 2 23X17IN

## (undated) DEVICE — NDL 18GA

## (undated) DEVICE — DRESSING SURGICAL 1X3

## (undated) DEVICE — GRID BIOPSY SPEC RADIOLOGY4.65